# Patient Record
Sex: FEMALE | Race: WHITE | NOT HISPANIC OR LATINO | Employment: OTHER | URBAN - METROPOLITAN AREA
[De-identification: names, ages, dates, MRNs, and addresses within clinical notes are randomized per-mention and may not be internally consistent; named-entity substitution may affect disease eponyms.]

---

## 2017-01-27 ENCOUNTER — HOSPITAL ENCOUNTER (INPATIENT)
Facility: HOSPITAL | Age: 67
LOS: 4 days | Discharge: HOME HEALTH | End: 2017-01-31
Attending: EMERGENCY MEDICINE | Admitting: HOSPITALIST

## 2017-01-27 ENCOUNTER — APPOINTMENT (OUTPATIENT)
Dept: GENERAL RADIOLOGY | Facility: HOSPITAL | Age: 67
End: 2017-01-27

## 2017-01-27 DIAGNOSIS — A41.9 SEPSIS, DUE TO UNSPECIFIED ORGANISM: ICD-10-CM

## 2017-01-27 DIAGNOSIS — M17.0 PRIMARY OSTEOARTHRITIS OF BOTH KNEES: ICD-10-CM

## 2017-01-27 DIAGNOSIS — R53.1 GENERAL WEAKNESS: ICD-10-CM

## 2017-01-27 DIAGNOSIS — J18.9 PNEUMONIA OF RIGHT LOWER LOBE DUE TO INFECTIOUS ORGANISM: Primary | ICD-10-CM

## 2017-01-27 DIAGNOSIS — L03.114 LEFT ARM CELLULITIS: ICD-10-CM

## 2017-01-27 LAB
ALBUMIN SERPL-MCNC: 4.2 G/DL (ref 3.5–5.2)
ALBUMIN/GLOB SERPL: 1 G/DL
ALP SERPL-CCNC: 60 U/L (ref 39–117)
ALT SERPL W P-5'-P-CCNC: 15 U/L (ref 1–33)
ANION GAP SERPL CALCULATED.3IONS-SCNC: 14.8 MMOL/L
AST SERPL-CCNC: 19 U/L (ref 1–32)
BASOPHILS # BLD AUTO: 0.02 10*3/MM3 (ref 0–0.2)
BASOPHILS NFR BLD AUTO: 0.1 % (ref 0–1.5)
BILIRUB SERPL-MCNC: 0.6 MG/DL (ref 0.1–1.2)
BUN BLD-MCNC: 31 MG/DL (ref 8–23)
BUN/CREAT SERPL: 23.7 (ref 7–25)
CALCIUM SPEC-SCNC: 9.9 MG/DL (ref 8.6–10.5)
CHLORIDE SERPL-SCNC: 98 MMOL/L (ref 98–107)
CO2 SERPL-SCNC: 30.2 MMOL/L (ref 22–29)
CREAT BLD-MCNC: 1.31 MG/DL (ref 0.57–1)
D-LACTATE SERPL-SCNC: 1.3 MMOL/L (ref 0.5–2)
D-LACTATE SERPL-SCNC: 2.8 MMOL/L (ref 0.5–2)
DEPRECATED RDW RBC AUTO: 46.8 FL (ref 37–54)
EOSINOPHIL # BLD AUTO: 0.08 10*3/MM3 (ref 0–0.7)
EOSINOPHIL NFR BLD AUTO: 0.5 % (ref 0.3–6.2)
ERYTHROCYTE [DISTWIDTH] IN BLOOD BY AUTOMATED COUNT: 13.8 % (ref 11.7–13)
GFR SERPL CREATININE-BSD FRML MDRD: 40 ML/MIN/1.73
GLOBULIN UR ELPH-MCNC: 4.2 GM/DL
GLUCOSE BLD-MCNC: 142 MG/DL (ref 65–99)
GLUCOSE BLDC GLUCOMTR-MCNC: 112 MG/DL (ref 70–130)
HCT VFR BLD AUTO: 52 % (ref 35.6–45.5)
HGB BLD-MCNC: 16 G/DL (ref 11.9–15.5)
HOLD SPECIMEN: NORMAL
IMM GRANULOCYTES # BLD: 0.03 10*3/MM3 (ref 0–0.03)
IMM GRANULOCYTES NFR BLD: 0.2 % (ref 0–0.5)
LYMPHOCYTES # BLD AUTO: 1.42 10*3/MM3 (ref 0.9–4.8)
LYMPHOCYTES NFR BLD AUTO: 9.7 % (ref 19.6–45.3)
MCH RBC QN AUTO: 28.4 PG (ref 26.9–32)
MCHC RBC AUTO-ENTMCNC: 30.8 G/DL (ref 32.4–36.3)
MCV RBC AUTO: 92.4 FL (ref 80.5–98.2)
MONOCYTES # BLD AUTO: 0.59 10*3/MM3 (ref 0.2–1.2)
MONOCYTES NFR BLD AUTO: 4 % (ref 5–12)
NEUTROPHILS # BLD AUTO: 12.54 10*3/MM3 (ref 1.9–8.1)
NEUTROPHILS NFR BLD AUTO: 85.5 % (ref 42.7–76)
NT-PROBNP SERPL-MCNC: 2262 PG/ML (ref 5–900)
PLATELET # BLD AUTO: 268 10*3/MM3 (ref 140–500)
PMV BLD AUTO: 9 FL (ref 6–12)
POTASSIUM BLD-SCNC: 4.8 MMOL/L (ref 3.5–5.2)
PROT SERPL-MCNC: 8.4 G/DL (ref 6–8.5)
RBC # BLD AUTO: 5.63 10*6/MM3 (ref 3.9–5.2)
SODIUM BLD-SCNC: 143 MMOL/L (ref 136–145)
TROPONIN T SERPL-MCNC: <0.01 NG/ML (ref 0–0.03)
WBC NRBC COR # BLD: 14.68 10*3/MM3 (ref 4.5–10.7)
WHOLE BLOOD HOLD SPECIMEN: NORMAL

## 2017-01-27 PROCEDURE — 93005 ELECTROCARDIOGRAM TRACING: CPT

## 2017-01-27 PROCEDURE — 83880 ASSAY OF NATRIURETIC PEPTIDE: CPT | Performed by: EMERGENCY MEDICINE

## 2017-01-27 PROCEDURE — 25010000002 PIPERACILLIN SOD-TAZOBACTAM PER 1 G: Performed by: PHYSICIAN ASSISTANT

## 2017-01-27 PROCEDURE — 83605 ASSAY OF LACTIC ACID: CPT | Performed by: EMERGENCY MEDICINE

## 2017-01-27 PROCEDURE — 87040 BLOOD CULTURE FOR BACTERIA: CPT | Performed by: EMERGENCY MEDICINE

## 2017-01-27 PROCEDURE — 93010 ELECTROCARDIOGRAM REPORT: CPT | Performed by: INTERNAL MEDICINE

## 2017-01-27 PROCEDURE — 71020 HC CHEST PA AND LATERAL: CPT

## 2017-01-27 PROCEDURE — 99284 EMERGENCY DEPT VISIT MOD MDM: CPT

## 2017-01-27 PROCEDURE — 84484 ASSAY OF TROPONIN QUANT: CPT | Performed by: EMERGENCY MEDICINE

## 2017-01-27 PROCEDURE — 36415 COLL VENOUS BLD VENIPUNCTURE: CPT | Performed by: EMERGENCY MEDICINE

## 2017-01-27 PROCEDURE — 80053 COMPREHEN METABOLIC PANEL: CPT | Performed by: EMERGENCY MEDICINE

## 2017-01-27 PROCEDURE — 82962 GLUCOSE BLOOD TEST: CPT

## 2017-01-27 PROCEDURE — 85025 COMPLETE CBC W/AUTO DIFF WBC: CPT | Performed by: EMERGENCY MEDICINE

## 2017-01-27 RX ORDER — SODIUM CHLORIDE 0.9 % (FLUSH) 0.9 %
10 SYRINGE (ML) INJECTION AS NEEDED
Status: DISCONTINUED | OUTPATIENT
Start: 2017-01-27 | End: 2017-01-31 | Stop reason: HOSPADM

## 2017-01-27 RX ORDER — DICLOFENAC SODIUM 75 MG/1
75 TABLET, DELAYED RELEASE ORAL DAILY
COMMUNITY
End: 2021-01-05

## 2017-01-27 RX ORDER — ACETAMINOPHEN 500 MG
1000 TABLET ORAL ONCE
Status: COMPLETED | OUTPATIENT
Start: 2017-01-27 | End: 2017-01-27

## 2017-01-27 RX ORDER — ATORVASTATIN CALCIUM 10 MG/1
10 TABLET, FILM COATED ORAL NIGHTLY
Status: ON HOLD | COMMUNITY
Start: 2015-09-05 | End: 2023-01-21

## 2017-01-27 RX ORDER — LISINOPRIL AND HYDROCHLOROTHIAZIDE 20; 12.5 MG/1; MG/1
2 TABLET ORAL DAILY
Status: ON HOLD | COMMUNITY
End: 2023-01-21

## 2017-01-27 RX ADMIN — SODIUM CHLORIDE 1779 ML: 9 INJECTION, SOLUTION INTRAVENOUS at 21:33

## 2017-01-27 RX ADMIN — ACETAMINOPHEN 1000 MG: 500 TABLET ORAL at 21:43

## 2017-01-27 RX ADMIN — TAZOBACTAM SODIUM AND PIPERACILLIN SODIUM 4.5 G: 500; 4 INJECTION, SOLUTION INTRAVENOUS at 21:43

## 2017-01-28 ENCOUNTER — APPOINTMENT (OUTPATIENT)
Dept: CT IMAGING | Facility: HOSPITAL | Age: 67
End: 2017-01-28

## 2017-01-28 PROBLEM — I89.0 LYMPHEDEMA: Status: ACTIVE | Noted: 2017-01-28

## 2017-01-28 PROBLEM — L03.90 CELLULITIS: Status: ACTIVE | Noted: 2017-01-28

## 2017-01-28 PROBLEM — E11.9 DIABETES MELLITUS: Status: ACTIVE | Noted: 2017-01-28

## 2017-01-28 PROBLEM — C50.919 BREAST CANCER (HCC): Status: ACTIVE | Noted: 2017-01-28

## 2017-01-28 PROBLEM — I10 HYPERTENSION: Status: ACTIVE | Noted: 2017-01-28

## 2017-01-28 PROBLEM — M81.0 OSTEOPOROSIS: Status: ACTIVE | Noted: 2017-01-28

## 2017-01-28 PROBLEM — E78.5 HYPERLIPIDEMIA: Status: ACTIVE | Noted: 2017-01-28

## 2017-01-28 LAB
ANION GAP SERPL CALCULATED.3IONS-SCNC: 11.2 MMOL/L
B PERT DNA SPEC QL NAA+PROBE: NOT DETECTED
BASOPHILS # BLD AUTO: 0.02 10*3/MM3 (ref 0–0.2)
BASOPHILS NFR BLD AUTO: 0.2 % (ref 0–1.5)
BUN BLD-MCNC: 25 MG/DL (ref 8–23)
BUN/CREAT SERPL: 24 (ref 7–25)
C PNEUM DNA NPH QL NAA+NON-PROBE: NOT DETECTED
CALCIUM SPEC-SCNC: 8.6 MG/DL (ref 8.6–10.5)
CHLORIDE SERPL-SCNC: 96 MMOL/L (ref 98–107)
CO2 SERPL-SCNC: 25.8 MMOL/L (ref 22–29)
CREAT BLD-MCNC: 1.04 MG/DL (ref 0.57–1)
DEPRECATED RDW RBC AUTO: 44.4 FL (ref 37–54)
EOSINOPHIL # BLD AUTO: 0.22 10*3/MM3 (ref 0–0.7)
EOSINOPHIL NFR BLD AUTO: 2.4 % (ref 0.3–6.2)
ERYTHROCYTE [DISTWIDTH] IN BLOOD BY AUTOMATED COUNT: 13.7 % (ref 11.7–13)
FLUAV H1 2009 PAND RNA NPH QL NAA+PROBE: NOT DETECTED
FLUAV H1 HA GENE NPH QL NAA+PROBE: NOT DETECTED
FLUAV H3 RNA NPH QL NAA+PROBE: NOT DETECTED
FLUAV SUBTYP SPEC NAA+PROBE: NOT DETECTED
FLUBV RNA ISLT QL NAA+PROBE: NOT DETECTED
GFR SERPL CREATININE-BSD FRML MDRD: 53 ML/MIN/1.73
GLUCOSE BLD-MCNC: 245 MG/DL (ref 65–99)
GLUCOSE BLDC GLUCOMTR-MCNC: 180 MG/DL (ref 70–130)
GLUCOSE BLDC GLUCOMTR-MCNC: 201 MG/DL (ref 70–130)
GLUCOSE BLDC GLUCOMTR-MCNC: 216 MG/DL (ref 70–130)
GLUCOSE BLDC GLUCOMTR-MCNC: 231 MG/DL (ref 70–130)
HADV DNA SPEC NAA+PROBE: NOT DETECTED
HBA1C MFR BLD: 7.7 % (ref 4.8–5.6)
HCOV 229E RNA SPEC QL NAA+PROBE: NOT DETECTED
HCOV HKU1 RNA SPEC QL NAA+PROBE: NOT DETECTED
HCOV NL63 RNA SPEC QL NAA+PROBE: NOT DETECTED
HCOV OC43 RNA SPEC QL NAA+PROBE: NOT DETECTED
HCT VFR BLD AUTO: 43.3 % (ref 35.6–45.5)
HGB BLD-MCNC: 13.8 G/DL (ref 11.9–15.5)
HMPV RNA NPH QL NAA+NON-PROBE: NOT DETECTED
HPIV1 RNA SPEC QL NAA+PROBE: NOT DETECTED
HPIV2 RNA SPEC QL NAA+PROBE: NOT DETECTED
HPIV3 RNA NPH QL NAA+PROBE: NOT DETECTED
HPIV4 P GENE NPH QL NAA+PROBE: NOT DETECTED
IMM GRANULOCYTES # BLD: 0.03 10*3/MM3 (ref 0–0.03)
IMM GRANULOCYTES NFR BLD: 0.3 % (ref 0–0.5)
LYMPHOCYTES # BLD AUTO: 1.75 10*3/MM3 (ref 0.9–4.8)
LYMPHOCYTES NFR BLD AUTO: 18.8 % (ref 19.6–45.3)
M PNEUMO IGG SER IA-ACNC: NOT DETECTED
MCH RBC QN AUTO: 28.3 PG (ref 26.9–32)
MCHC RBC AUTO-ENTMCNC: 31.9 G/DL (ref 32.4–36.3)
MCV RBC AUTO: 88.9 FL (ref 80.5–98.2)
MONOCYTES # BLD AUTO: 0.67 10*3/MM3 (ref 0.2–1.2)
MONOCYTES NFR BLD AUTO: 7.2 % (ref 5–12)
NEUTROPHILS # BLD AUTO: 6.64 10*3/MM3 (ref 1.9–8.1)
NEUTROPHILS NFR BLD AUTO: 71.1 % (ref 42.7–76)
PLATELET # BLD AUTO: 226 10*3/MM3 (ref 140–500)
PMV BLD AUTO: 9.2 FL (ref 6–12)
POTASSIUM BLD-SCNC: 4 MMOL/L (ref 3.5–5.2)
RBC # BLD AUTO: 4.87 10*6/MM3 (ref 3.9–5.2)
RHINOVIRUS RNA SPEC NAA+PROBE: NOT DETECTED
RSV RNA NPH QL NAA+NON-PROBE: NOT DETECTED
SODIUM BLD-SCNC: 133 MMOL/L (ref 136–145)
WBC NRBC COR # BLD: 9.33 10*3/MM3 (ref 4.5–10.7)

## 2017-01-28 PROCEDURE — 82962 GLUCOSE BLOOD TEST: CPT

## 2017-01-28 PROCEDURE — 25010000002 VANCOMYCIN: Performed by: HOSPITALIST

## 2017-01-28 PROCEDURE — 71250 CT THORAX DX C-: CPT

## 2017-01-28 PROCEDURE — 87486 CHLMYD PNEUM DNA AMP PROBE: CPT | Performed by: HOSPITALIST

## 2017-01-28 PROCEDURE — 25010000002 ENOXAPARIN PER 10 MG: Performed by: HOSPITALIST

## 2017-01-28 PROCEDURE — 83036 HEMOGLOBIN GLYCOSYLATED A1C: CPT | Performed by: HOSPITALIST

## 2017-01-28 PROCEDURE — 94640 AIRWAY INHALATION TREATMENT: CPT

## 2017-01-28 PROCEDURE — 63710000001 INSULIN ASPART 100 UNIT/ML SOLUTION PEN-INJECTOR 3 ML PEN: Performed by: HOSPITALIST

## 2017-01-28 PROCEDURE — 87581 M.PNEUMON DNA AMP PROBE: CPT | Performed by: HOSPITALIST

## 2017-01-28 PROCEDURE — 80048 BASIC METABOLIC PNL TOTAL CA: CPT | Performed by: HOSPITALIST

## 2017-01-28 PROCEDURE — 25010000002 PIPERACILLIN SOD-TAZOBACTAM PER 1 G: Performed by: HOSPITALIST

## 2017-01-28 PROCEDURE — 87205 SMEAR GRAM STAIN: CPT | Performed by: INTERNAL MEDICINE

## 2017-01-28 PROCEDURE — 85025 COMPLETE CBC W/AUTO DIFF WBC: CPT | Performed by: HOSPITALIST

## 2017-01-28 PROCEDURE — 25010000002 VANCOMYCIN: Performed by: PHYSICIAN ASSISTANT

## 2017-01-28 PROCEDURE — 63710000001 INSULIN ASPART PER 5 UNITS: Performed by: HOSPITALIST

## 2017-01-28 PROCEDURE — 63710000001 INSULIN DETEMER PER 5 UNITS: Performed by: HOSPITALIST

## 2017-01-28 PROCEDURE — 87070 CULTURE OTHR SPECIMN AEROBIC: CPT | Performed by: INTERNAL MEDICINE

## 2017-01-28 PROCEDURE — 94799 UNLISTED PULMONARY SVC/PX: CPT

## 2017-01-28 PROCEDURE — 87798 DETECT AGENT NOS DNA AMP: CPT | Performed by: HOSPITALIST

## 2017-01-28 PROCEDURE — 87633 RESP VIRUS 12-25 TARGETS: CPT | Performed by: HOSPITALIST

## 2017-01-28 RX ORDER — ATORVASTATIN CALCIUM 10 MG/1
10 TABLET, FILM COATED ORAL NIGHTLY
Status: DISCONTINUED | OUTPATIENT
Start: 2017-01-28 | End: 2017-01-31 | Stop reason: HOSPADM

## 2017-01-28 RX ORDER — NICOTINE POLACRILEX 4 MG
15 LOZENGE BUCCAL
Status: DISCONTINUED | OUTPATIENT
Start: 2017-01-28 | End: 2017-01-31 | Stop reason: HOSPADM

## 2017-01-28 RX ORDER — ONDANSETRON 2 MG/ML
4 INJECTION INTRAMUSCULAR; INTRAVENOUS EVERY 6 HOURS PRN
Status: DISCONTINUED | OUTPATIENT
Start: 2017-01-28 | End: 2017-01-31 | Stop reason: HOSPADM

## 2017-01-28 RX ORDER — IPRATROPIUM BROMIDE AND ALBUTEROL SULFATE 2.5; .5 MG/3ML; MG/3ML
3 SOLUTION RESPIRATORY (INHALATION)
Status: DISCONTINUED | OUTPATIENT
Start: 2017-01-28 | End: 2017-01-31 | Stop reason: HOSPADM

## 2017-01-28 RX ORDER — GUAIFENESIN 600 MG/1
600 TABLET, EXTENDED RELEASE ORAL 2 TIMES DAILY
Status: DISCONTINUED | OUTPATIENT
Start: 2017-01-28 | End: 2017-01-31 | Stop reason: HOSPADM

## 2017-01-28 RX ORDER — SODIUM CHLORIDE 9 MG/ML
100 INJECTION, SOLUTION INTRAVENOUS CONTINUOUS
Status: DISCONTINUED | OUTPATIENT
Start: 2017-01-28 | End: 2017-01-28

## 2017-01-28 RX ORDER — DEXTROSE MONOHYDRATE 25 G/50ML
25 INJECTION, SOLUTION INTRAVENOUS
Status: DISCONTINUED | OUTPATIENT
Start: 2017-01-28 | End: 2017-01-31 | Stop reason: HOSPADM

## 2017-01-28 RX ORDER — ACETAMINOPHEN 325 MG/1
650 TABLET ORAL EVERY 6 HOURS PRN
Status: DISCONTINUED | OUTPATIENT
Start: 2017-01-28 | End: 2017-01-31 | Stop reason: HOSPADM

## 2017-01-28 RX ORDER — HYDROCODONE BITARTRATE AND ACETAMINOPHEN 5; 325 MG/1; MG/1
1 TABLET ORAL EVERY 6 HOURS PRN
Status: DISCONTINUED | OUTPATIENT
Start: 2017-01-28 | End: 2017-01-31 | Stop reason: HOSPADM

## 2017-01-28 RX ORDER — LISINOPRIL AND HYDROCHLOROTHIAZIDE 20; 12.5 MG/1; MG/1
2 TABLET ORAL DAILY
Status: DISCONTINUED | OUTPATIENT
Start: 2017-01-28 | End: 2017-01-31 | Stop reason: HOSPADM

## 2017-01-28 RX ADMIN — INSULIN ASPART 3 UNITS: 100 INJECTION, SOLUTION INTRAVENOUS; SUBCUTANEOUS at 12:05

## 2017-01-28 RX ADMIN — VANCOMYCIN HYDROCHLORIDE 1750 MG: 1 INJECTION, POWDER, LYOPHILIZED, FOR SOLUTION INTRAVENOUS at 23:37

## 2017-01-28 RX ADMIN — INSULIN ASPART 1 UNITS: 100 INJECTION, SOLUTION INTRAVENOUS; SUBCUTANEOUS at 17:05

## 2017-01-28 RX ADMIN — INSULIN ASPART 3 UNITS: 100 INJECTION, SOLUTION INTRAVENOUS; SUBCUTANEOUS at 21:50

## 2017-01-28 RX ADMIN — VANCOMYCIN HYDROCHLORIDE 1750 MG: 1 INJECTION, POWDER, LYOPHILIZED, FOR SOLUTION INTRAVENOUS at 12:03

## 2017-01-28 RX ADMIN — IPRATROPIUM BROMIDE AND ALBUTEROL SULFATE 3 ML: .5; 3 SOLUTION RESPIRATORY (INHALATION) at 12:13

## 2017-01-28 RX ADMIN — INSULIN DETEMIR 32 UNITS: 100 INJECTION, SOLUTION SUBCUTANEOUS at 21:00

## 2017-01-28 RX ADMIN — LISINOPRIL AND HYDROCHLOROTHIAZIDE 2 TABLET: 12.5; 2 TABLET ORAL at 09:44

## 2017-01-28 RX ADMIN — GUAIFENESIN 600 MG: 600 TABLET, EXTENDED RELEASE ORAL at 17:04

## 2017-01-28 RX ADMIN — INSULIN ASPART 3 UNITS: 100 INJECTION, SOLUTION INTRAVENOUS; SUBCUTANEOUS at 09:44

## 2017-01-28 RX ADMIN — TAZOBACTAM SODIUM AND PIPERACILLIN SODIUM 3.38 G: 375; 3 INJECTION, SOLUTION INTRAVENOUS at 23:00

## 2017-01-28 RX ADMIN — IPRATROPIUM BROMIDE AND ALBUTEROL SULFATE 3 ML: .5; 3 SOLUTION RESPIRATORY (INHALATION) at 16:37

## 2017-01-28 RX ADMIN — ENOXAPARIN SODIUM 40 MG: 40 INJECTION SUBCUTANEOUS at 04:51

## 2017-01-28 RX ADMIN — IPRATROPIUM BROMIDE AND ALBUTEROL SULFATE 3 ML: .5; 3 SOLUTION RESPIRATORY (INHALATION) at 22:43

## 2017-01-28 RX ADMIN — INSULIN DETEMIR 32 UNITS: 100 INJECTION, SOLUTION SUBCUTANEOUS at 11:33

## 2017-01-28 RX ADMIN — INSULIN ASPART 1 UNITS: 100 INJECTION, SOLUTION INTRAVENOUS; SUBCUTANEOUS at 12:05

## 2017-01-28 RX ADMIN — ATORVASTATIN CALCIUM 10 MG: 10 TABLET, FILM COATED ORAL at 21:49

## 2017-01-28 RX ADMIN — TAZOBACTAM SODIUM AND PIPERACILLIN SODIUM 3.38 G: 375; 3 INJECTION, SOLUTION INTRAVENOUS at 06:03

## 2017-01-28 RX ADMIN — SODIUM CHLORIDE 100 ML/HR: 9 INJECTION, SOLUTION INTRAVENOUS at 04:45

## 2017-01-28 RX ADMIN — TAZOBACTAM SODIUM AND PIPERACILLIN SODIUM 3.38 G: 375; 3 INJECTION, SOLUTION INTRAVENOUS at 16:13

## 2017-01-28 RX ADMIN — VANCOMYCIN HYDROCHLORIDE 3000 MG: 1 INJECTION, POWDER, LYOPHILIZED, FOR SOLUTION INTRAVENOUS at 00:15

## 2017-01-28 RX ADMIN — GUAIFENESIN 600 MG: 600 TABLET, EXTENDED RELEASE ORAL at 09:44

## 2017-01-28 RX ADMIN — INSULIN ASPART 2 UNITS: 100 INJECTION, SOLUTION INTRAVENOUS; SUBCUTANEOUS at 17:04

## 2017-01-28 RX ADMIN — INSULIN ASPART 1 UNITS: 100 INJECTION, SOLUTION INTRAVENOUS; SUBCUTANEOUS at 09:45

## 2017-01-29 ENCOUNTER — APPOINTMENT (OUTPATIENT)
Dept: CARDIOLOGY | Facility: HOSPITAL | Age: 67
End: 2017-01-29
Attending: INTERNAL MEDICINE

## 2017-01-29 PROBLEM — J96.01 ACUTE RESPIRATORY FAILURE WITH HYPOXIA (HCC): Status: ACTIVE | Noted: 2017-01-29

## 2017-01-29 LAB
ANION GAP SERPL CALCULATED.3IONS-SCNC: 12.8 MMOL/L
BASOPHILS # BLD AUTO: 0.02 10*3/MM3 (ref 0–0.2)
BASOPHILS NFR BLD AUTO: 0.3 % (ref 0–1.5)
BUN BLD-MCNC: 19 MG/DL (ref 8–23)
BUN/CREAT SERPL: 17.8 (ref 7–25)
CALCIUM SPEC-SCNC: 8.9 MG/DL (ref 8.6–10.5)
CHLORIDE SERPL-SCNC: 100 MMOL/L (ref 98–107)
CO2 SERPL-SCNC: 26.2 MMOL/L (ref 22–29)
CREAT BLD-MCNC: 1.07 MG/DL (ref 0.57–1)
DEPRECATED RDW RBC AUTO: 44.6 FL (ref 37–54)
EOSINOPHIL # BLD AUTO: 0.28 10*3/MM3 (ref 0–0.7)
EOSINOPHIL NFR BLD AUTO: 3.5 % (ref 0.3–6.2)
ERYTHROCYTE [DISTWIDTH] IN BLOOD BY AUTOMATED COUNT: 13.6 % (ref 11.7–13)
GFR SERPL CREATININE-BSD FRML MDRD: 51 ML/MIN/1.73
GLUCOSE BLD-MCNC: 162 MG/DL (ref 65–99)
GLUCOSE BLDC GLUCOMTR-MCNC: 130 MG/DL (ref 70–130)
GLUCOSE BLDC GLUCOMTR-MCNC: 134 MG/DL (ref 70–130)
GLUCOSE BLDC GLUCOMTR-MCNC: 188 MG/DL (ref 70–130)
GLUCOSE BLDC GLUCOMTR-MCNC: 43 MG/DL (ref 70–130)
GLUCOSE BLDC GLUCOMTR-MCNC: 74 MG/DL (ref 70–130)
GLUCOSE BLDC GLUCOMTR-MCNC: 78 MG/DL (ref 70–130)
HCT VFR BLD AUTO: 41.6 % (ref 35.6–45.5)
HGB BLD-MCNC: 12.9 G/DL (ref 11.9–15.5)
IMM GRANULOCYTES # BLD: 0.02 10*3/MM3 (ref 0–0.03)
IMM GRANULOCYTES NFR BLD: 0.3 % (ref 0–0.5)
LYMPHOCYTES # BLD AUTO: 1.15 10*3/MM3 (ref 0.9–4.8)
LYMPHOCYTES NFR BLD AUTO: 14.4 % (ref 19.6–45.3)
MCH RBC QN AUTO: 27.9 PG (ref 26.9–32)
MCHC RBC AUTO-ENTMCNC: 31 G/DL (ref 32.4–36.3)
MCV RBC AUTO: 90 FL (ref 80.5–98.2)
MONOCYTES # BLD AUTO: 0.7 10*3/MM3 (ref 0.2–1.2)
MONOCYTES NFR BLD AUTO: 8.8 % (ref 5–12)
NEUTROPHILS # BLD AUTO: 5.82 10*3/MM3 (ref 1.9–8.1)
NEUTROPHILS NFR BLD AUTO: 72.7 % (ref 42.7–76)
PLATELET # BLD AUTO: 231 10*3/MM3 (ref 140–500)
PMV BLD AUTO: 9.3 FL (ref 6–12)
POTASSIUM BLD-SCNC: 4 MMOL/L (ref 3.5–5.2)
RBC # BLD AUTO: 4.62 10*6/MM3 (ref 3.9–5.2)
SODIUM BLD-SCNC: 139 MMOL/L (ref 136–145)
VANCOMYCIN TROUGH SERPL-MCNC: 20.3 MCG/ML (ref 5–20)
WBC NRBC COR # BLD: 7.99 10*3/MM3 (ref 4.5–10.7)

## 2017-01-29 PROCEDURE — 93971 EXTREMITY STUDY: CPT

## 2017-01-29 PROCEDURE — 63710000001 INSULIN ASPART PER 5 UNITS: Performed by: HOSPITALIST

## 2017-01-29 PROCEDURE — 94640 AIRWAY INHALATION TREATMENT: CPT

## 2017-01-29 PROCEDURE — 25010000003 CEFTRIAXONE PER 250 MG: Performed by: INTERNAL MEDICINE

## 2017-01-29 PROCEDURE — 80202 ASSAY OF VANCOMYCIN: CPT | Performed by: HOSPITALIST

## 2017-01-29 PROCEDURE — 85025 COMPLETE CBC W/AUTO DIFF WBC: CPT | Performed by: INTERNAL MEDICINE

## 2017-01-29 PROCEDURE — 80048 BASIC METABOLIC PNL TOTAL CA: CPT | Performed by: INTERNAL MEDICINE

## 2017-01-29 PROCEDURE — 82962 GLUCOSE BLOOD TEST: CPT

## 2017-01-29 PROCEDURE — 25010000002 ENOXAPARIN PER 10 MG: Performed by: HOSPITALIST

## 2017-01-29 PROCEDURE — 25010000002 PIPERACILLIN SOD-TAZOBACTAM PER 1 G: Performed by: HOSPITALIST

## 2017-01-29 PROCEDURE — 94799 UNLISTED PULMONARY SVC/PX: CPT

## 2017-01-29 PROCEDURE — 87081 CULTURE SCREEN ONLY: CPT | Performed by: INTERNAL MEDICINE

## 2017-01-29 PROCEDURE — 63710000001 INSULIN ASPART PER 5 UNITS: Performed by: INTERNAL MEDICINE

## 2017-01-29 RX ORDER — ECHINACEA PURPUREA EXTRACT 125 MG
2 TABLET ORAL AS NEEDED
Status: DISCONTINUED | OUTPATIENT
Start: 2017-01-29 | End: 2017-01-31 | Stop reason: HOSPADM

## 2017-01-29 RX ORDER — CEFTRIAXONE SODIUM 1 G/50ML
1 INJECTION, SOLUTION INTRAVENOUS EVERY 24 HOURS
Status: DISCONTINUED | OUTPATIENT
Start: 2017-01-29 | End: 2017-01-31

## 2017-01-29 RX ADMIN — GUAIFENESIN 600 MG: 600 TABLET, EXTENDED RELEASE ORAL at 17:45

## 2017-01-29 RX ADMIN — DOXYCYCLINE 100 MG: 100 INJECTION, POWDER, LYOPHILIZED, FOR SOLUTION INTRAVENOUS at 15:55

## 2017-01-29 RX ADMIN — LISINOPRIL AND HYDROCHLOROTHIAZIDE 2 TABLET: 12.5; 2 TABLET ORAL at 08:52

## 2017-01-29 RX ADMIN — IPRATROPIUM BROMIDE AND ALBUTEROL SULFATE 3 ML: .5; 3 SOLUTION RESPIRATORY (INHALATION) at 11:01

## 2017-01-29 RX ADMIN — TAZOBACTAM SODIUM AND PIPERACILLIN SODIUM 3.38 G: 375; 3 INJECTION, SOLUTION INTRAVENOUS at 06:15

## 2017-01-29 RX ADMIN — IPRATROPIUM BROMIDE AND ALBUTEROL SULFATE 3 ML: .5; 3 SOLUTION RESPIRATORY (INHALATION) at 07:49

## 2017-01-29 RX ADMIN — ATORVASTATIN CALCIUM 10 MG: 10 TABLET, FILM COATED ORAL at 21:09

## 2017-01-29 RX ADMIN — AZITHROMYCIN DIHYDRATE 500 MG: 500 INJECTION, POWDER, LYOPHILIZED, FOR SOLUTION INTRAVENOUS at 11:17

## 2017-01-29 RX ADMIN — IPRATROPIUM BROMIDE AND ALBUTEROL SULFATE 3 ML: .5; 3 SOLUTION RESPIRATORY (INHALATION) at 16:01

## 2017-01-29 RX ADMIN — HYDROCODONE BITARTRATE AND ACETAMINOPHEN 1 TABLET: 5; 325 TABLET ORAL at 23:44

## 2017-01-29 RX ADMIN — GUAIFENESIN 600 MG: 600 TABLET, EXTENDED RELEASE ORAL at 08:52

## 2017-01-29 RX ADMIN — CEFTRIAXONE SODIUM 1 G: 1 INJECTION, SOLUTION INTRAVENOUS at 14:43

## 2017-01-29 RX ADMIN — INSULIN DETEMIR 32 UNITS: 100 INJECTION, SOLUTION SUBCUTANEOUS at 08:51

## 2017-01-29 RX ADMIN — INSULIN ASPART 2 UNITS: 100 INJECTION, SOLUTION INTRAVENOUS; SUBCUTANEOUS at 11:50

## 2017-01-29 RX ADMIN — ENOXAPARIN SODIUM 40 MG: 40 INJECTION SUBCUTANEOUS at 05:29

## 2017-01-29 RX ADMIN — INSULIN ASPART 30 UNITS: 100 INJECTION, SOLUTION INTRAVENOUS; SUBCUTANEOUS at 11:50

## 2017-01-29 RX ADMIN — IPRATROPIUM BROMIDE AND ALBUTEROL SULFATE 3 ML: .5; 3 SOLUTION RESPIRATORY (INHALATION) at 19:39

## 2017-01-29 RX ADMIN — INSULIN ASPART 22 UNITS: 100 INJECTION, SOLUTION INTRAVENOUS; SUBCUTANEOUS at 09:53

## 2017-01-30 PROBLEM — E66.01 MORBID OBESITY DUE TO EXCESS CALORIES: Status: ACTIVE | Noted: 2017-01-30

## 2017-01-30 PROBLEM — A41.9 SEPSIS: Status: ACTIVE | Noted: 2017-01-30

## 2017-01-30 LAB
BACTERIA SPEC RESP CULT: NORMAL
BH CV UPPER VENOUS LEFT AXILLARY AUGMENT: NORMAL
BH CV UPPER VENOUS LEFT AXILLARY COMPETENT: NORMAL
BH CV UPPER VENOUS LEFT AXILLARY COMPRESS: NORMAL
BH CV UPPER VENOUS LEFT AXILLARY PHASIC: NORMAL
BH CV UPPER VENOUS LEFT AXILLARY SPONT: NORMAL
BH CV UPPER VENOUS LEFT BASILIC FOREARM COMPRESS: NORMAL
BH CV UPPER VENOUS LEFT BASILIC UPPER COMPRESS: NORMAL
BH CV UPPER VENOUS LEFT BRACHIAL COMPRESS: NORMAL
BH CV UPPER VENOUS LEFT CEPHALIC FOREARM COMPRESS: NORMAL
BH CV UPPER VENOUS LEFT CEPHALIC UPPER COMPRESS: NORMAL
BH CV UPPER VENOUS LEFT INNOMINATE AUGMENT: NORMAL
BH CV UPPER VENOUS LEFT INNOMINATE COMPETENT: NORMAL
BH CV UPPER VENOUS LEFT INNOMINATE COMPRESS: NORMAL
BH CV UPPER VENOUS LEFT INNOMINATE PHASIC: NORMAL
BH CV UPPER VENOUS LEFT INNOMINATE SPONT: NORMAL
BH CV UPPER VENOUS LEFT INTERNAL JUGULAR AUGMENT: NORMAL
BH CV UPPER VENOUS LEFT INTERNAL JUGULAR COMPETENT: NORMAL
BH CV UPPER VENOUS LEFT INTERNAL JUGULAR COMPRESS: NORMAL
BH CV UPPER VENOUS LEFT INTERNAL JUGULAR PHASIC: NORMAL
BH CV UPPER VENOUS LEFT INTERNAL JUGULAR SPONT: NORMAL
BH CV UPPER VENOUS LEFT RADIAL COMPRESS: NORMAL
BH CV UPPER VENOUS LEFT SUBCLAVIAN AUGMENT: NORMAL
BH CV UPPER VENOUS LEFT SUBCLAVIAN COMPETENT: NORMAL
BH CV UPPER VENOUS LEFT SUBCLAVIAN COMPRESS: NORMAL
BH CV UPPER VENOUS LEFT SUBCLAVIAN PHASIC: NORMAL
BH CV UPPER VENOUS LEFT SUBCLAVIAN SPONT: NORMAL
BH CV UPPER VENOUS LEFT ULNAR COMPRESS: NORMAL
BH CV UPPER VENOUS RIGHT INNOMINATE AUGMENT: NORMAL
BH CV UPPER VENOUS RIGHT INNOMINATE COMPETENT: NORMAL
BH CV UPPER VENOUS RIGHT INNOMINATE COMPRESS: NORMAL
BH CV UPPER VENOUS RIGHT INNOMINATE PHASIC: NORMAL
BH CV UPPER VENOUS RIGHT INNOMINATE SPONT: NORMAL
BH CV UPPER VENOUS RIGHT INTERNAL JUGULAR AUGMENT: NORMAL
BH CV UPPER VENOUS RIGHT INTERNAL JUGULAR COMPETENT: NORMAL
BH CV UPPER VENOUS RIGHT INTERNAL JUGULAR COMPRESS: NORMAL
BH CV UPPER VENOUS RIGHT INTERNAL JUGULAR PHASIC: NORMAL
BH CV UPPER VENOUS RIGHT INTERNAL JUGULAR SPONT: NORMAL
BH CV UPPER VENOUS RIGHT SUBCLAVIAN AUGMENT: NORMAL
BH CV UPPER VENOUS RIGHT SUBCLAVIAN COMPETENT: NORMAL
BH CV UPPER VENOUS RIGHT SUBCLAVIAN COMPRESS: NORMAL
BH CV UPPER VENOUS RIGHT SUBCLAVIAN PHASIC: NORMAL
BH CV UPPER VENOUS RIGHT SUBCLAVIAN SPONT: NORMAL
GLUCOSE BLDC GLUCOMTR-MCNC: 132 MG/DL (ref 70–130)
GLUCOSE BLDC GLUCOMTR-MCNC: 163 MG/DL (ref 70–130)
GLUCOSE BLDC GLUCOMTR-MCNC: 214 MG/DL (ref 70–130)
GLUCOSE BLDC GLUCOMTR-MCNC: 40 MG/DL (ref 70–130)
GLUCOSE BLDC GLUCOMTR-MCNC: 62 MG/DL (ref 70–130)
GLUCOSE BLDC GLUCOMTR-MCNC: 69 MG/DL (ref 70–130)
GLUCOSE BLDC GLUCOMTR-MCNC: 77 MG/DL (ref 70–130)
GLUCOSE BLDC GLUCOMTR-MCNC: 90 MG/DL (ref 70–130)
GRAM STN SPEC: NORMAL

## 2017-01-30 PROCEDURE — 94799 UNLISTED PULMONARY SVC/PX: CPT

## 2017-01-30 PROCEDURE — 94640 AIRWAY INHALATION TREATMENT: CPT

## 2017-01-30 PROCEDURE — 25010000002 ENOXAPARIN PER 10 MG: Performed by: HOSPITALIST

## 2017-01-30 PROCEDURE — 63710000001 INSULIN ASPART PER 5 UNITS: Performed by: INTERNAL MEDICINE

## 2017-01-30 PROCEDURE — 63710000001 INSULIN ASPART PER 5 UNITS: Performed by: HOSPITALIST

## 2017-01-30 PROCEDURE — 97110 THERAPEUTIC EXERCISES: CPT

## 2017-01-30 PROCEDURE — 25010000003 CEFTRIAXONE PER 250 MG: Performed by: INTERNAL MEDICINE

## 2017-01-30 PROCEDURE — 94760 N-INVAS EAR/PLS OXIMETRY 1: CPT

## 2017-01-30 PROCEDURE — 82962 GLUCOSE BLOOD TEST: CPT

## 2017-01-30 PROCEDURE — 97162 PT EVAL MOD COMPLEX 30 MIN: CPT

## 2017-01-30 RX ADMIN — IPRATROPIUM BROMIDE AND ALBUTEROL SULFATE 3 ML: .5; 3 SOLUTION RESPIRATORY (INHALATION) at 15:19

## 2017-01-30 RX ADMIN — ACETAMINOPHEN 650 MG: 325 TABLET ORAL at 02:08

## 2017-01-30 RX ADMIN — INSULIN ASPART 3 UNITS: 100 INJECTION, SOLUTION INTRAVENOUS; SUBCUTANEOUS at 21:12

## 2017-01-30 RX ADMIN — CEFTRIAXONE SODIUM 1 G: 1 INJECTION, SOLUTION INTRAVENOUS at 15:15

## 2017-01-30 RX ADMIN — LISINOPRIL AND HYDROCHLOROTHIAZIDE 2 TABLET: 12.5; 2 TABLET ORAL at 08:41

## 2017-01-30 RX ADMIN — ATORVASTATIN CALCIUM 10 MG: 10 TABLET, FILM COATED ORAL at 20:44

## 2017-01-30 RX ADMIN — GUAIFENESIN 600 MG: 600 TABLET, EXTENDED RELEASE ORAL at 17:52

## 2017-01-30 RX ADMIN — INSULIN DETEMIR 32 UNITS: 100 INJECTION, SOLUTION SUBCUTANEOUS at 08:41

## 2017-01-30 RX ADMIN — IPRATROPIUM BROMIDE AND ALBUTEROL SULFATE 3 ML: .5; 3 SOLUTION RESPIRATORY (INHALATION) at 10:33

## 2017-01-30 RX ADMIN — GUAIFENESIN 600 MG: 600 TABLET, EXTENDED RELEASE ORAL at 08:41

## 2017-01-30 RX ADMIN — INSULIN ASPART 30 UNITS: 100 INJECTION, SOLUTION INTRAVENOUS; SUBCUTANEOUS at 11:51

## 2017-01-30 RX ADMIN — HYDROCODONE BITARTRATE AND ACETAMINOPHEN 1 TABLET: 5; 325 TABLET ORAL at 20:44

## 2017-01-30 RX ADMIN — DOXYCYCLINE 100 MG: 100 INJECTION, POWDER, LYOPHILIZED, FOR SOLUTION INTRAVENOUS at 15:15

## 2017-01-30 RX ADMIN — INSULIN ASPART 22 UNITS: 100 INJECTION, SOLUTION INTRAVENOUS; SUBCUTANEOUS at 08:41

## 2017-01-30 RX ADMIN — INSULIN DETEMIR 32 UNITS: 100 INJECTION, SOLUTION SUBCUTANEOUS at 21:12

## 2017-01-30 RX ADMIN — ENOXAPARIN SODIUM 40 MG: 40 INJECTION SUBCUTANEOUS at 05:50

## 2017-01-30 RX ADMIN — DOXYCYCLINE 100 MG: 100 INJECTION, POWDER, LYOPHILIZED, FOR SOLUTION INTRAVENOUS at 02:09

## 2017-01-30 RX ADMIN — IPRATROPIUM BROMIDE AND ALBUTEROL SULFATE 3 ML: .5; 3 SOLUTION RESPIRATORY (INHALATION) at 20:46

## 2017-01-30 RX ADMIN — IPRATROPIUM BROMIDE AND ALBUTEROL SULFATE 3 ML: .5; 3 SOLUTION RESPIRATORY (INHALATION) at 07:05

## 2017-01-31 VITALS
DIASTOLIC BLOOD PRESSURE: 83 MMHG | RESPIRATION RATE: 20 BRPM | OXYGEN SATURATION: 92 % | TEMPERATURE: 97.7 F | BODY MASS INDEX: 47.09 KG/M2 | HEIGHT: 66 IN | WEIGHT: 293 LBS | SYSTOLIC BLOOD PRESSURE: 154 MMHG | HEART RATE: 72 BPM

## 2017-01-31 PROBLEM — A41.9 SEPSIS (HCC): Status: RESOLVED | Noted: 2017-01-30 | Resolved: 2017-01-31

## 2017-01-31 LAB
GLUCOSE BLDC GLUCOMTR-MCNC: 102 MG/DL (ref 70–130)
GLUCOSE BLDC GLUCOMTR-MCNC: 122 MG/DL (ref 70–130)
GLUCOSE BLDC GLUCOMTR-MCNC: 42 MG/DL (ref 70–130)
GLUCOSE BLDC GLUCOMTR-MCNC: 52 MG/DL (ref 70–130)
MRSA SPEC QL CULT: NORMAL

## 2017-01-31 PROCEDURE — 63710000001 INSULIN ASPART PER 5 UNITS: Performed by: INTERNAL MEDICINE

## 2017-01-31 PROCEDURE — 25010000002 ENOXAPARIN PER 10 MG: Performed by: HOSPITALIST

## 2017-01-31 PROCEDURE — 94799 UNLISTED PULMONARY SVC/PX: CPT

## 2017-01-31 PROCEDURE — 94640 AIRWAY INHALATION TREATMENT: CPT

## 2017-01-31 PROCEDURE — 82962 GLUCOSE BLOOD TEST: CPT

## 2017-01-31 RX ORDER — DOXYCYCLINE 100 MG/1
100 CAPSULE ORAL EVERY 12 HOURS SCHEDULED
Qty: 16 CAPSULE | Refills: 0 | Status: SHIPPED | OUTPATIENT
Start: 2017-01-31 | End: 2017-02-08

## 2017-01-31 RX ORDER — HYDROCODONE BITARTRATE AND ACETAMINOPHEN 5; 325 MG/1; MG/1
1 TABLET ORAL EVERY 6 HOURS PRN
Qty: 30 TABLET | Refills: 0 | Status: SHIPPED | OUTPATIENT
Start: 2017-01-31 | End: 2023-01-31 | Stop reason: HOSPADM

## 2017-01-31 RX ORDER — DOXYCYCLINE 100 MG/1
100 CAPSULE ORAL EVERY 12 HOURS SCHEDULED
Qty: 16 CAPSULE | Refills: 0 | Status: CANCELLED | OUTPATIENT
Start: 2017-01-31 | End: 2017-02-08

## 2017-01-31 RX ORDER — CEFDINIR 300 MG/1
300 CAPSULE ORAL EVERY 12 HOURS SCHEDULED
Qty: 16 CAPSULE | Refills: 0 | Status: SHIPPED | OUTPATIENT
Start: 2017-01-31 | End: 2017-02-08

## 2017-01-31 RX ORDER — CEFDINIR 300 MG/1
300 CAPSULE ORAL EVERY 12 HOURS SCHEDULED
Qty: 16 CAPSULE | Refills: 0 | Status: CANCELLED | OUTPATIENT
Start: 2017-01-31 | End: 2017-02-08

## 2017-01-31 RX ORDER — DOXYCYCLINE 100 MG/1
100 CAPSULE ORAL EVERY 12 HOURS SCHEDULED
Status: DISCONTINUED | OUTPATIENT
Start: 2017-01-31 | End: 2017-01-31 | Stop reason: HOSPADM

## 2017-01-31 RX ORDER — GUAIFENESIN 600 MG/1
600 TABLET, EXTENDED RELEASE ORAL 2 TIMES DAILY
Qty: 16 TABLET | Refills: 0 | Status: SHIPPED | OUTPATIENT
Start: 2017-01-31 | End: 2017-02-08

## 2017-01-31 RX ORDER — CEFDINIR 300 MG/1
300 CAPSULE ORAL EVERY 12 HOURS SCHEDULED
Status: DISCONTINUED | OUTPATIENT
Start: 2017-01-31 | End: 2017-01-31 | Stop reason: HOSPADM

## 2017-01-31 RX ORDER — ALBUTEROL SULFATE 90 UG/1
2 AEROSOL, METERED RESPIRATORY (INHALATION) EVERY 4 HOURS PRN
Qty: 1 INHALER | Refills: 0 | Status: SHIPPED | OUTPATIENT
Start: 2017-01-31

## 2017-01-31 RX ADMIN — HYDROCODONE BITARTRATE AND ACETAMINOPHEN 1 TABLET: 5; 325 TABLET ORAL at 08:05

## 2017-01-31 RX ADMIN — INSULIN ASPART 20 UNITS: 100 INJECTION, SOLUTION INTRAVENOUS; SUBCUTANEOUS at 12:07

## 2017-01-31 RX ADMIN — INSULIN ASPART 22 UNITS: 100 INJECTION, SOLUTION INTRAVENOUS; SUBCUTANEOUS at 08:05

## 2017-01-31 RX ADMIN — IPRATROPIUM BROMIDE AND ALBUTEROL SULFATE 3 ML: .5; 3 SOLUTION RESPIRATORY (INHALATION) at 07:31

## 2017-01-31 RX ADMIN — GUAIFENESIN 600 MG: 600 TABLET, EXTENDED RELEASE ORAL at 08:05

## 2017-01-31 RX ADMIN — ENOXAPARIN SODIUM 40 MG: 40 INJECTION SUBCUTANEOUS at 05:08

## 2017-01-31 RX ADMIN — DOXYCYCLINE 100 MG: 100 CAPSULE ORAL at 13:42

## 2017-01-31 RX ADMIN — CEFDINIR 300 MG: 300 CAPSULE ORAL at 13:42

## 2017-01-31 RX ADMIN — LISINOPRIL AND HYDROCHLOROTHIAZIDE 2 TABLET: 12.5; 2 TABLET ORAL at 08:05

## 2017-01-31 RX ADMIN — INSULIN DETEMIR 32 UNITS: 100 INJECTION, SOLUTION SUBCUTANEOUS at 08:05

## 2017-01-31 RX ADMIN — IPRATROPIUM BROMIDE AND ALBUTEROL SULFATE 3 ML: .5; 3 SOLUTION RESPIRATORY (INHALATION) at 12:59

## 2017-01-31 RX ADMIN — DOXYCYCLINE 100 MG: 100 INJECTION, POWDER, LYOPHILIZED, FOR SOLUTION INTRAVENOUS at 01:40

## 2017-02-01 LAB — BACTERIA SPEC AEROBE CULT: NORMAL

## 2017-02-02 LAB — BACTERIA SPEC AEROBE CULT: NORMAL

## 2017-02-21 ENCOUNTER — OFFICE VISIT (OUTPATIENT)
Dept: ORTHOPEDIC SURGERY | Facility: CLINIC | Age: 67
End: 2017-02-21

## 2017-02-21 VITALS — BODY MASS INDEX: 47.09 KG/M2 | WEIGHT: 293 LBS | HEIGHT: 66 IN | TEMPERATURE: 98 F

## 2017-02-21 DIAGNOSIS — M25.561 CHRONIC PAIN OF RIGHT KNEE: Primary | ICD-10-CM

## 2017-02-21 DIAGNOSIS — G89.29 CHRONIC PAIN OF RIGHT KNEE: Primary | ICD-10-CM

## 2017-02-21 PROCEDURE — 99213 OFFICE O/P EST LOW 20 MIN: CPT | Performed by: ORTHOPAEDIC SURGERY

## 2017-02-21 PROCEDURE — 73562 X-RAY EXAM OF KNEE 3: CPT | Performed by: ORTHOPAEDIC SURGERY

## 2017-02-21 NOTE — PROGRESS NOTES
Patient: Aydee Solis  YOB: 1950 66 y.o. female  Medical Record Number: [id number]    Chief Complaints:   Chief Complaint   Patient presents with   • Right Knee - Follow-up, Pain, Numbness, Tingling       History of Present Illness:Aydee Solis is a 66 y.o. female who presents with complaints of right leg pain ongoing.  She's recently discharged for treatment of cellulitis in her leg.  She's having pain throughout moderate ache in nature.  She states she can no longer exercise.  She is wondering about a scooter.  She is wondering me to write her for narcotic pain medication for all over body pain including night pain.    Allergies:   Allergies   Allergen Reactions   • Ciprofloxacin        Medications:   Current Outpatient Prescriptions   Medication Sig Dispense Refill   • albuterol (PROVENTIL HFA;VENTOLIN HFA) 108 (90 BASE) MCG/ACT inhaler Inhale 2 puffs Every 4 (Four) Hours As Needed for wheezing or shortness of air. 1 inhaler 0   • atorvastatin (LIPITOR) 10 MG tablet Take 10 mg by mouth Every Night.     • diclofenac (VOLTAREN) 75 MG EC tablet Take 75 mg by mouth Daily.     • HYDROcodone-acetaminophen (NORCO) 5-325 MG per tablet Take 1 tablet by mouth Every 6 (Six) Hours As Needed for moderate pain (4-6) or severe pain (7-10). 30 tablet 0   • insulin aspart (NOVOLOG FLEXPEN) 100 UNIT/ML solution pen-injector sc pen Inject 1 Units under the skin 3 (Three) Times a Day With Meals. 22 units breakfast 20units lunch 15units dinner     • insulin detemir (LEVEMIR FLEXTOUCH) 100 UNIT/ML injection Inject 32 Units under the skin 2 (Two) Times a Day.     • lisinopril-hydrochlorothiazide (PRINZIDE,ZESTORETIC) 20-12.5 MG per tablet Take 2 tablets by mouth Daily.       No current facility-administered medications for this visit.          The following portions of the patient's history were reviewed and updated as appropriate: allergies, current medications, past family history, past medical history, past social  "history, past surgical history and problem list.    Review of Systems:   A 14 point review of systems was performed. All systems negative except pertinent positives/negative listed in HPI above    Physical Exam:   Vitals:    02/21/17 1511   Temp: 98 °F (36.7 °C)   Weight: (!) 334 lb (152 kg)   Height: 66\" (167.6 cm)       General: A and O x 3, ASA, NAD    SCLERA:    Normal    DENTITION:   Normal  There is marketed lymphedema from the knee distal she has cellulitis from mid tibia distal is tender to palpation the tissues are quite woody there is no evidence of weeping or drainage currently.  She has moderate pain with knee range of motion.       Radiology:  Xrays 3views right knee (ap,lateral, sunrise) were ordered and reviewed for evaluation of knee pain demonstrating advanced varus osteoarthritis with bone on bone articulation, subchondral cysts, and periarticular osteophytes  In comparison to previous films there are no changes    Assessment/Plan: Right knee osteoarthritis.  I don't feel she is a candidate for surgery given her markedly elevated BMI lymphedema distal cellulitis.  I also don't feel she is a candidate for chronic narcotics.  I discussed the problems with chronic narcotics including dependence tolerance and addiction.  Or for her to lymphedema clinic for management of edema so hopefully her cellulitis can improve.  I'll see her back on a when necessary basis.    Body mass index is 53.91 kg/(m^2).    Rocky Choudhary MD  2/21/2017  "

## 2017-02-24 ENCOUNTER — TELEPHONE (OUTPATIENT)
Dept: ORTHOPEDIC SURGERY | Facility: CLINIC | Age: 67
End: 2017-02-24

## 2017-02-24 NOTE — TELEPHONE ENCOUNTER
Upon further review of Dr. Yanez's notes, the patient was supposed to go to the lymphedema clinic for the swelling in her legs.  Please contact patient and if she is willing to go to the lymphedema clinic I can place an order for that.

## 2017-03-09 ENCOUNTER — TELEPHONE (OUTPATIENT)
Dept: ORTHOPEDIC SURGERY | Facility: CLINIC | Age: 67
End: 2017-03-09

## 2017-03-09 DIAGNOSIS — I89.0 LYMPHEDEMA OF RIGHT LOWER EXTREMITY: Primary | ICD-10-CM

## 2017-03-09 DIAGNOSIS — L03.115 CELLULITIS OF RIGHT LOWER EXTREMITY: ICD-10-CM

## 2017-03-31 ENCOUNTER — HOSPITAL ENCOUNTER (OUTPATIENT)
Dept: PHYSICAL THERAPY | Facility: HOSPITAL | Age: 67
Setting detail: THERAPIES SERIES
Discharge: HOME | End: 2017-03-31

## 2017-03-31 DIAGNOSIS — I89.0 LYMPHEDEMA: Primary | ICD-10-CM

## 2017-03-31 PROCEDURE — G8987 SELF CARE CURRENT STATUS: HCPCS

## 2017-03-31 PROCEDURE — G8988 SELF CARE GOAL STATUS: HCPCS

## 2017-03-31 PROCEDURE — 97162 PT EVAL MOD COMPLEX 30 MIN: CPT

## 2017-05-26 ENCOUNTER — TRANSCRIBE ORDERS (OUTPATIENT)
Dept: PHYSICAL THERAPY | Facility: HOSPITAL | Age: 67
End: 2017-05-26

## 2017-05-26 DIAGNOSIS — Z46.89 WHEELCHAIR FITTING OR ADJUSTMENT: Primary | ICD-10-CM

## 2017-05-30 ENCOUNTER — HOSPITAL ENCOUNTER (OUTPATIENT)
Dept: PHYSICAL THERAPY | Facility: HOSPITAL | Age: 67
Setting detail: THERAPIES SERIES
Discharge: HOME | End: 2017-05-30

## 2017-05-30 DIAGNOSIS — E66.9 OBESITY, UNSPECIFIED: ICD-10-CM

## 2017-05-30 DIAGNOSIS — Z46.89 FITTING AND ADJUSTMENT OF WHEELCHAIR: Primary | ICD-10-CM

## 2017-05-30 DIAGNOSIS — I89.0 LYMPHEDEMA: Primary | ICD-10-CM

## 2017-05-30 DIAGNOSIS — R26.89 FUNCTIONAL GAIT ABNORMALITY: ICD-10-CM

## 2017-05-30 PROCEDURE — 97140 MANUAL THERAPY 1/> REGIONS: CPT

## 2017-05-30 PROCEDURE — 97162 PT EVAL MOD COMPLEX 30 MIN: CPT

## 2017-05-30 PROCEDURE — G8991 OTHER PT/OT GOAL STATUS: HCPCS

## 2017-05-30 PROCEDURE — G8990 OTHER PT/OT CURRENT STATUS: HCPCS

## 2017-05-31 ENCOUNTER — HOSPITAL ENCOUNTER (OUTPATIENT)
Dept: PHYSICAL THERAPY | Facility: HOSPITAL | Age: 67
Setting detail: THERAPIES SERIES
Discharge: HOME | End: 2017-05-31

## 2017-05-31 DIAGNOSIS — I89.0 LYMPHEDEMA: Primary | ICD-10-CM

## 2017-05-31 PROCEDURE — 97140 MANUAL THERAPY 1/> REGIONS: CPT

## 2017-06-01 ENCOUNTER — HOSPITAL ENCOUNTER (OUTPATIENT)
Dept: PHYSICAL THERAPY | Facility: HOSPITAL | Age: 67
Setting detail: THERAPIES SERIES
Discharge: HOME | End: 2017-06-01

## 2017-06-01 DIAGNOSIS — I89.0 LYMPHEDEMA: Primary | ICD-10-CM

## 2017-06-01 PROCEDURE — 97140 MANUAL THERAPY 1/> REGIONS: CPT

## 2017-06-01 NOTE — THERAPY TREATMENT NOTE
Outpatient Physical Therapy Lymphedema Treatment Note  [facility]     Patient Name: Aydee Solis  : 1950  MRN: [id number]  Today's Date: 2017        Visit Date: 2017    Visit Dx:    ICD-10-CM ICD-9-CM   1. Lymphedema I89.0 457.1       Patient Active Problem List   Diagnosis   • Pneumonia of right lower lobe due to infectious organism   • Cellulitis   • Hypertension   • Diabetes mellitus   • Hyperlipidemia   • Breast cancer   • Lymphedema   • Osteoporosis   • Acute respiratory failure with hypoxia   • Morbid obesity due to excess calories              Lymphedema       17 1400 17 1500       Subjective Comments    Subjective Comments States that when she removed the bandages earlier today her legs were very painful and tender , very itchy, and she had red streaks and welts on them. Not as bad now. States she felt that the bandages were pulling in different directions. Says that she can tell her legs are down--says right leg looks close to normal for her.  -KD States the bandages on her feet made it very difficult to walk and caused incresaed pain.  The bandages on the legs were fine.  -PC     Subjective Pain    Able to rate subjective pain? yes  -KD yes  -PC     Pre-Treatment Pain Level 8  -KD 7  -PC     Post-Treatment Pain Level 8  -KD 7  -PC     Skin Changes/Observations    Skin Observations Comment Legs with some dark red discoloration.  -KD      Manual Lymphatic Drainage    Manual Lymphatic Drainage --   B axilla, sides, B inguinal, B LE's.  -KD --   B axilla, sides, B inguinal, B LE's.  -PC     Compression/Skin Care    Compression/Skin Care --   HCC to lower legs  -KD --   Eucerin lotion  -PC     Wrapping Location --   B ankles to knees  -KD --   B ankles to knees  -PC     Compression/Skin Care Comments Tricofix G8, Artiflex , Rosidal K 1-8cm & 2-10cm.  -KD Tg 9, Rosidal Soft/foam roll, Comprilan 1-6cm, 1-8cm, 2-10cm.   -PC       User Key  (r) = Recorded By, (t) = Taken By, (c)  = Cosigned By    Initials Name Provider Type    PC Hilary Wayne, PT Physical Therapist    SHONDA Braxton PT Physical Therapist                              PT Assessment/Plan       06/01/17 1430 05/31/17 1536    PT Assessment    Assessment Comments Pt liked not having the bandages on her feet but had issues with pain and irritation of both lower legs. Switched stockinette to Tricofix, switched Rosidal Soft to Artiflex, and decreased bandages from 4 on each leg to 3.  -KD Pt did not tolerate the bandages on her feet due to her peripheral neuropathy.  Today we tried bandaging ankles to knees, and pt will leave her shoes on until bedtime.  -PC    PT Plan    PT Plan Comments Cont, assess tolerance to changes in bandaging.  -KD Cont, assess pt's tolerance to bandaging ankles to knees.  -PC      User Key  (r) = Recorded By, (t) = Taken By, (c) = Cosigned By    Initials Name Provider Type    PC Hilary Wayne, PT Physical Therapist    SHONDA Braxton, PT Physical Therapist                     Exercises       06/01/17 1400 05/31/17 1500       Subjective Comments    Subjective Comments States that when she removed the bandages earlier today her legs were very painful and tender , very itchy, and she had red streaks and welts on them. Not as bad now. States she felt that the bandages were pulling in different directions. Says that she can tell her legs are down--says right leg looks close to normal for her.  -KD States the bandages on her feet made it very difficult to walk and caused incresaed pain.  The bandages on the legs were fine.  -PC     Subjective Pain    Able to rate subjective pain? yes  -KD yes  -PC     Pre-Treatment Pain Level 8  -KD 7  -PC     Post-Treatment Pain Level 8  -KD 7  -PC       User Key  (r) = Recorded By, (t) = Taken By, (c) = Cosigned By    Initials Name Provider Type    ANTONIO Wayne, PT Physical Therapist    SHONDA Braxton PT Physical Therapist                                 "  Therapy Education       06/01/17 1430 05/31/17 1536       Therapy Education    Given Bandaging/dressing change   Discussed change in bandaging  -KD Bandaging/dressing change  -PC     Program Reinforced  -KD Reinforced  -PC     How Provided Verbal  -KD Verbal;Demonstration  -PC     Provided to Patient  -KD Patient  -PC     Level of Understanding Verbalized  -KD Verbalized  -PC       User Key  (r) = Recorded By, (t) = Taken By, (c) = Cosigned By    Initials Name Provider Type    PC Hilary Wayne, PT Physical Therapist    SHONDA Braxton, ILYA Physical Therapist                Outcome Measures       05/30/17 1900          Tinetti Assessment    Tinetti Assessment yes  -LB      Sitting Balance 1  -LB      Arises 2  -LB      Attempts to Rise 2  -LB      Immediate Standing Balance (first 5 sec) 2  -LB      Standing Balance 1  -LB      Sternal Nudge (feet close together) 1  -LB      Eyes Closed (feet close together) 1  -LB      Turning 360 Degrees- Steps 0  -LB      Turning 360 Degrees- Steadiness 0  -LB      Sitting Down 1  -LB      Tinetti Balance Score 11  -LB      Gait Initiation (immediate after told \"go\") 0  -LB      Step Length- Right Swing 0  -LB      Step Length- Left Swing 1  -LB      Foot Clearance- Right Foot 1  -LB      Foot Clearance- Left Foot 1  -LB      Step Symmetry 0  -LB      Step Continuity 1  -LB      Path (excursion) 1  -LB      Trunk 0  -LB      Base of Support 0  -LB      Gait Score 5  -LB      Tinetti Total Score 16  -LB      Tinetti Assistive Device straight cane   in right hand, left hand unable to  cane   -LB      Tinetti Assessment Comments SOA noted   -LB      Timed Up and Go (TUG)    TUG Test 1 36 seconds  -LB      TUG Test 2 --   unable to complete due to pain and SOA   -LB      Timed Up and Go Comments straight cane in right hand   -LB      Functional Assessment    Outcome Measure Options Tinetti;Timed Up and Go (TUG)  -LB        User Key  (r) = Recorded By, (t) = Taken By, (c) = " Cosigned By    Initials Name Provider Type    LB Susie Augustin, PT Physical Therapist            Time Calculation:   Start Time: 1303  Stop Time: 1409  Time Calculation (min): 66 min     Therapy Charges for Today     Code Description Service Date Service Provider Modifiers Qty    20302339616 HC PT MANUAL THERAPY EA 15 MIN 6/1/2017 Cheryl Braxton, PT GP 4                    Cheryl Braxton, PT  6/1/2017

## 2017-06-02 ENCOUNTER — HOSPITAL ENCOUNTER (OUTPATIENT)
Dept: PHYSICAL THERAPY | Facility: HOSPITAL | Age: 67
Setting detail: THERAPIES SERIES
Discharge: HOME | End: 2017-06-02

## 2017-06-02 DIAGNOSIS — I89.0 LYMPHEDEMA: Primary | ICD-10-CM

## 2017-06-02 PROCEDURE — 97140 MANUAL THERAPY 1/> REGIONS: CPT

## 2017-06-02 NOTE — THERAPY TREATMENT NOTE
Outpatient Physical Therapy Lymphedema Treatment Note  [facility]     Patient Name: Aydee Solis  : 1950  MRN: [id number]  Today's Date: 2017        Visit Date: 2017    Visit Dx:    ICD-10-CM ICD-9-CM   1. Lymphedema I89.0 457.1       Patient Active Problem List   Diagnosis   • Pneumonia of right lower lobe due to infectious organism   • Cellulitis   • Hypertension   • Diabetes mellitus   • Hyperlipidemia   • Breast cancer   • Lymphedema   • Osteoporosis   • Acute respiratory failure with hypoxia   • Morbid obesity due to excess calories              Lymphedema       17 1500          Subjective Comments    Subjective Comments States the bandages felt much better yesterday. Thinks her right leg is close to normal.  -PC      Subjective Pain    Able to rate subjective pain? yes  -PC      Pre-Treatment Pain Level 7  -PC      Post-Treatment Pain Level 7  -PC      Skin Changes/Observations    Skin Observations Comment Redness has decreased.  -PC      Manual Lymphatic Drainage    Manual Lymphatic Drainage --   B axilla, sides, B inguinal, B LE's.  -PC      Compression/Skin Care    Compression/Skin Care --   HCC to lower legs  -PC      Wrapping Location --   B ankles to knees  -PC      Compression/Skin Care Comments Tricofix G8, Artiflex , Rosidal K 1-8cm & 2-10cm.  -PC        User Key  (r) = Recorded By, (t) = Taken By, (c) = Cosigned By    Initials Name Provider Type    PC Hilary Wayne, PT Physical Therapist                              PT Assessment/Plan       17 1532       PT Assessment    Assessment Comments Pt tolerated bandages much better with the changes.  -PC     PT Plan    PT Plan Comments Cont.  -PC       User Key  (r) = Recorded By, (t) = Taken By, (c) = Cosigned By    Initials Name Provider Type     Hilary Wayne, PT Physical Therapist                     Exercises       17 1500          Subjective Comments    Subjective Comments States the bandages felt much  "better yesterday. Thinks her right leg is close to normal.  -PC      Subjective Pain    Able to rate subjective pain? yes  -PC      Pre-Treatment Pain Level 7  -PC      Post-Treatment Pain Level 7  -PC        User Key  (r) = Recorded By, (t) = Taken By, (c) = Cosigned By    Initials Name Provider Type    PC Hilary Wayne, ILYA Physical Therapist                                  Therapy Education       06/02/17 1532          Therapy Education    Given Bandaging/dressing change  -PC      Program Reinforced  -PC      How Provided Verbal;Demonstration  -PC      Provided to Patient  -PC      Level of Understanding Verbalized  -PC        User Key  (r) = Recorded By, (t) = Taken By, (c) = Cosigned By    Initials Name Provider Type    PC Hilary Wayne PT Physical Therapist                Outcome Measures       05/30/17 1900          Tinetti Assessment    Tinetti Assessment yes  -LB      Sitting Balance 1  -LB      Arises 2  -LB      Attempts to Rise 2  -LB      Immediate Standing Balance (first 5 sec) 2  -LB      Standing Balance 1  -LB      Sternal Nudge (feet close together) 1  -LB      Eyes Closed (feet close together) 1  -LB      Turning 360 Degrees- Steps 0  -LB      Turning 360 Degrees- Steadiness 0  -LB      Sitting Down 1  -LB      Tinetti Balance Score 11  -LB      Gait Initiation (immediate after told \"go\") 0  -LB      Step Length- Right Swing 0  -LB      Step Length- Left Swing 1  -LB      Foot Clearance- Right Foot 1  -LB      Foot Clearance- Left Foot 1  -LB      Step Symmetry 0  -LB      Step Continuity 1  -LB      Path (excursion) 1  -LB      Trunk 0  -LB      Base of Support 0  -LB      Gait Score 5  -LB      Tinetti Total Score 16  -LB      Tinetti Assistive Device straight cane   in right hand, left hand unable to  cane   -LB      Tinetti Assessment Comments SOA noted   -LB      Timed Up and Go (TUG)    TUG Test 1 36 seconds  -LB      TUG Test 2 --   unable to complete due to pain and SOA   -LB      " Timed Up and Go Comments straight cane in right hand   -LB      Functional Assessment    Outcome Measure Options Tinetti;Timed Up and Go (TUG)  -LB        User Key  (r) = Recorded By, (t) = Taken By, (c) = Cosigned By    Initials Name Provider Type    LB Susie Augustin, PT Physical Therapist            Time Calculation:   Start Time: 1300  Stop Time: 1405  Time Calculation (min): 65 min     Therapy Charges for Today     Code Description Service Date Service Provider Modifiers Qty    38261203603  PT MANUAL THERAPY EA 15 MIN 6/2/2017 Hilary Wayne, PT GP 4                    Hilary Wayne, PT  6/2/2017

## 2017-06-05 ENCOUNTER — HOSPITAL ENCOUNTER (OUTPATIENT)
Dept: PHYSICAL THERAPY | Facility: HOSPITAL | Age: 67
Setting detail: THERAPIES SERIES
Discharge: HOME | End: 2017-06-05

## 2017-06-05 DIAGNOSIS — Z46.89 FITTING AND ADJUSTMENT OF WHEELCHAIR: Primary | ICD-10-CM

## 2017-06-05 DIAGNOSIS — I89.0 LYMPHEDEMA: ICD-10-CM

## 2017-06-05 DIAGNOSIS — E66.9 OBESITY, UNSPECIFIED: ICD-10-CM

## 2017-06-05 DIAGNOSIS — I89.0 LYMPHEDEMA: Primary | ICD-10-CM

## 2017-06-05 DIAGNOSIS — R26.89 FUNCTIONAL GAIT ABNORMALITY: ICD-10-CM

## 2017-06-05 PROCEDURE — 97140 MANUAL THERAPY 1/> REGIONS: CPT

## 2017-06-05 PROCEDURE — 97542 WHEELCHAIR MNGMENT TRAINING: CPT

## 2017-06-05 PROCEDURE — G8991 OTHER PT/OT GOAL STATUS: HCPCS

## 2017-06-05 PROCEDURE — G8992 OTHER PT/OT  D/C STATUS: HCPCS

## 2017-06-05 NOTE — THERAPY DISCHARGE NOTE
"      Outpatient Physical Therapy Neuro Treatment Note/Discharge Summary  [facility]     Patient Name: Aydee Solis  : 1950  MRN: [id number]  Today's Date: 2017      Visit Date: 2017    Visit Dx:    ICD-10-CM ICD-9-CM   1. Fitting and adjustment of wheelchair Z46.89 V53.8   2. Functional gait abnormality R26.89 781.2   3. Obesity, unspecified E66.9 278.00   4. Lymphedema I89.0 457.1       Patient Active Problem List   Diagnosis   • Pneumonia of right lower lobe due to infectious organism   • Cellulitis   • Hypertension   • Diabetes mellitus   • Hyperlipidemia   • Breast cancer   • Lymphedema   • Osteoporosis   • Acute respiratory failure with hypoxia   • Morbid obesity due to excess calories                  PT Neuro       17 1145          Subjective Comments    Subjective Comments \"I just was diagnosed with Achilles tendonitis by my podiatrist Dr. Rasmussen.\" \"The sore on my bottom is on the right side and it now has a scab.\"    -LB      Subjective Pain    Able to rate subjective pain? yes  -LB      Pre-Treatment Pain Level 7  -LB      Post-Treatment Pain Level 7  -LB      Subjective Pain Comment \"Having a hard time walking today. Pretty sore all over due to the rain.\"  -LB      Posture/Observations    Observations Edema;Erythema   (severe edema in bilateral LE's and left UE )  -LB      Posture/Observations Comments Pt reports she weighs 340#.   -LB      Wheelchair Training/Management    Wheelchair Transfer Type other (see comments)   Heavy duty scooter w difficulty w LE placement due to tiller  -LB      Wheelchair, Distance Propelled Pt was not evaluated with a manual wheelchair due to left UE weakness and pt would require a heavy duty w/c. Pt reports they gave her a heavy duty w/c when recently discharged from the hospital and she was unable to propel the w/c or fit thru doorways. Pt was evalauted with a heavy duty scooter but had great difficulty getting thru the large doorways.   -LB      " Wheelchair Safety Comment Pt had difficulty with LE placement on the scooter and has very fragile skin. Pt would not be able to extend her LE's for protective reaction if the scooter tippped.   -LB        User Key  (r) = Recorded By, (t) = Taken By, (c) = Cosigned By    Initials Name Provider Type    LB Susie Augustin, PT Physical Therapist                Lymphedema       06/05/17 1300          Subjective Comments    Subjective Comments States she took the bandages off yesterday around noon, so they have been off 24 hours.  -PC      Subjective Pain    Able to rate subjective pain? yes  -PC      Pre-Treatment Pain Level 7  -PC      Post-Treatment Pain Level 7  -PC      Lymphedema Edema Assessment    Edema Assessment Comment Legs appear more edematous today since pt has had bandages off 24 hrs.  -PC      Skin Changes/Observations    Skin Observations Comment Lower legs with areas of dark red discoloration.  -PC      Manual Lymphatic Drainage    Manual Lymphatic Drainage --   B axilla, sides, B inguinal, B LE's.  -PC      Compression/Skin Care    Compression/Skin Care --   HCC to lower legs  -PC      Wrapping Location --   B ankles to knees  -PC      Compression/Skin Care Comments Tricofix G8, Artiflex , Rosidal K 1-8cm & 2-10cm.  -PC        User Key  (r) = Recorded By, (t) = Taken By, (c) = Cosigned By    Initials Name Provider Type    PC Hilary Wayne, PT Physical Therapist                    PT Assessment/Plan       06/05/17 1634 06/05/17 1349    PT Assessment    Assessment Comments Based on the Initial Evaluation (including the Tinetti and TUG) pt was not able to use an upright mobility device. In addition to her multiple medical issues (lymphedema and osteoarthrits) pt reporting she was just diagnosed with Achilles tendonitis by her podiatrist. Pt reports she needs to have a knee replacement but due to her LE swelling she is unable to have the knee replacement. Due to patients weight of 340# she was unable to  "propel a heavy duty wheelchair in her home environment.  Pt had difficulty with LE placement onto the scooter and was unsuccessful manuevering a scooter in and out of a large doorway. An upright mobiity device (walker or cane),a heavy duty manual wheelchair and a scooter are not safe or timely for her to use ADL's. Pt has the mental capabilites to operate a power wheelchair safely. Therefore, a Quantum Heavy Duty wheelchair with a Tilt System is recommended. Pt requires the tilt  system to promote circulation and reduce edema due to severe lymphedema and to prevent further pressure sores on her buttocks. Elevating power legrests with adduction pads are required to properly position the LE' s while in the tilt position. A headrest is needed to support the head and neck in while being tilted. Pt has a pressure area on her buttocks.  A [employer] seat cushion is required to pressure sores and distribute weight throughout her buttocks and LE's. The Quantum Heavy Duty power wheelchiar will allow the patient to remain independent in her home environment and perfrom  ADL's.  Ga [employer] , [employer], A [employer] representative was present to aide in determination of appropriate mobility device.      -LB Pt had bandages off close to 24 hrs yesterday, so legs are more edematous today. Emphasized need to keep bandages on longer.  -PC    PT Plan    PT Plan Comments  Cont.  -PC      User Key  (r) = Recorded By, (t) = Taken By, (c) = Cosigned By    Initials Name Provider Type    LB Susie Augustin, PT Physical Therapist    PC Hilary Wayne, PT Physical Therapist                     Exercises       06/05/17 1300 06/05/17 1145       Subjective Comments    Subjective Comments States she took the bandages off yesterday around noon, so they have been off 24 hours.  -PC \"I just was diagnosed with Achilles tendonitis by my podiatrist Dr. Rasmussen.\" \"The sore on my bottom is on the right side and it now has a scab.\"    -LB     Subjective Pain    Able " "to rate subjective pain? yes  -PC yes  -LB     Pre-Treatment Pain Level 7  -PC 7  -LB     Post-Treatment Pain Level 7  -PC 7  -LB     Subjective Pain Comment  \"Having a hard time walking today. Pretty sore all over due to the rain.\"  -LB       User Key  (r) = Recorded By, (t) = Taken By, (c) = Cosigned By    Initials Name Provider Type    ZOHRA Augustin, PT Physical Therapist    PC Hilary Wayne, PT Physical Therapist                              PT OP Goals       06/05/17 1600       Long Term Goals    LTG 4 Pt to be evaluated for appropriate wheeled mobility device to allow pt to be independent in her home environment.   -LB     LTG 4 Progress Met  -LB       User Key  (r) = Recorded By, (t) = Taken By, (c) = Cosigned By    Initials Name Provider Type    ZOHRA Augustin PT Physical Therapist                Therapy Education       06/05/17 1631 06/05/17 1348       Therapy Education    Given Other (comment)   Wheelchair needs and recommendations were discussed in detail with the patient and . Stressed the importance of elevating her LE's above her heart and pressure relief for her buttocks.   -LB Edema management   Discussed long term management and emphasized the need for compression during the day.  -PC     Program  New  -PC     How Provided Verbal  -LB Verbal  -PC     Provided to Patient;Caregiver  -LB Patient  -PC     Level of Understanding Verbalized  -LB Verbalized  -PC       User Key  (r) = Recorded By, (t) = Taken By, (c) = Cosigned By    Initials Name Provider Type    ZOHRA Augustin, PT Physical Therapist    PC Hilary Wayne, PT Physical Therapist              Time Calculation:   Start Time: 1145  Stop Time: 1230  Time Calculation (min): 45 min     Therapy Charges for Today     Code Description Service Date Service Provider Modifiers Qty    27745196443 HC PT OTHER PRIME FUNCT PROJECTED 6/5/2017 Susie Augustin, PT GP, [place] 1    32083821195 HC PT OTHER PRIME FUNCT DISCHARGE 6/5/2017 Susie Augustin, " PT GP,  1    30298282032 HC PT WHEELCHAIR MGMT EA 15 MIN 6/5/2017 Susie Augustin, PT GP 3          PT G-Codes  PT Professional Judgement Used?: Yes  Functional Limitation: Other PT primary  Other PT Primary Goal Status (): 0 percent impaired, limited or restricted  Other PT Primary Discharge Status (): 0 percent impaired, limited or restricted     OP PT Discharge Summary  Date of Discharge: 06/05/17  Reason for Discharge: All goals achieved        Susie Augustin, PT  6/5/2017

## 2017-06-05 NOTE — THERAPY TREATMENT NOTE
Outpatient Physical Therapy Lymphedema Treatment Note  [facility]     Patient Name: Aydee Solis  : 1950  MRN: [id number]  Today's Date: 2017        Visit Date: 2017    Visit Dx:    ICD-10-CM ICD-9-CM   1. Lymphedema I89.0 457.1       Patient Active Problem List   Diagnosis   • Pneumonia of right lower lobe due to infectious organism   • Cellulitis   • Hypertension   • Diabetes mellitus   • Hyperlipidemia   • Breast cancer   • Lymphedema   • Osteoporosis   • Acute respiratory failure with hypoxia   • Morbid obesity due to excess calories              Lymphedema       17 1300          Subjective Comments    Subjective Comments States she took the bandages off yesterday around noon, so they have been off 24 hours.  -PC      Subjective Pain    Able to rate subjective pain? yes  -PC      Pre-Treatment Pain Level 7  -PC      Post-Treatment Pain Level 7  -PC      Lymphedema Edema Assessment    Edema Assessment Comment Legs appear more edematous today since pt has had bandages off 24 hrs.  -PC      Skin Changes/Observations    Skin Observations Comment Lower legs with areas of dark red discoloration.  -PC      Manual Lymphatic Drainage    Manual Lymphatic Drainage --   B axilla, sides, B inguinal, B LE's.  -PC      Compression/Skin Care    Compression/Skin Care --   HCC to lower legs  -PC      Wrapping Location --   B ankles to knees  -PC      Compression/Skin Care Comments Tricofix G8, Artiflex , Rosidal K 1-8cm & 2-10cm.  -PC        User Key  (r) = Recorded By, (t) = Taken By, (c) = Cosigned By    Initials Name Provider Type    PC Hilary Wayne, PT Physical Therapist                              PT Assessment/Plan       17 1349       PT Assessment    Assessment Comments Pt had bandages off close to 24 hrs yesterday, so legs are more edematous today. Emphasized need to keep bandages on longer.  -PC     PT Plan    PT Plan Comments Cont.  -PC       User Key  (r) = Recorded By, (t) =  Taken By, (c) = Cosigned By    Initials Name Provider Type    PC Hilary Wayne PT Physical Therapist                     Exercises       06/05/17 1300          Subjective Comments    Subjective Comments States she took the bandages off yesterday around noon, so they have been off 24 hours.  -PC      Subjective Pain    Able to rate subjective pain? yes  -PC      Pre-Treatment Pain Level 7  -PC      Post-Treatment Pain Level 7  -PC        User Key  (r) = Recorded By, (t) = Taken By, (c) = Cosigned By    Initials Name Provider Type    PC Hilary Wayne PT Physical Therapist                                  Therapy Education       06/05/17 1348          Therapy Education    Given Edema management   Discussed long term management and emphasized the need for compression during the day.  -PC      Program New  -PC      How Provided Verbal  -PC      Provided to Patient  -PC      Level of Understanding Verbalized  -PC        User Key  (r) = Recorded By, (t) = Taken By, (c) = Cosigned By    Initials Name Provider Type    PC Hilary Wayne PT Physical Therapist                Time Calculation:   Start Time: 1235  Stop Time: 1331  Time Calculation (min): 56 min     Therapy Charges for Today     Code Description Service Date Service Provider Modifiers Qty    09407549803  PT MANUAL THERAPY EA 15 MIN 6/5/2017 Hilary Wayne, PT GP 4                    Hilary Wayne PT  6/5/2017

## 2017-06-06 ENCOUNTER — HOSPITAL ENCOUNTER (OUTPATIENT)
Dept: PHYSICAL THERAPY | Facility: HOSPITAL | Age: 67
Setting detail: THERAPIES SERIES
Discharge: HOME | End: 2017-06-06

## 2017-06-06 DIAGNOSIS — I89.0 LYMPHEDEMA: Primary | ICD-10-CM

## 2017-06-06 PROCEDURE — 97140 MANUAL THERAPY 1/> REGIONS: CPT

## 2017-06-06 NOTE — THERAPY TREATMENT NOTE
Outpatient Physical Therapy Lymphedema Treatment Note  [facility]     Patient Name: Aydee Solis  : 1950  MRN: [id number]  Today's Date: 2017        Visit Date: 2017    Visit Dx:    ICD-10-CM ICD-9-CM   1. Lymphedema I89.0 457.1       Patient Active Problem List   Diagnosis   • Pneumonia of right lower lobe due to infectious organism   • Cellulitis   • Hypertension   • Diabetes mellitus   • Hyperlipidemia   • Breast cancer   • Lymphedema   • Osteoporosis   • Acute respiratory failure with hypoxia   • Morbid obesity due to excess calories              Lymphedema       17 1400          Subjective Comments    Subjective Comments States she's pleased with how well her legs are going down. States the backs of her legs were itchy last night.  -KD      Subjective Pain    Able to rate subjective pain? yes  -KD      Pre-Treatment Pain Level 7  -KD      Post-Treatment Pain Level 7  -KD      Manual Lymphatic Drainage    Manual Lymphatic Drainage --   B axilla, sides, B inguinal, B LE's.  -KD      Compression/Skin Care    Compression/Skin Care --   HCC to lower legs  -KD      Wrapping Location --   B ankles to knees  -KD      Compression/Skin Care Comments Tricofix G9, Artiflex , Rosidal K 1-8cm & 2-10cm.  -KD        User Key  (r) = Recorded By, (t) = Taken By, (c) = Cosigned By    Initials Name Provider Type    SHONDA Braxton, PT Physical Therapist                              PT Assessment/Plan       17 1417 17 1634    PT Assessment    Assessment Comments Legs seem somewhat decreased .Will measure .  -KD Based on the Initial Evaluation (including the Tinetti and TUG) pt was not able to use an upright mobility device. In addition to her multiple medical issues (lymphedema and osteoarthrits) pt reporting she was just diagnosed with Achilles tendonitis by her podiatrist. Pt reports she needs to have a knee replacement but due to her LE swelling she is unable to have the knee  replacement. Due to patients weight of 340# she was unable to propel a heavy duty wheelchair in her home environment.  Pt had difficulty with LE placement onto the scooter and was unsuccessful manuevering a scooter in and out of a large doorway. An upright mobiity device (walker or cane),a heavy duty manual wheelchair and a scooter are not safe or timely for her to use ADL's. Pt has the mental capabilites to operate a power wheelchair safely. Therefore, a Quantum Heavy Duty wheelchair with a Tilt System is recommended. Pt requires the tilt  system to promote circulation and reduce edema due to severe lymphedema and to prevent further pressure sores on her buttocks. Elevating power legrests with adduction pads are required to properly position the LE' s while in the tilt position. A headrest is needed to support the head and neck in while being tilted. Pt has a pressure area on her buttocks.  A [place] seat cushion is required to pressure sores and distribute weight throughout her buttocks and LE's. The Quantum Heavy Duty power wheelchiar will allow the patient to remain independent in her home environment and perfrom  ADL's.  [employer] , [employer], A [employer] representative was present to aide in determination of appropriate mobility device.      -LB    PT Plan    PT Plan Comments Cont.  -KD       User Key  (r) = Recorded By, (t) = Taken By, (c) = Cosigned By    Initials Name Provider Type    LB Susie Augustin, PT Physical Therapist    KD Cheryl Braxton, PT Physical Therapist                     Exercises       06/06/17 1400          Subjective Comments    Subjective Comments States she's pleased with how well her legs are going down. States the backs of her legs were itchy last night.  -KD      Subjective Pain    Able to rate subjective pain? yes  -KD      Pre-Treatment Pain Level 7  -KD      Post-Treatment Pain Level 7  -KD        User Key  (r) = Recorded By, (t) = Taken By, (c) = Cosigned By    Initials Name Provider  Type    KD Cheryl Braxton PT Physical Therapist                              PT OP Goals       06/05/17 1600       Long Term Goals    LTG 4 Pt to be evaluated for appropriate wheeled mobility device to allow pt to be independent in her home environment.   -LB     LTG 4 Progress Met  -LB       User Key  (r) = Recorded By, (t) = Taken By, (c) = Cosigned By    Initials Name Provider Type    ZOHRA Augustin, PT Physical Therapist                Therapy Education       06/06/17 1411 06/05/17 1631       Therapy Education    Given Edema management   Discussed velcro compression   -KD Other (comment)   Wheelchair needs and recommendations were discussed in detail with the patient and . Stressed the importance of elevating her LE's above her heart and pressure relief for her buttocks.   -LB     Program New  -KD      How Provided Verbal  -KD Verbal  -LB     Provided to Patient  -KD Patient;Caregiver  -LB     Level of Understanding Verbalized  -KD Verbalized  -LB       User Key  (r) = Recorded By, (t) = Taken By, (c) = Cosigned By    Initials Name Provider Type    ZOHRA Augustin, PT Physical Therapist    KD Cheyrl Braxton PT Physical Therapist                Time Calculation:   Start Time: 1258  Stop Time: 1400  Time Calculation (min): 62 min     Therapy Charges for Today     Code Description Service Date Service Provider Modifiers Qty    35418548059 HC PT MANUAL THERAPY EA 15 MIN 6/6/2017 Cheryl Braxton PT GP 4                    Cheryl Braxton PT  6/6/2017

## 2017-06-07 ENCOUNTER — HOSPITAL ENCOUNTER (OUTPATIENT)
Dept: PHYSICAL THERAPY | Facility: HOSPITAL | Age: 67
Setting detail: THERAPIES SERIES
Discharge: HOME | End: 2017-06-07

## 2017-06-07 DIAGNOSIS — I89.0 LYMPHEDEMA: Primary | ICD-10-CM

## 2017-06-07 PROCEDURE — 97140 MANUAL THERAPY 1/> REGIONS: CPT

## 2017-06-07 NOTE — THERAPY TREATMENT NOTE
Outpatient Physical Therapy Lymphedema Treatment Note  [facility]     Patient Name: Aydee Solis  : 1950  MRN: [id number]  Today's Date: 2017        Visit Date: 2017    Visit Dx:    ICD-10-CM ICD-9-CM   1. Lymphedema I89.0 457.1       Patient Active Problem List   Diagnosis   • Pneumonia of right lower lobe due to infectious organism   • Cellulitis   • Hypertension   • Diabetes mellitus   • Hyperlipidemia   • Breast cancer   • Lymphedema   • Osteoporosis   • Acute respiratory failure with hypoxia   • Morbid obesity due to excess calories              Lymphedema       17 1700          Subjective Comments    Subjective Comments States she had a little itching on back of legs, but not too bad.  States the back of her bandages get a little messed up as she moves around.  -PC      Subjective Pain    Able to rate subjective pain? yes  -PC      Pre-Treatment Pain Level 7  -PC      Post-Treatment Pain Level 7  -PC      Manual Lymphatic Drainage    Manual Lymphatic Drainage --   B axilla, sides, B inguinal, B LE's.  -PC      Compression/Skin Care    Compression/Skin Care --   HCC to lower legs  -PC      Wrapping Location --   B ankles to knees  -PC      Compression/Skin Care Comments Tricofix G9, Artiflex , Rosidal K 1-8cm & 2-10cm. Covered with Tubigrip size J.  -PC        User Key  (r) = Recorded By, (t) = Taken By, (c) = Cosigned By    Initials Name Provider Type    PC Hilary Wayne, PT Physical Therapist                              PT Assessment/Plan       17 1735 17 1711    PT Assessment    Assessment Comments Added Tubigrip over bandages to protect them from rolling/bunching.  -PC Legs appear decreased, redness has lightened some.  -PC    PT Plan    PT Plan Comments  Cont.  -PC      User Key  (r) = Recorded By, (t) = Taken By, (c) = Cosigned By    Initials Name Provider Type    ANTONIO Wayne, PT Physical Therapist                     Exercises       17 1700           Subjective Comments    Subjective Comments States she had a little itching on back of legs, but not too bad.  States the back of her bandages get a little messed up as she moves around.  -PC      Subjective Pain    Able to rate subjective pain? yes  -PC      Pre-Treatment Pain Level 7  -PC      Post-Treatment Pain Level 7  -PC        User Key  (r) = Recorded By, (t) = Taken By, (c) = Cosigned By    Initials Name Provider Type    PC Hilary Wayne PT Physical Therapist                                  Therapy Education       06/07/17 1710          Therapy Education    Given Symptoms/condition management  -PC      Program Reinforced  -PC      How Provided Verbal  -PC      Provided to Patient  -PC      Level of Understanding Verbalized  -PC        User Key  (r) = Recorded By, (t) = Taken By, (c) = Cosigned By    Initials Name Provider Type    PC Hilary Wayne PT Physical Therapist                Time Calculation:   Start Time: 1305  Stop Time: 1405  Time Calculation (min): 60 min     Therapy Charges for Today     Code Description Service Date Service Provider Modifiers Qty    85337761256 HC PT MANUAL THERAPY EA 15 MIN 6/7/2017 Hilary Wayne, PT GP 4                    Hilary Wayne PT  6/7/2017

## 2017-06-08 ENCOUNTER — HOSPITAL ENCOUNTER (OUTPATIENT)
Dept: PHYSICAL THERAPY | Facility: HOSPITAL | Age: 67
Setting detail: THERAPIES SERIES
Discharge: HOME | End: 2017-06-08

## 2017-06-08 DIAGNOSIS — I89.0 LYMPHEDEMA: Primary | ICD-10-CM

## 2017-06-08 PROCEDURE — 97140 MANUAL THERAPY 1/> REGIONS: CPT

## 2017-06-08 NOTE — THERAPY TREATMENT NOTE
"    Outpatient Physical Therapy Lymphedema Treatment Note  [facility]     Patient Name: Aydee Solis  : 1950  MRN: [id number]  Today's Date: 2017        Visit Date: 2017    Visit Dx:    ICD-10-CM ICD-9-CM   1. Lymphedema I89.0 457.1       Patient Active Problem List   Diagnosis   • Pneumonia of right lower lobe due to infectious organism   • Cellulitis   • Hypertension   • Diabetes mellitus   • Hyperlipidemia   • Breast cancer   • Lymphedema   • Osteoporosis   • Acute respiratory failure with hypoxia   • Morbid obesity due to excess calories              Lymphedema       17 1400 17 1700       Subjective Comments    Subjective Comments States her legs \"don't feel quite as heavy \" as they were. States she thinks the Tubigrip over her bandages helped.  -KD States she had a little itching on back of legs, but not too bad.  States the back of her bandages get a little messed up as she moves around.  -PC     Subjective Pain    Able to rate subjective pain? yes  -KD yes  -PC     Pre-Treatment Pain Level 7  -KD 7  -PC     Post-Treatment Pain Level 7  -KD 7  -PC     Lymphedema Measurements    Measurement Type(s) Circumferential  -KD      Circumferential Areas Lower extremities  -KD      LLE Circumferential (cm)    Measurement Location 1 Knee  -KD      Left 1 52 cm  -KD      Measurement Location 2 10cm below knee  -KD      Left 2 57.5 cm  -KD      Measurement Location 3 20cm below knee  -KD      Left 3 53 cm  -KD      Measurement Location 4 30cm below knee  -KD      Left 4 44 cm  -KD      Measurement Location 5 Ankle  -KD      Left 5 31 cm  -KD      Measurement Location 6 Midfoot  -KD      Left 6 23 cm  -KD      Measurement Location 7 Total  -KD      Left 7 260.5 cm  -KD      Measurement Location 8 Difference from 5/30  -KD      Left 8 -17 cm  -KD      RLE Circumferential (cm)    Measurement Location 1 Knee  -KD      Right 1 51 cm  -KD      Measurement Location 2 10cm below knee  -KD      " Right 2 58 cm  -KD      Measurement Location 3 20cm below knee  -KD      Right 3 49 cm  -KD      Measurement Location 4 30cm below knee  -KD      Right 4 41 cm  -KD      Measurement Location 5 Ankle  -KD      Right 5 32.5 cm  -KD      Measurement Location 6 Midfoot  -KD      Right 6 23 cm  -KD      Measurement Location 7 Total  -KD      Right 7 254.5 cm  -KD      Measurement Location 8 Difference from 5/30  -KD      Right 8 -21.5 cm  -KD      Manual Lymphatic Drainage    Manual Lymphatic Drainage --   B axilla, sides, B inguinal, B LE's.  -KD --   B axilla, sides, B inguinal, B LE's.  -PC     Compression/Skin Care    Compression/Skin Care --   Eucerin lotion to lower legs  -KD --   HCC to lower legs  -PC     Wrapping Location --   B ankles to knees  -KD --   B ankles to knees  -PC     Compression/Skin Care Comments Tricofix G9, Artiflex , Rosidal K 1-8cm & 2-10cm. Covered with Tubigrip size J.  -KD Tricofix G9, Artiflex , Rosidal K 1-8cm & 2-10cm. Covered with Tubigrip size J.  -PC       User Key  (r) = Recorded By, (t) = Taken By, (c) = Cosigned By    Initials Name Provider Type    PC Hilary Wayne, PT Physical Therapist    SHONDA Braxton, PT Physical Therapist                              PT Assessment/Plan       06/08/17 1426 06/07/17 1735    PT Assessment    Assessment Comments Pt tolerated Tubigrip addition well. Measurements have decreased by 21.5cm right and 17cm left.  -KD Added Tubigrip over bandages to protect them from rolling/bunching.  -PC    PT Plan    PT Plan Comments Cont. per PT Plan.  -KD       06/07/17 1711       PT Assessment    Assessment Comments Legs appear decreased, redness has lightened some.  -PC     PT Plan    PT Plan Comments Cont.  -PC       User Key  (r) = Recorded By, (t) = Taken By, (c) = Cosigned By    Initials Name Provider Type    PC Hilary Wayne, PT Physical Therapist    SHONDA Braxton, PT Physical Therapist                     Exercises       06/08/17 1400 06/07/17 1700  "      Subjective Comments    Subjective Comments States her legs \"don't feel quite as heavy \" as they were. States she thinks the Tubigrip over her bandages helped.  -KD States she had a little itching on back of legs, but not too bad.  States the back of her bandages get a little messed up as she moves around.  -PC     Subjective Pain    Able to rate subjective pain? yes  -KD yes  -PC     Pre-Treatment Pain Level 7  -KD 7  -PC     Post-Treatment Pain Level 7  -KD 7  -PC       User Key  (r) = Recorded By, (t) = Taken By, (c) = Cosigned By    Initials Name Provider Type    PC Hilary Wayne, PT Physical Therapist    KD Cheryl Braxton, PT Physical Therapist                              PT OP Goals       06/08/17 1400       PT Short Term Goals    STG Date to Achieve 06/13/17  -KD     STG 1 Pt demo awareness of condition and precautions for improved prevention, management, care of symptoms, and ease of transition to self-care of condition.  -KD     STG 1 Progress Progressing  -KD     STG 2 Pt/family independent with self-wrapping techniques of compression bandages as indicated for improved self-management of condition.  -KD     STG 2 Progress New  -KD     STG 3 Pt demo decreased net edema of >/=10-20cm for decreased edema symptoms, decreased risk of infection, and improved skin care.  -KD     STG 3 Progress Met  -KD     STG 3 Progress Comments Pt's measurements have decreased by 21.5cm on the right and by 17cm on the left.  -KD     Long Term Goals    LTG Date to Achieve 06/29/17  -KD     LTG 1 Pt/family independent with self-care techniques for self-management of condition.  -KD     LTG 1 Progress New  -KD     LTG 2 Pt demo decreased net edema of >/=20-30cm for decreased edema symptoms, decreased risk of infection, and improved skin care.  -KD     LTG 2 Progress Partially Met  -KD     LTG 2 Progress Comments Right leg has decreased by 21.5cm.  -KD     LTG 3 Pt/family independent with compression garments/velcro device " as indicated for self-management of condition.  -KD     LTG 3 Progress New  -KD     Time Calculation    PT Goal Re-Cert Due Date 06/29/17  -KD       User Key  (r) = Recorded By, (t) = Taken By, (c) = Cosigned By    Initials Name Provider Type    SHONDA Braxton PT Physical Therapist                Therapy Education       06/08/17 1422 06/07/17 1710       Therapy Education    Given Edema management   Discussed use of velcro compression devices.  -KD Symptoms/condition management  -PC     Program Reinforced  -KD Reinforced  -PC     How Provided Verbal  -KD Verbal  -PC     Provided to Patient  -KD Patient  -PC     Level of Understanding Verbalized  -KD Verbalized  -PC       User Key  (r) = Recorded By, (t) = Taken By, (c) = Cosigned By    Initials Name Provider Type    PC Hilary Wayne, PT Physical Therapist    KD Cheryl Braxton PT Physical Therapist                Time Calculation:   Start Time: 1257  Stop Time: 1402  Time Calculation (min): 65 min     Therapy Charges for Today     Code Description Service Date Service Provider Modifiers Qty    [id number]  PT MANUAL THERAPY EA 15 MIN 6/8/2017 Cheryl Braxton PT GP 4                    Cheryl Braxton PT  6/8/2017

## 2017-06-09 ENCOUNTER — HOSPITAL ENCOUNTER (OUTPATIENT)
Dept: PHYSICAL THERAPY | Facility: HOSPITAL | Age: 67
Setting detail: THERAPIES SERIES
Discharge: HOME | End: 2017-06-09

## 2017-06-09 DIAGNOSIS — I89.0 LYMPHEDEMA: Primary | ICD-10-CM

## 2017-06-09 PROCEDURE — 97140 MANUAL THERAPY 1/> REGIONS: CPT

## 2017-06-09 PROCEDURE — G8988 SELF CARE GOAL STATUS: HCPCS

## 2017-06-09 PROCEDURE — G8987 SELF CARE CURRENT STATUS: HCPCS

## 2017-06-09 NOTE — THERAPY TREATMENT NOTE
Outpatient Physical Therapy Lymphedema Treatment Note  [facility]     Patient Name: Aydee Solis  : 1950  MRN: [id number]  Today's Date: 2017        Visit Date: 2017    Visit Dx:    ICD-10-CM ICD-9-CM   1. Lymphedema I89.0 457.1       Patient Active Problem List   Diagnosis   • Pneumonia of right lower lobe due to infectious organism   • Cellulitis   • Hypertension   • Diabetes mellitus   • Hyperlipidemia   • Breast cancer   • Lymphedema   • Osteoporosis   • Acute respiratory failure with hypoxia   • Morbid obesity due to excess calories              Lymphedema       17 1500          Subjective Comments    Subjective Comments States she was happy with her msmts yesterday.  Is ready to order her velcro compression.  -PC      Subjective Pain    Able to rate subjective pain? yes  -PC      Pre-Treatment Pain Level 7  -PC      Post-Treatment Pain Level 7  -PC      Skin Changes/Observations    Skin Observations Comment Lower legs with areas of pink discolored areas (prev dark red).  -PC      Manual Lymphatic Drainage    Manual Lymphatic Drainage --   B axilla, sides, B inguinal, B LE's.  -PC      Compression/Skin Care    Compression/Skin Care --   Eucerin lotion to lower legs  -PC      Wrapping Location --   B ankles to knees  -PC      Compression/Skin Care Comments Tricofix G9, Artiflex , Rosidal K 1-8cm & 2-10cm.   -PC        User Key  (r) = Recorded By, (t) = Taken By, (c) = Cosigned By    Initials Name Provider Type    PC Hilary Wayne, PT Physical Therapist                              PT Assessment/Plan       17 1531       PT Assessment    Assessment Comments Pt is reday to order long term compression. Showed pt the Compreflex lite and how to order on line.  She plans to order this weekend.  -PC     PT Plan    PT Plan Comments Pt to order velcro compression.  Plan to see next week until they arrive.  -PC       User Key  (r) = Recorded By, (t) = Taken By, (c) = Cosigned By     Initials Name Provider Type    PC Hilary Wayne, PT Physical Therapist                     Exercises       06/09/17 1500          Subjective Comments    Subjective Comments States she was happy with her msmts yesterday.  Is ready to order her velcro compression.  -PC      Subjective Pain    Able to rate subjective pain? yes  -PC      Pre-Treatment Pain Level 7  -PC      Post-Treatment Pain Level 7  -PC        User Key  (r) = Recorded By, (t) = Taken By, (c) = Cosigned By    Initials Name Provider Type    PC Hilary Wayne, PT Physical Therapist                              PT OP Goals       06/09/17 1500       PT Short Term Goals    STG Date to Achieve 06/13/17  -PC     STG 1 Pt demo awareness of condition and precautions for improved prevention, management, care of symptoms, and ease of transition to self-care of condition.  -PC     STG 1 Progress Progressing  -PC     STG 2 Pt/family independent with self-wrapping techniques of compression bandages as indicated for improved self-management of condition.  -PC     STG 2 Progress Ongoing  -PC     STG 3 Pt demo decreased net edema of >/=10-20cm for decreased edema symptoms, decreased risk of infection, and improved skin care.  -PC     STG 3 Progress Met  -PC     Long Term Goals    LTG Date to Achieve 06/29/17  -PC     LTG 1 Pt/family independent with self-care techniques for self-management of condition.  -PC     LTG 1 Progress Ongoing  -PC     LTG 2 Pt demo decreased net edema of >/=20-30cm for decreased edema symptoms, decreased risk of infection, and improved skin care.  -PC     LTG 2 Progress Partially Met  -PC     LTG 3 Pt/family independent with compression garments/velcro device as indicated for self-management of condition.  -PC     LTG 3 Progress Ongoing  -PC     Time Calculation    PT Goal Re-Cert Due Date 06/29/17  -PC       User Key  (r) = Recorded By, (t) = Taken By, (c) = Cosigned By    Initials Name Provider Type    PC Hilary Wayne, ILYA Physical  Therapist                Therapy Education       06/09/17 1527          Therapy Education    Given Edema management   Showed pt a sample Compreflex lite. and instructed how to order on line.  -PC      Program New  -PC      How Provided Verbal;Demonstration;Written  -PC      Provided to Patient  -PC      Level of Understanding Verbalized  -PC        User Key  (r) = Recorded By, (t) = Taken By, (c) = Cosigned By    Initials Name Provider Type    PC Hilary Wayne, PT Physical Therapist                Time Calculation:   Start Time: 1300  Stop Time: 1405  Time Calculation (min): 65 min     Therapy Charges for Today     Code Description Service Date Service Provider Modifiers Qty    76804662522  PT SELFCARE CURRENT 6/9/2017 Hilary Wayne, PT GP, CK 1    59317594325 HC PT SELFCARE PROJECTED 6/9/2017 Hilary Wayne, PT GP, CJ 1    10330005630 HC PT MANUAL THERAPY EA 15 MIN 6/9/2017 Hilary Wayne, PT GP 4          PT G-Codes  PT Professional Judgement Used?: Yes  Functional Limitation: Self care  Self Care Current Status (): At least 40 percent but less than 60 percent impaired, limited or restricted  Self Care Goal Status (): At least 20 percent but less than 40 percent impaired, limited or restricted         Hilary Wayne, PT  6/9/2017

## 2017-06-12 ENCOUNTER — HOSPITAL ENCOUNTER (OUTPATIENT)
Dept: PHYSICAL THERAPY | Facility: HOSPITAL | Age: 67
Setting detail: THERAPIES SERIES
Discharge: HOME | End: 2017-06-12

## 2017-06-12 DIAGNOSIS — I89.0 LYMPHEDEMA: Primary | ICD-10-CM

## 2017-06-12 PROCEDURE — 97140 MANUAL THERAPY 1/> REGIONS: CPT

## 2017-06-12 NOTE — THERAPY TREATMENT NOTE
Outpatient Physical Therapy Lymphedema Treatment Note  [facility]     Patient Name: Aydee Solis  : 1950  MRN: [id number]  Today's Date: 2017        Visit Date: 2017    Visit Dx:    ICD-10-CM ICD-9-CM   1. Lymphedema I89.0 457.1       Patient Active Problem List   Diagnosis   • Pneumonia of right lower lobe due to infectious organism   • Cellulitis   • Hypertension   • Diabetes mellitus   • Hyperlipidemia   • Breast cancer   • Lymphedema   • Osteoporosis   • Acute respiratory failure with hypoxia   • Morbid obesity due to excess calories              Lymphedema       17 1600          Subjective Comments    Subjective Comments States she ordered her velcro compression.  States she removed her bandages Sat night and didn't rebandage the rest of the weekend.  -PC      Subjective Pain    Able to rate subjective pain? yes  -PC      Pre-Treatment Pain Level 7  -PC      Post-Treatment Pain Level 7  -PC      Lymphedema Edema Assessment    Edema Assessment Comment Legs appear slightly more swollen.  -PC      Manual Lymphatic Drainage    Manual Lymphatic Drainage --   B axilla, sides, B inguinal, B LE's.  -PC      Compression/Skin Care    Compression/Skin Care --   Eucerin lotion to lower legs  -PC      Wrapping Location --   B ankles to knees  -PC      Compression/Skin Care Comments Tricofix G9, Artiflex , Rosidal K 1-8cm & 2-10cm.   -PC        User Key  (r) = Recorded By, (t) = Taken By, (c) = Cosigned By    Initials Name Provider Type    PC Hilary Wayne, PT Physical Therapist                              PT Assessment/Plan       17 1643       PT Assessment    Assessment Comments Pt has ordered velcro compression. She cont to remove compression and leave it off over the weekend as she does not want to ask her family to bandage her.  -PC     PT Plan    PT Plan Comments Cont this week.  -PC       User Key  (r) = Recorded By, (t) = Taken By, (c) = Cosigned By    Initials Name Provider  Type    PC Hilary Wayne, PT Physical Therapist                     Exercises       06/12/17 1600          Subjective Comments    Subjective Comments States she ordered her velcro compression.  States she removed her bandages Sat night and didn't rebandage the rest of the weekend.  -PC      Subjective Pain    Able to rate subjective pain? yes  -PC      Pre-Treatment Pain Level 7  -PC      Post-Treatment Pain Level 7  -PC        User Key  (r) = Recorded By, (t) = Taken By, (c) = Cosigned By    Initials Name Provider Type    PC Hilary Wayne PT Physical Therapist                                  Therapy Education       06/12/17 1637          Therapy Education    Given Edema management  -PC      Program Reinforced  -PC      How Provided Verbal  -PC      Provided to Patient  -PC      Level of Understanding Verbalized  -PC        User Key  (r) = Recorded By, (t) = Taken By, (c) = Cosigned By    Initials Name Provider Type    PC Hilary Wayne PT Physical Therapist                Time Calculation:   Start Time: 1302  Stop Time: 1405  Time Calculation (min): 63 min     Therapy Charges for Today     Code Description Service Date Service Provider Modifiers Qty    89284541464 HC PT MANUAL THERAPY EA 15 MIN 6/12/2017 Hilary Wayne, PT GP 4                    Hilary Wayne, PT  6/12/2017

## 2017-06-13 ENCOUNTER — HOSPITAL ENCOUNTER (OUTPATIENT)
Dept: PHYSICAL THERAPY | Facility: HOSPITAL | Age: 67
Setting detail: THERAPIES SERIES
Discharge: HOME | End: 2017-06-13

## 2017-06-13 DIAGNOSIS — I89.0 LYMPHEDEMA: Primary | ICD-10-CM

## 2017-06-13 PROCEDURE — 97140 MANUAL THERAPY 1/> REGIONS: CPT

## 2017-06-13 NOTE — THERAPY TREATMENT NOTE
Outpatient Physical Therapy Lymphedema Treatment Note  [facility]     Patient Name: Aydee Solis  : 1950  MRN: [id number]  Today's Date: 2017        Visit Date: 2017    Visit Dx:    ICD-10-CM ICD-9-CM   1. Lymphedema I89.0 457.1       Patient Active Problem List   Diagnosis   • Pneumonia of right lower lobe due to infectious organism   • Cellulitis   • Hypertension   • Diabetes mellitus   • Hyperlipidemia   • Breast cancer   • Lymphedema   • Osteoporosis   • Acute respiratory failure with hypoxia   • Morbid obesity due to excess calories              Lymphedema       17 1400 17 1600       Subjective Comments    Subjective Comments States she has been running to medical appointments today.  -KD States she ordered her velcro compression.  States she removed her bandages Sat night and didn't rebandage the rest of the weekend.  -PC     Subjective Pain    Able to rate subjective pain? yes  -KD yes  -PC     Pre-Treatment Pain Level 7  -KD 7  -PC     Post-Treatment Pain Level 7  -KD 7  -PC     Lymphedema Edema Assessment    Edema Assessment Comment  Legs appear slightly more swollen.  -PC     Manual Lymphatic Drainage    Manual Lymphatic Drainage --   B axilla, sides, B inguinal, B LE's.  -KD --   B axilla, sides, B inguinal, B LE's.  -PC     Compression/Skin Care    Compression/Skin Care --   Eucerin lotion to lower legs  -KD --   Eucerin lotion to lower legs  -PC     Wrapping Location --   B ankles to knees  -KD --   B ankles to knees  -PC     Compression/Skin Care Comments Tricofix G9, Artiflex , Rosidal K 1-8cm & 2-10cm.   -KD Tricofix G9, Artiflex , Rosidal K 1-8cm & 2-10cm.   -PC       User Key  (r) = Recorded By, (t) = Taken By, (c) = Cosigned By    Initials Name Provider Type    PC Hilary Wayne, PT Physical Therapist    SHONDA Braxton, PT Physical Therapist                              PT Assessment/Plan       17 1417 17 1643    PT Assessment    Assessment  Comments Legs looked pretty good today. Awaiting velcro compression devices.  -KD Pt has ordered velcro compression. She cont to remove compression and leave it off over the weekend as she does not want to ask her family to bandage her.  -PC    PT Plan    PT Plan Comments Cont.  -KD Cont this week.  -PC      User Key  (r) = Recorded By, (t) = Taken By, (c) = Cosigned By    Initials Name Provider Type    PC Hilary Wayne, PT Physical Therapist    KD Cheryl Braxton, PT Physical Therapist                     Exercises       06/13/17 1400 06/12/17 1600       Subjective Comments    Subjective Comments States she has been running to medical appointments today.  -KD States she ordered her velcro compression.  States she removed her bandages Sat night and didn't rebandage the rest of the weekend.  -PC     Subjective Pain    Able to rate subjective pain? yes  -KD yes  -PC     Pre-Treatment Pain Level 7  -KD 7  -PC     Post-Treatment Pain Level 7  -KD 7  -PC       User Key  (r) = Recorded By, (t) = Taken By, (c) = Cosigned By    Initials Name Provider Type    PC Hilary Wayne, PT Physical Therapist    KD Cheryl Braxton, PT Physical Therapist                                  Therapy Education       06/13/17 1417 06/12/17 1637       Therapy Education    Given Edema management  -KD Edema management  -PC     Program Reinforced  -KD Reinforced  -PC     How Provided Verbal  -KD Verbal  -PC     Provided to Patient  -KD Patient  -PC     Level of Understanding Verbalized  -KD Verbalized  -PC       User Key  (r) = Recorded By, (t) = Taken By, (c) = Cosigned By    Initials Name Provider Type    PC Hilary Wayne, PT Physical Therapist    KD Cheryl Braxton, PT Physical Therapist                Time Calculation:   Start Time: 1301  Stop Time: 1358  Time Calculation (min): 57 min     Therapy Charges for Today     Code Description Service Date Service Provider Modifiers Qty    57161340121 HC PT MANUAL THERAPY EA 15 MIN 6/13/2017 Cheryl  Irais, PT GP 4                    Cheryl Braxton, PT  6/13/2017

## 2017-06-14 ENCOUNTER — HOSPITAL ENCOUNTER (OUTPATIENT)
Dept: PHYSICAL THERAPY | Facility: HOSPITAL | Age: 67
Setting detail: THERAPIES SERIES
Discharge: HOME | End: 2017-06-14

## 2017-06-14 DIAGNOSIS — I89.0 LYMPHEDEMA: Primary | ICD-10-CM

## 2017-06-14 PROCEDURE — 97140 MANUAL THERAPY 1/> REGIONS: CPT

## 2017-06-14 NOTE — THERAPY TREATMENT NOTE
Outpatient Physical Therapy Lymphedema Treatment Note  [facility]     Patient Name: Aydee Solis  : 1950  MRN: [id number]  Today's Date: 2017        Visit Date: 2017    Visit Dx:    ICD-10-CM ICD-9-CM   1. Lymphedema I89.0 457.1       Patient Active Problem List   Diagnosis   • Pneumonia of right lower lobe due to infectious organism   • Cellulitis   • Hypertension   • Diabetes mellitus   • Hyperlipidemia   • Breast cancer   • Lymphedema   • Osteoporosis   • Acute respiratory failure with hypoxia   • Morbid obesity due to excess calories              Lymphedema       17 1400          Subjective Comments    Subjective Comments States she doesn't feel well today  -PC      Subjective Pain    Able to rate subjective pain? yes  -PC      Pre-Treatment Pain Level 4  -PC      Post-Treatment Pain Level 4  -PC      Manual Lymphatic Drainage    Manual Lymphatic Drainage --   B axilla, sides, B inguinal, B LE's.  -PC      Compression/Skin Care    Compression/Skin Care --   Eucerin lotion to lower legs  -PC      Wrapping Location --   B ankles to knees  -PC      Compression/Skin Care Comments Tricofix G9, Artiflex , Rosidal K 1-8cm & 2-10cm.   -PC        User Key  (r) = Recorded By, (t) = Taken By, (c) = Cosigned By    Initials Name Provider Type    PC Hilary Wayne, PT Physical Therapist                              PT Assessment/Plan       17 1403 17 1417    PT Assessment    Assessment Comments No new problems.  -PC Legs looked pretty good today. Awaiting velcro compression devices.  -KD    PT Plan    PT Plan Comments Cont.  -PC Cont.  -KD      User Key  (r) = Recorded By, (t) = Taken By, (c) = Cosigned By    Initials Name Provider Type     Hilary Wayne, PT Physical Therapist    KD Cheryl Braxton, PT Physical Therapist                     Exercises       17 1400          Subjective Comments    Subjective Comments States she doesn't feel well today  -PC      Subjective Pain     Able to rate subjective pain? yes  -PC      Pre-Treatment Pain Level 4  -PC      Post-Treatment Pain Level 4  -PC        User Key  (r) = Recorded By, (t) = Taken By, (c) = Cosigned By    Initials Name Provider Type    PC Hilary Wayne, PT Physical Therapist                                  Therapy Education       06/14/17 1402 06/13/17 1417       Therapy Education    Given Symptoms/condition management  -PC Edema management  -KD     Program Reinforced  -PC Reinforced  -KD     How Provided Verbal  -PC Verbal  -KD     Provided to Patient  -PC Patient  -KD     Level of Understanding Verbalized  -PC Verbalized  -KD       User Key  (r) = Recorded By, (t) = Taken By, (c) = Cosigned By    Initials Name Provider Type    PC Hilary Wayne, PT Physical Therapist    KD Cheryl Braxton, PT Physical Therapist                Time Calculation:   Start Time: 1301  Stop Time: 1400  Time Calculation (min): 59 min     Therapy Charges for Today     Code Description Service Date Service Provider Modifiers Qty    51297758036 HC PT MANUAL THERAPY EA 15 MIN 6/14/2017 Hilary Wayne, PT GP 4                    Hilary Wayne, PT  6/14/2017

## 2017-06-15 ENCOUNTER — HOSPITAL ENCOUNTER (OUTPATIENT)
Dept: PHYSICAL THERAPY | Facility: HOSPITAL | Age: 67
Setting detail: THERAPIES SERIES
Discharge: HOME | End: 2017-06-15

## 2017-06-15 DIAGNOSIS — I89.0 LYMPHEDEMA: Primary | ICD-10-CM

## 2017-06-15 PROCEDURE — 97140 MANUAL THERAPY 1/> REGIONS: CPT

## 2017-06-15 NOTE — THERAPY TREATMENT NOTE
Outpatient Physical Therapy Lymphedema Treatment Note  [facility]     Patient Name: Aydee Solis  : 1950  MRN: [id number]  Today's Date: 6/15/2017        Visit Date: 06/15/2017    Visit Dx:    ICD-10-CM ICD-9-CM   1. Lymphedema I89.0 457.1       Patient Active Problem List   Diagnosis   • Pneumonia of right lower lobe due to infectious organism   • Cellulitis   • Hypertension   • Diabetes mellitus   • Hyperlipidemia   • Breast cancer   • Lymphedema   • Osteoporosis   • Acute respiratory failure with hypoxia   • Morbid obesity due to excess calories              Lymphedema       06/15/17 1400          Subjective Comments    Subjective Comments States she received her velcro devices. States she's had bandages off for about 6 hours.  -KD      Subjective Pain    Able to rate subjective pain? yes  -KD      Pre-Treatment Pain Level 4  -KD      Post-Treatment Pain Level 4  -KD      Lymphedema Measurements    Measurement Type(s) Circumferential  -KD      Circumferential Areas Lower extremities  -KD      LLE Circumferential (cm)    Measurement Location 1 Knee  -KD      Left 1 51 cm  -KD      Measurement Location 2 10cm below knee  -KD      Left 2 56.5 cm  -KD      Measurement Location 3 20cm below knee  -KD      Left 3 55.5 cm  -KD      Measurement Location 4 30cm below knee  -KD      Left 4 42.5 cm  -KD      Measurement Location 5 Ankle  -KD      Left 5 31 cm  -KD      Measurement Location 6 Midfoot  -KD      Left 6 23 cm  -KD      Measurement Location 7 Total  -KD      Left 7 259.5 cm  -KD      Measurement Location 8 Difference from 5/30  -KD      Left 8 -18 cm  -KD      RLE Circumferential (cm)    Measurement Location 1 Knee  -KD      Right 1 52 cm  -KD      Measurement Location 2 10cm below knee  -KD      Right 2 56.2 cm  -KD      Measurement Location 3 20cm below knee  -KD      Right 3 53.5 cm  -KD      Measurement Location 4 30cm below knee  -KD      Right 4 42 cm  -KD      Measurement Location 5 Ankle   -KD      Right 5 31 cm  -KD      Measurement Location 6 Midfoot  -KD      Right 6 22.5 cm  -KD      Measurement Location 7 Total  -KD      Right 7 257.2 cm  -KD      Measurement Location 8 Difference from 5/30  -KD      Right 8 -18.8 cm  -KD      Manual Lymphatic Drainage    Manual Lymphatic Drainage --   B axilla, sides, B inguinal, B LE's.  -KD      Compression/Skin Care    Compression/Skin Care Comments Applied Sigvaris velcro compression devices (size XXL).  -KD        User Key  (r) = Recorded By, (t) = Taken By, (c) = Cosigned By    Initials Name Provider Type    SHONDA Braxton PT Physical Therapist                              PT Assessment/Plan       06/15/17 1431       PT Assessment    Assessment Comments Pt fitted with velcro compression devices--some concern with edema bulging above top of garment--may need to adjust garment placement/amount of compression. Pt requested to use Tricofix as a liner instead of provided liner, so will need to check skin.  -KD     PT Plan    PT Plan Comments Re-check 6/16/2017.  -KD       User Key  (r) = Recorded By, (t) = Taken By, (c) = Cosigned By    Initials Name Provider Type    SHONDA Braxton PT Physical Therapist                     Exercises       06/15/17 1400          Subjective Comments    Subjective Comments States she received her velcro devices. States she's had bandages off for about 6 hours.  -KD      Subjective Pain    Able to rate subjective pain? yes  -KD      Pre-Treatment Pain Level 4  -KD      Post-Treatment Pain Level 4  -KD        User Key  (r) = Recorded By, (t) = Taken By, (c) = Cosigned By    Initials Name Provider Type    SHONDA Braxton PT Physical Therapist                              PT OP Goals       06/15/17 1400       PT Short Term Goals    STG Date to Achieve 06/13/17  -KD     STG 1 Pt demo awareness of condition and precautions for improved prevention, management, care of symptoms, and ease of transition to self-care of condition.   -KD     STG 1 Progress Met  -KD     STG 2 Pt/family independent with self-wrapping techniques of compression bandages as indicated for improved self-management of condition.  -KD     STG 2 Progress Ongoing  -KD     STG 2 Progress Comments Pt is hesitant to ask family to help.  -KD     STG 3 Pt demo decreased net edema of >/=10-20cm for decreased edema symptoms, decreased risk of infection, and improved skin care.  -KD     STG 3 Progress Met  -KD     Long Term Goals    LTG Date to Achieve 06/29/17  -KD     LTG 1 Pt/family independent with self-care techniques for self-management of condition.  -KD     LTG 1 Progress Met  -KD     LTG 2 Pt demo decreased net edema of >/=20-30cm for decreased edema symptoms, decreased risk of infection, and improved skin care.  -KD     LTG 2 Progress Not Met  -KD     LTG 2 Progress Comments Pt's measurements have decreased by 18-18.8cm.  -KD     LTG 3 Pt/family independent with compression garments/velcro device as indicated for self-management of condition.  -KD     LTG 3 Progress Ongoing  -KD     LTG 3 Progress Comments Will check garments 6/16.  -KD     Time Calculation    PT Goal Re-Cert Due Date 06/29/17  -KD       User Key  (r) = Recorded By, (t) = Taken By, (c) = Cosigned By    Initials Name Provider Type    SHONDA Braxton PT Physical Therapist                Therapy Education       06/15/17 5047          Therapy Education    Given Edema management   Discussed wear of velcro compression devices  -KD      Program New  -KD      How Provided Verbal  -KD      Provided to Patient  -KD      Level of Understanding Verbalized  -KD        User Key  (r) = Recorded By, (t) = Taken By, (c) = Cosigned By    Initials Name Provider Type    SHONDA Braxton PT Physical Therapist                Time Calculation:   Start Time: 1302  Stop Time: 1402  Time Calculation (min): 60 min     Therapy Charges for Today     Code Description Service Date Service Provider Modifiers Qty    15304565834   PT MANUAL THERAPY EA 15 MIN 6/15/2017 Cheryl Braxton, PT GP 4                    Cheryl Braxton, PT  6/15/2017

## 2017-06-16 ENCOUNTER — HOSPITAL ENCOUNTER (OUTPATIENT)
Dept: PHYSICAL THERAPY | Facility: HOSPITAL | Age: 67
Setting detail: THERAPIES SERIES
Discharge: HOME | End: 2017-06-16

## 2017-06-16 DIAGNOSIS — I89.0 LYMPHEDEMA: Primary | ICD-10-CM

## 2017-06-16 PROCEDURE — G8989 SELF CARE D/C STATUS: HCPCS

## 2017-06-16 PROCEDURE — 97535 SELF CARE MNGMENT TRAINING: CPT

## 2017-06-16 PROCEDURE — G8988 SELF CARE GOAL STATUS: HCPCS

## 2017-06-16 NOTE — THERAPY DISCHARGE NOTE
Outpatient Physical Therapy Lymphedema Treatment Note/Discharge Summary  [facility]     Patient Name: Aydee Solis  : 1950  MRN: [id number]  Today's Date: 2017      Visit Date: 2017    Visit Dx:    ICD-10-CM ICD-9-CM   1. Lymphedema I89.0 457.1       Patient Active Problem List   Diagnosis   • Pneumonia of right lower lobe due to infectious organism   • Cellulitis   • Hypertension   • Diabetes mellitus   • Hyperlipidemia   • Breast cancer   • Lymphedema   • Osteoporosis   • Acute respiratory failure with hypoxia   • Morbid obesity due to excess calories              Lymphedema       17 1700          Subjective Comments    Subjective Comments States she did ok with the velcro devices.  -PC      Subjective Pain    Able to rate subjective pain? yes  -PC      Pre-Treatment Pain Level 4  -PC      Post-Treatment Pain Level 4  -PC      Lymphedema Edema Assessment    Edema Assessment Comment Removed velcro devices, pt had some indentations from the velcro device.  -PC      Compression/Skin Care    Compression/Skin Care Comments Pt wore Compreflex velcro compression devices into clinic.  Advised pt to wear the light compression sock that came with the devices to prevent them from slipping as much, to have more padding between skin and velcro devices, and to prevent as much pitting from the devices.    -PC        User Key  (r) = Recorded By, (t) = Taken By, (c) = Cosigned By    Initials Name Provider Type    PC Hilary Wayne, PT Physical Therapist                              PT Assessment/Plan       17 9759       PT Assessment    Assessment Comments Pt tolerated velcro compression device well, but recommended she use the light compression sock that came with the device to give extra compression, keep it from slipping, and to prevent pitting. Pt is ready for d/c.  -PC     PT Plan    PT Plan Comments D/C.  -PC       User Key  (r) = Recorded By, (t) = Taken By, (c) = Cosigned By     Initials Name Provider Type    PC Hilary Wayne, PT Physical Therapist                     Exercises       06/16/17 1700          Subjective Comments    Subjective Comments States she did ok with the velcro devices.  -PC      Subjective Pain    Able to rate subjective pain? yes  -PC      Pre-Treatment Pain Level 4  -PC      Post-Treatment Pain Level 4  -PC        User Key  (r) = Recorded By, (t) = Taken By, (c) = Cosigned By    Initials Name Provider Type    PC Hilary Wayne PT Physical Therapist                              PT OP Goals       06/16/17 1700       PT Short Term Goals    STG 1 Pt demo awareness of condition and precautions for improved prevention, management, care of symptoms, and ease of transition to self-care of condition.  -PC     STG 1 Progress Met  -PC     STG 2 Pt/family independent with self-wrapping techniques of compression bandages as indicated for improved self-management of condition.  -PC     STG 2 Progress Met  -PC     STG 2 Progress Comments Pt's  able to bandage.  -PC     STG 3 Pt demo decreased net edema of >/=10-20cm for decreased edema symptoms, decreased risk of infection, and improved skin care.  -PC     STG 3 Progress Met  -PC     Long Term Goals    LTG 1 Pt/family independent with self-care techniques for self-management of condition.  -PC     LTG 1 Progress Met  -PC     LTG 2 Pt demo decreased net edema of >/=20-30cm for decreased edema symptoms, decreased risk of infection, and improved skin care.  -PC     LTG 2 Progress Not Met  -PC     LTG 3 Pt/family independent with compression garments/velcro device as indicated for self-management of condition.  -PC     LTG 3 Progress Met  -PC       User Key  (r) = Recorded By, (t) = Taken By, (c) = Cosigned By    Initials Name Provider Type    PC Hilary Wayne, PT Physical Therapist                Therapy Education       06/16/17 1731          Therapy Education    Given Edema management   Reivewed don/doff of velcro devices  with pt and .  -PC      Program Reinforced  -PC      How Provided Verbal;Demonstration  -PC      Provided to Patient;Caregiver  -PC      Level of Understanding Verbalized  -PC        User Key  (r) = Recorded By, (t) = Taken By, (c) = Cosigned By    Initials Name Provider Type    PC Hilary Wayne, PT Physical Therapist                Time Calculation:   Start Time: 1305  Stop Time: 1335  Time Calculation (min): 30 min     Therapy Charges for Today     Code Description Service Date Service Provider Modifiers Qty    69408216267 HC PT SELFCARE PROJECTED 6/16/2017 Hilary Wayne, PT GP, CJ 1    98796338232 HC PT SELFCARE DISCHARGE 6/16/2017 Hilary Wayne, PT GP, CK 1    13936815894 HC PT SELF CARE/MGMT/TRAIN EA 15 MIN 6/16/2017 Hilary Wayne PT GP 2          PT G-Codes  PT Professional Judgement Used?: Yes  Functional Limitation: Self care  Self Care Goal Status (): At least 20 percent but less than 40 percent impaired, limited or restricted  Self Care Discharge Status (): At least 40 percent but less than 60 percent impaired, limited or restricted      OP PT Discharge Summary  Date of Discharge: 06/16/17  Reason for Discharge: Maximum functional potential achieved  Outcomes Achieved: Patient able to partially acheive established goals  Discharge Destination: Home with home program  Discharge Instructions: Wear velcro compression during day, remove at night.  Bandage as needed.  Cont with skin care and elevation.      Hilary Wayne, PT  6/16/2017

## 2017-11-01 ENCOUNTER — APPOINTMENT (OUTPATIENT)
Dept: WOMENS IMAGING | Facility: HOSPITAL | Age: 67
End: 2017-11-01

## 2017-11-01 PROCEDURE — G0202 SCR MAMMO BI INCL CAD: HCPCS | Performed by: RADIOLOGY

## 2017-11-01 PROCEDURE — 77063 BREAST TOMOSYNTHESIS BI: CPT | Performed by: RADIOLOGY

## 2017-11-01 PROCEDURE — MDREVIEWSP: Performed by: RADIOLOGY

## 2021-01-05 ENCOUNTER — APPOINTMENT (OUTPATIENT)
Dept: GENERAL RADIOLOGY | Facility: HOSPITAL | Age: 71
End: 2021-01-05

## 2021-01-05 ENCOUNTER — APPOINTMENT (OUTPATIENT)
Dept: CARDIOLOGY | Facility: HOSPITAL | Age: 71
End: 2021-01-05

## 2021-01-05 ENCOUNTER — HOSPITAL ENCOUNTER (EMERGENCY)
Facility: HOSPITAL | Age: 71
Discharge: HOME | End: 2021-01-05
Attending: EMERGENCY MEDICINE | Admitting: EMERGENCY MEDICINE

## 2021-01-05 VITALS
RESPIRATION RATE: 19 BRPM | SYSTOLIC BLOOD PRESSURE: 100 MMHG | HEIGHT: 68 IN | DIASTOLIC BLOOD PRESSURE: 68 MMHG | HEART RATE: 66 BPM | TEMPERATURE: 97.2 F | WEIGHT: 293 LBS | OXYGEN SATURATION: 97 % | BODY MASS INDEX: 44.41 KG/M2

## 2021-01-05 DIAGNOSIS — I87.2 VENOUS STASIS ULCER OF LEFT CALF LIMITED TO BREAKDOWN OF SKIN WITHOUT VARICOSE VEINS (HCC): ICD-10-CM

## 2021-01-05 DIAGNOSIS — I80.02 SUPERFICIAL PHLEBITIS AND THROMBOPHLEBITIS OF LEFT LEG: ICD-10-CM

## 2021-01-05 DIAGNOSIS — L97.221 VENOUS STASIS ULCER OF LEFT CALF LIMITED TO BREAKDOWN OF SKIN WITHOUT VARICOSE VEINS (HCC): ICD-10-CM

## 2021-01-05 DIAGNOSIS — I48.92 NEW ONSET ATRIAL FLUTTER (HCC): Primary | ICD-10-CM

## 2021-01-05 LAB
ALBUMIN SERPL-MCNC: 4 G/DL (ref 3.5–5.2)
ALBUMIN/GLOB SERPL: 0.9 G/DL
ALP SERPL-CCNC: 91 U/L (ref 39–117)
ALT SERPL W P-5'-P-CCNC: 22 U/L (ref 1–33)
ANION GAP SERPL CALCULATED.3IONS-SCNC: 13.7 MMOL/L (ref 5–15)
AST SERPL-CCNC: 36 U/L (ref 1–32)
BASOPHILS # BLD AUTO: 0.05 10*3/MM3 (ref 0–0.2)
BASOPHILS NFR BLD AUTO: 0.7 % (ref 0–1.5)
BILIRUB SERPL-MCNC: 1 MG/DL (ref 0–1.2)
BUN SERPL-MCNC: 30 MG/DL (ref 8–23)
BUN/CREAT SERPL: 22.6 (ref 7–25)
CALCIUM SPEC-SCNC: 9.8 MG/DL (ref 8.6–10.5)
CHLORIDE SERPL-SCNC: 93 MMOL/L (ref 98–107)
CO2 SERPL-SCNC: 32.3 MMOL/L (ref 22–29)
CREAT SERPL-MCNC: 1.33 MG/DL (ref 0.57–1)
D DIMER PPP FEU-MCNC: 2.52 MCGFEU/ML (ref 0–0.49)
D-LACTATE SERPL-SCNC: 1.5 MMOL/L (ref 0.5–2)
DEPRECATED RDW RBC AUTO: 42.2 FL (ref 37–54)
EOSINOPHIL # BLD AUTO: 0.06 10*3/MM3 (ref 0–0.4)
EOSINOPHIL NFR BLD AUTO: 0.9 % (ref 0.3–6.2)
ERYTHROCYTE [DISTWIDTH] IN BLOOD BY AUTOMATED COUNT: 13.9 % (ref 12.3–15.4)
GFR SERPL CREATININE-BSD FRML MDRD: 39 ML/MIN/1.73
GLOBULIN UR ELPH-MCNC: 4.3 GM/DL
GLUCOSE SERPL-MCNC: 75 MG/DL (ref 65–99)
HCT VFR BLD AUTO: 52.6 % (ref 34–46.6)
HGB BLD-MCNC: 17 G/DL (ref 12–15.9)
IMM GRANULOCYTES # BLD AUTO: 0.02 10*3/MM3 (ref 0–0.05)
IMM GRANULOCYTES NFR BLD AUTO: 0.3 % (ref 0–0.5)
INR PPP: 1.1 (ref 0.9–1.1)
LYMPHOCYTES # BLD AUTO: 1.27 10*3/MM3 (ref 0.7–3.1)
LYMPHOCYTES NFR BLD AUTO: 18.2 % (ref 19.6–45.3)
MCH RBC QN AUTO: 27.5 PG (ref 26.6–33)
MCHC RBC AUTO-ENTMCNC: 32.3 G/DL (ref 31.5–35.7)
MCV RBC AUTO: 85.1 FL (ref 79–97)
MONOCYTES # BLD AUTO: 0.68 10*3/MM3 (ref 0.1–0.9)
MONOCYTES NFR BLD AUTO: 9.7 % (ref 5–12)
NEUTROPHILS NFR BLD AUTO: 4.9 10*3/MM3 (ref 1.7–7)
NEUTROPHILS NFR BLD AUTO: 70.2 % (ref 42.7–76)
NRBC BLD AUTO-RTO: 0 /100 WBC (ref 0–0.2)
PLATELET # BLD AUTO: 203 10*3/MM3 (ref 140–450)
PMV BLD AUTO: 9.4 FL (ref 6–12)
POTASSIUM SERPL-SCNC: 4.5 MMOL/L (ref 3.5–5.2)
PROCALCITONIN SERPL-MCNC: 0.11 NG/ML (ref 0–0.25)
PROT SERPL-MCNC: 8.3 G/DL (ref 6–8.5)
PROTHROMBIN TIME: 14 SECONDS (ref 11.7–14.2)
QT INTERVAL: 520 MS
RBC # BLD AUTO: 6.18 10*6/MM3 (ref 3.77–5.28)
SARS-COV-2 ORF1AB RESP QL NAA+PROBE: NOT DETECTED
SODIUM SERPL-SCNC: 139 MMOL/L (ref 136–145)
WBC # BLD AUTO: 6.98 10*3/MM3 (ref 3.4–10.8)

## 2021-01-05 PROCEDURE — 93971 EXTREMITY STUDY: CPT

## 2021-01-05 PROCEDURE — 85610 PROTHROMBIN TIME: CPT | Performed by: EMERGENCY MEDICINE

## 2021-01-05 PROCEDURE — 85379 FIBRIN DEGRADATION QUANT: CPT | Performed by: EMERGENCY MEDICINE

## 2021-01-05 PROCEDURE — 84145 PROCALCITONIN (PCT): CPT | Performed by: EMERGENCY MEDICINE

## 2021-01-05 PROCEDURE — 87077 CULTURE AEROBIC IDENTIFY: CPT | Performed by: EMERGENCY MEDICINE

## 2021-01-05 PROCEDURE — 85025 COMPLETE CBC W/AUTO DIFF WBC: CPT | Performed by: EMERGENCY MEDICINE

## 2021-01-05 PROCEDURE — U0004 COV-19 TEST NON-CDC HGH THRU: HCPCS | Performed by: EMERGENCY MEDICINE

## 2021-01-05 PROCEDURE — 99284 EMERGENCY DEPT VISIT MOD MDM: CPT

## 2021-01-05 PROCEDURE — 87147 CULTURE TYPE IMMUNOLOGIC: CPT | Performed by: EMERGENCY MEDICINE

## 2021-01-05 PROCEDURE — 87186 SC STD MICRODIL/AGAR DIL: CPT | Performed by: EMERGENCY MEDICINE

## 2021-01-05 PROCEDURE — 87205 SMEAR GRAM STAIN: CPT | Performed by: EMERGENCY MEDICINE

## 2021-01-05 PROCEDURE — 93010 ELECTROCARDIOGRAM REPORT: CPT | Performed by: INTERNAL MEDICINE

## 2021-01-05 PROCEDURE — 87070 CULTURE OTHR SPECIMN AEROBIC: CPT | Performed by: EMERGENCY MEDICINE

## 2021-01-05 PROCEDURE — 93005 ELECTROCARDIOGRAM TRACING: CPT | Performed by: EMERGENCY MEDICINE

## 2021-01-05 PROCEDURE — 96374 THER/PROPH/DIAG INJ IV PUSH: CPT

## 2021-01-05 PROCEDURE — 80053 COMPREHEN METABOLIC PANEL: CPT | Performed by: EMERGENCY MEDICINE

## 2021-01-05 PROCEDURE — 25010000002 FUROSEMIDE PER 20 MG: Performed by: EMERGENCY MEDICINE

## 2021-01-05 PROCEDURE — 36415 COLL VENOUS BLD VENIPUNCTURE: CPT

## 2021-01-05 PROCEDURE — 87040 BLOOD CULTURE FOR BACTERIA: CPT | Performed by: EMERGENCY MEDICINE

## 2021-01-05 PROCEDURE — 83605 ASSAY OF LACTIC ACID: CPT | Performed by: EMERGENCY MEDICINE

## 2021-01-05 PROCEDURE — 71045 X-RAY EXAM CHEST 1 VIEW: CPT

## 2021-01-05 RX ORDER — FUROSEMIDE 10 MG/ML
40 INJECTION INTRAMUSCULAR; INTRAVENOUS ONCE
Status: COMPLETED | OUTPATIENT
Start: 2021-01-05 | End: 2021-01-05

## 2021-01-05 RX ORDER — SODIUM CHLORIDE 0.9 % (FLUSH) 0.9 %
10 SYRINGE (ML) INJECTION AS NEEDED
Status: DISCONTINUED | OUTPATIENT
Start: 2021-01-05 | End: 2021-01-05 | Stop reason: HOSPADM

## 2021-01-05 RX ORDER — CEPHALEXIN 500 MG/1
500 CAPSULE ORAL 3 TIMES DAILY
Qty: 21 CAPSULE | Refills: 0 | Status: ON HOLD | OUTPATIENT
Start: 2021-01-05 | End: 2023-01-21

## 2021-01-05 RX ADMIN — APIXABAN 5 MG: 5 TABLET, FILM COATED ORAL at 20:20

## 2021-01-05 RX ADMIN — FUROSEMIDE 40 MG: 10 INJECTION, SOLUTION INTRAMUSCULAR; INTRAVENOUS at 18:39

## 2021-01-05 NOTE — ED NOTES
Pt wearing mask. Myself had mask, and eye wear/PPE when present with pt. Hand hygiene performed before and after pt care.     Daphnie Gumzan, PCT  01/05/21 1556

## 2021-01-05 NOTE — ED PROVIDER NOTES
EMERGENCY DEPARTMENT ENCOUNTER  Patient was placed in face mask in first look and the following protective measures were taken unless additional measures were taken and documented below in the ED course. Patient was wearing facemask when I entered the room and throughout our encounter. I wore full protective equipment throughout this patient encounter including a face mask, and gloves. Hand hygiene was performed before donning protective equipment and after removal when leaving the room.    Room Number:  13/13  Date of encounter:  1/5/2021  PCP: Marco Mayes MD    HPI:  Context: Aydee Solis is a 70 y.o. female who presents to the ED c/o chief complaint of continued leaking from her left leg venous stasis ulcer.  Patient has had known history of lymphedema secondary to breast cancer surgery.  She has had weeping from her left leg for several weeks.  She saw her primary physician on December 7, 2020 and was treated with Bactrim.  She states she has completed the Bactrim and the weeping and redness has minimally improved.  She still has redness to her left leg and she has had increasing weeping from the wounds on her left leg.  She denies fever, chills, nausea or vomiting.  She states that she is wheelchair-bound and is chronically short of air but is not on oxygen at home.  She does have dyspnea on exertion and orthopnea.  She denies general body aches or any known exposure to COVID-19.  She has been to the lymphedema clinic in the past but she states she has stopped going because it is very difficult to go there every day.  She has not been the lymphedema clinic in some time and has been treating her lymphedema at home.    MEDICAL HISTORY REVIEW  Reviewed in [facility].  Patient was seen by her family doctor on December 7 for a venous stasis ulcer left lower leg with mild erythema.  He was placed on Bactrim for cellulitis of her left leg.    PAST MEDICAL HISTORY  Active Ambulatory Problems     Diagnosis Date Noted   •  Pneumonia of right lower lobe due to infectious organism 01/27/2017   • Cellulitis 01/28/2017   • Hypertension 01/28/2017   • Diabetes mellitus (CMS/HCC) 01/28/2017   • Hyperlipidemia 01/28/2017   • Breast cancer (CMS/HCC) 01/28/2017   • Lymphedema 01/28/2017   • Osteoporosis 01/28/2017   • Acute respiratory failure with hypoxia (CMS/HCC) 01/29/2017   • Morbid obesity due to excess calories (CMS/HCC) 01/30/2017     Resolved Ambulatory Problems     Diagnosis Date Noted   • Sepsis (CMS/HCC) 01/30/2017     Past Medical History:   Diagnosis Date   • Arthritis    • Cancer (CMS/HCC)    • Elephantiasis    • Leukemia (CMS/HCC)    • Lung cancer (CMS/HCC)    • Tracheal cancer (CMS/HCC)        PAST SURGICAL HISTORY  Past Surgical History:   Procedure Laterality Date   • APPENDECTOMY     • HYSTERECTOMY     • KNEE ARTHROSCOPY Right    • LAPAROSCOPIC GASTRIC BANDING     • LYMPHADENECTOMY Left    • MASTECTOMY Left        FAMILY HISTORY  Family History   Problem Relation Age of Onset   • Stroke Mother    • Leukemia Father    • COPD Sister    • ALS Brother        SOCIAL HISTORY  Social History     Socioeconomic History   • Marital status:      Spouse name: Not on file   • Number of children: Not on file   • Years of education: Not on file   • Highest education level: Not on file   Tobacco Use   • Smoking status: Former Smoker   Substance and Sexual Activity   • Alcohol use: No   • Drug use: No       ALLERGIES  Ciprofloxacin    The patient's allergies have been reviewed    REVIEW OF SYSTEMS  All systems reviewed and negative except for those discussed in HPI.     PHYSICAL EXAM  I have reviewed the triage vital signs and nursing notes.  ED Triage Vitals   Temp Heart Rate Resp BP SpO2   01/05/21 1402 01/05/21 1356 01/05/21 1356 01/05/21 1428 01/05/21 1356   97.2 °F (36.2 °C) 75 24 126/78 (!) 86 %      Temp src Heart Rate Source Patient Position BP Location FiO2 (%)   01/05/21 1402 01/05/21 1356 01/05/21 1428 -- --   Tympanic  Monitor Sitting         General: No acute distress  HENT: NCAT, PERRL, Nares patent  Eyes: no scleral icterus  Neck: trachea midline, no ROM limitations  CV: regular rhythm, regular rate  Respiratory: normal effort, decreased breath sounds in the bases.  Abdomen: soft, nondistended, nontender to palpation, no rebound tenderness, no guarding or rigidity  : deferred  Musculoskeletal: no deformity.  Lymphedema bilateral lower extremities.  She has approximately 6 small areas on the anterior lateral aspect of her lower left leg that are weeping serous fluid.  There is surrounding erythema but is not warm to palpation.  Patient states the erythema is almost at her baseline.  Neuro: alert, moves all extremities, follows commands  Skin: warm, dry    LAB RESULTS  Recent Results (from the past 24 hour(s))   ECG 12 Lead    Collection Time: 01/05/21  3:50 PM   Result Value Ref Range    QT Interval 520 ms   Wound Culture - Wound, Leg, Left    Collection Time: 01/05/21  4:11 PM    Specimen: Leg, Left; Wound   Result Value Ref Range    Gram Stain No WBCs or organisms seen    COVID-19,[facility]-HOUSE, NP/OP SWAB IN UTM/VTM/[facility] TRANSPORT MEDIA,24 HR TAT - Swab, Nasopharynx    Collection Time: 01/05/21  4:11 PM    Specimen: Nasopharynx; Swab   Result Value Ref Range    COVID19 Not Detected Not Detected - Ref. Range   Comprehensive Metabolic Panel    Collection Time: 01/05/21  4:54 PM    Specimen: Blood   Result Value Ref Range    Glucose 75 65 - 99 mg/dL    BUN 30 (H) 8 - 23 mg/dL    Creatinine 1.33 (H) 0.57 - 1.00 mg/dL    Sodium 139 136 - 145 mmol/L    Potassium 4.5 3.5 - 5.2 mmol/L    Chloride 93 (L) 98 - 107 mmol/L    CO2 32.3 (H) 22.0 - 29.0 mmol/L    Calcium 9.8 8.6 - 10.5 mg/dL    Total Protein 8.3 6.0 - 8.5 g/dL    Albumin 4.00 3.50 - 5.20 g/dL    ALT (SGPT) 22 1 - 33 U/L    AST (SGOT) 36 (H) 1 - 32 U/L    Alkaline Phosphatase 91 39 - 117 U/L    Total Bilirubin 1.0 0.0 - 1.2 mg/dL    eGFR Non  Amer 39  (L) >60 mL/min/1.73    Globulin 4.3 gm/dL    A/G Ratio 0.9 g/dL    BUN/Creatinine Ratio 22.6 7.0 - 25.0    Anion Gap 13.7 5.0 - 15.0 mmol/L   Protime-INR    Collection Time: 01/05/21  4:54 PM    Specimen: Blood   Result Value Ref Range    Protime 14.0 11.7 - 14.2 Seconds    INR 1.10 0.90 - 1.10   Lactic Acid, Plasma    Collection Time: 01/05/21  4:54 PM    Specimen: Blood   Result Value Ref Range    Lactate 1.5 0.5 - 2.0 mmol/L   Procalcitonin    Collection Time: 01/05/21  4:54 PM    Specimen: Blood   Result Value Ref Range    Procalcitonin 0.11 0.00 - 0.25 ng/mL   CBC Auto Differential    Collection Time: 01/05/21  4:54 PM    Specimen: Blood   Result Value Ref Range    WBC 6.98 3.40 - 10.80 10*3/mm3    RBC 6.18 (H) 3.77 - 5.28 10*6/mm3    Hemoglobin 17.0 (H) 12.0 - 15.9 g/dL    Hematocrit 52.6 (H) 34.0 - 46.6 %    MCV 85.1 79.0 - 97.0 fL    MCH 27.5 26.6 - 33.0 pg    MCHC 32.3 31.5 - 35.7 g/dL    RDW 13.9 12.3 - 15.4 %    RDW-SD 42.2 37.0 - 54.0 fl    MPV 9.4 6.0 - 12.0 fL    Platelets 203 140 - 450 10*3/mm3    Neutrophil % 70.2 42.7 - 76.0 %    Lymphocyte % 18.2 (L) 19.6 - 45.3 %    Monocyte % 9.7 5.0 - 12.0 %    Eosinophil % 0.9 0.3 - 6.2 %    Basophil % 0.7 0.0 - 1.5 %    Immature Grans % 0.3 0.0 - 0.5 %    Neutrophils, Absolute 4.90 1.70 - 7.00 10*3/mm3    Lymphocytes, Absolute 1.27 0.70 - 3.10 10*3/mm3    Monocytes, Absolute 0.68 0.10 - 0.90 10*3/mm3    Eosinophils, Absolute 0.06 0.00 - 0.40 10*3/mm3    Basophils, Absolute 0.05 0.00 - 0.20 10*3/mm3    Immature Grans, Absolute 0.02 0.00 - 0.05 10*3/mm3    nRBC 0.0 0.0 - 0.2 /100 WBC   D-dimer, Quantitative    Collection Time: 01/05/21  4:54 PM    Specimen: Blood   Result Value Ref Range    D-Dimer, Quantitative 2.52 (H) 0.00 - 0.49 MCGFEU/mL   Duplex Venous Lower Extremity - Left CAR    Collection Time: 01/05/21  7:20 PM   Result Value Ref Range    Left Greater Saph AK Vessel 1     Left Greater Saph BK Vessel 1     Right Common Femoral Spont Y     Right Common  Femoral Phasic Y     Right Common Femoral Augment Y     Right Common Femoral Competent Y     Right Common Femoral Compress C     Left Common Femoral Spont Y     Left Common Femoral Phasic Y     Left Common Femoral Augment Y     Left Common Femoral Competent Y     Left Common Femoral Compress C     Left Saphenofemoral Junction Compress C     Left Profunda Femoral Compress C     Left Proximal Femoral Compress C     Left Mid Femoral Spont Y     Left Mid Femoral Phasic Y     Left Mid Femoral Augment Y     Left Mid Femoral Competent Y     Left Mid Femoral Compress C     Left Distal Femoral Compress C     Left Popliteal Spont Y     Left Popliteal Phasic Y     Left Popliteal Augment Y     Left Popliteal Competent Y     Left Popliteal Compress C     Left Posterior Tibial Compress C     Left Peroneal Compress C     Left GastronemiusSoleal Compress C     Left Greater Saph AK Compress N     Left Greater Saph AK Thrombus A     Left Greater Saph BK Compress N     Left Greater Saph BK Thrombus C     Left Lesser Saph Compress C        I ordered the above labs and reviewed the results.    RADIOLOGY  Xr Chest 1 View    Result Date: 1/5/2021  ONE VIEW PORTABLE CHEST  HISTORY: Shortness of breath.  FINDINGS: There is prominent cardiomegaly that is slightly more prominent compared to the study of 01/27/2017. The lungs are well-expanded and clear and there is no evidence of overt CHF or focal infiltrate.  This report was finalized on 1/5/2021 6:03 PM by Dr. Harsh Norton M.D.        I ordered the above noted radiological studies. I reviewed the images and results. I agree with the radiologist interpretation.    PROCEDURES  Procedures    MEDICATIONS GIVEN IN ER  Medications   sodium chloride 0.9 % flush 10 mL (has no administration in time range)   furosemide (LASIX) injection 40 mg (40 mg Intravenous Given 1/5/21 1839)   apixaban (ELIQUIS) tablet 5 mg (5 mg Oral Given 1/5/21 2020)       PROGRESS, DATA ANALYSIS, CONSULTS, AND MEDICAL  DECISION MAKING  A complete history and physical exam have been performed.  All available laboratory and imaging results have been reviewed by myself prior to disposition.    MDM  After the initial H&P, I discussed pertinent information from history and physical exam with patient/family.  Discussed differential diagnosis.  Discussed plan for ED evaluation/work-up/treatment.  All questions answered.  Patient/family is agreeable with plan.  ED Course as of Jan 05 2239   Tue Jan 05, 2021   1606 EKG          EKG time: 1550  Rhythm/Rate: Atrial flutter with a 4-1 block, rate 60  QRS, axis: LAFB and right bundle branch block  ST and T waves: Right bundle branch block    Interpreted Contemporaneously by me, independently viewed  changed compared to prior 01/27/2017      [DE]   1721 Patient's chest x-ray reveals cardiomegaly but no evidence of vascular congestion or CHF.    [DE]   1821 All of patient's blood work is normal and patient states the erythema is back to baseline.  We will wrap up patient's leg and have her follow-up with her family doctor.    [DE]   1834 Patient denies history of atrial flutter.  Patient is in atrial flutter with a heart rate in the 60s.  She states her left leg is burning but is not warm to the touch.  We will obtain a venous ultrasound left lower extremity to rule out DVT.    [DE]   1952 Have updated patient on the results of her blood work and a vascular ultrasound which showed a positive SVT but no DVT.  Patient states she has never been in atrial flutter or atrial fibrillation.  She would like somebody to come and check on her legs at home.  We will set up home health for this.  I will also call cardiology to see if we need to place patient on a DOAC.    [DE]   2006 I discussed the case with Dr. Mathis, cardiology.  He recommends placing patient on Eliquis, 5 mg twice daily.    [DE]      ED Course User Index  [DE] Camilo Armstrong MD     CHADS-VASc Risk Assessment            4       Total  Score        1 Hypertension    1 DM    1 Age 65-74    1 Sex: Female        Criteria that do not apply:    CHF    Age >/= 75    PRIOR STROKE/TIA/THROMBO    Vascular Disease          AS OF 22:39 EST VITALS:    BP - 100/68  HR - 66  TEMP - 97.2 °F (36.2 °C) (Tympanic)  O2 SATS - 97%    DIAGNOSIS  Final diagnoses:   New onset atrial flutter (CMS/HCC)   Venous stasis ulcer of left calf limited to breakdown of skin without varicose veins (CMS/HCC)   Superficial phlebitis and thrombophlebitis of left leg         DISPOSITION    DISCHARGE    Patient discharged in stable condition.    Reviewed implications of results, diagnosis, meds, responsibility to follow up, warning signs and symptoms of possible worsening, potential complications and reasons to return to ER.    Patient/Family voiced understanding of above instructions.    Discussed plan for discharge, as there is no emergent indication for admission. Patient referred to primary care provider for BP management due to today's BP. Pt/family is agreeable and understands need for follow up and repeat testing.  Pt is aware that discharge does not mean that nothing is wrong but it indicates no emergency is present that requires admission and they must continue care with follow-up as given below or physician of their choice.     FOLLOW-UP  Marco Mayes MD  [address] [facility] [id number] [phone]    Schedule an appointment as soon as possible for a visit       [facility] CARDIOLOGY  [address] [facility]  [address]  [place] [address] [phone]  Schedule an appointment as soon as possible for a visit            Medication List      New Prescriptions    apixaban 5 MG tablet tablet  Commonly known as: ELIQUIS  Take 1 tablet by mouth 2 (Two) Times a Day.     cephalexin 500 MG capsule  Commonly known as: KEFLEX  Take 1 capsule by mouth 3 (Three) Times a Day.        Stop    diclofenac 75 MG EC tablet  Commonly known as: VOLTAREN            Where to Get Your Medications      These medications were sent to [facility] [address] [place] [address] - [address] [address] [facility] - [phone]  - [phone]   [address] [facility] [facility] [address]    Phone: [phone]   · cephalexin 500 MG capsule     You can get these medications from any pharmacy    Bring a paper prescription for each of these medications  · apixaban 5 MG tablet tablet          Camilo Armstrong MD  01/05/21 0819

## 2021-01-05 NOTE — ED TRIAGE NOTES
Pt states that for the last few weeks she has had increase swelling, sores, and weeping to her left leg. States that she saw her PCP and was told to just out neosporin on it. States that it hasn't gotten better. Patient masked at arrival and triage staff wore all appropriate PPE during entire encounter with patient.

## 2021-01-06 LAB
BH CV LOW VAS LEFT GREATER SAPH AK VESSEL: 1
BH CV LOW VAS LEFT GREATER SAPH BK VESSEL: 1
BH CV LOWER VASCULAR LEFT COMMON FEMORAL AUGMENT: NORMAL
BH CV LOWER VASCULAR LEFT COMMON FEMORAL COMPETENT: NORMAL
BH CV LOWER VASCULAR LEFT COMMON FEMORAL COMPRESS: NORMAL
BH CV LOWER VASCULAR LEFT COMMON FEMORAL PHASIC: NORMAL
BH CV LOWER VASCULAR LEFT COMMON FEMORAL SPONT: NORMAL
BH CV LOWER VASCULAR LEFT DISTAL FEMORAL COMPRESS: NORMAL
BH CV LOWER VASCULAR LEFT GASTRONEMIUS COMPRESS: NORMAL
BH CV LOWER VASCULAR LEFT GREATER SAPH AK COMPRESS: NORMAL
BH CV LOWER VASCULAR LEFT GREATER SAPH AK THROMBUS: NORMAL
BH CV LOWER VASCULAR LEFT GREATER SAPH BK COMPRESS: NORMAL
BH CV LOWER VASCULAR LEFT GREATER SAPH BK THROMBUS: NORMAL
BH CV LOWER VASCULAR LEFT LESSER SAPH COMPRESS: NORMAL
BH CV LOWER VASCULAR LEFT MID FEMORAL AUGMENT: NORMAL
BH CV LOWER VASCULAR LEFT MID FEMORAL COMPETENT: NORMAL
BH CV LOWER VASCULAR LEFT MID FEMORAL COMPRESS: NORMAL
BH CV LOWER VASCULAR LEFT MID FEMORAL PHASIC: NORMAL
BH CV LOWER VASCULAR LEFT MID FEMORAL SPONT: NORMAL
BH CV LOWER VASCULAR LEFT PERONEAL COMPRESS: NORMAL
BH CV LOWER VASCULAR LEFT POPLITEAL AUGMENT: NORMAL
BH CV LOWER VASCULAR LEFT POPLITEAL COMPETENT: NORMAL
BH CV LOWER VASCULAR LEFT POPLITEAL COMPRESS: NORMAL
BH CV LOWER VASCULAR LEFT POPLITEAL PHASIC: NORMAL
BH CV LOWER VASCULAR LEFT POPLITEAL SPONT: NORMAL
BH CV LOWER VASCULAR LEFT POSTERIOR TIBIAL COMPRESS: NORMAL
BH CV LOWER VASCULAR LEFT PROFUNDA FEMORAL COMPRESS: NORMAL
BH CV LOWER VASCULAR LEFT PROXIMAL FEMORAL COMPRESS: NORMAL
BH CV LOWER VASCULAR LEFT SAPHENOFEMORAL JUNCTION COMPRESS: NORMAL
BH CV LOWER VASCULAR RIGHT COMMON FEMORAL AUGMENT: NORMAL
BH CV LOWER VASCULAR RIGHT COMMON FEMORAL COMPETENT: NORMAL
BH CV LOWER VASCULAR RIGHT COMMON FEMORAL COMPRESS: NORMAL
BH CV LOWER VASCULAR RIGHT COMMON FEMORAL PHASIC: NORMAL
BH CV LOWER VASCULAR RIGHT COMMON FEMORAL SPONT: NORMAL

## 2021-01-06 NOTE — PROGRESS NOTES
Spoke with Ely, on call with [facility], and informed of referral. Requested that info also be faxed to office. Lorraine Gomez RN

## 2021-01-06 NOTE — PROGRESS NOTES
Patient Education - Eliquis  Patient was counseled extensively on Eliquis including the followin) Eliquis's indication, mechanism of action, and dosing  2) Enforced the importance of taking Eliquis as instructed every day and that it is a twice a day medication.  3) Explained possible side effects of Eliquis therapy, including increased risk of bleeding, s/sx of bleeding, and s/sx of any additional clots/PE/CVA.   4) Emphasized the importance of going to the emergency room if any of the following occur: Falling and hitting your head; noticing bright red blood in urine or dark/tarry stools; vomiting up blood or vomit has a coffee-ground like texture; coughing up blood  5) Discussed the importance of speaking with physician/pharmacist when starting any new medications to check for drug interactions with Eliquis  6) Discussed the importance of informing any surgeon or proceduralist that you are on Eliquis and may need to go off prior to the procedure (including spinal/epidural procedures)  7) Discussed what to do about a missed dose (Take as soon as you remember and if it is closer to the time for your next dose, skip the missed dose and go back to your normal time; DO NOT double up doses)  8) Instructed the pt not to take or discontinue any medications without informing his physician/pharmacist     I also explained to the patient that this medication may have a high copay associated with it and to let the cardiologist know if it is unaffordable. Patient was provided with a 30 day free trial card to take to her preferred pharmacy.      Patient expressed understanding and had no further questions.       Peña Phelps, PharmD  20:31 EST

## 2021-01-06 NOTE — PROGRESS NOTES
Discharge Planning Assessment  [facility]     Patient Name: Aydee Solis  MRN: [id number]  Today's Date: 1/5/2021    Admit Date: 1/5/2021    Discharge Needs Assessment    No documentation.       Discharge Plan     Row Name 01/05/21 2007       Plan    Plan  Face sheet verified. Pt would like to have [facility] to provide services. Pt refuses to go to the Lymphedema clinic, as she does not have transportation. Referral sent via in basket        Continued Care and Services - Admitted Since 1/5/2021     Home Medical Care     Service Provider Request Status Selected Services Address Phone Fax Patient Preferred    [facility]  Pending - Request Sent N/A [address] [address] [phone] --                Demographic Summary    No documentation.       Functional Status    No documentation.       Psychosocial    No documentation.       Abuse/Neglect    No documentation.       Legal    No documentation.       Substance Abuse    No documentation.       Patient Forms    No documentation.           Lorraine Gomez RN

## 2021-01-06 NOTE — DISCHARGE INSTRUCTIONS
Take the Eliquis twice a day as directed.  Do follow-up with your family doctor.  Also, follow-up with [place] [facility].  Call for an appointment.  Please return to the emergency department if symptoms worsen with fever, increasing drainage from the left leg or increasing redness.

## 2021-01-06 NOTE — ED NOTES
Dressed the weeping the wounds to the left calf area and ace wrapped the leg.      Samantha Cabrera RN  01/05/21 1940

## 2021-01-06 NOTE — DISCHARGE PLACEMENT REQUEST
"Aydee Solis (70 y.o. Female)     Date of Birth Social Security Number Address Home Phone MRN    1950  [id number] [facility] [phone] [id number]    Amish Marital Status          Cheondoism        Admission Date Admission Type Admitting Provider Attending Provider Department, Room/Bed    1/5/21 Emergency  Camilo Armstrong MD [facility] Emergency Department, 13/13    Discharge Date Discharge Disposition Discharge Destination                       Attending Provider: Camilo Armstrong MD    Allergies: Ciprofloxacin    Isolation: None   Infection: COVID Screen (preop/placement) (01/05/21)   Code Status: Not on file    Ht: 172.7 cm (68\")   Wt: 161 kg (355 lb 14.4 oz)    Admission Cmt: None   Principal Problem: None                Active Insurance as of 1/5/2021     Primary Coverage     Payor Plan Insurance Group Employer/Plan Group    MEDICARE MEDICARE A & B      Payor Plan Address Payor Plan Phone Number Payor Plan Fax Number Effective Dates    [address] [phone]  7/1/2013 - None Entered    [facility] [address]       Subscriber Name Subscriber Birth Date Member ID       AYDEE SOLIS 1950 [id number]           Secondary Coverage     Payor Plan Insurance Group Employer/Plan Group    [facility] SUPP [id number]     Payor Plan Address Payor Plan Phone Number Payor Plan Fax Number Effective Dates    [address]   12/1/2016 - None Entered    [facility] [id number]       Subscriber Name Subscriber Birth Date Member ID       AYDEE SOLIS W 1950 [id number]                 Emergency Contacts      (Rel.) Home Phone Work Phone Mobile Phone    WillKyle jaime (Spouse) [phone] -- [phone]              "

## 2021-01-07 RX ORDER — DOXYCYCLINE 100 MG/1
100 CAPSULE ORAL 2 TIMES DAILY
Qty: 20 CAPSULE | Refills: 0 | Status: ON HOLD | OUTPATIENT
Start: 2021-01-07 | End: 2023-01-21

## 2021-01-08 LAB
BACTERIA SPEC AEROBE CULT: ABNORMAL
BACTERIA SPEC AEROBE CULT: ABNORMAL
GRAM STN SPEC: ABNORMAL

## 2021-01-10 LAB
BACTERIA SPEC AEROBE CULT: NORMAL
BACTERIA SPEC AEROBE CULT: NORMAL

## 2022-05-05 ENCOUNTER — OFFICE VISIT (OUTPATIENT)
Dept: ORTHOPEDIC SURGERY | Facility: CLINIC | Age: 72
End: 2022-05-05

## 2022-05-05 VITALS — HEIGHT: 66 IN | TEMPERATURE: 97.3 F | WEIGHT: 293 LBS | BODY MASS INDEX: 47.09 KG/M2

## 2022-05-05 DIAGNOSIS — R52 PAIN: Primary | ICD-10-CM

## 2022-05-05 DIAGNOSIS — M16.11 PRIMARY OSTEOARTHRITIS OF RIGHT HIP: ICD-10-CM

## 2022-05-05 PROCEDURE — 73502 X-RAY EXAM HIP UNI 2-3 VIEWS: CPT | Performed by: NURSE PRACTITIONER

## 2022-05-05 PROCEDURE — 99203 OFFICE O/P NEW LOW 30 MIN: CPT | Performed by: NURSE PRACTITIONER

## 2022-05-05 RX ORDER — MELATONIN
1000 DAILY
Status: ON HOLD | COMMUNITY
End: 2023-01-21

## 2022-05-05 RX ORDER — LISINOPRIL 5 MG/1
TABLET ORAL
Status: ON HOLD | COMMUNITY
Start: 2022-04-05 | End: 2023-01-21

## 2022-05-05 RX ORDER — ATORVASTATIN CALCIUM 10 MG/1
10 TABLET, FILM COATED ORAL DAILY
COMMUNITY
Start: 2022-01-21

## 2022-05-05 RX ORDER — METOLAZONE 5 MG/1
TABLET ORAL
Status: ON HOLD | COMMUNITY
Start: 2022-03-30 | End: 2023-01-21

## 2022-05-05 RX ORDER — DULOXETIN HYDROCHLORIDE 60 MG/1
60 CAPSULE, DELAYED RELEASE ORAL DAILY
COMMUNITY
Start: 2022-01-21

## 2022-05-05 RX ORDER — FUROSEMIDE 80 MG
80 TABLET ORAL DAILY
Status: ON HOLD | COMMUNITY
End: 2023-01-21 | Stop reason: ALTCHOICE

## 2022-05-05 NOTE — PROGRESS NOTES
Patient: Aydee Solis  YOB: 1950 71 y.o. female  Medical Record Number: [id number]    Chief Complaints:   Chief Complaint   Patient presents with   • Right Hip - Establish Care, Pain       History of Present Illness:Aydee Solis is a 71 y.o. female who presents with length of having right hip pain that started approximately 2 to 3 weeks ago.  Patient denies any known injury to her hip.  Patient states that the pain is located in her groin and the lateral side of her hip.  Patient reports the pain is severely excruciating.  She states she has osteoarthritis all over her body and she is afraid that she now has arthritis to her hip.  She states that she is having to use a cane for ambulatory purposes now just to get around.  She describes the pain as a severe sharp and achy pain and rates it as a 9 out of 10.  Patient does state that she knows that she has a very arthritic right knee and that she was not a good candidate for surgery.  Patient states the pain in her knee now with her hip is making it very hard for her to walk.  Patient is here today for further evaluation.    Allergies:   Allergies   Allergen Reactions   • Ciprofloxacin        Medications:   Current Outpatient Medications   Medication Sig Dispense Refill   • albuterol (PROVENTIL HFA;VENTOLIN HFA) 108 (90 BASE) MCG/ACT inhaler Inhale 2 puffs Every 4 (Four) Hours As Needed for wheezing or shortness of air. 1 inhaler 0   • apixaban (ELIQUIS) 5 MG tablet tablet Take 1 tablet by mouth 2 (Two) Times a Day. 60 tablet 0   • atorvastatin (LIPITOR) 10 MG tablet Take 10 mg by mouth Every Night.     • cholecalciferol (VITAMIN D3) 25 MCG (1000 UT) tablet Take 1,000 Units by mouth Daily.     • DULoxetine (CYMBALTA) 60 MG capsule Take 60 mg by mouth Daily.     • furosemide (LASIX) 80 MG tablet Take 80 mg by mouth Daily.     • HYDROcodone-acetaminophen (NORCO) 5-325 MG per tablet Take 1 tablet by mouth Every 6 (Six) Hours As Needed for moderate pain  "(4-6) or severe pain (7-10). 30 tablet 0   • insulin aspart (NOVOLOG FLEXPEN) 100 UNIT/ML solution pen-injector sc pen Inject 1 Units under the skin 3 (Three) Times a Day With Meals. 22 units breakfast 20units lunch 15units dinner     • insulin detemir (LEVEMIR) 100 UNIT/ML injection Inject 32 Units under the skin 2 (Two) Times a Day.     • lisinopril (PRINIVIL,ZESTRIL) 5 MG tablet      • metOLazone (ZAROXOLYN) 5 MG tablet      • Trelegy Ellipta 100-62.5-25 MCG/INH inhaler      • atorvastatin (LIPITOR) 10 MG tablet Take 10 mg by mouth Daily.     • cephalexin (KEFLEX) 500 MG capsule Take 1 capsule by mouth 3 (Three) Times a Day. 21 capsule 0   • doxycycline (MONODOX) 100 MG capsule Take 1 capsule by mouth 2 (Two) Times a Day. 20 capsule 0   • lisinopril-hydrochlorothiazide (PRINZIDE,ZESTORETIC) 20-12.5 MG per tablet Take 2 tablets by mouth Daily.       No current facility-administered medications for this visit.         The following portions of the patient's history were reviewed and updated as appropriate: allergies, current medications, past family history, past medical history, past social history, past surgical history and problem list.    Review of Systems:   A 14 point review of systems was performed. All systems negative except pertinent positives/negative listed in HPI above    Physical Exam:   Vitals:    05/05/22 1306   Temp: 97.3 °F (36.3 °C)   Weight: (!) 142 kg (312 lb)   Height: 167.6 cm (66\")   PainSc: 10-Worst pain ever       General: A and O x 3, ASA, NAD    SCLERA:    Normal    DENTITION:   Normal    Hip:  right    LEG ALIGNMENT:     Neutral        LEG LENGTH DISCREPANCY   :    none    GAIT:     Antalgic    SKIN:     No abnormality    RANGE OF MOTION:     Limited by joint irritability    STRENGTH:     Limited by joint irratibility    DISTAL PULSES:    Paplable    DISTAL SENSATION :   Intact    LYMPHATICS:     No   lymphadenopathy    OTHER:          +   Stinchfeld test      -    log roll      -   " Tenderness to palpation trochanteric bursa      Radiology:  Xrays 2views (AP bilateral hips, and lateral of the hip) ordered and reviewed for evaluation of hip pain  demonstrating  evidence of joint space narrowing and mild impingement morphology with CAM lesion noted as well as trochanteric spurring.  No other hip x-rays available for comparison purposes.    Assessment/Plan: Primary osteoarthritis right hip    Treatment options as well as imaging results were discussed in detail with the patient.  Patient's symptoms appear to be more significant than what the x-ray is showing.  Patient likely has worsening arthritis and wants appearing on the x-ray.  Patient is also obese and has a BMI of 50.38 kg/m².  I educated the patient on 1 pound of weight loss takes 4 pounds of pressure off of her knee joints.  I am going to send her for a fluoroscopy guided intra-articular hip injection to see if we can calm down the arthritis pain.  Should the patient not get significant relief from the fluoroscopy guided injection then she may need to schedule an appointment to talk with Dr. Choudhary about potential surgical options down the road.  However being the patient's current situation she is not a good candidate for any type of surgical operation at this time.      Kunal Griffith, APRN  5/5/2022

## 2022-05-12 ENCOUNTER — HOSPITAL ENCOUNTER (OUTPATIENT)
Dept: GENERAL RADIOLOGY | Facility: HOSPITAL | Age: 72
Discharge: HOME | End: 2022-05-12
Admitting: NURSE PRACTITIONER

## 2022-05-12 DIAGNOSIS — M16.11 PRIMARY OSTEOARTHRITIS OF RIGHT HIP: ICD-10-CM

## 2022-05-12 PROCEDURE — 25010000002 METHYLPREDNISOLONE PER 125 MG: Performed by: NURSE PRACTITIONER

## 2022-05-12 PROCEDURE — 77002 NEEDLE LOCALIZATION BY XRAY: CPT

## 2022-05-12 PROCEDURE — 0 LIDOCAINE 1 % SOLUTION: Performed by: NURSE PRACTITIONER

## 2022-05-12 PROCEDURE — 25010000002 IOPAMIDOL 61 % SOLUTION: Performed by: NURSE PRACTITIONER

## 2022-05-12 RX ORDER — LIDOCAINE HYDROCHLORIDE 10 MG/ML
10 INJECTION, SOLUTION INFILTRATION; PERINEURAL ONCE
Status: COMPLETED | OUTPATIENT
Start: 2022-05-12 | End: 2022-05-12

## 2022-05-12 RX ORDER — BUPIVACAINE HYDROCHLORIDE 2.5 MG/ML
10 INJECTION, SOLUTION EPIDURAL; INFILTRATION; INTRACAUDAL ONCE
Status: COMPLETED | OUTPATIENT
Start: 2022-05-12 | End: 2022-05-12

## 2022-05-12 RX ORDER — METHYLPREDNISOLONE SODIUM SUCCINATE 125 MG/2ML
80 INJECTION, POWDER, LYOPHILIZED, FOR SOLUTION INTRAMUSCULAR; INTRAVENOUS
Status: COMPLETED | OUTPATIENT
Start: 2022-05-12 | End: 2022-05-12

## 2022-05-12 RX ADMIN — IOPAMIDOL 1 ML: 612 INJECTION, SOLUTION INTRAVENOUS at 08:35

## 2022-05-12 RX ADMIN — METHYLPREDNISOLONE SODIUM SUCCINATE 80 MG: 125 INJECTION, POWDER, LYOPHILIZED, FOR SOLUTION INTRAMUSCULAR; INTRAVENOUS at 08:35

## 2022-05-12 RX ADMIN — LIDOCAINE HYDROCHLORIDE 5 ML: 10 INJECTION, SOLUTION INFILTRATION; PERINEURAL at 08:35

## 2022-05-12 RX ADMIN — BUPIVACAINE HYDROCHLORIDE 5 ML: 2.5 INJECTION, SOLUTION EPIDURAL; INFILTRATION; INTRACAUDAL; PERINEURAL at 08:35

## 2022-07-25 ENCOUNTER — TELEPHONE (OUTPATIENT)
Dept: ORTHOPEDIC SURGERY | Facility: CLINIC | Age: 72
End: 2022-07-25

## 2022-07-25 DIAGNOSIS — M16.11 PRIMARY OSTEOARTHRITIS OF RIGHT HIP: Primary | ICD-10-CM

## 2022-07-25 NOTE — TELEPHONE ENCOUNTER
I have ordered the injection, however she cannot have the injection before Aug 5th as she has to wait 3 months between injections. But I do go ahead and place the order.

## 2022-07-25 NOTE — TELEPHONE ENCOUNTER
Caller: RADHA    Relationship to patient: SPOUSE    Best call back number: [phone]    Chief complaint: R HIP     Type of visit: INJECTION     Requested date: NEXT AVAIL

## 2022-07-25 NOTE — TELEPHONE ENCOUNTER
Patient informed of CAR message, verbalizing understanding and with no further questions or concerns.

## 2022-08-04 ENCOUNTER — HOSPITAL ENCOUNTER (OUTPATIENT)
Dept: GENERAL RADIOLOGY | Facility: HOSPITAL | Age: 72
Discharge: HOME | End: 2022-08-04
Admitting: NURSE PRACTITIONER

## 2022-08-04 DIAGNOSIS — M16.11 PRIMARY OSTEOARTHRITIS OF RIGHT HIP: ICD-10-CM

## 2022-08-04 PROCEDURE — 77002 NEEDLE LOCALIZATION BY XRAY: CPT

## 2022-08-04 PROCEDURE — 25010000002 IOPAMIDOL 61 % SOLUTION: Performed by: NURSE PRACTITIONER

## 2022-08-04 PROCEDURE — 25010000002 METHYLPREDNISOLONE PER 80 MG: Performed by: NURSE PRACTITIONER

## 2022-08-04 PROCEDURE — 0 LIDOCAINE 1 % SOLUTION: Performed by: NURSE PRACTITIONER

## 2022-08-04 RX ORDER — LIDOCAINE HYDROCHLORIDE 10 MG/ML
10 INJECTION, SOLUTION INFILTRATION; PERINEURAL
Status: COMPLETED | OUTPATIENT
Start: 2022-08-04 | End: 2022-08-04

## 2022-08-04 RX ORDER — METHYLPREDNISOLONE ACETATE 80 MG/ML
80 INJECTION, SUSPENSION INTRA-ARTICULAR; INTRALESIONAL; INTRAMUSCULAR; SOFT TISSUE ONCE
Status: COMPLETED | OUTPATIENT
Start: 2022-08-04 | End: 2022-08-04

## 2022-08-04 RX ORDER — BUPIVACAINE HYDROCHLORIDE 2.5 MG/ML
10 INJECTION, SOLUTION EPIDURAL; INFILTRATION; INTRACAUDAL
Status: COMPLETED | OUTPATIENT
Start: 2022-08-04 | End: 2022-08-04

## 2022-08-04 RX ADMIN — METHYLPREDNISOLONE ACETATE 80 MG: 80 INJECTION, SUSPENSION INTRA-ARTICULAR; INTRALESIONAL; INTRAMUSCULAR; SOFT TISSUE at 13:50

## 2022-08-04 RX ADMIN — LIDOCAINE HYDROCHLORIDE 6 ML: 10 INJECTION, SOLUTION INFILTRATION; PERINEURAL at 13:50

## 2022-08-04 RX ADMIN — BUPIVACAINE HYDROCHLORIDE 5 ML: 2.5 INJECTION, SOLUTION EPIDURAL; INFILTRATION; INTRACAUDAL; PERINEURAL at 13:50

## 2022-08-04 RX ADMIN — IOPAMIDOL 2 ML: 612 INJECTION, SOLUTION INTRAVENOUS at 13:50

## 2022-08-09 ENCOUNTER — HOSPITAL ENCOUNTER (OUTPATIENT)
Dept: GENERAL RADIOLOGY | Facility: HOSPITAL | Age: 72
End: 2022-08-09

## 2022-09-15 ENCOUNTER — OFFICE VISIT (OUTPATIENT)
Dept: ORTHOPEDIC SURGERY | Facility: CLINIC | Age: 72
End: 2022-09-15

## 2022-09-15 VITALS — HEIGHT: 66 IN | RESPIRATION RATE: 16 BRPM | WEIGHT: 293 LBS | BODY MASS INDEX: 47.09 KG/M2 | TEMPERATURE: 96.6 F

## 2022-09-15 DIAGNOSIS — M16.12 PRIMARY OSTEOARTHRITIS OF LEFT HIP: Primary | ICD-10-CM

## 2022-09-15 DIAGNOSIS — M25.562 LEFT KNEE PAIN, UNSPECIFIED CHRONICITY: ICD-10-CM

## 2022-09-15 PROCEDURE — 99213 OFFICE O/P EST LOW 20 MIN: CPT | Performed by: NURSE PRACTITIONER

## 2022-09-15 PROCEDURE — 73502 X-RAY EXAM HIP UNI 2-3 VIEWS: CPT | Performed by: NURSE PRACTITIONER

## 2022-09-15 PROCEDURE — 73562 X-RAY EXAM OF KNEE 3: CPT | Performed by: NURSE PRACTITIONER

## 2022-09-15 NOTE — PROGRESS NOTES
Patient: Aydee Solis  YOB: 1950 72 y.o. female  Medical Record Number: [id number]    Chief Complaints:   Chief Complaint   Patient presents with   • Left Hip - Follow-up       History of Present Illness:Aydee Solis is a 72 y.o. female who presents with complaints of having left hip pain that started approximately 2 months ago.  Patient denies any known injury to the hip.  Patient reports the pain is located to the groin region and radiates to the posterior lateral portion of the hip down to the left knee.  Patient reports that she was seen earlier in this year with similar issues to her right hip.  Patient reports that she had a fluoroscopy guided intra-articular joint injection into her right hip and it gave her significant relief and has not been bothering her since.  She states that this is bothering her on a continual basis and she is having to use a cane for ambulatory purposes.  Patient has known knee issues in which she has seen Dr. Choudhary in the past.  Patient has significant lymphedema to her lower extremities and Dr. Choudhary said that she is not a good candidate for surgery.  Patient reports that she is recently been seen by Dr. Cosme Alston a pain management specialist who is taking over her treatment.     Allergies:   Allergies   Allergen Reactions   • Ciprofloxacin        Medications:   Current Outpatient Medications   Medication Sig Dispense Refill   • albuterol (PROVENTIL HFA;VENTOLIN HFA) 108 (90 BASE) MCG/ACT inhaler Inhale 2 puffs Every 4 (Four) Hours As Needed for wheezing or shortness of air. 1 inhaler 0   • apixaban (ELIQUIS) 5 MG tablet tablet Take 1 tablet by mouth 2 (Two) Times a Day. 60 tablet 0   • atorvastatin (LIPITOR) 10 MG tablet Take 10 mg by mouth Daily.     • cholecalciferol (VITAMIN D3) 25 MCG (1000 UT) tablet Take 1,000 Units by mouth Daily.     • doxycycline (MONODOX) 100 MG capsule Take 1 capsule by mouth 2 (Two) Times a Day. 20 capsule 0   • DULoxetine (CYMBALTA)  "60 MG capsule Take 60 mg by mouth Daily.     • furosemide (LASIX) 80 MG tablet Take 80 mg by mouth Daily.     • insulin aspart (NOVOLOG FLEXPEN) 100 UNIT/ML solution pen-injector sc pen Inject 1 Units under the skin 3 (Three) Times a Day With Meals. 22 units breakfast 20units lunch 15units dinner     • insulin detemir (LEVEMIR) 100 UNIT/ML injection Inject 32 Units under the skin 2 (Two) Times a Day.     • lisinopril (PRINIVIL,ZESTRIL) 5 MG tablet      • lisinopril-hydrochlorothiazide (PRINZIDE,ZESTORETIC) 20-12.5 MG per tablet Take 2 tablets by mouth Daily.     • metOLazone (ZAROXOLYN) 5 MG tablet      • Trelegy Ellipta 100-62.5-25 MCG/INH inhaler      • atorvastatin (LIPITOR) 10 MG tablet Take 10 mg by mouth Every Night.     • cephalexin (KEFLEX) 500 MG capsule Take 1 capsule by mouth 3 (Three) Times a Day. 21 capsule 0   • HYDROcodone-acetaminophen (NORCO) 5-325 MG per tablet Take 1 tablet by mouth Every 6 (Six) Hours As Needed for moderate pain (4-6) or severe pain (7-10). 30 tablet 0     No current facility-administered medications for this visit.         The following portions of the patient's history were reviewed and updated as appropriate: allergies, current medications, past family history, past medical history, past social history, past surgical history and problem list.    Review of Systems:   A 14 point review of systems was performed. All systems negative except pertinent positives/negative listed in HPI above    Physical Exam:   Vitals:    09/15/22 1030   Resp: 16   Temp: 96.6 °F (35.9 °C)   TempSrc: Temporal   Weight: (!) 142 kg (312 lb)   Height: 167.6 cm (65.98\")   PainSc: 0-No pain       General: A and O x 3, ASA, NAD    SCLERA:    Normal    DENTITION:   Normal    Hip:  left    LEG ALIGNMENT:     Neutral        LEG LENGTH DISCREPANCY   :    none    GAIT:     Antalgic    SKIN:     No abnormality    RANGE OF MOTION:     Limited by joint irritability    STRENGTH:     Limited by joint " irratibility    DISTAL PULSES:    Paplable    DISTAL SENSATION :   Intact    LYMPHATICS:     No   lymphadenopathy    OTHER:          +   Stinchfeld test      -    log roll      -   Tenderness to palpation trochanteric bursa          Radiology:  Xrays 2views (AP bilateral hips, and lateral of the hip) ordered and reviewed for evaluation of hip pain  demonstrating joint space narrowing and periarticular osteophytes.  In comparison to previous x-rays there does not appear to be significant arthritic changes.    X-rays 3 views left knee (AP, lateral, and sunrise views) were ordered and reviewed for evaluation of knee pain demonstrating moderate patellofemoral osteoarthritis with periarticular osteophytes present.  When in comparison to previous x-rays patient does demonstrate arthritic changes to the patellofemoral compartment.    Assessment/Plan: Primary osteoarthritis left hip    Treatment options as well as imaging results were discussed in detail with the patient.  At this point in time we are going to proceed forward with conservative measures.  Since patient reported significant relief from the fluoroscopy guided intra-articular hip injection on the right side we will repeat this procedure for the left side.  We will forward patient's medical records over to her new pain management Dr. Alston so he can assume care of joint injections.  Patient is not a good candidate for surgery due to her current medical issues and can follow-up with us on an as-needed basis.      Kunal Griffith, APRN  9/15/2022

## 2022-09-22 ENCOUNTER — HOSPITAL ENCOUNTER (OUTPATIENT)
Dept: GENERAL RADIOLOGY | Facility: HOSPITAL | Age: 72
Discharge: HOME | End: 2022-09-22
Admitting: NURSE PRACTITIONER

## 2022-09-22 DIAGNOSIS — M16.12 PRIMARY OSTEOARTHRITIS OF LEFT HIP: ICD-10-CM

## 2022-09-22 PROCEDURE — 25010000002 IOPAMIDOL 61 % SOLUTION: Performed by: NURSE PRACTITIONER

## 2022-09-22 PROCEDURE — 0 LIDOCAINE 1 % SOLUTION: Performed by: NURSE PRACTITIONER

## 2022-09-22 PROCEDURE — 77002 NEEDLE LOCALIZATION BY XRAY: CPT

## 2022-09-22 PROCEDURE — 25010000002 METHYLPREDNISOLONE PER 125 MG: Performed by: NURSE PRACTITIONER

## 2022-09-22 RX ORDER — LIDOCAINE HYDROCHLORIDE 10 MG/ML
10 INJECTION, SOLUTION INFILTRATION; PERINEURAL ONCE
Status: COMPLETED | OUTPATIENT
Start: 2022-09-22 | End: 2022-09-22

## 2022-09-22 RX ORDER — BUPIVACAINE HYDROCHLORIDE 2.5 MG/ML
10 INJECTION, SOLUTION EPIDURAL; INFILTRATION; INTRACAUDAL ONCE
Status: COMPLETED | OUTPATIENT
Start: 2022-09-22 | End: 2022-09-22

## 2022-09-22 RX ORDER — METHYLPREDNISOLONE SODIUM SUCCINATE 125 MG/2ML
80 INJECTION, POWDER, LYOPHILIZED, FOR SOLUTION INTRAMUSCULAR; INTRAVENOUS
Status: COMPLETED | OUTPATIENT
Start: 2022-09-22 | End: 2022-09-22

## 2022-09-22 RX ADMIN — LIDOCAINE HYDROCHLORIDE 2 ML: 10 INJECTION, SOLUTION INFILTRATION; PERINEURAL at 11:05

## 2022-09-22 RX ADMIN — BUPIVACAINE HYDROCHLORIDE 5 ML: 2.5 INJECTION, SOLUTION EPIDURAL; INFILTRATION; INTRACAUDAL; PERINEURAL at 11:05

## 2022-09-22 RX ADMIN — IOPAMIDOL 1 ML: 612 INJECTION, SOLUTION INTRAVENOUS at 11:05

## 2022-09-22 RX ADMIN — METHYLPREDNISOLONE SODIUM SUCCINATE 80 MG: 125 INJECTION, POWDER, LYOPHILIZED, FOR SOLUTION INTRAMUSCULAR; INTRAVENOUS at 11:05

## 2022-12-06 ENCOUNTER — TELEPHONE (OUTPATIENT)
Dept: ORTHOPEDIC SURGERY | Facility: CLINIC | Age: 72
End: 2022-12-06

## 2022-12-06 NOTE — TELEPHONE ENCOUNTER
Caller: RANDY ALCANTARA    Relationship to patient: SPOUSE - LISTED ON [facility]    Best call back number: [phone]    Chief complaint: LEFT HIP AND BILATERAL KNEE    Type of visit: INJECTION    Requested date: ASAP    If rescheduling, when is the original appointment: N/A     Additional notes: PLEASE CALL PATIENTS SPOUSE TO SCHEDULE. THANK  YOU

## 2022-12-08 ENCOUNTER — TELEPHONE (OUTPATIENT)
Dept: ORTHOPEDIC SURGERY | Facility: CLINIC | Age: 72
End: 2022-12-08

## 2022-12-08 NOTE — TELEPHONE ENCOUNTER
Caller: Kyle Solis    Relationship to patient: Emergency Contact    Best call back number:   [phone]    Chief complaint: LEFT HIP     Type of visit: FOLLOW UP HIP INJECTION     Requested date: WANTS ONE AROUND DEC 15TH

## 2022-12-09 ENCOUNTER — TELEPHONE (OUTPATIENT)
Dept: ORTHOPEDIC SURGERY | Facility: CLINIC | Age: 72
End: 2022-12-09

## 2022-12-09 DIAGNOSIS — M16.12 PRIMARY OSTEOARTHRITIS OF LEFT HIP: Primary | ICD-10-CM

## 2022-12-09 NOTE — TELEPHONE ENCOUNTER
Kunal    RE: FL Guide Pain Medicine Injection order    Patient requesting to be scheduled for another FL Guided Pain Medicine injection.   (Last performed 09/22/2022)    Started to have pain again and last injection really helped.     Thank you     P: [phone]

## 2022-12-19 ENCOUNTER — TELEPHONE (OUTPATIENT)
Dept: ORTHOPEDIC SURGERY | Facility: CLINIC | Age: 72
End: 2022-12-19

## 2022-12-19 NOTE — TELEPHONE ENCOUNTER
Caller: Kyle Solis    Relationship to patient: Emergency Contact    Best call back number:[phone]    Chief complaint:BILATERAL KNEE PAIN     Type of visit: INJECTION    Requested date:     If rescheduling, when is the original appointment: 12/20/22    Additional notes: PATIENT CANNOT MAKE IT TMRW, STATES IT IS FOR BILATERAL KNEE INJECTIONS AND NEEDS TO RESCHEDULE APPT

## 2023-01-05 ENCOUNTER — HOSPITAL ENCOUNTER (OUTPATIENT)
Dept: GENERAL RADIOLOGY | Facility: HOSPITAL | Age: 73
End: 2023-01-05
Payer: MEDICARE

## 2023-01-18 ENCOUNTER — APPOINTMENT (OUTPATIENT)
Dept: GENERAL RADIOLOGY | Facility: HOSPITAL | Age: 73
End: 2023-01-18
Payer: MEDICARE

## 2023-01-18 ENCOUNTER — TELEPHONE (OUTPATIENT)
Dept: ORTHOPEDIC SURGERY | Facility: CLINIC | Age: 73
End: 2023-01-18
Payer: MEDICARE

## 2023-01-18 NOTE — TELEPHONE ENCOUNTER
Caller: RANDY ALCANTARA     Relationship to patient:  ON  [facility]     Best call back number: [phone]    Chief complaint: THUAN KNEE     Type of visit: INJECTION     Requested date: 01/19/23 [place]   PATIENT RESIDES AT [facility] IN REHAB AND TRANSPORTATION FOR APPOINTMENTS IS NOT EASY.  MR ALCANTARA SAID IT WOULD BE A GREAT HELP IF PATIENT COULD GET THUAN KNEE INJECTIONS WHEN SHE COMES TO [place] FOR ANOTHER APPOINTMENT    FL YA GUIDE PAIN MED INJ SCHEDULED 01/19/23 AT 10;30 AM    MR ALCANTARA REQUESTED A CALL BACK IF YOU CAN OR CAN'T SO HE CAN ARRANGE.  THANK RENUKA GUERRA

## 2023-01-19 ENCOUNTER — TELEPHONE (OUTPATIENT)
Dept: ORTHOPEDIC SURGERY | Facility: CLINIC | Age: 73
End: 2023-01-19
Payer: MEDICARE

## 2023-01-19 DIAGNOSIS — M17.12 PRIMARY OSTEOARTHRITIS OF LEFT KNEE: Primary | ICD-10-CM

## 2023-01-19 NOTE — TELEPHONE ENCOUNTER
Please let the patient know that I have ordered a fluoroscopy guided intra-articular knee injection as well.  This way the patient can receive both the hip and the knee done at the same time due to her mobility issues.  Please just notify them that this is not common practice and we do not usually do fluoroscopy guided knee injections at the hospital.

## 2023-01-19 NOTE — TELEPHONE ENCOUNTER
PATIENT'S  CALLED AND WANTED TO MOVE HIS WIFE'S FLUORO GUIDED INJ MOVED TO [facility] SO THAT SHE CAN SOME DIRECTLY OVER HERE FOR HER THUAN KNEE INJ. SHE IS SCHEDULED THE SAME DAY. CAN SHE HAVE ALL THAT DONE IN ONE DAY? PLEASE ADVISE

## 2023-01-19 NOTE — TELEPHONE ENCOUNTER
This patient has an active fluoroscopy guided ordered injection in her chart.  She just needs to contact [facility] scheduling to set up her appointment.

## 2023-01-19 NOTE — TELEPHONE ENCOUNTER
Caller: RANDY ALCANTARA    Relationship to patient: SELF    Best call back number: [phone]    Patient is needing: PATIENT WANTS TO KNOW IF SHE CAN GET A GUIDED SHOT IN HER HIP AND SHE RECEIVED REGULAR INJECTION IN HER KNEES AT THE SAME LOCATION

## 2023-01-19 NOTE — TELEPHONE ENCOUNTER
Patient is scheduled for an guided injection 2/2/23 at 11:30 am aand will be coming over for a knee injection scheduled later that after noon

## 2023-01-19 NOTE — TELEPHONE ENCOUNTER
Spoke with patient and she verbalized understanding although patient is requesting if she can also have a Knee injection the same day as guided injection due to transportation issues at the hospital

## 2023-01-21 ENCOUNTER — APPOINTMENT (OUTPATIENT)
Dept: GENERAL RADIOLOGY | Facility: HOSPITAL | Age: 73
DRG: 70 | End: 2023-01-21
Payer: MEDICARE

## 2023-01-21 ENCOUNTER — APPOINTMENT (OUTPATIENT)
Dept: CT IMAGING | Facility: HOSPITAL | Age: 73
DRG: 70 | End: 2023-01-21
Payer: MEDICARE

## 2023-01-21 ENCOUNTER — HOSPITAL ENCOUNTER (INPATIENT)
Facility: HOSPITAL | Age: 73
LOS: 6 days | Discharge: NURSING, REHAB OR LONG-TERM CARE | DRG: 70 | End: 2023-01-31
Attending: EMERGENCY MEDICINE | Admitting: INTERNAL MEDICINE
Payer: MEDICARE

## 2023-01-21 DIAGNOSIS — M25.462 KNEE EFFUSION, LEFT: ICD-10-CM

## 2023-01-21 DIAGNOSIS — R41.82 ALTERED MENTAL STATUS, UNSPECIFIED ALTERED MENTAL STATUS TYPE: Primary | ICD-10-CM

## 2023-01-21 DIAGNOSIS — Z91.81 HISTORY OF RECENT FALL: ICD-10-CM

## 2023-01-21 DIAGNOSIS — N18.9 CHRONIC KIDNEY DISEASE, UNSPECIFIED CKD STAGE: ICD-10-CM

## 2023-01-21 LAB
ALBUMIN SERPL-MCNC: 3.4 G/DL (ref 3.5–5.2)
ALBUMIN/GLOB SERPL: 1 G/DL
ALP SERPL-CCNC: 161 U/L (ref 39–117)
ALT SERPL W P-5'-P-CCNC: 14 U/L (ref 1–33)
AMPHET+METHAMPHET UR QL: NEGATIVE
ANION GAP SERPL CALCULATED.3IONS-SCNC: 3.8 MMOL/L (ref 5–15)
AST SERPL-CCNC: 17 U/L (ref 1–32)
ATMOSPHERIC PRESS: 753.5 MMHG
BARBITURATES UR QL SCN: NEGATIVE
BASE EXCESS BLDV CALC-SCNC: 10.6 MMOL/L (ref -2–2)
BASOPHILS # BLD AUTO: 0.02 10*3/MM3 (ref 0–0.2)
BASOPHILS NFR BLD AUTO: 0.4 % (ref 0–1.5)
BDY SITE: ABNORMAL
BENZODIAZ UR QL SCN: NEGATIVE
BILIRUB SERPL-MCNC: 2.6 MG/DL (ref 0–1.2)
BILIRUB UR QL STRIP: NEGATIVE
BUN SERPL-MCNC: 33 MG/DL (ref 8–23)
BUN/CREAT SERPL: 21 (ref 7–25)
CALCIUM SPEC-SCNC: 9.3 MG/DL (ref 8.6–10.5)
CANNABINOIDS SERPL QL: NEGATIVE
CHLORIDE SERPL-SCNC: 95 MMOL/L (ref 98–107)
CLARITY UR: CLEAR
CO2 SERPL-SCNC: 37.2 MMOL/L (ref 22–29)
COCAINE UR QL: NEGATIVE
COLOR UR: ABNORMAL
CREAT SERPL-MCNC: 1.57 MG/DL (ref 0.57–1)
D-LACTATE SERPL-SCNC: 1.7 MMOL/L (ref 0.5–2)
DEPRECATED RDW RBC AUTO: 50.9 FL (ref 37–54)
EGFRCR SERPLBLD CKD-EPI 2021: 34.5 ML/MIN/1.73
EOSINOPHIL # BLD AUTO: 0.06 10*3/MM3 (ref 0–0.4)
EOSINOPHIL NFR BLD AUTO: 1.1 % (ref 0.3–6.2)
ERYTHROCYTE [DISTWIDTH] IN BLOOD BY AUTOMATED COUNT: 16.5 % (ref 12.3–15.4)
GAS FLOW AIRWAY: 2 LPM
GLOBULIN UR ELPH-MCNC: 3.5 GM/DL
GLUCOSE BLDC GLUCOMTR-MCNC: 143 MG/DL (ref 70–130)
GLUCOSE SERPL-MCNC: 152 MG/DL (ref 65–99)
GLUCOSE UR STRIP-MCNC: NEGATIVE MG/DL
HCO3 BLDV-SCNC: 35.4 MMOL/L (ref 22–28)
HCT VFR BLD AUTO: 40.3 % (ref 34–46.6)
HGB BLD-MCNC: 12.4 G/DL (ref 12–15.9)
HGB UR QL STRIP.AUTO: NEGATIVE
IMM GRANULOCYTES # BLD AUTO: 0.01 10*3/MM3 (ref 0–0.05)
IMM GRANULOCYTES NFR BLD AUTO: 0.2 % (ref 0–0.5)
KETONES UR QL STRIP: NEGATIVE
LEUKOCYTE ESTERASE UR QL STRIP.AUTO: NEGATIVE
LYMPHOCYTES # BLD AUTO: 1.02 10*3/MM3 (ref 0.7–3.1)
LYMPHOCYTES NFR BLD AUTO: 17.9 % (ref 19.6–45.3)
MCH RBC QN AUTO: 26.6 PG (ref 26.6–33)
MCHC RBC AUTO-ENTMCNC: 30.8 G/DL (ref 31.5–35.7)
MCV RBC AUTO: 86.3 FL (ref 79–97)
METHADONE UR QL SCN: NEGATIVE
MODALITY: ABNORMAL
MONOCYTES # BLD AUTO: 0.67 10*3/MM3 (ref 0.1–0.9)
MONOCYTES NFR BLD AUTO: 11.8 % (ref 5–12)
NEUTROPHILS NFR BLD AUTO: 3.92 10*3/MM3 (ref 1.7–7)
NEUTROPHILS NFR BLD AUTO: 68.6 % (ref 42.7–76)
NITRITE UR QL STRIP: NEGATIVE
NRBC BLD AUTO-RTO: 0.2 /100 WBC (ref 0–0.2)
OPIATES UR QL: POSITIVE
OXYCODONE UR QL SCN: NEGATIVE
PCO2 BLDV: 45.7 MM HG (ref 41–51)
PH BLDV: 7.5 PH UNITS (ref 7.31–7.41)
PH UR STRIP.AUTO: 5.5 [PH] (ref 5–8)
PLATELET # BLD AUTO: 191 10*3/MM3 (ref 140–450)
PMV BLD AUTO: 9.3 FL (ref 6–12)
PO2 BLDV: 29.1 MM HG (ref 35–45)
POTASSIUM SERPL-SCNC: 4.2 MMOL/L (ref 3.5–5.2)
PROCALCITONIN SERPL-MCNC: 0.14 NG/ML (ref 0–0.25)
PROT SERPL-MCNC: 6.9 G/DL (ref 6–8.5)
PROT UR QL STRIP: NEGATIVE
RBC # BLD AUTO: 4.67 10*6/MM3 (ref 3.77–5.28)
SAO2 % BLDCOA: 60.4 % (ref 92–99)
SODIUM SERPL-SCNC: 136 MMOL/L (ref 136–145)
SP GR UR STRIP: 1.02 (ref 1–1.03)
TOTAL RATE: 18 BREATHS/MINUTE
UROBILINOGEN UR QL STRIP: ABNORMAL
WBC NRBC COR # BLD: 5.7 10*3/MM3 (ref 3.4–10.8)

## 2023-01-21 PROCEDURE — 80307 DRUG TEST PRSMV CHEM ANLYZR: CPT | Performed by: EMERGENCY MEDICINE

## 2023-01-21 PROCEDURE — G0378 HOSPITAL OBSERVATION PER HR: HCPCS

## 2023-01-21 PROCEDURE — 94660 CPAP INITIATION&MGMT: CPT

## 2023-01-21 PROCEDURE — P9612 CATHETERIZE FOR URINE SPEC: HCPCS

## 2023-01-21 PROCEDURE — 82803 BLOOD GASES ANY COMBINATION: CPT

## 2023-01-21 PROCEDURE — 83605 ASSAY OF LACTIC ACID: CPT | Performed by: EMERGENCY MEDICINE

## 2023-01-21 PROCEDURE — 70450 CT HEAD/BRAIN W/O DYE: CPT

## 2023-01-21 PROCEDURE — 99285 EMERGENCY DEPT VISIT HI MDM: CPT

## 2023-01-21 PROCEDURE — 80053 COMPREHEN METABOLIC PANEL: CPT | Performed by: EMERGENCY MEDICINE

## 2023-01-21 PROCEDURE — 73502 X-RAY EXAM HIP UNI 2-3 VIEWS: CPT

## 2023-01-21 PROCEDURE — 84145 PROCALCITONIN (PCT): CPT | Performed by: EMERGENCY MEDICINE

## 2023-01-21 PROCEDURE — 87040 BLOOD CULTURE FOR BACTERIA: CPT | Performed by: EMERGENCY MEDICINE

## 2023-01-21 PROCEDURE — 71045 X-RAY EXAM CHEST 1 VIEW: CPT

## 2023-01-21 PROCEDURE — 82962 GLUCOSE BLOOD TEST: CPT

## 2023-01-21 PROCEDURE — 73552 X-RAY EXAM OF FEMUR 2/>: CPT

## 2023-01-21 PROCEDURE — 94799 UNLISTED PULMONARY SVC/PX: CPT

## 2023-01-21 PROCEDURE — 85025 COMPLETE CBC W/AUTO DIFF WBC: CPT | Performed by: EMERGENCY MEDICINE

## 2023-01-21 PROCEDURE — 81003 URINALYSIS AUTO W/O SCOPE: CPT | Performed by: EMERGENCY MEDICINE

## 2023-01-21 RX ORDER — ALBUTEROL SULFATE 2.5 MG/3ML
2.5 SOLUTION RESPIRATORY (INHALATION) EVERY 6 HOURS PRN
Status: DISCONTINUED | OUTPATIENT
Start: 2023-01-21 | End: 2023-01-31 | Stop reason: HOSPADM

## 2023-01-21 RX ORDER — ATORVASTATIN CALCIUM 20 MG/1
10 TABLET, FILM COATED ORAL DAILY
Status: DISCONTINUED | OUTPATIENT
Start: 2023-01-22 | End: 2023-01-31 | Stop reason: HOSPADM

## 2023-01-21 RX ORDER — ONDANSETRON 4 MG/1
4 TABLET, FILM COATED ORAL EVERY 6 HOURS PRN
Status: DISCONTINUED | OUTPATIENT
Start: 2023-01-21 | End: 2023-01-31 | Stop reason: HOSPADM

## 2023-01-21 RX ORDER — HYDROCODONE BITARTRATE AND ACETAMINOPHEN 7.5; 325 MG/1; MG/1
1 TABLET ORAL ONCE
Status: COMPLETED | OUTPATIENT
Start: 2023-01-21 | End: 2023-01-21

## 2023-01-21 RX ORDER — ACETAMINOPHEN 325 MG/1
650 TABLET ORAL EVERY 4 HOURS PRN
Status: DISCONTINUED | OUTPATIENT
Start: 2023-01-21 | End: 2023-01-31 | Stop reason: HOSPADM

## 2023-01-21 RX ORDER — ONDANSETRON 2 MG/ML
4 INJECTION INTRAMUSCULAR; INTRAVENOUS EVERY 6 HOURS PRN
Status: DISCONTINUED | OUTPATIENT
Start: 2023-01-21 | End: 2023-01-31 | Stop reason: HOSPADM

## 2023-01-21 RX ORDER — DOCUSATE SODIUM 100 MG/1
100 CAPSULE, LIQUID FILLED ORAL 2 TIMES DAILY
COMMUNITY

## 2023-01-21 RX ORDER — ALLOPURINOL 100 MG/1
100 TABLET ORAL DAILY
COMMUNITY

## 2023-01-21 RX ORDER — METOLAZONE 5 MG/1
5 TABLET ORAL DAILY
Status: DISCONTINUED | OUTPATIENT
Start: 2023-01-22 | End: 2023-01-22

## 2023-01-21 RX ORDER — DULOXETIN HYDROCHLORIDE 60 MG/1
60 CAPSULE, DELAYED RELEASE ORAL DAILY
Status: DISCONTINUED | OUTPATIENT
Start: 2023-01-22 | End: 2023-01-31 | Stop reason: HOSPADM

## 2023-01-21 RX ORDER — FUROSEMIDE 80 MG
80 TABLET ORAL DAILY
Status: DISCONTINUED | OUTPATIENT
Start: 2023-01-22 | End: 2023-01-22

## 2023-01-21 RX ORDER — UREA 10 %
3 LOTION (ML) TOPICAL NIGHTLY PRN
Status: DISCONTINUED | OUTPATIENT
Start: 2023-01-21 | End: 2023-01-31 | Stop reason: HOSPADM

## 2023-01-21 RX ORDER — BUSPIRONE HYDROCHLORIDE 5 MG/1
5 TABLET ORAL 3 TIMES DAILY
COMMUNITY

## 2023-01-21 RX ORDER — BUMETANIDE 2 MG/1
3 TABLET ORAL 2 TIMES DAILY
COMMUNITY
End: 2023-01-31 | Stop reason: HOSPADM

## 2023-01-21 RX ORDER — GABAPENTIN 100 MG/1
200 CAPSULE ORAL 2 TIMES DAILY
COMMUNITY

## 2023-01-21 RX ORDER — ASPIRIN 81 MG/1
81 TABLET, CHEWABLE ORAL DAILY
COMMUNITY

## 2023-01-21 RX ADMIN — HYDROCODONE BITARTRATE AND ACETAMINOPHEN 1 TABLET: 7.5; 325 TABLET ORAL at 18:45

## 2023-01-21 RX ADMIN — INSULIN GLARGINE-YFGN 30 UNITS: 100 INJECTION, SOLUTION SUBCUTANEOUS at 22:59

## 2023-01-22 ENCOUNTER — APPOINTMENT (OUTPATIENT)
Dept: CARDIOLOGY | Facility: HOSPITAL | Age: 73
DRG: 70 | End: 2023-01-22
Payer: MEDICARE

## 2023-01-22 LAB
ANION GAP SERPL CALCULATED.3IONS-SCNC: 9.1 MMOL/L (ref 5–15)
BH CV LOW VAS LEFT GREATER SAPH AK VESSEL: 1
BH CV LOW VAS RIGHT GREATER SAPH AK VESSEL: 1
BH CV LOWER VASCULAR LEFT COMMON FEMORAL AUGMENT: NORMAL
BH CV LOWER VASCULAR LEFT COMMON FEMORAL COMPETENT: NORMAL
BH CV LOWER VASCULAR LEFT COMMON FEMORAL COMPRESS: NORMAL
BH CV LOWER VASCULAR LEFT COMMON FEMORAL PHASIC: NORMAL
BH CV LOWER VASCULAR LEFT COMMON FEMORAL SPONT: NORMAL
BH CV LOWER VASCULAR LEFT GASTRONEMIUS COMPRESS: NORMAL
BH CV LOWER VASCULAR LEFT GREATER SAPH AK COMPRESS: NORMAL
BH CV LOWER VASCULAR LEFT GREATER SAPH AK THROMBUS: NORMAL
BH CV LOWER VASCULAR LEFT LESSER SAPH COMPRESS: NORMAL
BH CV LOWER VASCULAR LEFT MID FEMORAL AUGMENT: NORMAL
BH CV LOWER VASCULAR LEFT MID FEMORAL COMPETENT: NORMAL
BH CV LOWER VASCULAR LEFT MID FEMORAL COMPRESS: NORMAL
BH CV LOWER VASCULAR LEFT MID FEMORAL PHASIC: NORMAL
BH CV LOWER VASCULAR LEFT MID FEMORAL SPONT: NORMAL
BH CV LOWER VASCULAR LEFT POPLITEAL AUGMENT: NORMAL
BH CV LOWER VASCULAR LEFT POPLITEAL COMPETENT: NORMAL
BH CV LOWER VASCULAR LEFT POPLITEAL COMPRESS: NORMAL
BH CV LOWER VASCULAR LEFT POPLITEAL PHASIC: NORMAL
BH CV LOWER VASCULAR LEFT POPLITEAL SPONT: NORMAL
BH CV LOWER VASCULAR LEFT PROFUNDA FEMORAL COMPRESS: NORMAL
BH CV LOWER VASCULAR LEFT PROXIMAL FEMORAL COMPRESS: NORMAL
BH CV LOWER VASCULAR LEFT SAPHENOFEMORAL JUNCTION COMPRESS: NORMAL
BH CV LOWER VASCULAR RIGHT COMMON FEMORAL AUGMENT: NORMAL
BH CV LOWER VASCULAR RIGHT COMMON FEMORAL COMPETENT: NORMAL
BH CV LOWER VASCULAR RIGHT COMMON FEMORAL COMPRESS: NORMAL
BH CV LOWER VASCULAR RIGHT COMMON FEMORAL PHASIC: NORMAL
BH CV LOWER VASCULAR RIGHT COMMON FEMORAL SPONT: NORMAL
BH CV LOWER VASCULAR RIGHT DISTAL FEMORAL COMPRESS: NORMAL
BH CV LOWER VASCULAR RIGHT GASTRONEMIUS COMPRESS: NORMAL
BH CV LOWER VASCULAR RIGHT GREATER SAPH AK COMPRESS: NORMAL
BH CV LOWER VASCULAR RIGHT GREATER SAPH AK THROMBUS: NORMAL
BH CV LOWER VASCULAR RIGHT LESSER SAPH COMPRESS: NORMAL
BH CV LOWER VASCULAR RIGHT MID FEMORAL AUGMENT: NORMAL
BH CV LOWER VASCULAR RIGHT MID FEMORAL COMPETENT: NORMAL
BH CV LOWER VASCULAR RIGHT MID FEMORAL COMPRESS: NORMAL
BH CV LOWER VASCULAR RIGHT MID FEMORAL PHASIC: NORMAL
BH CV LOWER VASCULAR RIGHT MID FEMORAL SPONT: NORMAL
BH CV LOWER VASCULAR RIGHT POPLITEAL AUGMENT: NORMAL
BH CV LOWER VASCULAR RIGHT POPLITEAL COMPETENT: NORMAL
BH CV LOWER VASCULAR RIGHT POPLITEAL COMPRESS: NORMAL
BH CV LOWER VASCULAR RIGHT POPLITEAL PHASIC: NORMAL
BH CV LOWER VASCULAR RIGHT POPLITEAL SPONT: NORMAL
BH CV LOWER VASCULAR RIGHT PROFUNDA FEMORAL COMPRESS: NORMAL
BH CV LOWER VASCULAR RIGHT PROXIMAL FEMORAL COMPRESS: NORMAL
BH CV LOWER VASCULAR RIGHT SAPHENOFEMORAL JUNCTION COMPRESS: NORMAL
BH CV VAS POP FLUID COLLECTED: 1
BUN SERPL-MCNC: 32 MG/DL (ref 8–23)
BUN/CREAT SERPL: 21.9 (ref 7–25)
CALCIUM SPEC-SCNC: 9 MG/DL (ref 8.6–10.5)
CHLORIDE SERPL-SCNC: 98 MMOL/L (ref 98–107)
CO2 SERPL-SCNC: 28.9 MMOL/L (ref 22–29)
CREAT SERPL-MCNC: 1.46 MG/DL (ref 0.57–1)
DEPRECATED RDW RBC AUTO: 50.8 FL (ref 37–54)
EGFRCR SERPLBLD CKD-EPI 2021: 37.6 ML/MIN/1.73
ERYTHROCYTE [DISTWIDTH] IN BLOOD BY AUTOMATED COUNT: 16.2 % (ref 12.3–15.4)
GLUCOSE BLDC GLUCOMTR-MCNC: 150 MG/DL (ref 70–130)
GLUCOSE BLDC GLUCOMTR-MCNC: 161 MG/DL (ref 70–130)
GLUCOSE BLDC GLUCOMTR-MCNC: 212 MG/DL (ref 70–130)
GLUCOSE BLDC GLUCOMTR-MCNC: 256 MG/DL (ref 70–130)
GLUCOSE BLDC GLUCOMTR-MCNC: 261 MG/DL (ref 70–130)
GLUCOSE SERPL-MCNC: 165 MG/DL (ref 65–99)
HCT VFR BLD AUTO: 37.9 % (ref 34–46.6)
HGB BLD-MCNC: 11.7 G/DL (ref 12–15.9)
MAXIMAL PREDICTED HEART RATE: 146 BPM
MCH RBC QN AUTO: 27 PG (ref 26.6–33)
MCHC RBC AUTO-ENTMCNC: 30.9 G/DL (ref 31.5–35.7)
MCV RBC AUTO: 87.5 FL (ref 79–97)
PLATELET # BLD AUTO: 184 10*3/MM3 (ref 140–450)
PMV BLD AUTO: 10.3 FL (ref 6–12)
POTASSIUM SERPL-SCNC: 4.4 MMOL/L (ref 3.5–5.2)
PROCALCITONIN SERPL-MCNC: 0.13 NG/ML (ref 0–0.25)
QT INTERVAL: 481 MS
RBC # BLD AUTO: 4.33 10*6/MM3 (ref 3.77–5.28)
SODIUM SERPL-SCNC: 136 MMOL/L (ref 136–145)
STRESS TARGET HR: 124 BPM
TSH SERPL DL<=0.05 MIU/L-ACNC: 4.48 UIU/ML (ref 0.27–4.2)
WBC NRBC COR # BLD: 5.54 10*3/MM3 (ref 3.4–10.8)

## 2023-01-22 PROCEDURE — 82962 GLUCOSE BLOOD TEST: CPT

## 2023-01-22 PROCEDURE — 84145 PROCALCITONIN (PCT): CPT | Performed by: INTERNAL MEDICINE

## 2023-01-22 PROCEDURE — 94640 AIRWAY INHALATION TREATMENT: CPT

## 2023-01-22 PROCEDURE — 84443 ASSAY THYROID STIM HORMONE: CPT | Performed by: INTERNAL MEDICINE

## 2023-01-22 PROCEDURE — 94799 UNLISTED PULMONARY SVC/PX: CPT

## 2023-01-22 PROCEDURE — 36415 COLL VENOUS BLD VENIPUNCTURE: CPT | Performed by: INTERNAL MEDICINE

## 2023-01-22 PROCEDURE — 80048 BASIC METABOLIC PNL TOTAL CA: CPT | Performed by: INTERNAL MEDICINE

## 2023-01-22 PROCEDURE — 94761 N-INVAS EAR/PLS OXIMETRY MLT: CPT

## 2023-01-22 PROCEDURE — 85027 COMPLETE CBC AUTOMATED: CPT | Performed by: INTERNAL MEDICINE

## 2023-01-22 PROCEDURE — 25010000002 FUROSEMIDE PER 20 MG: Performed by: INTERNAL MEDICINE

## 2023-01-22 PROCEDURE — 93970 EXTREMITY STUDY: CPT

## 2023-01-22 PROCEDURE — 93010 ELECTROCARDIOGRAM REPORT: CPT | Performed by: INTERNAL MEDICINE

## 2023-01-22 PROCEDURE — 93005 ELECTROCARDIOGRAM TRACING: CPT | Performed by: INTERNAL MEDICINE

## 2023-01-22 PROCEDURE — G0378 HOSPITAL OBSERVATION PER HR: HCPCS

## 2023-01-22 RX ORDER — NICOTINE POLACRILEX 4 MG
15 LOZENGE BUCCAL
Status: DISCONTINUED | OUTPATIENT
Start: 2023-01-22 | End: 2023-01-27 | Stop reason: SDUPTHER

## 2023-01-22 RX ORDER — ALLOPURINOL 100 MG/1
100 TABLET ORAL DAILY
Status: DISCONTINUED | OUTPATIENT
Start: 2023-01-22 | End: 2023-01-31 | Stop reason: HOSPADM

## 2023-01-22 RX ORDER — BUDESONIDE AND FORMOTEROL FUMARATE DIHYDRATE 160; 4.5 UG/1; UG/1
2 AEROSOL RESPIRATORY (INHALATION)
Status: DISCONTINUED | OUTPATIENT
Start: 2023-01-22 | End: 2023-01-31 | Stop reason: HOSPADM

## 2023-01-22 RX ORDER — HYDROCODONE BITARTRATE AND ACETAMINOPHEN 7.5; 325 MG/1; MG/1
1 TABLET ORAL ONCE
Status: COMPLETED | OUTPATIENT
Start: 2023-01-22 | End: 2023-01-22

## 2023-01-22 RX ORDER — BUSPIRONE HYDROCHLORIDE 5 MG/1
5 TABLET ORAL 3 TIMES DAILY
Status: DISCONTINUED | OUTPATIENT
Start: 2023-01-22 | End: 2023-01-31 | Stop reason: HOSPADM

## 2023-01-22 RX ORDER — DOCUSATE SODIUM 100 MG/1
100 CAPSULE, LIQUID FILLED ORAL 2 TIMES DAILY
Status: DISCONTINUED | OUTPATIENT
Start: 2023-01-22 | End: 2023-01-31 | Stop reason: HOSPADM

## 2023-01-22 RX ORDER — FUROSEMIDE 10 MG/ML
40 INJECTION INTRAMUSCULAR; INTRAVENOUS EVERY 6 HOURS
Status: COMPLETED | OUTPATIENT
Start: 2023-01-22 | End: 2023-01-23

## 2023-01-22 RX ORDER — ASPIRIN 81 MG/1
81 TABLET, CHEWABLE ORAL DAILY
Status: DISCONTINUED | OUTPATIENT
Start: 2023-01-22 | End: 2023-01-31 | Stop reason: HOSPADM

## 2023-01-22 RX ORDER — GABAPENTIN 100 MG/1
200 CAPSULE ORAL 2 TIMES DAILY
Status: DISCONTINUED | OUTPATIENT
Start: 2023-01-22 | End: 2023-01-31 | Stop reason: HOSPADM

## 2023-01-22 RX ORDER — DEXTROSE MONOHYDRATE 25 G/50ML
25 INJECTION, SOLUTION INTRAVENOUS
Status: DISCONTINUED | OUTPATIENT
Start: 2023-01-22 | End: 2023-01-27 | Stop reason: SDUPTHER

## 2023-01-22 RX ADMIN — FUROSEMIDE 40 MG: 10 INJECTION, SOLUTION INTRAMUSCULAR; INTRAVENOUS at 18:35

## 2023-01-22 RX ADMIN — ATORVASTATIN CALCIUM 10 MG: 20 TABLET, FILM COATED ORAL at 08:05

## 2023-01-22 RX ADMIN — DULOXETINE HYDROCHLORIDE 60 MG: 60 CAPSULE, DELAYED RELEASE ORAL at 08:06

## 2023-01-22 RX ADMIN — DOCUSATE SODIUM 100 MG: 100 CAPSULE, LIQUID FILLED ORAL at 08:05

## 2023-01-22 RX ADMIN — INSULIN GLARGINE-YFGN 30 UNITS: 100 INJECTION, SOLUTION SUBCUTANEOUS at 21:24

## 2023-01-22 RX ADMIN — ASPIRIN 81 MG: 81 TABLET, CHEWABLE ORAL at 08:06

## 2023-01-22 RX ADMIN — ACETAMINOPHEN 650 MG: 325 TABLET, FILM COATED ORAL at 05:48

## 2023-01-22 RX ADMIN — BUDESONIDE AND FORMOTEROL FUMARATE DIHYDRATE 2 PUFF: 160; 4.5 AEROSOL RESPIRATORY (INHALATION) at 19:55

## 2023-01-22 RX ADMIN — BUSPIRONE HYDROCHLORIDE 5 MG: 5 TABLET ORAL at 14:52

## 2023-01-22 RX ADMIN — GABAPENTIN 200 MG: 100 CAPSULE ORAL at 21:24

## 2023-01-22 RX ADMIN — DICLOFENAC SODIUM 2 G: 10 GEL TOPICAL at 21:24

## 2023-01-22 RX ADMIN — BUSPIRONE HYDROCHLORIDE 5 MG: 5 TABLET ORAL at 08:08

## 2023-01-22 RX ADMIN — GABAPENTIN 200 MG: 100 CAPSULE ORAL at 08:06

## 2023-01-22 RX ADMIN — APIXABAN 5 MG: 5 TABLET, FILM COATED ORAL at 13:59

## 2023-01-22 RX ADMIN — DOCUSATE SODIUM 100 MG: 100 CAPSULE, LIQUID FILLED ORAL at 21:24

## 2023-01-22 RX ADMIN — HYDROCODONE BITARTRATE AND ACETAMINOPHEN 1 TABLET: 7.5; 325 TABLET ORAL at 14:52

## 2023-01-22 RX ADMIN — BUDESONIDE AND FORMOTEROL FUMARATE DIHYDRATE 2 PUFF: 160; 4.5 AEROSOL RESPIRATORY (INHALATION) at 10:56

## 2023-01-22 RX ADMIN — ALLOPURINOL 100 MG: 100 TABLET ORAL at 08:06

## 2023-01-22 RX ADMIN — BUMETANIDE 3 MG: 2 TABLET ORAL at 08:06

## 2023-01-22 RX ADMIN — APIXABAN 5 MG: 5 TABLET, FILM COATED ORAL at 21:24

## 2023-01-22 RX ADMIN — BUSPIRONE HYDROCHLORIDE 5 MG: 5 TABLET ORAL at 21:24

## 2023-01-22 RX ADMIN — TIOTROPIUM BROMIDE INHALATION SPRAY 1 PUFF: 3.12 SPRAY, METERED RESPIRATORY (INHALATION) at 10:56

## 2023-01-23 ENCOUNTER — APPOINTMENT (OUTPATIENT)
Dept: GENERAL RADIOLOGY | Facility: HOSPITAL | Age: 73
DRG: 70 | End: 2023-01-23
Payer: MEDICARE

## 2023-01-23 ENCOUNTER — APPOINTMENT (OUTPATIENT)
Dept: CARDIOLOGY | Facility: HOSPITAL | Age: 73
DRG: 70 | End: 2023-01-23
Payer: MEDICARE

## 2023-01-23 LAB
ALBUMIN SERPL-MCNC: 2.8 G/DL (ref 3.5–5.2)
ANION GAP SERPL CALCULATED.3IONS-SCNC: 8.4 MMOL/L (ref 5–15)
AORTIC DIMENSIONLESS INDEX: 1.8 (DI)
ASCENDING AORTA: 3.7 CM
BH CV ECHO MEAS - ACS: 0.85 CM
BH CV ECHO MEAS - AO MAX PG: 6.2 MMHG
BH CV ECHO MEAS - AO ROOT DIAM: 1.86 CM
BH CV ECHO MEAS - AO V2 MAX: 124.9 CM/SEC
BH CV ECHO MEAS - EDV(CUBED): 105.1 ML
BH CV ECHO MEAS - EDV(MOD-SP2): 125 ML
BH CV ECHO MEAS - EDV(MOD-SP4): 110 ML
BH CV ECHO MEAS - EF(MOD-BP): 42.8 %
BH CV ECHO MEAS - EF(MOD-SP2): 40.8 %
BH CV ECHO MEAS - EF(MOD-SP4): 43.6 %
BH CV ECHO MEAS - ESV(CUBED): 65.1 ML
BH CV ECHO MEAS - ESV(MOD-SP2): 74 ML
BH CV ECHO MEAS - ESV(MOD-SP4): 62 ML
BH CV ECHO MEAS - FS: 14.7 %
BH CV ECHO MEAS - IVS/LVPW: 1.12 CM
BH CV ECHO MEAS - IVSD: 0.91 CM
BH CV ECHO MEAS - LAT PEAK E' VEL: 7.5 CM/SEC
BH CV ECHO MEAS - LV DIASTOLIC VOL/BSA (35-75): 45.1 CM2
BH CV ECHO MEAS - LV MASS(C)D: 134.8 GRAMS
BH CV ECHO MEAS - LV MAX PG: 4.8 MMHG
BH CV ECHO MEAS - LV MEAN PG: 2.5 MMHG
BH CV ECHO MEAS - LV SYSTOLIC VOL/BSA (12-30): 25.4 CM2
BH CV ECHO MEAS - LV V1 MAX: 109.9 CM/SEC
BH CV ECHO MEAS - LV V1 VTI: 22.4 CM
BH CV ECHO MEAS - LVIDD: 4.7 CM
BH CV ECHO MEAS - LVIDS: 4 CM
BH CV ECHO MEAS - LVOT AREA: 2.7 CM2
BH CV ECHO MEAS - LVOT DIAM: 1.86 CM
BH CV ECHO MEAS - LVPWD: 0.81 CM
BH CV ECHO MEAS - MED PEAK E' VEL: 7.1 CM/SEC
BH CV ECHO MEAS - MV A DUR: 0.15 SEC
BH CV ECHO MEAS - MV A MAX VEL: 132.6 CM/SEC
BH CV ECHO MEAS - MV DEC SLOPE: 229.9 CM/SEC2
BH CV ECHO MEAS - MV DEC TIME: 0.54 MSEC
BH CV ECHO MEAS - MV E MAX VEL: 93.4 CM/SEC
BH CV ECHO MEAS - MV E/A: 0.7
BH CV ECHO MEAS - MV MAX PG: 8 MMHG
BH CV ECHO MEAS - MV MEAN PG: 3.1 MMHG
BH CV ECHO MEAS - MV P1/2T: 124.5 MSEC
BH CV ECHO MEAS - MV V2 VTI: 41.6 CM
BH CV ECHO MEAS - MVA(P1/2T): 1.77 CM2
BH CV ECHO MEAS - MVA(VTI): 1.47 CM2
BH CV ECHO MEAS - PA ACC TIME: 0.1 SEC
BH CV ECHO MEAS - PA PR(ACCEL): 35 MMHG
BH CV ECHO MEAS - PA V2 MAX: 97.7 CM/SEC
BH CV ECHO MEAS - PULM A REVS DUR: 0.08 SEC
BH CV ECHO MEAS - PULM A REVS VEL: 38.8 CM/SEC
BH CV ECHO MEAS - PULM DIAS VEL: 26.7 CM/SEC
BH CV ECHO MEAS - PULM S/D: 1.04
BH CV ECHO MEAS - PULM SYS VEL: 27.7 CM/SEC
BH CV ECHO MEAS - QP/QS: 0.42
BH CV ECHO MEAS - RAP SYSTOLE: 15 MMHG
BH CV ECHO MEAS - RV MAX PG: 0.95 MMHG
BH CV ECHO MEAS - RV V1 MAX: 48.8 CM/SEC
BH CV ECHO MEAS - RV V1 VTI: 8.3 CM
BH CV ECHO MEAS - RVOT DIAM: 1.98 CM
BH CV ECHO MEAS - RVSP: 35 MMHG
BH CV ECHO MEAS - SI(MOD-SP2): 20.9 ML/M2
BH CV ECHO MEAS - SI(MOD-SP4): 19.7 ML/M2
BH CV ECHO MEAS - SV(LVOT): 61 ML
BH CV ECHO MEAS - SV(MOD-SP2): 51 ML
BH CV ECHO MEAS - SV(MOD-SP4): 48 ML
BH CV ECHO MEAS - SV(RVOT): 25.4 ML
BH CV ECHO MEAS - TAPSE (>1.6): 2.19 CM
BH CV ECHO MEAS - TR MAX PG: 19.3 MMHG
BH CV ECHO MEAS - TR MAX VEL: 219.9 CM/SEC
BH CV ECHO MEASUREMENTS AVERAGE E/E' RATIO: 12.79
BH CV XLRA - RV BASE: 3.5 CM
BH CV XLRA - RV LENGTH: 7.6 CM
BH CV XLRA - RV MID: 4.4 CM
BH CV XLRA - TDI S': 4.8 CM/SEC
BUN SERPL-MCNC: 29 MG/DL (ref 8–23)
BUN/CREAT SERPL: 21 (ref 7–25)
CALCIUM SPEC-SCNC: 8.4 MG/DL (ref 8.6–10.5)
CHLORIDE SERPL-SCNC: 95 MMOL/L (ref 98–107)
CO2 SERPL-SCNC: 32.6 MMOL/L (ref 22–29)
CREAT SERPL-MCNC: 1.38 MG/DL (ref 0.57–1)
DEPRECATED RDW RBC AUTO: 48.5 FL (ref 37–54)
EGFRCR SERPLBLD CKD-EPI 2021: 40.2 ML/MIN/1.73
ERYTHROCYTE [DISTWIDTH] IN BLOOD BY AUTOMATED COUNT: 16 % (ref 12.3–15.4)
GLUCOSE BLDC GLUCOMTR-MCNC: 179 MG/DL (ref 70–130)
GLUCOSE BLDC GLUCOMTR-MCNC: 183 MG/DL (ref 70–130)
GLUCOSE BLDC GLUCOMTR-MCNC: 209 MG/DL (ref 70–130)
GLUCOSE BLDC GLUCOMTR-MCNC: 222 MG/DL (ref 70–130)
GLUCOSE SERPL-MCNC: 166 MG/DL (ref 65–99)
HCT VFR BLD AUTO: 34.3 % (ref 34–46.6)
HGB BLD-MCNC: 10.5 G/DL (ref 12–15.9)
LEFT ATRIUM VOLUME INDEX: 19.7 ML/M2
MAGNESIUM SERPL-MCNC: 1.9 MG/DL (ref 1.6–2.4)
MAXIMAL PREDICTED HEART RATE: 146 BPM
MCH RBC QN AUTO: 25.9 PG (ref 26.6–33)
MCHC RBC AUTO-ENTMCNC: 30.6 G/DL (ref 31.5–35.7)
MCV RBC AUTO: 84.7 FL (ref 79–97)
PHOSPHATE SERPL-MCNC: 3.3 MG/DL (ref 2.5–4.5)
PLATELET # BLD AUTO: 197 10*3/MM3 (ref 140–450)
PMV BLD AUTO: 9.9 FL (ref 6–12)
POTASSIUM SERPL-SCNC: 3.6 MMOL/L (ref 3.5–5.2)
RBC # BLD AUTO: 4.05 10*6/MM3 (ref 3.77–5.28)
SINUS: 3.1 CM
SODIUM SERPL-SCNC: 136 MMOL/L (ref 136–145)
STJ: 2.6 CM
STRESS TARGET HR: 124 BPM
WBC NRBC COR # BLD: 8.34 10*3/MM3 (ref 3.4–10.8)

## 2023-01-23 PROCEDURE — 25010000002 PERFLUTREN (DEFINITY) 8.476 MG IN SODIUM CHLORIDE (PF) 0.9 % 10 ML INJECTION: Performed by: STUDENT IN AN ORGANIZED HEALTH CARE EDUCATION/TRAINING PROGRAM

## 2023-01-23 PROCEDURE — 94799 UNLISTED PULMONARY SVC/PX: CPT

## 2023-01-23 PROCEDURE — 83735 ASSAY OF MAGNESIUM: CPT | Performed by: STUDENT IN AN ORGANIZED HEALTH CARE EDUCATION/TRAINING PROGRAM

## 2023-01-23 PROCEDURE — G0378 HOSPITAL OBSERVATION PER HR: HCPCS

## 2023-01-23 PROCEDURE — 94761 N-INVAS EAR/PLS OXIMETRY MLT: CPT

## 2023-01-23 PROCEDURE — 93306 TTE W/DOPPLER COMPLETE: CPT

## 2023-01-23 PROCEDURE — 63710000001 INSULIN LISPRO (HUMAN) PER 5 UNITS: Performed by: STUDENT IN AN ORGANIZED HEALTH CARE EDUCATION/TRAINING PROGRAM

## 2023-01-23 PROCEDURE — 80069 RENAL FUNCTION PANEL: CPT | Performed by: INTERNAL MEDICINE

## 2023-01-23 PROCEDURE — 82962 GLUCOSE BLOOD TEST: CPT

## 2023-01-23 PROCEDURE — 73030 X-RAY EXAM OF SHOULDER: CPT

## 2023-01-23 PROCEDURE — 85027 COMPLETE CBC AUTOMATED: CPT | Performed by: STUDENT IN AN ORGANIZED HEALTH CARE EDUCATION/TRAINING PROGRAM

## 2023-01-23 PROCEDURE — 25010000002 FUROSEMIDE PER 20 MG: Performed by: INTERNAL MEDICINE

## 2023-01-23 PROCEDURE — 94664 DEMO&/EVAL PT USE INHALER: CPT

## 2023-01-23 PROCEDURE — 93306 TTE W/DOPPLER COMPLETE: CPT | Performed by: INTERNAL MEDICINE

## 2023-01-23 RX ORDER — FUROSEMIDE 10 MG/ML
40 INJECTION INTRAMUSCULAR; INTRAVENOUS ONCE
Status: COMPLETED | OUTPATIENT
Start: 2023-01-23 | End: 2023-01-23

## 2023-01-23 RX ORDER — NICOTINE POLACRILEX 4 MG
15 LOZENGE BUCCAL
Status: DISCONTINUED | OUTPATIENT
Start: 2023-01-23 | End: 2023-01-31 | Stop reason: HOSPADM

## 2023-01-23 RX ORDER — DEXTROSE MONOHYDRATE 25 G/50ML
25 INJECTION, SOLUTION INTRAVENOUS
Status: DISCONTINUED | OUTPATIENT
Start: 2023-01-23 | End: 2023-01-31 | Stop reason: HOSPADM

## 2023-01-23 RX ORDER — POTASSIUM CHLORIDE 750 MG/1
40 TABLET, FILM COATED, EXTENDED RELEASE ORAL ONCE
Status: COMPLETED | OUTPATIENT
Start: 2023-01-23 | End: 2023-01-23

## 2023-01-23 RX ORDER — INSULIN LISPRO 100 [IU]/ML
0-7 INJECTION, SOLUTION INTRAVENOUS; SUBCUTANEOUS
Status: DISCONTINUED | OUTPATIENT
Start: 2023-01-23 | End: 2023-01-30

## 2023-01-23 RX ADMIN — DULOXETINE HYDROCHLORIDE 60 MG: 60 CAPSULE, DELAYED RELEASE ORAL at 09:21

## 2023-01-23 RX ADMIN — DICLOFENAC SODIUM 2 G: 10 GEL TOPICAL at 21:11

## 2023-01-23 RX ADMIN — GABAPENTIN 200 MG: 100 CAPSULE ORAL at 09:22

## 2023-01-23 RX ADMIN — ALLOPURINOL 100 MG: 100 TABLET ORAL at 09:21

## 2023-01-23 RX ADMIN — INSULIN GLARGINE-YFGN 30 UNITS: 100 INJECTION, SOLUTION SUBCUTANEOUS at 21:11

## 2023-01-23 RX ADMIN — BUSPIRONE HYDROCHLORIDE 5 MG: 5 TABLET ORAL at 16:11

## 2023-01-23 RX ADMIN — BUDESONIDE AND FORMOTEROL FUMARATE DIHYDRATE 2 PUFF: 160; 4.5 AEROSOL RESPIRATORY (INHALATION) at 19:16

## 2023-01-23 RX ADMIN — GABAPENTIN 200 MG: 100 CAPSULE ORAL at 21:11

## 2023-01-23 RX ADMIN — BUSPIRONE HYDROCHLORIDE 5 MG: 5 TABLET ORAL at 21:11

## 2023-01-23 RX ADMIN — ACETAMINOPHEN 650 MG: 325 TABLET, FILM COATED ORAL at 03:48

## 2023-01-23 RX ADMIN — BUSPIRONE HYDROCHLORIDE 5 MG: 5 TABLET ORAL at 09:21

## 2023-01-23 RX ADMIN — DOCUSATE SODIUM 100 MG: 100 CAPSULE, LIQUID FILLED ORAL at 21:11

## 2023-01-23 RX ADMIN — APIXABAN 5 MG: 5 TABLET, FILM COATED ORAL at 21:11

## 2023-01-23 RX ADMIN — ATORVASTATIN CALCIUM 10 MG: 20 TABLET, FILM COATED ORAL at 09:22

## 2023-01-23 RX ADMIN — FUROSEMIDE 40 MG: 10 INJECTION, SOLUTION INTRAMUSCULAR; INTRAVENOUS at 18:32

## 2023-01-23 RX ADMIN — BUDESONIDE AND FORMOTEROL FUMARATE DIHYDRATE 2 PUFF: 160; 4.5 AEROSOL RESPIRATORY (INHALATION) at 06:35

## 2023-01-23 RX ADMIN — ACETAMINOPHEN 650 MG: 325 TABLET, FILM COATED ORAL at 09:38

## 2023-01-23 RX ADMIN — POTASSIUM CHLORIDE 40 MEQ: 750 TABLET, EXTENDED RELEASE ORAL at 18:35

## 2023-01-23 RX ADMIN — FUROSEMIDE 40 MG: 10 INJECTION, SOLUTION INTRAMUSCULAR; INTRAVENOUS at 09:22

## 2023-01-23 RX ADMIN — PERFLUTREN 4.5 ML: 6.52 INJECTION, SUSPENSION INTRAVENOUS at 08:29

## 2023-01-23 RX ADMIN — TIOTROPIUM BROMIDE INHALATION SPRAY 1 PUFF: 3.12 SPRAY, METERED RESPIRATORY (INHALATION) at 06:34

## 2023-01-23 RX ADMIN — INSULIN LISPRO 3 UNITS: 100 INJECTION, SOLUTION INTRAVENOUS; SUBCUTANEOUS at 16:47

## 2023-01-23 RX ADMIN — DOCUSATE SODIUM 100 MG: 100 CAPSULE, LIQUID FILLED ORAL at 09:22

## 2023-01-23 RX ADMIN — FUROSEMIDE 40 MG: 10 INJECTION, SOLUTION INTRAMUSCULAR; INTRAVENOUS at 00:49

## 2023-01-23 RX ADMIN — APIXABAN 5 MG: 5 TABLET, FILM COATED ORAL at 09:21

## 2023-01-23 RX ADMIN — ASPIRIN 81 MG: 81 TABLET, CHEWABLE ORAL at 09:21

## 2023-01-23 RX ADMIN — DICLOFENAC SODIUM 2 G: 10 GEL TOPICAL at 09:23

## 2023-01-24 LAB
ALBUMIN SERPL-MCNC: 2.9 G/DL (ref 3.5–5.2)
ANION GAP SERPL CALCULATED.3IONS-SCNC: 9 MMOL/L (ref 5–15)
BUN SERPL-MCNC: 26 MG/DL (ref 8–23)
BUN/CREAT SERPL: 19.1 (ref 7–25)
CALCIUM SPEC-SCNC: 8.6 MG/DL (ref 8.6–10.5)
CHLORIDE SERPL-SCNC: 94 MMOL/L (ref 98–107)
CO2 SERPL-SCNC: 31 MMOL/L (ref 22–29)
CREAT SERPL-MCNC: 1.36 MG/DL (ref 0.57–1)
DEPRECATED RDW RBC AUTO: 51.1 FL (ref 37–54)
EGFRCR SERPLBLD CKD-EPI 2021: 41 ML/MIN/1.73
ERYTHROCYTE [DISTWIDTH] IN BLOOD BY AUTOMATED COUNT: 16.2 % (ref 12.3–15.4)
GLUCOSE BLDC GLUCOMTR-MCNC: 142 MG/DL (ref 70–130)
GLUCOSE BLDC GLUCOMTR-MCNC: 170 MG/DL (ref 70–130)
GLUCOSE BLDC GLUCOMTR-MCNC: 186 MG/DL (ref 70–130)
GLUCOSE BLDC GLUCOMTR-MCNC: 228 MG/DL (ref 70–130)
GLUCOSE SERPL-MCNC: 144 MG/DL (ref 65–99)
HCT VFR BLD AUTO: 40.3 % (ref 34–46.6)
HGB BLD-MCNC: 11.8 G/DL (ref 12–15.9)
MAGNESIUM SERPL-MCNC: 3.4 MG/DL (ref 1.6–2.4)
MCH RBC QN AUTO: 25.7 PG (ref 26.6–33)
MCHC RBC AUTO-ENTMCNC: 29.3 G/DL (ref 31.5–35.7)
MCV RBC AUTO: 87.6 FL (ref 79–97)
PHOSPHATE SERPL-MCNC: 3.3 MG/DL (ref 2.5–4.5)
PLATELET # BLD AUTO: 208 10*3/MM3 (ref 140–450)
PMV BLD AUTO: 10.1 FL (ref 6–12)
POTASSIUM SERPL-SCNC: 4.5 MMOL/L (ref 3.5–5.2)
RBC # BLD AUTO: 4.6 10*6/MM3 (ref 3.77–5.28)
SODIUM SERPL-SCNC: 134 MMOL/L (ref 136–145)
WBC NRBC COR # BLD: 7.93 10*3/MM3 (ref 3.4–10.8)

## 2023-01-24 PROCEDURE — 85027 COMPLETE CBC AUTOMATED: CPT | Performed by: STUDENT IN AN ORGANIZED HEALTH CARE EDUCATION/TRAINING PROGRAM

## 2023-01-24 PROCEDURE — G0378 HOSPITAL OBSERVATION PER HR: HCPCS

## 2023-01-24 PROCEDURE — 94799 UNLISTED PULMONARY SVC/PX: CPT

## 2023-01-24 PROCEDURE — 63710000001 INSULIN LISPRO (HUMAN) PER 5 UNITS: Performed by: STUDENT IN AN ORGANIZED HEALTH CARE EDUCATION/TRAINING PROGRAM

## 2023-01-24 PROCEDURE — 83735 ASSAY OF MAGNESIUM: CPT | Performed by: INTERNAL MEDICINE

## 2023-01-24 PROCEDURE — 82962 GLUCOSE BLOOD TEST: CPT

## 2023-01-24 PROCEDURE — 80069 RENAL FUNCTION PANEL: CPT | Performed by: INTERNAL MEDICINE

## 2023-01-24 PROCEDURE — 94660 CPAP INITIATION&MGMT: CPT

## 2023-01-24 PROCEDURE — 94761 N-INVAS EAR/PLS OXIMETRY MLT: CPT

## 2023-01-24 PROCEDURE — 94664 DEMO&/EVAL PT USE INHALER: CPT

## 2023-01-24 RX ORDER — FUROSEMIDE 40 MG/1
40 TABLET ORAL 2 TIMES DAILY
Qty: 60 TABLET | Refills: 0 | Status: SHIPPED | OUTPATIENT
Start: 2023-01-24 | End: 2023-01-24 | Stop reason: HOSPADM

## 2023-01-24 RX ORDER — OXYCODONE HYDROCHLORIDE 5 MG/1
2.5 TABLET ORAL EVERY 4 HOURS PRN
Qty: 8 TABLET | Refills: 0 | Status: SHIPPED | OUTPATIENT
Start: 2023-01-24 | End: 2023-01-31

## 2023-01-24 RX ORDER — OXYCODONE HYDROCHLORIDE 5 MG/1
2.5 TABLET ORAL EVERY 4 HOURS PRN
Status: DISPENSED | OUTPATIENT
Start: 2023-01-24 | End: 2023-01-31

## 2023-01-24 RX ORDER — BUMETANIDE 1 MG/1
3 TABLET ORAL 3 TIMES DAILY
Qty: 270 TABLET | Refills: 0 | Status: SHIPPED | OUTPATIENT
Start: 2023-01-24 | End: 2023-01-31 | Stop reason: HOSPADM

## 2023-01-24 RX ADMIN — ATORVASTATIN CALCIUM 10 MG: 20 TABLET, FILM COATED ORAL at 08:45

## 2023-01-24 RX ADMIN — BUMETANIDE 3 MG: 2 TABLET ORAL at 17:57

## 2023-01-24 RX ADMIN — GABAPENTIN 200 MG: 100 CAPSULE ORAL at 08:45

## 2023-01-24 RX ADMIN — BUDESONIDE AND FORMOTEROL FUMARATE DIHYDRATE 2 PUFF: 160; 4.5 AEROSOL RESPIRATORY (INHALATION) at 20:37

## 2023-01-24 RX ADMIN — GABAPENTIN 200 MG: 100 CAPSULE ORAL at 20:41

## 2023-01-24 RX ADMIN — APIXABAN 5 MG: 5 TABLET, FILM COATED ORAL at 20:40

## 2023-01-24 RX ADMIN — APIXABAN 5 MG: 5 TABLET, FILM COATED ORAL at 08:45

## 2023-01-24 RX ADMIN — BUSPIRONE HYDROCHLORIDE 5 MG: 5 TABLET ORAL at 16:57

## 2023-01-24 RX ADMIN — INSULIN LISPRO 2 UNITS: 100 INJECTION, SOLUTION INTRAVENOUS; SUBCUTANEOUS at 12:40

## 2023-01-24 RX ADMIN — BUSPIRONE HYDROCHLORIDE 5 MG: 5 TABLET ORAL at 08:45

## 2023-01-24 RX ADMIN — TIOTROPIUM BROMIDE INHALATION SPRAY 1 PUFF: 3.12 SPRAY, METERED RESPIRATORY (INHALATION) at 08:06

## 2023-01-24 RX ADMIN — ASPIRIN 81 MG: 81 TABLET, CHEWABLE ORAL at 10:15

## 2023-01-24 RX ADMIN — INSULIN GLARGINE-YFGN 30 UNITS: 100 INJECTION, SOLUTION SUBCUTANEOUS at 20:43

## 2023-01-24 RX ADMIN — DULOXETINE HYDROCHLORIDE 60 MG: 60 CAPSULE, DELAYED RELEASE ORAL at 08:45

## 2023-01-24 RX ADMIN — BUSPIRONE HYDROCHLORIDE 5 MG: 5 TABLET ORAL at 20:41

## 2023-01-24 RX ADMIN — DICLOFENAC SODIUM 2 G: 10 GEL TOPICAL at 20:40

## 2023-01-24 RX ADMIN — ALLOPURINOL 100 MG: 100 TABLET ORAL at 08:45

## 2023-01-24 RX ADMIN — DICLOFENAC SODIUM 2 G: 10 GEL TOPICAL at 16:57

## 2023-01-24 RX ADMIN — BUDESONIDE AND FORMOTEROL FUMARATE DIHYDRATE 2 PUFF: 160; 4.5 AEROSOL RESPIRATORY (INHALATION) at 08:08

## 2023-01-24 RX ADMIN — BUMETANIDE 3 MG: 2 TABLET ORAL at 20:41

## 2023-01-24 RX ADMIN — DOCUSATE SODIUM 100 MG: 100 CAPSULE, LIQUID FILLED ORAL at 20:40

## 2023-01-24 RX ADMIN — DOCUSATE SODIUM 100 MG: 100 CAPSULE, LIQUID FILLED ORAL at 08:45

## 2023-01-24 RX ADMIN — INSULIN LISPRO 3 UNITS: 100 INJECTION, SOLUTION INTRAVENOUS; SUBCUTANEOUS at 16:57

## 2023-01-25 ENCOUNTER — APPOINTMENT (OUTPATIENT)
Dept: ULTRASOUND IMAGING | Facility: HOSPITAL | Age: 73
DRG: 70 | End: 2023-01-25
Payer: MEDICARE

## 2023-01-25 PROBLEM — J44.9 COPD (CHRONIC OBSTRUCTIVE PULMONARY DISEASE): Chronic | Status: ACTIVE | Noted: 2023-01-25

## 2023-01-25 PROBLEM — J96.11 CHRONIC RESPIRATORY FAILURE WITH HYPOXIA: Chronic | Status: ACTIVE | Noted: 2023-01-25

## 2023-01-25 PROBLEM — G47.33 OSA (OBSTRUCTIVE SLEEP APNEA): Chronic | Status: ACTIVE | Noted: 2023-01-25

## 2023-01-25 PROBLEM — R41.82 ALTERED MENTAL STATUS, UNSPECIFIED ALTERED MENTAL STATUS TYPE: Status: ACTIVE | Noted: 2023-01-25

## 2023-01-25 PROBLEM — E11.9 TYPE 2 DIABETES MELLITUS, WITH LONG-TERM CURRENT USE OF INSULIN: Chronic | Status: ACTIVE | Noted: 2023-01-25

## 2023-01-25 PROBLEM — Z79.01 CHRONIC ANTICOAGULATION: Chronic | Status: ACTIVE | Noted: 2023-01-25

## 2023-01-25 PROBLEM — N18.32 STAGE 3B CHRONIC KIDNEY DISEASE: Chronic | Status: ACTIVE | Noted: 2023-01-25

## 2023-01-25 PROBLEM — G93.41 METABOLIC ENCEPHALOPATHY: Status: ACTIVE | Noted: 2023-01-25

## 2023-01-25 PROBLEM — I48.0 PAF (PAROXYSMAL ATRIAL FIBRILLATION): Chronic | Status: ACTIVE | Noted: 2023-01-25

## 2023-01-25 PROBLEM — Z79.4 TYPE 2 DIABETES MELLITUS, WITH LONG-TERM CURRENT USE OF INSULIN: Chronic | Status: ACTIVE | Noted: 2023-01-25

## 2023-01-25 PROBLEM — I50.43 ACUTE ON CHRONIC COMBINED SYSTOLIC AND DIASTOLIC CHF (CONGESTIVE HEART FAILURE): Status: ACTIVE | Noted: 2023-01-25

## 2023-01-25 LAB
ALBUMIN SERPL-MCNC: 3.2 G/DL (ref 3.5–5.2)
ALBUMIN/GLOB SERPL: 0.8 G/DL
ALP SERPL-CCNC: 166 U/L (ref 39–117)
ALT SERPL W P-5'-P-CCNC: 8 U/L (ref 1–33)
AMMONIA BLD-SCNC: 28 UMOL/L (ref 11–51)
ANION GAP SERPL CALCULATED.3IONS-SCNC: 10 MMOL/L (ref 5–15)
ARTERIAL PATENCY WRIST A: POSITIVE
AST SERPL-CCNC: 14 U/L (ref 1–32)
ATMOSPHERIC PRESS: 739.6 MMHG
BASE EXCESS BLDA CALC-SCNC: 10.5 MMOL/L (ref 0–2)
BASOPHILS # BLD AUTO: 0.04 10*3/MM3 (ref 0–0.2)
BASOPHILS NFR BLD AUTO: 0.5 % (ref 0–1.5)
BDY SITE: ABNORMAL
BILIRUB CONJ SERPL-MCNC: 2.4 MG/DL (ref 0–0.3)
BILIRUB INDIRECT SERPL-MCNC: 2 MG/DL
BILIRUB SERPL-MCNC: 4.4 MG/DL (ref 0–1.2)
BILIRUB SERPL-MCNC: 4.4 MG/DL (ref 0–1.2)
BUN SERPL-MCNC: 24 MG/DL (ref 8–23)
BUN/CREAT SERPL: 18.2 (ref 7–25)
CALCIUM SPEC-SCNC: 8.8 MG/DL (ref 8.6–10.5)
CHLORIDE SERPL-SCNC: 91 MMOL/L (ref 98–107)
CO2 SERPL-SCNC: 32 MMOL/L (ref 22–29)
CREAT SERPL-MCNC: 1.32 MG/DL (ref 0.57–1)
DEPRECATED RDW RBC AUTO: 49.6 FL (ref 37–54)
EGFRCR SERPLBLD CKD-EPI 2021: 42.5 ML/MIN/1.73
EOSINOPHIL # BLD AUTO: 0.05 10*3/MM3 (ref 0–0.4)
EOSINOPHIL NFR BLD AUTO: 0.6 % (ref 0.3–6.2)
ERYTHROCYTE [DISTWIDTH] IN BLOOD BY AUTOMATED COUNT: 16.1 % (ref 12.3–15.4)
GAS FLOW AIRWAY: 1 LPM
GLOBULIN UR ELPH-MCNC: 3.9 GM/DL
GLUCOSE BLDC GLUCOMTR-MCNC: 133 MG/DL (ref 70–130)
GLUCOSE BLDC GLUCOMTR-MCNC: 158 MG/DL (ref 70–130)
GLUCOSE BLDC GLUCOMTR-MCNC: 174 MG/DL (ref 70–130)
GLUCOSE BLDC GLUCOMTR-MCNC: 93 MG/DL (ref 70–130)
GLUCOSE SERPL-MCNC: 159 MG/DL (ref 65–99)
HCO3 BLDA-SCNC: 34.3 MMOL/L (ref 22–28)
HCT VFR BLD AUTO: 40 % (ref 34–46.6)
HGB BLD-MCNC: 12.5 G/DL (ref 12–15.9)
IMM GRANULOCYTES # BLD AUTO: 0.03 10*3/MM3 (ref 0–0.05)
IMM GRANULOCYTES NFR BLD AUTO: 0.4 % (ref 0–0.5)
LYMPHOCYTES # BLD AUTO: 0.98 10*3/MM3 (ref 0.7–3.1)
LYMPHOCYTES NFR BLD AUTO: 12.6 % (ref 19.6–45.3)
MCH RBC QN AUTO: 26.5 PG (ref 26.6–33)
MCHC RBC AUTO-ENTMCNC: 31.3 G/DL (ref 31.5–35.7)
MCV RBC AUTO: 84.7 FL (ref 79–97)
MODALITY: ABNORMAL
MONOCYTES # BLD AUTO: 1.04 10*3/MM3 (ref 0.1–0.9)
MONOCYTES NFR BLD AUTO: 13.3 % (ref 5–12)
NEUTROPHILS NFR BLD AUTO: 5.66 10*3/MM3 (ref 1.7–7)
NEUTROPHILS NFR BLD AUTO: 72.6 % (ref 42.7–76)
NRBC BLD AUTO-RTO: 0 /100 WBC (ref 0–0.2)
PCO2 BLDA: 41.1 MM HG (ref 35–45)
PH BLDA: 7.53 PH UNITS (ref 7.35–7.45)
PHOSPHATE SERPL-MCNC: 3.2 MG/DL (ref 2.5–4.5)
PLATELET # BLD AUTO: 266 10*3/MM3 (ref 140–450)
PMV BLD AUTO: 9.3 FL (ref 6–12)
PO2 BLDA: 68.7 MM HG (ref 80–100)
POTASSIUM SERPL-SCNC: 3.5 MMOL/L (ref 3.5–5.2)
PROT SERPL-MCNC: 7.1 G/DL (ref 6–8.5)
QT INTERVAL: 472 MS
RBC # BLD AUTO: 4.72 10*6/MM3 (ref 3.77–5.28)
SAO2 % BLDCOA: 95.2 % (ref 92–99)
SODIUM SERPL-SCNC: 133 MMOL/L (ref 136–145)
TOTAL RATE: 18 BREATHS/MINUTE
WBC NRBC COR # BLD: 7.8 10*3/MM3 (ref 3.4–10.8)

## 2023-01-25 PROCEDURE — 94799 UNLISTED PULMONARY SVC/PX: CPT

## 2023-01-25 PROCEDURE — 82803 BLOOD GASES ANY COMBINATION: CPT

## 2023-01-25 PROCEDURE — 82140 ASSAY OF AMMONIA: CPT | Performed by: HOSPITALIST

## 2023-01-25 PROCEDURE — 63710000001 INSULIN LISPRO (HUMAN) PER 5 UNITS: Performed by: STUDENT IN AN ORGANIZED HEALTH CARE EDUCATION/TRAINING PROGRAM

## 2023-01-25 PROCEDURE — 94664 DEMO&/EVAL PT USE INHALER: CPT

## 2023-01-25 PROCEDURE — 93005 ELECTROCARDIOGRAM TRACING: CPT | Performed by: HOSPITALIST

## 2023-01-25 PROCEDURE — 76705 ECHO EXAM OF ABDOMEN: CPT

## 2023-01-25 PROCEDURE — 82248 BILIRUBIN DIRECT: CPT | Performed by: HOSPITALIST

## 2023-01-25 PROCEDURE — 82962 GLUCOSE BLOOD TEST: CPT

## 2023-01-25 PROCEDURE — 36600 WITHDRAWAL OF ARTERIAL BLOOD: CPT

## 2023-01-25 PROCEDURE — 82247 BILIRUBIN TOTAL: CPT | Performed by: HOSPITALIST

## 2023-01-25 PROCEDURE — 93010 ELECTROCARDIOGRAM REPORT: CPT | Performed by: INTERNAL MEDICINE

## 2023-01-25 PROCEDURE — 84100 ASSAY OF PHOSPHORUS: CPT | Performed by: INTERNAL MEDICINE

## 2023-01-25 PROCEDURE — 80053 COMPREHEN METABOLIC PANEL: CPT | Performed by: HOSPITALIST

## 2023-01-25 PROCEDURE — 99222 1ST HOSP IP/OBS MODERATE 55: CPT | Performed by: INTERNAL MEDICINE

## 2023-01-25 PROCEDURE — 94761 N-INVAS EAR/PLS OXIMETRY MLT: CPT

## 2023-01-25 PROCEDURE — 0 POTASSIUM CHLORIDE 10 MEQ/100ML SOLUTION: Performed by: INTERNAL MEDICINE

## 2023-01-25 PROCEDURE — 85025 COMPLETE CBC W/AUTO DIFF WBC: CPT | Performed by: HOSPITALIST

## 2023-01-25 RX ORDER — TRIAMCINOLONE ACETONIDE 1 MG/G
1 CREAM TOPICAL EVERY 12 HOURS SCHEDULED
Status: DISCONTINUED | OUTPATIENT
Start: 2023-01-25 | End: 2023-01-31 | Stop reason: HOSPADM

## 2023-01-25 RX ORDER — POTASSIUM CHLORIDE 7.45 MG/ML
10 INJECTION INTRAVENOUS
Status: COMPLETED | OUTPATIENT
Start: 2023-01-25 | End: 2023-01-25

## 2023-01-25 RX ADMIN — INSULIN GLARGINE-YFGN 30 UNITS: 100 INJECTION, SOLUTION SUBCUTANEOUS at 22:01

## 2023-01-25 RX ADMIN — DOCUSATE SODIUM 100 MG: 100 CAPSULE, LIQUID FILLED ORAL at 08:44

## 2023-01-25 RX ADMIN — ACETAMINOPHEN 650 MG: 325 TABLET, FILM COATED ORAL at 17:48

## 2023-01-25 RX ADMIN — TRIAMCINOLONE ACETONIDE 1 APPLICATION: 1 CREAM TOPICAL at 13:02

## 2023-01-25 RX ADMIN — APIXABAN 5 MG: 5 TABLET, FILM COATED ORAL at 08:44

## 2023-01-25 RX ADMIN — INSULIN LISPRO 2 UNITS: 100 INJECTION, SOLUTION INTRAVENOUS; SUBCUTANEOUS at 13:02

## 2023-01-25 RX ADMIN — BUMETANIDE 3 MG: 2 TABLET ORAL at 08:44

## 2023-01-25 RX ADMIN — BUSPIRONE HYDROCHLORIDE 5 MG: 5 TABLET ORAL at 16:00

## 2023-01-25 RX ADMIN — POTASSIUM CHLORIDE 10 MEQ: 7.46 INJECTION, SOLUTION INTRAVENOUS at 16:00

## 2023-01-25 RX ADMIN — ATORVASTATIN CALCIUM 10 MG: 20 TABLET, FILM COATED ORAL at 08:44

## 2023-01-25 RX ADMIN — DICLOFENAC SODIUM 2 G: 10 GEL TOPICAL at 21:58

## 2023-01-25 RX ADMIN — DOCUSATE SODIUM 100 MG: 100 CAPSULE, LIQUID FILLED ORAL at 21:58

## 2023-01-25 RX ADMIN — GABAPENTIN 200 MG: 100 CAPSULE ORAL at 08:44

## 2023-01-25 RX ADMIN — BUSPIRONE HYDROCHLORIDE 5 MG: 5 TABLET ORAL at 21:58

## 2023-01-25 RX ADMIN — APIXABAN 5 MG: 5 TABLET, FILM COATED ORAL at 21:58

## 2023-01-25 RX ADMIN — OXYCODONE HYDROCHLORIDE 2.5 MG: 5 TABLET ORAL at 13:09

## 2023-01-25 RX ADMIN — GABAPENTIN 200 MG: 100 CAPSULE ORAL at 21:58

## 2023-01-25 RX ADMIN — TIOTROPIUM BROMIDE INHALATION SPRAY 1 PUFF: 3.12 SPRAY, METERED RESPIRATORY (INHALATION) at 07:46

## 2023-01-25 RX ADMIN — BUDESONIDE AND FORMOTEROL FUMARATE DIHYDRATE 2 PUFF: 160; 4.5 AEROSOL RESPIRATORY (INHALATION) at 21:07

## 2023-01-25 RX ADMIN — DULOXETINE HYDROCHLORIDE 60 MG: 60 CAPSULE, DELAYED RELEASE ORAL at 08:44

## 2023-01-25 RX ADMIN — BUMETANIDE 3 MG: 2 TABLET ORAL at 21:58

## 2023-01-25 RX ADMIN — BUDESONIDE AND FORMOTEROL FUMARATE DIHYDRATE 2 PUFF: 160; 4.5 AEROSOL RESPIRATORY (INHALATION) at 07:47

## 2023-01-25 RX ADMIN — DICLOFENAC SODIUM 2 G: 10 GEL TOPICAL at 08:44

## 2023-01-25 RX ADMIN — BUMETANIDE 3 MG: 2 TABLET ORAL at 16:00

## 2023-01-25 RX ADMIN — ALLOPURINOL 100 MG: 100 TABLET ORAL at 08:44

## 2023-01-25 RX ADMIN — ASPIRIN 81 MG: 81 TABLET, CHEWABLE ORAL at 08:44

## 2023-01-25 RX ADMIN — BUSPIRONE HYDROCHLORIDE 5 MG: 5 TABLET ORAL at 08:44

## 2023-01-25 RX ADMIN — OXYCODONE HYDROCHLORIDE 2.5 MG: 5 TABLET ORAL at 22:23

## 2023-01-25 RX ADMIN — TRIAMCINOLONE ACETONIDE 1 APPLICATION: 1 CREAM TOPICAL at 21:59

## 2023-01-25 RX ADMIN — POTASSIUM CHLORIDE 10 MEQ: 7.46 INJECTION, SOLUTION INTRAVENOUS at 16:53

## 2023-01-26 LAB
ALBUMIN SERPL-MCNC: 3 G/DL (ref 3.5–5.2)
ALBUMIN/GLOB SERPL: 0.9 G/DL
ALP SERPL-CCNC: 144 U/L (ref 39–117)
ALT SERPL W P-5'-P-CCNC: 8 U/L (ref 1–33)
ANION GAP SERPL CALCULATED.3IONS-SCNC: 8 MMOL/L (ref 5–15)
AST SERPL-CCNC: 13 U/L (ref 1–32)
BACTERIA SPEC AEROBE CULT: NORMAL
BACTERIA SPEC AEROBE CULT: NORMAL
BILIRUB SERPL-MCNC: 4 MG/DL (ref 0–1.2)
BUN SERPL-MCNC: 26 MG/DL (ref 8–23)
BUN/CREAT SERPL: 21 (ref 7–25)
CALCIUM SPEC-SCNC: 8.6 MG/DL (ref 8.6–10.5)
CHLORIDE SERPL-SCNC: 94 MMOL/L (ref 98–107)
CO2 SERPL-SCNC: 32 MMOL/L (ref 22–29)
CREAT SERPL-MCNC: 1.24 MG/DL (ref 0.57–1)
DEPRECATED RDW RBC AUTO: 50 FL (ref 37–54)
EGFRCR SERPLBLD CKD-EPI 2021: 45.8 ML/MIN/1.73
ERYTHROCYTE [DISTWIDTH] IN BLOOD BY AUTOMATED COUNT: 16.2 % (ref 12.3–15.4)
GLOBULIN UR ELPH-MCNC: 3.4 GM/DL
GLUCOSE BLDC GLUCOMTR-MCNC: 116 MG/DL (ref 70–130)
GLUCOSE BLDC GLUCOMTR-MCNC: 221 MG/DL (ref 70–130)
GLUCOSE BLDC GLUCOMTR-MCNC: 268 MG/DL (ref 70–130)
GLUCOSE BLDC GLUCOMTR-MCNC: 60 MG/DL (ref 70–130)
GLUCOSE BLDC GLUCOMTR-MCNC: 69 MG/DL (ref 70–130)
GLUCOSE SERPL-MCNC: 54 MG/DL (ref 65–99)
HAV IGM SERPL QL IA: NORMAL
HBV CORE IGM SERPL QL IA: NORMAL
HBV SURFACE AG SERPL QL IA: NORMAL
HCT VFR BLD AUTO: 37.4 % (ref 34–46.6)
HCV AB SER DONR QL: NORMAL
HGB BLD-MCNC: 11.2 G/DL (ref 12–15.9)
INR PPP: 2.35 (ref 0.9–1.1)
MAGNESIUM SERPL-MCNC: 2 MG/DL (ref 1.6–2.4)
MCH RBC QN AUTO: 25.9 PG (ref 26.6–33)
MCHC RBC AUTO-ENTMCNC: 29.9 G/DL (ref 31.5–35.7)
MCV RBC AUTO: 86.4 FL (ref 79–97)
PLATELET # BLD AUTO: 320 10*3/MM3 (ref 140–450)
PMV BLD AUTO: 9.6 FL (ref 6–12)
POTASSIUM SERPL-SCNC: 3.5 MMOL/L (ref 3.5–5.2)
PROT SERPL-MCNC: 6.4 G/DL (ref 6–8.5)
PROTHROMBIN TIME: 26.1 SECONDS (ref 11.7–14.2)
RBC # BLD AUTO: 4.33 10*6/MM3 (ref 3.77–5.28)
SODIUM SERPL-SCNC: 134 MMOL/L (ref 136–145)
WBC NRBC COR # BLD: 7.65 10*3/MM3 (ref 3.4–10.8)

## 2023-01-26 PROCEDURE — 94761 N-INVAS EAR/PLS OXIMETRY MLT: CPT

## 2023-01-26 PROCEDURE — 80074 ACUTE HEPATITIS PANEL: CPT | Performed by: INTERNAL MEDICINE

## 2023-01-26 PROCEDURE — 97530 THERAPEUTIC ACTIVITIES: CPT

## 2023-01-26 PROCEDURE — 82962 GLUCOSE BLOOD TEST: CPT

## 2023-01-26 PROCEDURE — 83735 ASSAY OF MAGNESIUM: CPT | Performed by: HOSPITALIST

## 2023-01-26 PROCEDURE — 63710000001 INSULIN LISPRO (HUMAN) PER 5 UNITS: Performed by: STUDENT IN AN ORGANIZED HEALTH CARE EDUCATION/TRAINING PROGRAM

## 2023-01-26 PROCEDURE — 94799 UNLISTED PULMONARY SVC/PX: CPT

## 2023-01-26 PROCEDURE — 85027 COMPLETE CBC AUTOMATED: CPT | Performed by: HOSPITALIST

## 2023-01-26 PROCEDURE — 80053 COMPREHEN METABOLIC PANEL: CPT | Performed by: HOSPITALIST

## 2023-01-26 PROCEDURE — 97162 PT EVAL MOD COMPLEX 30 MIN: CPT

## 2023-01-26 PROCEDURE — 85610 PROTHROMBIN TIME: CPT | Performed by: INTERNAL MEDICINE

## 2023-01-26 PROCEDURE — 99232 SBSQ HOSP IP/OBS MODERATE 35: CPT | Performed by: INTERNAL MEDICINE

## 2023-01-26 RX ORDER — POTASSIUM CHLORIDE 750 MG/1
40 TABLET, FILM COATED, EXTENDED RELEASE ORAL ONCE
Status: COMPLETED | OUTPATIENT
Start: 2023-01-26 | End: 2023-01-26

## 2023-01-26 RX ADMIN — INSULIN GLARGINE-YFGN 20 UNITS: 100 INJECTION, SOLUTION SUBCUTANEOUS at 23:04

## 2023-01-26 RX ADMIN — INSULIN LISPRO 3 UNITS: 100 INJECTION, SOLUTION INTRAVENOUS; SUBCUTANEOUS at 16:50

## 2023-01-26 RX ADMIN — TRIAMCINOLONE ACETONIDE 1 APPLICATION: 1 CREAM TOPICAL at 20:20

## 2023-01-26 RX ADMIN — APIXABAN 5 MG: 5 TABLET, FILM COATED ORAL at 20:20

## 2023-01-26 RX ADMIN — BUSPIRONE HYDROCHLORIDE 5 MG: 5 TABLET ORAL at 20:20

## 2023-01-26 RX ADMIN — TIOTROPIUM BROMIDE INHALATION SPRAY 1 PUFF: 3.12 SPRAY, METERED RESPIRATORY (INHALATION) at 07:40

## 2023-01-26 RX ADMIN — POTASSIUM CHLORIDE 40 MEQ: 750 TABLET, EXTENDED RELEASE ORAL at 13:11

## 2023-01-26 RX ADMIN — BUDESONIDE AND FORMOTEROL FUMARATE DIHYDRATE 2 PUFF: 160; 4.5 AEROSOL RESPIRATORY (INHALATION) at 19:15

## 2023-01-26 RX ADMIN — DOCUSATE SODIUM 100 MG: 100 CAPSULE, LIQUID FILLED ORAL at 20:20

## 2023-01-26 RX ADMIN — GABAPENTIN 200 MG: 100 CAPSULE ORAL at 08:01

## 2023-01-26 RX ADMIN — ASPIRIN 81 MG: 81 TABLET, CHEWABLE ORAL at 08:02

## 2023-01-26 RX ADMIN — BUDESONIDE AND FORMOTEROL FUMARATE DIHYDRATE 2 PUFF: 160; 4.5 AEROSOL RESPIRATORY (INHALATION) at 07:39

## 2023-01-26 RX ADMIN — DOCUSATE SODIUM 100 MG: 100 CAPSULE, LIQUID FILLED ORAL at 08:01

## 2023-01-26 RX ADMIN — DULOXETINE HYDROCHLORIDE 60 MG: 60 CAPSULE, DELAYED RELEASE ORAL at 08:02

## 2023-01-26 RX ADMIN — APIXABAN 5 MG: 5 TABLET, FILM COATED ORAL at 08:02

## 2023-01-26 RX ADMIN — TRIAMCINOLONE ACETONIDE 1 APPLICATION: 1 CREAM TOPICAL at 08:03

## 2023-01-26 RX ADMIN — ALLOPURINOL 100 MG: 100 TABLET ORAL at 08:02

## 2023-01-26 RX ADMIN — BUSPIRONE HYDROCHLORIDE 5 MG: 5 TABLET ORAL at 08:02

## 2023-01-26 RX ADMIN — BUSPIRONE HYDROCHLORIDE 5 MG: 5 TABLET ORAL at 16:50

## 2023-01-26 RX ADMIN — GABAPENTIN 200 MG: 100 CAPSULE ORAL at 20:20

## 2023-01-26 RX ADMIN — OXYCODONE HYDROCHLORIDE 2.5 MG: 5 TABLET ORAL at 10:00

## 2023-01-26 RX ADMIN — DICLOFENAC SODIUM 2 G: 10 GEL TOPICAL at 20:20

## 2023-01-26 RX ADMIN — OXYCODONE HYDROCHLORIDE 2.5 MG: 5 TABLET ORAL at 14:21

## 2023-01-26 RX ADMIN — ATORVASTATIN CALCIUM 10 MG: 20 TABLET, FILM COATED ORAL at 08:01

## 2023-01-26 RX ADMIN — OXYCODONE HYDROCHLORIDE 2.5 MG: 5 TABLET ORAL at 20:20

## 2023-01-27 ENCOUNTER — TELEPHONE (OUTPATIENT)
Dept: ORTHOPEDIC SURGERY | Facility: CLINIC | Age: 73
End: 2023-01-27

## 2023-01-27 LAB
ALBUMIN SERPL-MCNC: 2.6 G/DL (ref 3.5–5.2)
ALBUMIN/GLOB SERPL: 0.7 G/DL
ALP SERPL-CCNC: 171 U/L (ref 39–117)
ALT SERPL W P-5'-P-CCNC: 20 U/L (ref 1–33)
ANION GAP SERPL CALCULATED.3IONS-SCNC: 11.4 MMOL/L (ref 5–15)
AST SERPL-CCNC: 35 U/L (ref 1–32)
BILIRUB CONJ SERPL-MCNC: 3 MG/DL (ref 0–0.3)
BILIRUB INDIRECT SERPL-MCNC: 2 MG/DL
BILIRUB SERPL-MCNC: 4.2 MG/DL (ref 0–1.2)
BILIRUB SERPL-MCNC: 5 MG/DL (ref 0–1.2)
BUN SERPL-MCNC: 37 MG/DL (ref 8–23)
BUN/CREAT SERPL: 29.1 (ref 7–25)
CALCIUM SPEC-SCNC: 8.5 MG/DL (ref 8.6–10.5)
CHLORIDE SERPL-SCNC: 91 MMOL/L (ref 98–107)
CO2 SERPL-SCNC: 29.6 MMOL/L (ref 22–29)
CREAT SERPL-MCNC: 1.27 MG/DL (ref 0.57–1)
DEPRECATED RDW RBC AUTO: 49.7 FL (ref 37–54)
EGFRCR SERPLBLD CKD-EPI 2021: 44.5 ML/MIN/1.73
ERYTHROCYTE [DISTWIDTH] IN BLOOD BY AUTOMATED COUNT: 16 % (ref 12.3–15.4)
GLOBULIN UR ELPH-MCNC: 3.9 GM/DL
GLUCOSE BLDC GLUCOMTR-MCNC: 166 MG/DL (ref 70–130)
GLUCOSE BLDC GLUCOMTR-MCNC: 184 MG/DL (ref 70–130)
GLUCOSE BLDC GLUCOMTR-MCNC: 213 MG/DL (ref 70–130)
GLUCOSE BLDC GLUCOMTR-MCNC: 264 MG/DL (ref 70–130)
GLUCOSE SERPL-MCNC: 194 MG/DL (ref 65–99)
HCT VFR BLD AUTO: 34.8 % (ref 34–46.6)
HGB BLD-MCNC: 10.4 G/DL (ref 12–15.9)
MAGNESIUM SERPL-MCNC: 2.1 MG/DL (ref 1.6–2.4)
MCH RBC QN AUTO: 25.8 PG (ref 26.6–33)
MCHC RBC AUTO-ENTMCNC: 29.9 G/DL (ref 31.5–35.7)
MCV RBC AUTO: 86.4 FL (ref 79–97)
PLATELET # BLD AUTO: 340 10*3/MM3 (ref 140–450)
PMV BLD AUTO: 9.7 FL (ref 6–12)
POTASSIUM SERPL-SCNC: 4.9 MMOL/L (ref 3.5–5.2)
PROT SERPL-MCNC: 6.5 G/DL (ref 6–8.5)
RBC # BLD AUTO: 4.03 10*6/MM3 (ref 3.77–5.28)
SODIUM SERPL-SCNC: 132 MMOL/L (ref 136–145)
WBC NRBC COR # BLD: 6.68 10*3/MM3 (ref 3.4–10.8)

## 2023-01-27 PROCEDURE — 94799 UNLISTED PULMONARY SVC/PX: CPT

## 2023-01-27 PROCEDURE — 80053 COMPREHEN METABOLIC PANEL: CPT | Performed by: INTERNAL MEDICINE

## 2023-01-27 PROCEDURE — 82962 GLUCOSE BLOOD TEST: CPT

## 2023-01-27 PROCEDURE — 99232 SBSQ HOSP IP/OBS MODERATE 35: CPT | Performed by: INTERNAL MEDICINE

## 2023-01-27 PROCEDURE — 85027 COMPLETE CBC AUTOMATED: CPT | Performed by: HOSPITALIST

## 2023-01-27 PROCEDURE — 63710000001 INSULIN LISPRO (HUMAN) PER 5 UNITS: Performed by: STUDENT IN AN ORGANIZED HEALTH CARE EDUCATION/TRAINING PROGRAM

## 2023-01-27 PROCEDURE — 94761 N-INVAS EAR/PLS OXIMETRY MLT: CPT

## 2023-01-27 PROCEDURE — 83735 ASSAY OF MAGNESIUM: CPT | Performed by: HOSPITALIST

## 2023-01-27 PROCEDURE — 25010000002 FUROSEMIDE PER 20 MG: Performed by: INTERNAL MEDICINE

## 2023-01-27 PROCEDURE — 94664 DEMO&/EVAL PT USE INHALER: CPT

## 2023-01-27 PROCEDURE — 82248 BILIRUBIN DIRECT: CPT | Performed by: INTERNAL MEDICINE

## 2023-01-27 PROCEDURE — 82247 BILIRUBIN TOTAL: CPT | Performed by: INTERNAL MEDICINE

## 2023-01-27 RX ORDER — FUROSEMIDE 10 MG/ML
40 INJECTION INTRAMUSCULAR; INTRAVENOUS ONCE
Status: COMPLETED | OUTPATIENT
Start: 2023-01-27 | End: 2023-01-27

## 2023-01-27 RX ADMIN — DOCUSATE SODIUM 100 MG: 100 CAPSULE, LIQUID FILLED ORAL at 09:28

## 2023-01-27 RX ADMIN — OXYCODONE HYDROCHLORIDE 2.5 MG: 5 TABLET ORAL at 17:49

## 2023-01-27 RX ADMIN — OXYCODONE HYDROCHLORIDE 2.5 MG: 5 TABLET ORAL at 09:33

## 2023-01-27 RX ADMIN — DULOXETINE HYDROCHLORIDE 60 MG: 60 CAPSULE, DELAYED RELEASE ORAL at 09:28

## 2023-01-27 RX ADMIN — BUSPIRONE HYDROCHLORIDE 5 MG: 5 TABLET ORAL at 17:51

## 2023-01-27 RX ADMIN — INSULIN LISPRO 2 UNITS: 100 INJECTION, SOLUTION INTRAVENOUS; SUBCUTANEOUS at 09:28

## 2023-01-27 RX ADMIN — GABAPENTIN 200 MG: 100 CAPSULE ORAL at 21:13

## 2023-01-27 RX ADMIN — INSULIN GLARGINE-YFGN 20 UNITS: 100 INJECTION, SOLUTION SUBCUTANEOUS at 21:13

## 2023-01-27 RX ADMIN — ACETAMINOPHEN 650 MG: 325 TABLET, FILM COATED ORAL at 17:49

## 2023-01-27 RX ADMIN — DICLOFENAC SODIUM 2 G: 10 GEL TOPICAL at 21:13

## 2023-01-27 RX ADMIN — INSULIN LISPRO 3 UNITS: 100 INJECTION, SOLUTION INTRAVENOUS; SUBCUTANEOUS at 17:49

## 2023-01-27 RX ADMIN — DOCUSATE SODIUM 100 MG: 100 CAPSULE, LIQUID FILLED ORAL at 21:12

## 2023-01-27 RX ADMIN — TRIAMCINOLONE ACETONIDE 1 APPLICATION: 1 CREAM TOPICAL at 21:13

## 2023-01-27 RX ADMIN — APIXABAN 5 MG: 5 TABLET, FILM COATED ORAL at 21:12

## 2023-01-27 RX ADMIN — BUSPIRONE HYDROCHLORIDE 5 MG: 5 TABLET ORAL at 09:29

## 2023-01-27 RX ADMIN — TIOTROPIUM BROMIDE INHALATION SPRAY 1 PUFF: 3.12 SPRAY, METERED RESPIRATORY (INHALATION) at 08:02

## 2023-01-27 RX ADMIN — TRIAMCINOLONE ACETONIDE 1 APPLICATION: 1 CREAM TOPICAL at 09:29

## 2023-01-27 RX ADMIN — ACETAMINOPHEN 650 MG: 325 TABLET, FILM COATED ORAL at 09:33

## 2023-01-27 RX ADMIN — BUDESONIDE AND FORMOTEROL FUMARATE DIHYDRATE 2 PUFF: 160; 4.5 AEROSOL RESPIRATORY (INHALATION) at 08:02

## 2023-01-27 RX ADMIN — DICLOFENAC SODIUM 2 G: 10 GEL TOPICAL at 09:29

## 2023-01-27 RX ADMIN — OXYCODONE HYDROCHLORIDE 2.5 MG: 5 TABLET ORAL at 04:51

## 2023-01-27 RX ADMIN — ATORVASTATIN CALCIUM 10 MG: 20 TABLET, FILM COATED ORAL at 09:28

## 2023-01-27 RX ADMIN — ALLOPURINOL 100 MG: 100 TABLET ORAL at 09:28

## 2023-01-27 RX ADMIN — BUSPIRONE HYDROCHLORIDE 5 MG: 5 TABLET ORAL at 21:12

## 2023-01-27 RX ADMIN — INSULIN LISPRO 3 UNITS: 100 INJECTION, SOLUTION INTRAVENOUS; SUBCUTANEOUS at 13:17

## 2023-01-27 RX ADMIN — ASPIRIN 81 MG: 81 TABLET, CHEWABLE ORAL at 09:28

## 2023-01-27 RX ADMIN — APIXABAN 5 MG: 5 TABLET, FILM COATED ORAL at 09:28

## 2023-01-27 RX ADMIN — BUDESONIDE AND FORMOTEROL FUMARATE DIHYDRATE 2 PUFF: 160; 4.5 AEROSOL RESPIRATORY (INHALATION) at 22:04

## 2023-01-27 RX ADMIN — GABAPENTIN 200 MG: 100 CAPSULE ORAL at 09:28

## 2023-01-27 RX ADMIN — FUROSEMIDE 40 MG: 10 INJECTION, SOLUTION INTRAMUSCULAR; INTRAVENOUS at 21:12

## 2023-01-27 NOTE — TELEPHONE ENCOUNTER
This patient if they are in the hospital for other medical issues should wait until they are better before we proceed forward with giving her bilateral knee injections.  Is would not be ideal for me to come over to the hospital and give her bilateral knee injections due to her current situation.  They can reschedule an office appointment with me when she gets discharged from the hospital.

## 2023-01-27 NOTE — TELEPHONE ENCOUNTER
Spoke with patients  and she is scheduled for fl guided inj for hip and left knee at  on 2/2.  stated that she is supposed to have an inj in the right knee as well. He said his wife is immobile and he has a hard time getting her around places and would like to have all 3 injections done at the same time.

## 2023-01-27 NOTE — TELEPHONE ENCOUNTER
Caller: Kyle Solis    Relationship to patient: Emergency Contact    Best call back number:[phone]    Chief complaint: THUAN HIP    Type of visit: INJECTION    Additional notes: PT  CALLED TO INFORM THAT PT IS CURRENTLY AT [facility] AND HE DOES NOT KNOW IF PT WILL BE ABLE TO KEEP HER 02/02 APPT FOR INJECTION ON THUAN KNEES. PT  WOULD LIKE TO KNOW IF SHE COULD HAVE INJECTION AT THE HOSPITAL. PLEASE ADVISE

## 2023-01-28 LAB
ALBUMIN SERPL-MCNC: 2.5 G/DL (ref 3.5–5.2)
ALBUMIN SERPL-MCNC: 2.7 G/DL (ref 3.5–5.2)
ALP SERPL-CCNC: 174 U/L (ref 39–117)
ALT SERPL W P-5'-P-CCNC: 18 U/L (ref 1–33)
ANION GAP SERPL CALCULATED.3IONS-SCNC: 10.2 MMOL/L (ref 5–15)
AST SERPL-CCNC: 29 U/L (ref 1–32)
BILIRUB CONJ SERPL-MCNC: 2.4 MG/DL (ref 0–0.3)
BILIRUB INDIRECT SERPL-MCNC: 1.9 MG/DL
BILIRUB SERPL-MCNC: 4.3 MG/DL (ref 0–1.2)
BUN SERPL-MCNC: 41 MG/DL (ref 8–23)
BUN/CREAT SERPL: 24.7 (ref 7–25)
CALCIUM SPEC-SCNC: 8.6 MG/DL (ref 8.6–10.5)
CHLORIDE SERPL-SCNC: 91 MMOL/L (ref 98–107)
CO2 SERPL-SCNC: 29.8 MMOL/L (ref 22–29)
CREAT SERPL-MCNC: 1.66 MG/DL (ref 0.57–1)
EGFRCR SERPLBLD CKD-EPI 2021: 32.2 ML/MIN/1.73
GLUCOSE BLDC GLUCOMTR-MCNC: 143 MG/DL (ref 70–130)
GLUCOSE BLDC GLUCOMTR-MCNC: 197 MG/DL (ref 70–130)
GLUCOSE BLDC GLUCOMTR-MCNC: 199 MG/DL (ref 70–130)
GLUCOSE BLDC GLUCOMTR-MCNC: 253 MG/DL (ref 70–130)
GLUCOSE SERPL-MCNC: 127 MG/DL (ref 65–99)
PHOSPHATE SERPL-MCNC: 4.3 MG/DL (ref 2.5–4.5)
POTASSIUM SERPL-SCNC: 4.4 MMOL/L (ref 3.5–5.2)
PROT SERPL-MCNC: 6.5 G/DL (ref 6–8.5)
SODIUM SERPL-SCNC: 131 MMOL/L (ref 136–145)

## 2023-01-28 PROCEDURE — 82248 BILIRUBIN DIRECT: CPT | Performed by: INTERNAL MEDICINE

## 2023-01-28 PROCEDURE — 94799 UNLISTED PULMONARY SVC/PX: CPT

## 2023-01-28 PROCEDURE — 84100 ASSAY OF PHOSPHORUS: CPT | Performed by: INTERNAL MEDICINE

## 2023-01-28 PROCEDURE — 80053 COMPREHEN METABOLIC PANEL: CPT | Performed by: INTERNAL MEDICINE

## 2023-01-28 PROCEDURE — 82962 GLUCOSE BLOOD TEST: CPT

## 2023-01-28 PROCEDURE — 94664 DEMO&/EVAL PT USE INHALER: CPT

## 2023-01-28 PROCEDURE — 94761 N-INVAS EAR/PLS OXIMETRY MLT: CPT

## 2023-01-28 PROCEDURE — 63710000001 INSULIN LISPRO (HUMAN) PER 5 UNITS: Performed by: STUDENT IN AN ORGANIZED HEALTH CARE EDUCATION/TRAINING PROGRAM

## 2023-01-28 PROCEDURE — 25010000002 FUROSEMIDE PER 20 MG: Performed by: INTERNAL MEDICINE

## 2023-01-28 PROCEDURE — 99232 SBSQ HOSP IP/OBS MODERATE 35: CPT | Performed by: INTERNAL MEDICINE

## 2023-01-28 RX ORDER — OXYCODONE HCL 5 MG/5 ML
5 SOLUTION, ORAL ORAL ONCE AS NEEDED
Status: COMPLETED | OUTPATIENT
Start: 2023-01-28 | End: 2023-01-28

## 2023-01-28 RX ORDER — FUROSEMIDE 10 MG/ML
40 INJECTION INTRAMUSCULAR; INTRAVENOUS ONCE
Status: COMPLETED | OUTPATIENT
Start: 2023-01-28 | End: 2023-01-28

## 2023-01-28 RX ADMIN — BUSPIRONE HYDROCHLORIDE 5 MG: 5 TABLET ORAL at 21:08

## 2023-01-28 RX ADMIN — APIXABAN 5 MG: 5 TABLET, FILM COATED ORAL at 21:08

## 2023-01-28 RX ADMIN — OXYCODONE HYDROCHLORIDE 2.5 MG: 5 TABLET ORAL at 08:00

## 2023-01-28 RX ADMIN — ALLOPURINOL 100 MG: 100 TABLET ORAL at 09:36

## 2023-01-28 RX ADMIN — TRIAMCINOLONE ACETONIDE 1 APPLICATION: 1 CREAM TOPICAL at 21:08

## 2023-01-28 RX ADMIN — INSULIN LISPRO 4 UNITS: 100 INJECTION, SOLUTION INTRAVENOUS; SUBCUTANEOUS at 12:33

## 2023-01-28 RX ADMIN — DICLOFENAC SODIUM 2 G: 10 GEL TOPICAL at 09:37

## 2023-01-28 RX ADMIN — ATORVASTATIN CALCIUM 10 MG: 20 TABLET, FILM COATED ORAL at 09:36

## 2023-01-28 RX ADMIN — TRIAMCINOLONE ACETONIDE 1 APPLICATION: 1 CREAM TOPICAL at 09:38

## 2023-01-28 RX ADMIN — DULOXETINE HYDROCHLORIDE 60 MG: 60 CAPSULE, DELAYED RELEASE ORAL at 09:36

## 2023-01-28 RX ADMIN — APIXABAN 5 MG: 5 TABLET, FILM COATED ORAL at 09:36

## 2023-01-28 RX ADMIN — INSULIN GLARGINE-YFGN 20 UNITS: 100 INJECTION, SOLUTION SUBCUTANEOUS at 21:07

## 2023-01-28 RX ADMIN — TIOTROPIUM BROMIDE INHALATION SPRAY 1 PUFF: 3.12 SPRAY, METERED RESPIRATORY (INHALATION) at 07:19

## 2023-01-28 RX ADMIN — DOCUSATE SODIUM 100 MG: 100 CAPSULE, LIQUID FILLED ORAL at 21:08

## 2023-01-28 RX ADMIN — DICLOFENAC SODIUM 2 G: 10 GEL TOPICAL at 21:08

## 2023-01-28 RX ADMIN — DOCUSATE SODIUM 100 MG: 100 CAPSULE, LIQUID FILLED ORAL at 09:37

## 2023-01-28 RX ADMIN — ASPIRIN 81 MG: 81 TABLET, CHEWABLE ORAL at 09:36

## 2023-01-28 RX ADMIN — INSULIN LISPRO 2 UNITS: 100 INJECTION, SOLUTION INTRAVENOUS; SUBCUTANEOUS at 18:16

## 2023-01-28 RX ADMIN — BUSPIRONE HYDROCHLORIDE 5 MG: 5 TABLET ORAL at 18:16

## 2023-01-28 RX ADMIN — OXYCODONE HYDROCHLORIDE 2.5 MG: 5 TABLET ORAL at 21:08

## 2023-01-28 RX ADMIN — FUROSEMIDE 40 MG: 10 INJECTION, SOLUTION INTRAMUSCULAR; INTRAVENOUS at 12:26

## 2023-01-28 RX ADMIN — BUSPIRONE HYDROCHLORIDE 5 MG: 5 TABLET ORAL at 09:36

## 2023-01-28 RX ADMIN — OXYCODONE HYDROCHLORIDE 2.5 MG: 5 TABLET ORAL at 12:26

## 2023-01-28 RX ADMIN — BUDESONIDE AND FORMOTEROL FUMARATE DIHYDRATE 2 PUFF: 160; 4.5 AEROSOL RESPIRATORY (INHALATION) at 20:32

## 2023-01-28 RX ADMIN — GABAPENTIN 200 MG: 100 CAPSULE ORAL at 21:08

## 2023-01-28 RX ADMIN — OXYCODONE HYDROCHLORIDE 5 MG: 5 SOLUTION ORAL at 18:16

## 2023-01-28 RX ADMIN — GABAPENTIN 200 MG: 100 CAPSULE ORAL at 09:36

## 2023-01-28 RX ADMIN — BUDESONIDE AND FORMOTEROL FUMARATE DIHYDRATE 2 PUFF: 160; 4.5 AEROSOL RESPIRATORY (INHALATION) at 07:18

## 2023-01-28 NOTE — TELEPHONE ENCOUNTER
We cannot do all three injections at the same time as that is to much steroids. We can only do two joints at one time, so she will have to pick which two hurt the worse.

## 2023-01-29 LAB
ALBUMIN SERPL-MCNC: 2.7 G/DL (ref 3.5–5.2)
ALBUMIN/GLOB SERPL: 0.8 G/DL
ALP SERPL-CCNC: 189 U/L (ref 39–117)
ALT SERPL W P-5'-P-CCNC: 16 U/L (ref 1–33)
ANION GAP SERPL CALCULATED.3IONS-SCNC: 13 MMOL/L (ref 5–15)
AST SERPL-CCNC: 29 U/L (ref 1–32)
BILIRUB SERPL-MCNC: 3.9 MG/DL (ref 0–1.2)
BUN SERPL-MCNC: 40 MG/DL (ref 8–23)
BUN/CREAT SERPL: 28.4 (ref 7–25)
CALCIUM SPEC-SCNC: 8.6 MG/DL (ref 8.6–10.5)
CHLORIDE SERPL-SCNC: 95 MMOL/L (ref 98–107)
CO2 SERPL-SCNC: 26 MMOL/L (ref 22–29)
CREAT SERPL-MCNC: 1.41 MG/DL (ref 0.57–1)
EGFRCR SERPLBLD CKD-EPI 2021: 39.2 ML/MIN/1.73
FERRITIN SERPL-MCNC: 161 NG/ML (ref 13–150)
GLOBULIN UR ELPH-MCNC: 3.6 GM/DL
GLUCOSE BLDC GLUCOMTR-MCNC: 129 MG/DL (ref 70–130)
GLUCOSE BLDC GLUCOMTR-MCNC: 196 MG/DL (ref 70–130)
GLUCOSE BLDC GLUCOMTR-MCNC: 208 MG/DL (ref 70–130)
GLUCOSE BLDC GLUCOMTR-MCNC: 281 MG/DL (ref 70–130)
GLUCOSE SERPL-MCNC: 129 MG/DL (ref 65–99)
IRON 24H UR-MRATE: 35 MCG/DL (ref 37–145)
IRON SATN MFR SERPL: 10 % (ref 20–50)
POTASSIUM SERPL-SCNC: 5.6 MMOL/L (ref 3.5–5.2)
PROT SERPL-MCNC: 6.3 G/DL (ref 6–8.5)
SODIUM SERPL-SCNC: 134 MMOL/L (ref 136–145)
TIBC SERPL-MCNC: 335 MCG/DL (ref 298–536)
TRANSFERRIN SERPL-MCNC: 225 MG/DL (ref 200–360)

## 2023-01-29 PROCEDURE — 99232 SBSQ HOSP IP/OBS MODERATE 35: CPT | Performed by: INTERNAL MEDICINE

## 2023-01-29 PROCEDURE — 80053 COMPREHEN METABOLIC PANEL: CPT | Performed by: STUDENT IN AN ORGANIZED HEALTH CARE EDUCATION/TRAINING PROGRAM

## 2023-01-29 PROCEDURE — 94761 N-INVAS EAR/PLS OXIMETRY MLT: CPT

## 2023-01-29 PROCEDURE — 82962 GLUCOSE BLOOD TEST: CPT

## 2023-01-29 PROCEDURE — 82728 ASSAY OF FERRITIN: CPT | Performed by: INTERNAL MEDICINE

## 2023-01-29 PROCEDURE — 94664 DEMO&/EVAL PT USE INHALER: CPT

## 2023-01-29 PROCEDURE — 63710000001 INSULIN LISPRO (HUMAN) PER 5 UNITS: Performed by: STUDENT IN AN ORGANIZED HEALTH CARE EDUCATION/TRAINING PROGRAM

## 2023-01-29 PROCEDURE — 94799 UNLISTED PULMONARY SVC/PX: CPT

## 2023-01-29 PROCEDURE — 83540 ASSAY OF IRON: CPT | Performed by: INTERNAL MEDICINE

## 2023-01-29 PROCEDURE — 84466 ASSAY OF TRANSFERRIN: CPT | Performed by: INTERNAL MEDICINE

## 2023-01-29 RX ORDER — BUMETANIDE 0.25 MG/ML
2 INJECTION INTRAMUSCULAR; INTRAVENOUS ONCE
Status: COMPLETED | OUTPATIENT
Start: 2023-01-29 | End: 2023-01-29

## 2023-01-29 RX ORDER — BUMETANIDE 0.25 MG/ML
2 INJECTION INTRAMUSCULAR; INTRAVENOUS EVERY 12 HOURS
Status: DISCONTINUED | OUTPATIENT
Start: 2023-01-29 | End: 2023-01-30

## 2023-01-29 RX ADMIN — INSULIN LISPRO 2 UNITS: 100 INJECTION, SOLUTION INTRAVENOUS; SUBCUTANEOUS at 18:22

## 2023-01-29 RX ADMIN — BUSPIRONE HYDROCHLORIDE 5 MG: 5 TABLET ORAL at 21:26

## 2023-01-29 RX ADMIN — TRIAMCINOLONE ACETONIDE 1 APPLICATION: 1 CREAM TOPICAL at 09:31

## 2023-01-29 RX ADMIN — OXYCODONE HYDROCHLORIDE 2.5 MG: 5 TABLET ORAL at 13:43

## 2023-01-29 RX ADMIN — ATORVASTATIN CALCIUM 10 MG: 20 TABLET, FILM COATED ORAL at 09:30

## 2023-01-29 RX ADMIN — APIXABAN 5 MG: 5 TABLET, FILM COATED ORAL at 21:26

## 2023-01-29 RX ADMIN — DOCUSATE SODIUM 100 MG: 100 CAPSULE, LIQUID FILLED ORAL at 21:26

## 2023-01-29 RX ADMIN — ALLOPURINOL 100 MG: 100 TABLET ORAL at 09:31

## 2023-01-29 RX ADMIN — BUDESONIDE AND FORMOTEROL FUMARATE DIHYDRATE 2 PUFF: 160; 4.5 AEROSOL RESPIRATORY (INHALATION) at 19:31

## 2023-01-29 RX ADMIN — BUSPIRONE HYDROCHLORIDE 5 MG: 5 TABLET ORAL at 18:24

## 2023-01-29 RX ADMIN — TIOTROPIUM BROMIDE INHALATION SPRAY 1 PUFF: 3.12 SPRAY, METERED RESPIRATORY (INHALATION) at 07:30

## 2023-01-29 RX ADMIN — BUDESONIDE AND FORMOTEROL FUMARATE DIHYDRATE 2 PUFF: 160; 4.5 AEROSOL RESPIRATORY (INHALATION) at 07:30

## 2023-01-29 RX ADMIN — DICLOFENAC SODIUM 2 G: 10 GEL TOPICAL at 09:31

## 2023-01-29 RX ADMIN — BUMETANIDE 2 MG: 0.25 INJECTION INTRAMUSCULAR; INTRAVENOUS at 13:44

## 2023-01-29 RX ADMIN — Medication 3 MG: at 21:26

## 2023-01-29 RX ADMIN — INSULIN GLARGINE-YFGN 20 UNITS: 100 INJECTION, SOLUTION SUBCUTANEOUS at 21:26

## 2023-01-29 RX ADMIN — DOCUSATE SODIUM 100 MG: 100 CAPSULE, LIQUID FILLED ORAL at 09:31

## 2023-01-29 RX ADMIN — OXYCODONE HYDROCHLORIDE 2.5 MG: 5 TABLET ORAL at 04:38

## 2023-01-29 RX ADMIN — GABAPENTIN 200 MG: 100 CAPSULE ORAL at 09:30

## 2023-01-29 RX ADMIN — DICLOFENAC SODIUM 2 G: 10 GEL TOPICAL at 21:27

## 2023-01-29 RX ADMIN — TRIAMCINOLONE ACETONIDE 1 APPLICATION: 1 CREAM TOPICAL at 21:28

## 2023-01-29 RX ADMIN — OXYCODONE HYDROCHLORIDE 2.5 MG: 5 TABLET ORAL at 21:39

## 2023-01-29 RX ADMIN — OXYCODONE HYDROCHLORIDE 2.5 MG: 5 TABLET ORAL at 09:30

## 2023-01-29 RX ADMIN — BUMETANIDE 2 MG: 0.25 INJECTION INTRAMUSCULAR; INTRAVENOUS at 18:22

## 2023-01-29 RX ADMIN — INSULIN LISPRO 3 UNITS: 100 INJECTION, SOLUTION INTRAVENOUS; SUBCUTANEOUS at 12:07

## 2023-01-29 RX ADMIN — APIXABAN 5 MG: 5 TABLET, FILM COATED ORAL at 09:30

## 2023-01-29 RX ADMIN — DULOXETINE HYDROCHLORIDE 60 MG: 60 CAPSULE, DELAYED RELEASE ORAL at 09:30

## 2023-01-29 RX ADMIN — ASPIRIN 81 MG: 81 TABLET, CHEWABLE ORAL at 09:30

## 2023-01-29 RX ADMIN — BUSPIRONE HYDROCHLORIDE 5 MG: 5 TABLET ORAL at 09:30

## 2023-01-29 RX ADMIN — GABAPENTIN 200 MG: 100 CAPSULE ORAL at 21:26

## 2023-01-30 LAB
ALBUMIN SERPL-MCNC: 2.9 G/DL (ref 3.5–5.2)
ALBUMIN/GLOB SERPL: 0.7 G/DL
ALP SERPL-CCNC: 216 U/L (ref 39–117)
ALT SERPL W P-5'-P-CCNC: 16 U/L (ref 1–33)
ANION GAP SERPL CALCULATED.3IONS-SCNC: 9.4 MMOL/L (ref 5–15)
AST SERPL-CCNC: 21 U/L (ref 1–32)
BILIRUB SERPL-MCNC: 3.6 MG/DL (ref 0–1.2)
BUN SERPL-MCNC: 28 MG/DL (ref 8–23)
BUN/CREAT SERPL: 19.2 (ref 7–25)
CALCIUM SPEC-SCNC: 8.8 MG/DL (ref 8.6–10.5)
CHLORIDE SERPL-SCNC: 94 MMOL/L (ref 98–107)
CO2 SERPL-SCNC: 32.6 MMOL/L (ref 22–29)
CREAT SERPL-MCNC: 1.46 MG/DL (ref 0.57–1)
EGFRCR SERPLBLD CKD-EPI 2021: 37.6 ML/MIN/1.73
GLOBULIN UR ELPH-MCNC: 3.9 GM/DL
GLUCOSE BLDC GLUCOMTR-MCNC: 127 MG/DL (ref 70–130)
GLUCOSE BLDC GLUCOMTR-MCNC: 197 MG/DL (ref 70–130)
GLUCOSE BLDC GLUCOMTR-MCNC: 208 MG/DL (ref 70–130)
GLUCOSE BLDC GLUCOMTR-MCNC: 288 MG/DL (ref 70–130)
GLUCOSE SERPL-MCNC: 189 MG/DL (ref 65–99)
POTASSIUM SERPL-SCNC: 3.7 MMOL/L (ref 3.5–5.2)
PROT SERPL-MCNC: 6.8 G/DL (ref 6–8.5)
SODIUM SERPL-SCNC: 136 MMOL/L (ref 136–145)

## 2023-01-30 PROCEDURE — 80053 COMPREHEN METABOLIC PANEL: CPT | Performed by: STUDENT IN AN ORGANIZED HEALTH CARE EDUCATION/TRAINING PROGRAM

## 2023-01-30 PROCEDURE — 94799 UNLISTED PULMONARY SVC/PX: CPT

## 2023-01-30 PROCEDURE — 94664 DEMO&/EVAL PT USE INHALER: CPT

## 2023-01-30 PROCEDURE — 82962 GLUCOSE BLOOD TEST: CPT

## 2023-01-30 PROCEDURE — 63710000001 INSULIN LISPRO (HUMAN) PER 5 UNITS: Performed by: STUDENT IN AN ORGANIZED HEALTH CARE EDUCATION/TRAINING PROGRAM

## 2023-01-30 PROCEDURE — 97530 THERAPEUTIC ACTIVITIES: CPT

## 2023-01-30 PROCEDURE — 97110 THERAPEUTIC EXERCISES: CPT

## 2023-01-30 RX ORDER — TORSEMIDE 20 MG/1
60 TABLET ORAL
Status: DISCONTINUED | OUTPATIENT
Start: 2023-01-31 | End: 2023-01-31 | Stop reason: HOSPADM

## 2023-01-30 RX ORDER — INSULIN LISPRO 100 [IU]/ML
0-9 INJECTION, SOLUTION INTRAVENOUS; SUBCUTANEOUS
Status: DISCONTINUED | OUTPATIENT
Start: 2023-01-30 | End: 2023-01-31 | Stop reason: HOSPADM

## 2023-01-30 RX ORDER — POTASSIUM CHLORIDE 750 MG/1
20 TABLET, FILM COATED, EXTENDED RELEASE ORAL ONCE
Status: COMPLETED | OUTPATIENT
Start: 2023-01-30 | End: 2023-01-30

## 2023-01-30 RX ORDER — BUMETANIDE 0.25 MG/ML
2 INJECTION INTRAMUSCULAR; INTRAVENOUS ONCE
Status: COMPLETED | OUTPATIENT
Start: 2023-01-30 | End: 2023-01-30

## 2023-01-30 RX ADMIN — BUDESONIDE AND FORMOTEROL FUMARATE DIHYDRATE 2 PUFF: 160; 4.5 AEROSOL RESPIRATORY (INHALATION) at 20:02

## 2023-01-30 RX ADMIN — BUSPIRONE HYDROCHLORIDE 5 MG: 5 TABLET ORAL at 08:07

## 2023-01-30 RX ADMIN — TRIAMCINOLONE ACETONIDE 1 APPLICATION: 1 CREAM TOPICAL at 21:20

## 2023-01-30 RX ADMIN — POTASSIUM CHLORIDE 20 MEQ: 750 TABLET, EXTENDED RELEASE ORAL at 21:19

## 2023-01-30 RX ADMIN — OXYCODONE HYDROCHLORIDE 2.5 MG: 5 TABLET ORAL at 22:49

## 2023-01-30 RX ADMIN — BUMETANIDE 2 MG: 0.25 INJECTION INTRAMUSCULAR; INTRAVENOUS at 18:03

## 2023-01-30 RX ADMIN — Medication 3 MG: at 21:19

## 2023-01-30 RX ADMIN — ALLOPURINOL 100 MG: 100 TABLET ORAL at 08:07

## 2023-01-30 RX ADMIN — TRIAMCINOLONE ACETONIDE 1 APPLICATION: 1 CREAM TOPICAL at 08:08

## 2023-01-30 RX ADMIN — BUDESONIDE AND FORMOTEROL FUMARATE DIHYDRATE 2 PUFF: 160; 4.5 AEROSOL RESPIRATORY (INHALATION) at 08:18

## 2023-01-30 RX ADMIN — DOCUSATE SODIUM 100 MG: 100 CAPSULE, LIQUID FILLED ORAL at 21:21

## 2023-01-30 RX ADMIN — INSULIN LISPRO 6 UNITS: 100 INJECTION, SOLUTION INTRAVENOUS; SUBCUTANEOUS at 12:06

## 2023-01-30 RX ADMIN — DICLOFENAC SODIUM 2 G: 10 GEL TOPICAL at 21:20

## 2023-01-30 RX ADMIN — BUSPIRONE HYDROCHLORIDE 5 MG: 5 TABLET ORAL at 22:49

## 2023-01-30 RX ADMIN — INSULIN LISPRO 3 UNITS: 100 INJECTION, SOLUTION INTRAVENOUS; SUBCUTANEOUS at 06:31

## 2023-01-30 RX ADMIN — GABAPENTIN 200 MG: 100 CAPSULE ORAL at 08:07

## 2023-01-30 RX ADMIN — ATORVASTATIN CALCIUM 10 MG: 20 TABLET, FILM COATED ORAL at 08:07

## 2023-01-30 RX ADMIN — TIOTROPIUM BROMIDE INHALATION SPRAY 1 PUFF: 3.12 SPRAY, METERED RESPIRATORY (INHALATION) at 08:19

## 2023-01-30 RX ADMIN — BUSPIRONE HYDROCHLORIDE 5 MG: 5 TABLET ORAL at 18:03

## 2023-01-30 RX ADMIN — GABAPENTIN 200 MG: 100 CAPSULE ORAL at 21:21

## 2023-01-30 RX ADMIN — OXYCODONE HYDROCHLORIDE 2.5 MG: 5 TABLET ORAL at 18:10

## 2023-01-30 RX ADMIN — APIXABAN 5 MG: 5 TABLET, FILM COATED ORAL at 08:07

## 2023-01-30 RX ADMIN — ASPIRIN 81 MG: 81 TABLET, CHEWABLE ORAL at 08:07

## 2023-01-30 RX ADMIN — DOCUSATE SODIUM 100 MG: 100 CAPSULE, LIQUID FILLED ORAL at 08:07

## 2023-01-30 RX ADMIN — BUMETANIDE 2 MG: 0.25 INJECTION INTRAMUSCULAR; INTRAVENOUS at 22:51

## 2023-01-30 RX ADMIN — BUMETANIDE 2 MG: 0.25 INJECTION INTRAMUSCULAR; INTRAVENOUS at 06:30

## 2023-01-30 RX ADMIN — DULOXETINE HYDROCHLORIDE 60 MG: 60 CAPSULE, DELAYED RELEASE ORAL at 08:07

## 2023-01-30 RX ADMIN — APIXABAN 5 MG: 5 TABLET, FILM COATED ORAL at 21:20

## 2023-01-30 RX ADMIN — OXYCODONE HYDROCHLORIDE 2.5 MG: 5 TABLET ORAL at 06:30

## 2023-01-30 RX ADMIN — DICLOFENAC SODIUM 2 G: 10 GEL TOPICAL at 08:08

## 2023-01-30 RX ADMIN — INSULIN GLARGINE-YFGN 20 UNITS: 100 INJECTION, SOLUTION SUBCUTANEOUS at 21:19

## 2023-01-31 VITALS
OXYGEN SATURATION: 97 % | WEIGHT: 293 LBS | DIASTOLIC BLOOD PRESSURE: 61 MMHG | HEIGHT: 66 IN | SYSTOLIC BLOOD PRESSURE: 117 MMHG | TEMPERATURE: 97.6 F | BODY MASS INDEX: 47.09 KG/M2 | HEART RATE: 68 BPM | RESPIRATION RATE: 18 BRPM

## 2023-01-31 PROBLEM — I50.43 ACUTE ON CHRONIC COMBINED SYSTOLIC AND DIASTOLIC CHF (CONGESTIVE HEART FAILURE): Status: RESOLVED | Noted: 2023-01-25 | Resolved: 2023-01-31

## 2023-01-31 PROBLEM — I50.22 CHRONIC SYSTOLIC CHF (CONGESTIVE HEART FAILURE) (HCC): Status: ACTIVE | Noted: 2023-01-31

## 2023-01-31 LAB
ALBUMIN SERPL-MCNC: 2.6 G/DL (ref 3.5–5.2)
ALBUMIN/GLOB SERPL: 0.6 G/DL
ALP SERPL-CCNC: 209 U/L (ref 39–117)
ALT SERPL W P-5'-P-CCNC: 14 U/L (ref 1–33)
ANION GAP SERPL CALCULATED.3IONS-SCNC: 11.3 MMOL/L (ref 5–15)
AST SERPL-CCNC: 25 U/L (ref 1–32)
BILIRUB SERPL-MCNC: 3.2 MG/DL (ref 0–1.2)
BUN SERPL-MCNC: 31 MG/DL (ref 8–23)
BUN/CREAT SERPL: 26.5 (ref 7–25)
CALCIUM SPEC-SCNC: 8.5 MG/DL (ref 8.6–10.5)
CHLORIDE SERPL-SCNC: 92 MMOL/L (ref 98–107)
CO2 SERPL-SCNC: 29.7 MMOL/L (ref 22–29)
CREAT SERPL-MCNC: 1.17 MG/DL (ref 0.57–1)
EGFRCR SERPLBLD CKD-EPI 2021: 49.1 ML/MIN/1.73
GLOBULIN UR ELPH-MCNC: 4.2 GM/DL
GLUCOSE BLDC GLUCOMTR-MCNC: 135 MG/DL (ref 70–130)
GLUCOSE BLDC GLUCOMTR-MCNC: 148 MG/DL (ref 70–130)
GLUCOSE BLDC GLUCOMTR-MCNC: 164 MG/DL (ref 70–130)
GLUCOSE SERPL-MCNC: 183 MG/DL (ref 65–99)
POTASSIUM SERPL-SCNC: 3.7 MMOL/L (ref 3.5–5.2)
PROT SERPL-MCNC: 6.8 G/DL (ref 6–8.5)
SODIUM SERPL-SCNC: 133 MMOL/L (ref 136–145)

## 2023-01-31 PROCEDURE — 82962 GLUCOSE BLOOD TEST: CPT

## 2023-01-31 PROCEDURE — 80053 COMPREHEN METABOLIC PANEL: CPT | Performed by: STUDENT IN AN ORGANIZED HEALTH CARE EDUCATION/TRAINING PROGRAM

## 2023-01-31 PROCEDURE — 63710000001 INSULIN LISPRO (HUMAN) PER 5 UNITS: Performed by: STUDENT IN AN ORGANIZED HEALTH CARE EDUCATION/TRAINING PROGRAM

## 2023-01-31 PROCEDURE — 94799 UNLISTED PULMONARY SVC/PX: CPT

## 2023-01-31 PROCEDURE — 94664 DEMO&/EVAL PT USE INHALER: CPT

## 2023-01-31 RX ORDER — TRIAMCINOLONE ACETONIDE 1 MG/G
1 CREAM TOPICAL EVERY 12 HOURS SCHEDULED
Qty: 56 G | Refills: 0 | Status: SHIPPED | OUTPATIENT
Start: 2023-01-31 | End: 2023-02-28

## 2023-01-31 RX ADMIN — BUSPIRONE HYDROCHLORIDE 5 MG: 5 TABLET ORAL at 08:07

## 2023-01-31 RX ADMIN — ATORVASTATIN CALCIUM 10 MG: 20 TABLET, FILM COATED ORAL at 08:06

## 2023-01-31 RX ADMIN — INSULIN LISPRO 2 UNITS: 100 INJECTION, SOLUTION INTRAVENOUS; SUBCUTANEOUS at 07:06

## 2023-01-31 RX ADMIN — TIOTROPIUM BROMIDE INHALATION SPRAY 1 PUFF: 3.12 SPRAY, METERED RESPIRATORY (INHALATION) at 09:22

## 2023-01-31 RX ADMIN — ASPIRIN 81 MG: 81 TABLET, CHEWABLE ORAL at 08:09

## 2023-01-31 RX ADMIN — DOCUSATE SODIUM 100 MG: 100 CAPSULE, LIQUID FILLED ORAL at 08:07

## 2023-01-31 RX ADMIN — GABAPENTIN 200 MG: 100 CAPSULE ORAL at 08:07

## 2023-01-31 RX ADMIN — TRIAMCINOLONE ACETONIDE 1 APPLICATION: 1 CREAM TOPICAL at 08:07

## 2023-01-31 RX ADMIN — APIXABAN 5 MG: 5 TABLET, FILM COATED ORAL at 08:07

## 2023-01-31 RX ADMIN — DICLOFENAC SODIUM 2 G: 10 GEL TOPICAL at 08:07

## 2023-01-31 RX ADMIN — DULOXETINE HYDROCHLORIDE 60 MG: 60 CAPSULE, DELAYED RELEASE ORAL at 08:06

## 2023-01-31 RX ADMIN — BUDESONIDE AND FORMOTEROL FUMARATE DIHYDRATE 2 PUFF: 160; 4.5 AEROSOL RESPIRATORY (INHALATION) at 09:21

## 2023-01-31 RX ADMIN — TORSEMIDE 60 MG: 20 TABLET ORAL at 17:02

## 2023-01-31 RX ADMIN — BUSPIRONE HYDROCHLORIDE 5 MG: 5 TABLET ORAL at 17:02

## 2023-01-31 RX ADMIN — ALLOPURINOL 100 MG: 100 TABLET ORAL at 08:06

## 2023-01-31 RX ADMIN — INSULIN LISPRO 2 UNITS: 100 INJECTION, SOLUTION INTRAVENOUS; SUBCUTANEOUS at 17:02

## 2023-01-31 RX ADMIN — OXYCODONE HYDROCHLORIDE 2.5 MG: 5 TABLET ORAL at 13:52

## 2023-01-31 RX ADMIN — TORSEMIDE 60 MG: 20 TABLET ORAL at 08:06

## 2023-02-03 NOTE — CASE MANAGEMENT/SOCIAL WORK
Case Management Discharge Note      Final Note: [facility] snf via [facility]. [email or web address]         Selected Continued Care - Discharged on 1/31/2023 Admission date: 1/21/2023 - Discharge disposition: Skilled Nursing Facility (DC - External)    Destination Coordination complete.    Service Provider Selected Services Address Phone Fax Patient Preferred    Critical access hospital Skilled Nursing [address] [facility] [id number] [phone] --          Durable Medical Equipment    No services have been selected for the patient.              Dialysis/Infusion    No services have been selected for the patient.              Home Medical Care    No services have been selected for the patient.              Therapy    No services have been selected for the patient.              Community Resources    No services have been selected for the patient.              Community & DME    No services have been selected for the patient.                  Transportation Services  Ambulance: [facility]    Final Discharge Disposition Code: 03 - skilled nursing facility (SNF)

## 2023-04-03 ENCOUNTER — TELEPHONE (OUTPATIENT)
Dept: ORTHOPEDIC SURGERY | Facility: CLINIC | Age: 73
End: 2023-04-03

## 2023-04-03 NOTE — TELEPHONE ENCOUNTER
Caller: Kyle Solis    Relationship to patient: Emergency Contact    Best call back number:     Chief complaint: BILATERAL KNEE PAIN    Type of visit: CORTISONE INJECTIONS    Requested date: 4/27/23 IN THE AFTERNOON

## 2023-04-27 ENCOUNTER — HOSPITAL ENCOUNTER (OUTPATIENT)
Dept: GENERAL RADIOLOGY | Facility: HOSPITAL | Age: 73
Discharge: HOME | End: 2023-04-27
Payer: MEDICARE

## 2023-04-27 DIAGNOSIS — M16.12 PRIMARY OSTEOARTHRITIS OF LEFT HIP: ICD-10-CM

## 2023-04-27 PROCEDURE — 25510000001 IOPAMIDOL 61 % SOLUTION: Performed by: NURSE PRACTITIONER

## 2023-04-27 PROCEDURE — 0 LIDOCAINE 1 % SOLUTION: Performed by: NURSE PRACTITIONER

## 2023-04-27 PROCEDURE — 25010000002 METHYLPREDNISOLONE PER 125 MG: Performed by: NURSE PRACTITIONER

## 2023-04-27 PROCEDURE — 77002 NEEDLE LOCALIZATION BY XRAY: CPT

## 2023-04-27 RX ORDER — BUPIVACAINE HYDROCHLORIDE 2.5 MG/ML
10 INJECTION, SOLUTION EPIDURAL; INFILTRATION; INTRACAUDAL ONCE
Status: COMPLETED | OUTPATIENT
Start: 2023-04-27 | End: 2023-04-27

## 2023-04-27 RX ORDER — LIDOCAINE HYDROCHLORIDE 10 MG/ML
10 INJECTION, SOLUTION INFILTRATION; PERINEURAL ONCE
Status: COMPLETED | OUTPATIENT
Start: 2023-04-27 | End: 2023-04-27

## 2023-04-27 RX ORDER — METHYLPREDNISOLONE SODIUM SUCCINATE 125 MG/2ML
80 INJECTION, POWDER, LYOPHILIZED, FOR SOLUTION INTRAMUSCULAR; INTRAVENOUS
Status: COMPLETED | OUTPATIENT
Start: 2023-04-27 | End: 2023-04-27

## 2023-04-27 RX ADMIN — IOPAMIDOL 1 ML: 612 INJECTION, SOLUTION INTRAVENOUS at 11:54

## 2023-04-27 RX ADMIN — BUPIVACAINE HYDROCHLORIDE 5 ML: 2.5 INJECTION, SOLUTION EPIDURAL; INFILTRATION; INTRACAUDAL; PERINEURAL at 11:54

## 2023-04-27 RX ADMIN — METHYLPREDNISOLONE SODIUM SUCCINATE 80 MG: 125 INJECTION, POWDER, LYOPHILIZED, FOR SOLUTION INTRAMUSCULAR; INTRAVENOUS at 11:54

## 2023-04-27 RX ADMIN — LIDOCAINE HYDROCHLORIDE 2 ML: 10 INJECTION, SOLUTION INFILTRATION; PERINEURAL at 11:54

## 2023-05-25 ENCOUNTER — OFFICE VISIT (OUTPATIENT)
Dept: ORTHOPEDIC SURGERY | Facility: CLINIC | Age: 73
End: 2023-05-25
Payer: MEDICARE

## 2023-05-25 VITALS — TEMPERATURE: 97.3 F | BODY MASS INDEX: 42.67 KG/M2 | HEIGHT: 66 IN | WEIGHT: 265.5 LBS | RESPIRATION RATE: 16 BRPM

## 2023-05-25 DIAGNOSIS — R52 PAIN: Primary | ICD-10-CM

## 2023-05-25 DIAGNOSIS — I89.0 LYMPH EDEMA: ICD-10-CM

## 2023-05-25 DIAGNOSIS — M17.0 BILATERAL PRIMARY OSTEOARTHRITIS OF KNEE: ICD-10-CM

## 2023-05-25 PROCEDURE — 99213 OFFICE O/P EST LOW 20 MIN: CPT | Performed by: NURSE PRACTITIONER

## 2023-05-26 NOTE — PROGRESS NOTES
Patient: Aydee Solis  YOB: 1950 72 y.o. female  Medical Record Number: [id number]    Chief Complaints:   Chief Complaint   Patient presents with   • Right Knee - Pain   • Left Knee - Pain       History of Present Illness:Aydee Solis is a 72 y.o. female who presents for follow-up of bilateral knee pain that is chronic in nature.  Patient has known advanced osteoarthritis to both knees.  Patient also has advanced lymphedema to the left upper extremity as well as bilateral lower extremities.  Patient states that she was being seen by a Dr. Cosme Alston for pain management however he is only given her pain medicine and this is not what she wants.  Patient that Dr. Choudhary told her she was not a good candidate for surgery, but would be a good candidate for injections.  She denies any recent injuries to the knees.  She denies any signs or symptoms of infection, she is without any other significant complaints today.    Allergies:   Allergies   Allergen Reactions   • Ciprofloxacin        Medications:   Current Outpatient Medications   Medication Sig Dispense Refill   • albuterol (PROVENTIL HFA;VENTOLIN HFA) 108 (90 BASE) MCG/ACT inhaler Inhale 2 puffs Every 4 (Four) Hours As Needed for wheezing or shortness of air. 1 inhaler 0   • allopurinol (ZYLOPRIM) 100 MG tablet Take 1 tablet by mouth Daily.     • apixaban (ELIQUIS) 5 MG tablet tablet Take 1 tablet by mouth 2 (Two) Times a Day. 60 tablet 0   • aspirin 81 MG chewable tablet Chew 1 tablet Daily.     • atorvastatin (LIPITOR) 10 MG tablet Take 1 tablet by mouth Daily.     • busPIRone (BUSPAR) 5 MG tablet Take 1 tablet by mouth 3 (Three) Times a Day.     • Dextromethorphan-guaiFENesin 5-100 MG/5ML liquid Take 10 mL by mouth 4 (Four) Times a Day As Needed.     • Diclofenac Sodium 1 % cream Apply 4 g topically 2 (Two) Times a Day As Needed.     • docusate sodium (COLACE) 100 MG capsule Take 1 capsule by mouth 2 (Two) Times a Day.     • DULoxetine (CYMBALTA) 60  "MG capsule Take 1 capsule by mouth Daily.     • gabapentin (NEURONTIN) 100 MG capsule Take 2 capsules by mouth 2 (Two) Times a Day.     • insulin detemir (LEVEMIR) 100 UNIT/ML injection Inject 20 Units under the skin into the appropriate area as directed Every Night.     • miconazole (MICOTIN) 2 % powder Apply 1 application topically to the appropriate area as directed 2 (Two) Times a Day.     • Trelegy Ellipta 100-62.5-25 MCG/INH inhaler      • torsemide 60 MG tablet Take 60 mg by mouth 2 (Two) Times a Day for 14 days. 28 tablet 0     No current facility-administered medications for this visit.         The following portions of the patient's history were reviewed and updated as appropriate: allergies, current medications, past family history, past medical history, past social history, past surgical history and problem list.    Review of Systems:   Pertinent positives/negative listed in HPI above    Physical Exam:   Vitals:    05/25/23 1329   Resp: 16   Temp: 97.3 °F (36.3 °C)   TempSrc: Temporal   Weight: 120 kg (265 lb 8 oz)   Height: 167 cm (65.75\")   PainSc:   8       General: A and O x 3, ASA, NAD      Knee:  bilateral    ALIGNMENT:     Varus  ,   Patella  tracks  midline    GAIT:    Antalgic    SKIN:    No abnormality    RANGE OF MOTION:   3  -  105   DEG    STRENGTH:   4  / 5    LIGAMENTS:    No varus / valgus instability.   Negative  Lachman.    MENISCUS:     Negative   Donavon       DISTAL PULSES:    Paplable    DISTAL SENSATION :   Intact    LYMPHATICS:   Moderate edema noted bilaterally    OTHER:          - Positive   effusion      - Crepitance with ROM         Radiology:  Xrays 3views bilateral knees (ap,lateral, sunrise) were ordered and reviewed for evaluation of knee pain demonstratingadvanced varus osteoarthritis with bone on bone articulation, subchondral cysts, and periarticular osteophytes  todays xrays were compared to previous xrays and demonstrate no significant change    Assessment/Plan: " Primary we osteoarthritis bilateral knees    Treatment options as well as imaging results were discussed in detail with the patient.  At this point in time we would like to proceed forward with conservative measures.  Patient has a large amount of lymphedema to bilateral lower extremities and I feel that she would be best suited for fluoroscopy guided ultrasound guided intra-articular joint injections.  Therefore I am going to get her referred over to see Dr. Yanez for these injections.  Patient reports that she has not seen a vascular surgeon and on x-rays show that she does have some claudication noted to her lower extremities.  We are going to refer her over to see Dr. Elliott for further evaluation.  He can follow back up with us on an as-needed basis.    Kunal Griffith, APRN  5/26/2023

## 2023-05-30 ENCOUNTER — OFFICE VISIT (OUTPATIENT)
Dept: SPORTS MEDICINE | Facility: CLINIC | Age: 73
End: 2023-05-30

## 2023-05-30 VITALS
BODY MASS INDEX: 42.59 KG/M2 | TEMPERATURE: 98.5 F | OXYGEN SATURATION: 95 % | HEART RATE: 84 BPM | HEIGHT: 66 IN | WEIGHT: 265 LBS

## 2023-05-30 DIAGNOSIS — M17.0 PRIMARY OSTEOARTHRITIS OF BOTH KNEES: Primary | ICD-10-CM

## 2023-05-30 RX ORDER — TRIAMCINOLONE ACETONIDE 40 MG/ML
80 INJECTION, SUSPENSION INTRA-ARTICULAR; INTRAMUSCULAR ONCE
Status: SHIPPED | OUTPATIENT
Start: 2023-05-30

## 2023-05-30 RX ORDER — INSULIN ASPART 100 [IU]/ML
INJECTION, SOLUTION INTRAVENOUS; SUBCUTANEOUS
Status: ON HOLD | COMMUNITY

## 2023-05-30 NOTE — PROGRESS NOTES
Here today for bilateral knee injection under ultrasound guidance    Ultrasound Guided Knee Aspiration/Injection Procedure Note    Bilateral knee aspiration/injection was discussed with the patient in detail, including indication, risks, benefits, and alternatives. Verbal consent was given for the procedure. Injection was performed by MD. Ultrasound was indicated due to procedural pain with blind approach, difficult body habitus for procedural precision, and/or unsuccessful blind procedure in the past.   Aspiration/injection site at the superior capsule was identified by ultrasound examination then cleaned with Betadine and alcohol swabs.  Sterile technique was used. Local anesthesia was obtained with ethyl chloride.  On the right knee the 25-gauge 1.5 inch needle was used from medial approach.  On the left knee a 22-gauge 3.5 inch needle was used from lateral approach.  Continuous ultrasound visualization was used.   Injectate was passed into the joint space without difficulty. The needle was removed and a simple bandage was applied. The procedure was tolerated well without difficulty.    Injection mixture:  1% lidocaine without epinephrine: 2 mL  40 mg/mL triamcinolone acetonide: 1 mL      Diagnoses and all orders for this visit:    Primary osteoarthritis of both knees  -     triamcinolone acetonide (KENALOG-40) injection 80 mg    Other orders  -     insulin aspart (NovoLOG FlexPen) 100 UNIT/ML solution pen-injector sc pen; Novolog FlexPen U-100 Insulin aspart 100 unit/mL (3 mL) subcutaneous   Sliding scale.    Successful knee injections tolerated well.  Discussed possible hyperglycemia.  Plan to add viscosupplementation after 4 to 6 weeks to try to obtain extended benefit.

## 2023-06-02 ENCOUNTER — PATIENT ROUNDING (BHMG ONLY) (OUTPATIENT)
Dept: SPORTS MEDICINE | Facility: CLINIC | Age: 73
End: 2023-06-02

## 2023-06-02 NOTE — PROGRESS NOTES
June 2, 2023    A Choose Digital Message has been sent to the patient for PATIENT ROUNDING with [facility]

## 2023-06-05 ENCOUNTER — APPOINTMENT (OUTPATIENT)
Dept: GENERAL RADIOLOGY | Facility: HOSPITAL | Age: 73
DRG: 690 | End: 2023-06-05
Payer: MEDICARE

## 2023-06-05 ENCOUNTER — APPOINTMENT (OUTPATIENT)
Dept: CT IMAGING | Facility: HOSPITAL | Age: 73
DRG: 690 | End: 2023-06-05
Payer: MEDICARE

## 2023-06-05 ENCOUNTER — HOSPITAL ENCOUNTER (INPATIENT)
Facility: HOSPITAL | Age: 73
LOS: 2 days | Discharge: HOME | DRG: 690 | End: 2023-06-08
Attending: EMERGENCY MEDICINE | Admitting: HOSPITALIST
Payer: MEDICARE
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
## 2023-06-05 DIAGNOSIS — L03.116 LEFT LEG CELLULITIS: Primary | ICD-10-CM

## 2023-06-05 DIAGNOSIS — F11.20 UNCOMPLICATED OPIOID DEPENDENCE: ICD-10-CM

## 2023-06-05 DIAGNOSIS — N17.9 AKI (ACUTE KIDNEY INJURY): ICD-10-CM

## 2023-06-05 LAB
ALBUMIN SERPL-MCNC: 3.5 G/DL (ref 3.5–5.2)
ALBUMIN/GLOB SERPL: 1 G/DL
ALP SERPL-CCNC: 131 U/L (ref 39–117)
ALT SERPL W P-5'-P-CCNC: 26 U/L (ref 1–33)
ANION GAP SERPL CALCULATED.3IONS-SCNC: 12.7 MMOL/L (ref 5–15)
AST SERPL-CCNC: 47 U/L (ref 1–32)
BACTERIA UR QL AUTO: ABNORMAL /HPF
BASOPHILS # BLD AUTO: 0.02 10*3/MM3 (ref 0–0.2)
BASOPHILS NFR BLD AUTO: 0.3 % (ref 0–1.5)
BILIRUB SERPL-MCNC: 4.6 MG/DL (ref 0–1.2)
BILIRUB UR QL STRIP: NEGATIVE
BUN SERPL-MCNC: 53 MG/DL (ref 8–23)
BUN/CREAT SERPL: 34.2 (ref 7–25)
CALCIUM SPEC-SCNC: 8.8 MG/DL (ref 8.6–10.5)
CHLORIDE SERPL-SCNC: 88 MMOL/L (ref 98–107)
CLARITY UR: ABNORMAL
CO2 SERPL-SCNC: 34.3 MMOL/L (ref 22–29)
COLOR UR: ABNORMAL
CREAT SERPL-MCNC: 1.55 MG/DL (ref 0.57–1)
D-LACTATE SERPL-SCNC: 2 MMOL/L (ref 0.5–2)
DEPRECATED RDW RBC AUTO: 51.5 FL (ref 37–54)
EGFRCR SERPLBLD CKD-EPI 2021: 35 ML/MIN/1.73
EOSINOPHIL # BLD AUTO: 0.04 10*3/MM3 (ref 0–0.4)
EOSINOPHIL NFR BLD AUTO: 0.5 % (ref 0.3–6.2)
ERYTHROCYTE [DISTWIDTH] IN BLOOD BY AUTOMATED COUNT: 18.7 % (ref 12.3–15.4)
GEN 5 2HR TROPONIN T REFLEX: 45 NG/L
GLOBULIN UR ELPH-MCNC: 3.5 GM/DL
GLUCOSE SERPL-MCNC: 194 MG/DL (ref 65–99)
GLUCOSE UR STRIP-MCNC: NEGATIVE MG/DL
HCT VFR BLD AUTO: 46.9 % (ref 34–46.6)
HGB BLD-MCNC: 14.7 G/DL (ref 12–15.9)
HGB UR QL STRIP.AUTO: NEGATIVE
HYALINE CASTS UR QL AUTO: ABNORMAL /LPF
IMM GRANULOCYTES # BLD AUTO: 0.05 10*3/MM3 (ref 0–0.05)
IMM GRANULOCYTES NFR BLD AUTO: 0.6 % (ref 0–0.5)
KETONES UR QL STRIP: NEGATIVE
LEUKOCYTE ESTERASE UR QL STRIP.AUTO: ABNORMAL
LYMPHOCYTES # BLD AUTO: 1.07 10*3/MM3 (ref 0.7–3.1)
LYMPHOCYTES NFR BLD AUTO: 13.8 % (ref 19.6–45.3)
MAGNESIUM SERPL-MCNC: 2.5 MG/DL (ref 1.6–2.4)
MCH RBC QN AUTO: 25.3 PG (ref 26.6–33)
MCHC RBC AUTO-ENTMCNC: 31.3 G/DL (ref 31.5–35.7)
MCV RBC AUTO: 80.7 FL (ref 79–97)
MONOCYTES # BLD AUTO: 0.62 10*3/MM3 (ref 0.1–0.9)
MONOCYTES NFR BLD AUTO: 8 % (ref 5–12)
NEUTROPHILS NFR BLD AUTO: 5.93 10*3/MM3 (ref 1.7–7)
NEUTROPHILS NFR BLD AUTO: 76.8 % (ref 42.7–76)
NITRITE UR QL STRIP: NEGATIVE
NRBC BLD AUTO-RTO: 0.5 /100 WBC (ref 0–0.2)
NT-PROBNP SERPL-MCNC: 3099 PG/ML (ref 0–900)
PH UR STRIP.AUTO: 6 [PH] (ref 5–8)
PHOSPHATE SERPL-MCNC: 3.7 MG/DL (ref 2.5–4.5)
PLATELET # BLD AUTO: 242 10*3/MM3 (ref 140–450)
PMV BLD AUTO: 10.1 FL (ref 6–12)
POTASSIUM SERPL-SCNC: 3.9 MMOL/L (ref 3.5–5.2)
PROT SERPL-MCNC: 7 G/DL (ref 6–8.5)
PROT UR QL STRIP: ABNORMAL
RBC # BLD AUTO: 5.81 10*6/MM3 (ref 3.77–5.28)
RBC # UR STRIP: ABNORMAL /HPF
REF LAB TEST METHOD: ABNORMAL
SODIUM SERPL-SCNC: 135 MMOL/L (ref 136–145)
SP GR UR STRIP: 1.01 (ref 1–1.03)
SQUAMOUS #/AREA URNS HPF: ABNORMAL /HPF
T4 FREE SERPL-MCNC: 1.14 NG/DL (ref 0.93–1.7)
TROPONIN T DELTA: -1 NG/L
TROPONIN T SERPL HS-MCNC: 46 NG/L
TSH SERPL DL<=0.05 MIU/L-ACNC: 8.77 UIU/ML (ref 0.27–4.2)
UROBILINOGEN UR QL STRIP: ABNORMAL
WBC # UR STRIP: ABNORMAL /HPF
WBC NRBC COR # BLD: 7.73 10*3/MM3 (ref 3.4–10.8)

## 2023-06-05 PROCEDURE — 25010000002 CEFAZOLIN IN DEXTROSE 2-4 GM/100ML-% SOLUTION: Performed by: EMERGENCY MEDICINE

## 2023-06-05 PROCEDURE — G0378 HOSPITAL OBSERVATION PER HR: HCPCS

## 2023-06-05 PROCEDURE — 84100 ASSAY OF PHOSPHORUS: CPT | Performed by: EMERGENCY MEDICINE

## 2023-06-05 PROCEDURE — 80053 COMPREHEN METABOLIC PANEL: CPT | Performed by: EMERGENCY MEDICINE

## 2023-06-05 PROCEDURE — 93005 ELECTROCARDIOGRAM TRACING: CPT | Performed by: EMERGENCY MEDICINE

## 2023-06-05 PROCEDURE — 84439 ASSAY OF FREE THYROXINE: CPT | Performed by: EMERGENCY MEDICINE

## 2023-06-05 PROCEDURE — 84484 ASSAY OF TROPONIN QUANT: CPT | Performed by: EMERGENCY MEDICINE

## 2023-06-05 PROCEDURE — 99285 EMERGENCY DEPT VISIT HI MDM: CPT

## 2023-06-05 PROCEDURE — 83880 ASSAY OF NATRIURETIC PEPTIDE: CPT | Performed by: EMERGENCY MEDICINE

## 2023-06-05 PROCEDURE — 72170 X-RAY EXAM OF PELVIS: CPT

## 2023-06-05 PROCEDURE — 83735 ASSAY OF MAGNESIUM: CPT | Performed by: EMERGENCY MEDICINE

## 2023-06-05 PROCEDURE — 83605 ASSAY OF LACTIC ACID: CPT | Performed by: EMERGENCY MEDICINE

## 2023-06-05 PROCEDURE — 70450 CT HEAD/BRAIN W/O DYE: CPT

## 2023-06-05 PROCEDURE — 85025 COMPLETE CBC W/AUTO DIFF WBC: CPT | Performed by: EMERGENCY MEDICINE

## 2023-06-05 PROCEDURE — 73560 X-RAY EXAM OF KNEE 1 OR 2: CPT

## 2023-06-05 PROCEDURE — 87040 BLOOD CULTURE FOR BACTERIA: CPT | Performed by: EMERGENCY MEDICINE

## 2023-06-05 PROCEDURE — 36415 COLL VENOUS BLD VENIPUNCTURE: CPT

## 2023-06-05 PROCEDURE — 93010 ELECTROCARDIOGRAM REPORT: CPT | Performed by: INTERNAL MEDICINE

## 2023-06-05 PROCEDURE — 81001 URINALYSIS AUTO W/SCOPE: CPT | Performed by: EMERGENCY MEDICINE

## 2023-06-05 PROCEDURE — 84443 ASSAY THYROID STIM HORMONE: CPT | Performed by: EMERGENCY MEDICINE

## 2023-06-05 PROCEDURE — 71045 X-RAY EXAM CHEST 1 VIEW: CPT

## 2023-06-05 RX ORDER — SODIUM CHLORIDE 0.9 % (FLUSH) 0.9 %
10 SYRINGE (ML) INJECTION AS NEEDED
Status: DISCONTINUED | OUTPATIENT
Start: 2023-06-05 | End: 2023-06-08 | Stop reason: HOSPADM

## 2023-06-05 RX ORDER — CEFAZOLIN SODIUM 2 G/100ML
2 INJECTION, SOLUTION INTRAVENOUS ONCE
Status: COMPLETED | OUTPATIENT
Start: 2023-06-05 | End: 2023-06-05

## 2023-06-05 RX ADMIN — SODIUM CHLORIDE 500 ML: 9 INJECTION, SOLUTION INTRAVENOUS at 22:26

## 2023-06-05 RX ADMIN — CEFAZOLIN SODIUM 2 G: 2 INJECTION, SOLUTION INTRAVENOUS at 23:05

## 2023-06-05 NOTE — ED NOTES
Patient from home via ems; call was for a fall that happened at 1330 today. Patient was found to be disoriented with ems, was hypoxic in the mid 80s on RA, placed on 6L nc and is now A&Ox4 complaining of right knee pain.

## 2023-06-05 NOTE — ED PROVIDER NOTES
EMERGENCY DEPARTMENT ENCOUNTER    Room Number:  24/24  Date seen:  6/5/2023  PCP: Bennett Duque DO      HPI:  Chief Complaint: Fall  A complete HPI/ROS/PMH/PSH/SH/FH are unobtainable due to: None  Context: Aydee Solis is a 72 y.o. female who presents to the ED c/o fall.  She states that she fell earlier today.  She states that she had one of her typical dizzy spells.  Has been dizzy for about 5 months per her best estimate.  She does not know why she fell today, however.  She denies hitting her head although she is on Eliquis.  She is complaining primarily of right knee pain.  She normally wears 2.5 L of oxygen at all times.  She denies having any fever, headache, chest pain, cough, shortness of breath, abdominal pain.     is now at bedside.  I did obtain additional information from him.  He feels that she has been confused for about 1 week.          PAST MEDICAL HISTORY  Active Ambulatory Problems     Diagnosis Date Noted    Pneumonia of right lower lobe due to infectious organism 01/27/2017    Cellulitis 01/28/2017    Hypertension 01/28/2017    Hyperlipidemia 01/28/2017    Breast cancer 01/28/2017    Lymphedema 01/28/2017    Osteoporosis 01/28/2017    Acute respiratory failure with hypoxia 01/29/2017    Morbid obesity due to excess calories (HCC) 01/30/2017    Metabolic encephalopathy 01/25/2023    PAULETTE (obstructive sleep apnea) 01/25/2023    Chronic respiratory failure with hypoxia 01/25/2023    Stage 3b chronic kidney disease 01/25/2023    Type 2 diabetes mellitus, with long-term current use of insulin 01/25/2023    PAF (paroxysmal atrial fibrillation) 01/25/2023    Chronic anticoagulation 01/25/2023    COPD (chronic obstructive pulmonary disease) 01/25/2023    Chronic systolic CHF (congestive heart failure) 01/31/2023     Resolved Ambulatory Problems     Diagnosis Date Noted    Sepsis 01/30/2017    Acute on chronic combined systolic and diastolic CHF (congestive heart failure) 01/25/2023      Past Medical History:   Diagnosis Date    Arthritis     Cancer     Class 3 severe obesity due to excess calories with body mass index (BMI) of 50.0 to 59.9 in adult     Diabetes mellitus     EDWARDS (dyspnea on exertion)     Elephantiasis     Leukemia     Lung cancer     Tracheal cancer          PAST SURGICAL HISTORY  Past Surgical History:   Procedure Laterality Date    APPENDECTOMY      HYSTERECTOMY      KNEE ARTHROSCOPY Right     LAPAROSCOPIC GASTRIC BANDING      LYMPHADENECTOMY Left     MASTECTOMY Left          FAMILY HISTORY  Family History   Problem Relation Age of Onset    Stroke Mother     Leukemia Father     COPD Sister     ALS Brother          SOCIAL HISTORY  Social History     Socioeconomic History    Marital status:    Tobacco Use    Smoking status: Former   Vaping Use    Vaping Use: Never used   Substance and Sexual Activity    Alcohol use: No    Drug use: No         ALLERGIES  Ciprofloxacin        REVIEW OF SYSTEMS  Review of Systems     All systems reviewed and negative except for those discussed in HPI.       PHYSICAL EXAM  ED Triage Vitals   Temp Heart Rate Resp BP SpO2   06/05/23 1755 06/05/23 1755 06/05/23 1755 06/05/23 1755 06/05/23 1755   97.9 °F (36.6 °C) 98 18 117/67 97 %      Temp src Heart Rate Source Patient Position BP Location FiO2 (%)   06/05/23 1906 -- 06/05/23 1906 06/05/23 1906 --   Oral  Lying Right arm        Physical Exam      GENERAL: no acute distress  HENT: nares patent  EYES: no scleral icterus  CV: regular rhythm, normal rate  RESPIRATORY: normal effort, clear to auscultation bilaterally, distant breath sounds due to body habitus, she is satting in the upper 90s on 2.5 L nasal cannula  ABDOMEN: soft, nontender, obese abdomen  MUSCULOSKELETAL: no deformity, intact right knee extension, stable right knee ligaments  NEURO: alert, I first met she seems to be cognitively intact.  She does answer orientation questions appropriately.  However, she does seem to have difficulty  answering certain questions such as asking her if she is having urinary symptoms.  She answers a totally different question.  Moves all extremities, follows simple commands.  No facial asymmetry.  EOMI.  Normal speech.  PSYCH:  calm, cooperative  SKIN: Lymphedema noted to the left upper extremity as well as bilateral legs, she does have warmth on the left leg compared to the right.  However, the erythema to her bilateral legs is quite symmetrical.  There is no purulent drainage or no crepitus from her left leg.  Patient has bruising to the right knee    Vital signs and nursing notes reviewed.          LAB RESULTS  No results found for this or any previous visit (from the past 24 hour(s)).    Ordered the above labs and reviewed the results.        RADIOLOGY  No Radiology Exams Resulted Within Past 24 Hours    Ordered the above noted radiological studies. Reviewed by me in PACS.          PROCEDURES  Procedures          MEDICATIONS GIVEN IN ER  Medications   sodium chloride 0.9 % flush 10 mL (has no administration in time range)         MEDICAL DECISION MAKING, PROGRESS, and CONSULTS    Discussion below represents my analysis of pertinent findings related to patient's condition, differential diagnosis, treatment plan and final disposition.      Orders placed during this visit:  Orders Placed This Encounter   Procedures    XR Chest 1 View    XR Knee 1 or 2 View Right    XR Pelvis 1 or 2 View    CT Head Without Contrast    Comprehensive Metabolic Panel    Urinalysis With Culture If Indicated - Urine, Clean Catch    Single High Sensitivity Troponin T    High Sensitivity Troponin T    BNP    Magnesium    Phosphorus    TSH    T4, Free    CBC Auto Differential    Monitor Blood Pressure    Pulse Oximetry, Continuous    ECG 12 Lead Dyspnea    Insert Peripheral IV    CBC & Differential               Differential diagnosis:    Differential diagnosis for altered mental status includes:  - vital sign abnormalities such as HTN  encephalopathy, hypotension, hypoxemia, hypercarbia, heat stroke  - toxic/metabolic pathology such as hypoglycemia, DKA, hypo/hyper-natremia, thyroid storm, myxedema coma, medication side effect (either intentional or accidental)  - infectious etiology  - intracranial pathology such as stroke, seizure, intracranial mass, intracranial hemorrhage  - psychiatric pathology    After initial evaluation, I considered the need for admission given her dizziness causing her to fall. I was concerned about the possibility of stroke and her inability to walk.          Independent interpretation of labs, radiology studies, and discussions with consultants:  ED Course as of 06/08/23 1428   Mon Jun 05, 2023 1943 EKG independently interpreted by myself.  Time 7:40 PM.  Ventricularly paced.  Heart rate 76.  Negative Sgarbossa criteria.  PVC x1. [TD]   2040 WBC: 7.73 [TD]   2040 Hemoglobin: 14.7 [TD]   2041 WBC, UA: 0-2 [TD]   2041 Bacteria, UA(!): 4+ [TD]   2147 proBNP(!): 3,099.0 [TD]   2147 HS Troponin T(!): 46 [TD]   2147 CT scan of the head shows no acute intracranial pathology. [TD]   2148 X-ray of the right knee independently interpreted by myself.  No fracture or dislocation. [TD]   2150 Sodium(!): 135 [TD]   2150 Creatinine(!): 1.55 [TD]   2151 On 1/31/2023, the patient's creatinine was 1.17.  Today it is 1.55.  Her BUN is up to 53.  Back in January it was 31. [TD]   2238 I discussed the case with TAMANNA Taylor with hospitalist service.  We reviewed the patient's labs, history, imaging, exam.  She will admit for Dr. Dias. [TD]      ED Course User Index  [TD] Perry Batres II, MD         She is not a tpa candidate as her NIHSS is 0. I do not believer her dizziness was related to a stroke. She is symptomatically doing much better.    I am more concerned about the cellulitis to her leg lower leg.       DIAGNOSIS  Final diagnoses:   Left leg cellulitis   MELANIE (acute kidney injury)   Fall, initial encounter          DISPOSITION  Admit      Latest Documented Vital Signs:  As of 19:09 EDT  BP- 105/75 HR- 71 Temp- 98.1 °F (36.7 °C) (Oral) O2 sat- 99%      --    Please note that portions of this were completed with a voice recognition program.       Note Disclaimer: At [facility], we believe that sharing information builds trust and better relationships. You are receiving this note because you are receiving care at [facility] or recently visited. It is possible you will see health information before a provider has talked with you about it. This kind of information can be easy to misunderstand. To help you fully understand what it means for your health, we urge you to discuss this note with your provider.         Perry Batres II, MD  06/08/23 9900

## 2023-06-06 PROBLEM — I89.0 LYMPHEDEMA: Status: ACTIVE | Noted: 2023-06-06

## 2023-06-06 PROBLEM — R42 DIZZINESS: Status: ACTIVE | Noted: 2023-06-06

## 2023-06-06 PROBLEM — N39.0 ACUTE UTI (URINARY TRACT INFECTION): Status: ACTIVE | Noted: 2023-06-06

## 2023-06-06 LAB
ANION GAP SERPL CALCULATED.3IONS-SCNC: 13.1 MMOL/L (ref 5–15)
BASOPHILS # BLD AUTO: 0.02 10*3/MM3 (ref 0–0.2)
BASOPHILS NFR BLD AUTO: 0.2 % (ref 0–1.5)
BUN SERPL-MCNC: 49 MG/DL (ref 8–23)
BUN/CREAT SERPL: 36.3 (ref 7–25)
CALCIUM SPEC-SCNC: 9.1 MG/DL (ref 8.6–10.5)
CHLORIDE SERPL-SCNC: 89 MMOL/L (ref 98–107)
CO2 SERPL-SCNC: 31.9 MMOL/L (ref 22–29)
CREAT SERPL-MCNC: 1.35 MG/DL (ref 0.57–1)
D-LACTATE SERPL-SCNC: 2.1 MMOL/L (ref 0.5–2)
DEPRECATED RDW RBC AUTO: 53.1 FL (ref 37–54)
EGFRCR SERPLBLD CKD-EPI 2021: 41.3 ML/MIN/1.73
EOSINOPHIL # BLD AUTO: 0.13 10*3/MM3 (ref 0–0.4)
EOSINOPHIL NFR BLD AUTO: 1.6 % (ref 0.3–6.2)
ERYTHROCYTE [DISTWIDTH] IN BLOOD BY AUTOMATED COUNT: 18.8 % (ref 12.3–15.4)
GLUCOSE BLDC GLUCOMTR-MCNC: 161 MG/DL (ref 70–130)
GLUCOSE BLDC GLUCOMTR-MCNC: 193 MG/DL (ref 70–130)
GLUCOSE BLDC GLUCOMTR-MCNC: 203 MG/DL (ref 70–130)
GLUCOSE BLDC GLUCOMTR-MCNC: 239 MG/DL (ref 70–130)
GLUCOSE BLDC GLUCOMTR-MCNC: 269 MG/DL (ref 70–130)
GLUCOSE SERPL-MCNC: 233 MG/DL (ref 65–99)
HCT VFR BLD AUTO: 44.5 % (ref 34–46.6)
HGB BLD-MCNC: 13.8 G/DL (ref 12–15.9)
IMM GRANULOCYTES # BLD AUTO: 0.06 10*3/MM3 (ref 0–0.05)
IMM GRANULOCYTES NFR BLD AUTO: 0.7 % (ref 0–0.5)
INR PPP: 2.34 (ref 0.9–1.1)
LYMPHOCYTES # BLD AUTO: 1.15 10*3/MM3 (ref 0.7–3.1)
LYMPHOCYTES NFR BLD AUTO: 14.1 % (ref 19.6–45.3)
MCH RBC QN AUTO: 25.3 PG (ref 26.6–33)
MCHC RBC AUTO-ENTMCNC: 31 G/DL (ref 31.5–35.7)
MCV RBC AUTO: 81.7 FL (ref 79–97)
MONOCYTES # BLD AUTO: 0.69 10*3/MM3 (ref 0.1–0.9)
MONOCYTES NFR BLD AUTO: 8.4 % (ref 5–12)
NEUTROPHILS NFR BLD AUTO: 6.12 10*3/MM3 (ref 1.7–7)
NEUTROPHILS NFR BLD AUTO: 75 % (ref 42.7–76)
NRBC BLD AUTO-RTO: 0.2 /100 WBC (ref 0–0.2)
PLATELET # BLD AUTO: 231 10*3/MM3 (ref 140–450)
PMV BLD AUTO: 9.3 FL (ref 6–12)
POTASSIUM SERPL-SCNC: 3.5 MMOL/L (ref 3.5–5.2)
PROTHROMBIN TIME: 26.1 SECONDS (ref 11.7–14.2)
QT INTERVAL: 485 MS
RBC # BLD AUTO: 5.45 10*6/MM3 (ref 3.77–5.28)
SODIUM SERPL-SCNC: 134 MMOL/L (ref 136–145)
WBC NRBC COR # BLD: 8.17 10*3/MM3 (ref 3.4–10.8)

## 2023-06-06 PROCEDURE — 25010000002 ONDANSETRON PER 1 MG: Performed by: NURSE PRACTITIONER

## 2023-06-06 PROCEDURE — 80048 BASIC METABOLIC PNL TOTAL CA: CPT | Performed by: NURSE PRACTITIONER

## 2023-06-06 PROCEDURE — 63710000001 INSULIN LISPRO (HUMAN) PER 5 UNITS: Performed by: NURSE PRACTITIONER

## 2023-06-06 PROCEDURE — 82948 REAGENT STRIP/BLOOD GLUCOSE: CPT

## 2023-06-06 PROCEDURE — 99222 1ST HOSP IP/OBS MODERATE 55: CPT | Performed by: PSYCHIATRY & NEUROLOGY

## 2023-06-06 PROCEDURE — 25010000002 CEFAZOLIN IN DEXTROSE 2-4 GM/100ML-% SOLUTION: Performed by: NURSE PRACTITIONER

## 2023-06-06 PROCEDURE — 25010000002 CEFTRIAXONE PER 250 MG: Performed by: HOSPITALIST

## 2023-06-06 PROCEDURE — 85025 COMPLETE CBC W/AUTO DIFF WBC: CPT | Performed by: NURSE PRACTITIONER

## 2023-06-06 PROCEDURE — 85610 PROTHROMBIN TIME: CPT | Performed by: NURSE PRACTITIONER

## 2023-06-06 PROCEDURE — 83605 ASSAY OF LACTIC ACID: CPT | Performed by: NURSE PRACTITIONER

## 2023-06-06 RX ORDER — ACETAMINOPHEN 650 MG/1
650 SUPPOSITORY RECTAL EVERY 4 HOURS PRN
Status: DISCONTINUED | OUTPATIENT
Start: 2023-06-06 | End: 2023-06-08 | Stop reason: HOSPADM

## 2023-06-06 RX ORDER — DULOXETIN HYDROCHLORIDE 60 MG/1
60 CAPSULE, DELAYED RELEASE ORAL DAILY
Status: DISCONTINUED | OUTPATIENT
Start: 2023-06-06 | End: 2023-06-08 | Stop reason: HOSPADM

## 2023-06-06 RX ORDER — BISACODYL 10 MG
10 SUPPOSITORY, RECTAL RECTAL DAILY PRN
Status: DISCONTINUED | OUTPATIENT
Start: 2023-06-06 | End: 2023-06-08 | Stop reason: HOSPADM

## 2023-06-06 RX ORDER — DEXTROSE MONOHYDRATE 25 G/50ML
25 INJECTION, SOLUTION INTRAVENOUS
Status: DISCONTINUED | OUTPATIENT
Start: 2023-06-06 | End: 2023-06-08 | Stop reason: HOSPADM

## 2023-06-06 RX ORDER — AMOXICILLIN 250 MG
2 CAPSULE ORAL 2 TIMES DAILY
Status: DISCONTINUED | OUTPATIENT
Start: 2023-06-06 | End: 2023-06-08 | Stop reason: HOSPADM

## 2023-06-06 RX ORDER — GABAPENTIN 100 MG/1
100 CAPSULE ORAL 3 TIMES DAILY
Status: DISCONTINUED | OUTPATIENT
Start: 2023-06-06 | End: 2023-06-08 | Stop reason: HOSPADM

## 2023-06-06 RX ORDER — POLYETHYLENE GLYCOL 3350 17 G/17G
17 POWDER, FOR SOLUTION ORAL DAILY PRN
Status: DISCONTINUED | OUTPATIENT
Start: 2023-06-06 | End: 2023-06-08 | Stop reason: HOSPADM

## 2023-06-06 RX ORDER — DOCUSATE SODIUM 100 MG/1
100 CAPSULE, LIQUID FILLED ORAL 2 TIMES DAILY
Status: DISCONTINUED | OUTPATIENT
Start: 2023-06-06 | End: 2023-06-08 | Stop reason: HOSPADM

## 2023-06-06 RX ORDER — ONDANSETRON 2 MG/ML
4 INJECTION INTRAMUSCULAR; INTRAVENOUS EVERY 6 HOURS PRN
Status: DISCONTINUED | OUTPATIENT
Start: 2023-06-06 | End: 2023-06-08 | Stop reason: HOSPADM

## 2023-06-06 RX ORDER — SODIUM CHLORIDE 0.9 % (FLUSH) 0.9 %
10 SYRINGE (ML) INJECTION EVERY 12 HOURS SCHEDULED
Status: DISCONTINUED | OUTPATIENT
Start: 2023-06-06 | End: 2023-06-08 | Stop reason: HOSPADM

## 2023-06-06 RX ORDER — BUSPIRONE HYDROCHLORIDE 5 MG/1
5 TABLET ORAL 3 TIMES DAILY
Status: DISCONTINUED | OUTPATIENT
Start: 2023-06-06 | End: 2023-06-08 | Stop reason: HOSPADM

## 2023-06-06 RX ORDER — INSULIN LISPRO 100 [IU]/ML
2-7 INJECTION, SOLUTION INTRAVENOUS; SUBCUTANEOUS
Status: DISCONTINUED | OUTPATIENT
Start: 2023-06-06 | End: 2023-06-08 | Stop reason: HOSPADM

## 2023-06-06 RX ORDER — SODIUM CHLORIDE 0.9 % (FLUSH) 0.9 %
10 SYRINGE (ML) INJECTION AS NEEDED
Status: DISCONTINUED | OUTPATIENT
Start: 2023-06-06 | End: 2023-06-08 | Stop reason: HOSPADM

## 2023-06-06 RX ORDER — ACETAMINOPHEN 325 MG/1
650 TABLET ORAL EVERY 4 HOURS PRN
Status: DISCONTINUED | OUTPATIENT
Start: 2023-06-06 | End: 2023-06-08 | Stop reason: HOSPADM

## 2023-06-06 RX ORDER — IBUPROFEN 600 MG/1
1 TABLET ORAL
Status: DISCONTINUED | OUTPATIENT
Start: 2023-06-06 | End: 2023-06-08 | Stop reason: HOSPADM

## 2023-06-06 RX ORDER — NICOTINE POLACRILEX 4 MG
15 LOZENGE BUCCAL
Status: DISCONTINUED | OUTPATIENT
Start: 2023-06-06 | End: 2023-06-08 | Stop reason: HOSPADM

## 2023-06-06 RX ORDER — BISACODYL 5 MG/1
5 TABLET, DELAYED RELEASE ORAL DAILY PRN
Status: DISCONTINUED | OUTPATIENT
Start: 2023-06-06 | End: 2023-06-08 | Stop reason: HOSPADM

## 2023-06-06 RX ORDER — SODIUM CHLORIDE 9 MG/ML
40 INJECTION, SOLUTION INTRAVENOUS AS NEEDED
Status: DISCONTINUED | OUTPATIENT
Start: 2023-06-06 | End: 2023-06-08 | Stop reason: HOSPADM

## 2023-06-06 RX ORDER — ALBUTEROL SULFATE 2.5 MG/3ML
2.5 SOLUTION RESPIRATORY (INHALATION) EVERY 6 HOURS PRN
Status: DISCONTINUED | OUTPATIENT
Start: 2023-06-06 | End: 2023-06-08 | Stop reason: HOSPADM

## 2023-06-06 RX ORDER — ACETAMINOPHEN 160 MG/5ML
650 SOLUTION ORAL EVERY 4 HOURS PRN
Status: DISCONTINUED | OUTPATIENT
Start: 2023-06-06 | End: 2023-06-08 | Stop reason: HOSPADM

## 2023-06-06 RX ORDER — CEFAZOLIN SODIUM 2 G/100ML
2 INJECTION, SOLUTION INTRAVENOUS EVERY 8 HOURS
Status: DISCONTINUED | OUTPATIENT
Start: 2023-06-06 | End: 2023-06-06

## 2023-06-06 RX ORDER — GABAPENTIN 100 MG/1
200 CAPSULE ORAL 2 TIMES DAILY
Status: DISCONTINUED | OUTPATIENT
Start: 2023-06-06 | End: 2023-06-06

## 2023-06-06 RX ORDER — ALLOPURINOL 100 MG/1
100 TABLET ORAL DAILY
Status: DISCONTINUED | OUTPATIENT
Start: 2023-06-06 | End: 2023-06-08 | Stop reason: HOSPADM

## 2023-06-06 RX ORDER — ATORVASTATIN CALCIUM 20 MG/1
10 TABLET, FILM COATED ORAL DAILY
Status: DISCONTINUED | OUTPATIENT
Start: 2023-06-06 | End: 2023-06-08 | Stop reason: HOSPADM

## 2023-06-06 RX ADMIN — Medication 10 ML: at 01:54

## 2023-06-06 RX ADMIN — INSULIN GLARGINE-YFGN 20 UNITS: 100 INJECTION, SOLUTION SUBCUTANEOUS at 22:25

## 2023-06-06 RX ADMIN — DULOXETINE HYDROCHLORIDE 60 MG: 60 CAPSULE, DELAYED RELEASE ORAL at 09:56

## 2023-06-06 RX ADMIN — INSULIN LISPRO 4 UNITS: 100 INJECTION, SOLUTION INTRAVENOUS; SUBCUTANEOUS at 11:46

## 2023-06-06 RX ADMIN — ONDANSETRON 4 MG: 2 INJECTION INTRAMUSCULAR; INTRAVENOUS at 11:42

## 2023-06-06 RX ADMIN — ALLOPURINOL 100 MG: 100 TABLET ORAL at 09:56

## 2023-06-06 RX ADMIN — INSULIN LISPRO 3 UNITS: 100 INJECTION, SOLUTION INTRAVENOUS; SUBCUTANEOUS at 22:24

## 2023-06-06 RX ADMIN — CEFTRIAXONE 2 G: 2 INJECTION, POWDER, FOR SOLUTION INTRAMUSCULAR; INTRAVENOUS at 09:53

## 2023-06-06 RX ADMIN — BUSPIRONE HYDROCHLORIDE 5 MG: 5 TABLET ORAL at 22:22

## 2023-06-06 RX ADMIN — APIXABAN 5 MG: 5 TABLET, FILM COATED ORAL at 09:54

## 2023-06-06 RX ADMIN — BUSPIRONE HYDROCHLORIDE 5 MG: 5 TABLET ORAL at 17:34

## 2023-06-06 RX ADMIN — DOCUSATE SODIUM 50MG AND SENNOSIDES 8.6MG 2 TABLET: 8.6; 5 TABLET, FILM COATED ORAL at 09:53

## 2023-06-06 RX ADMIN — BUSPIRONE HYDROCHLORIDE 5 MG: 5 TABLET ORAL at 09:56

## 2023-06-06 RX ADMIN — GABAPENTIN 100 MG: 100 CAPSULE ORAL at 17:34

## 2023-06-06 RX ADMIN — APIXABAN 5 MG: 5 TABLET, FILM COATED ORAL at 22:21

## 2023-06-06 RX ADMIN — Medication 10 ML: at 09:59

## 2023-06-06 RX ADMIN — DOCUSATE SODIUM 50MG AND SENNOSIDES 8.6MG 2 TABLET: 8.6; 5 TABLET, FILM COATED ORAL at 22:20

## 2023-06-06 RX ADMIN — Medication 10 ML: at 22:23

## 2023-06-06 RX ADMIN — GABAPENTIN 200 MG: 100 CAPSULE ORAL at 09:54

## 2023-06-06 RX ADMIN — INSULIN LISPRO 2 UNITS: 100 INJECTION, SOLUTION INTRAVENOUS; SUBCUTANEOUS at 17:34

## 2023-06-06 RX ADMIN — ATORVASTATIN CALCIUM 10 MG: 20 TABLET, FILM COATED ORAL at 09:54

## 2023-06-06 RX ADMIN — INSULIN LISPRO 3 UNITS: 100 INJECTION, SOLUTION INTRAVENOUS; SUBCUTANEOUS at 07:41

## 2023-06-06 RX ADMIN — GABAPENTIN 100 MG: 100 CAPSULE ORAL at 22:21

## 2023-06-06 RX ADMIN — CEFAZOLIN SODIUM 2 G: 2 INJECTION, SOLUTION INTRAVENOUS at 06:01

## 2023-06-06 NOTE — PLAN OF CARE
Goal Outcome Evaluation:  Plan of Care Reviewed With: patient        Progress: no change  Outcome Evaluation: Pt admitted through the ED w/ left leg cellulitis, MELANIE, and recent fall, A/O, on 5 L nasal cannula, IV antibiotic ordered, purewick in place, blood glucose monitoring, limb and falls precautions maintained

## 2023-06-06 NOTE — CASE MANAGEMENT/SOCIAL WORK
Discharge Planning Assessment  [facility]     Patient Name: Aydee Solis  MRN: [id number]  Today's Date: 6/6/2023    Admit Date: 6/5/2023    Plan: Home with family support   Discharge Needs Assessment       Row Name 06/06/23 1406       Living Environment    People in Home spouse    Name(s) of People in Home Kyle Solis  [phone]    Current Living Arrangements home    Potentially Unsafe Housing Conditions none    Primary Care Provided by self;spouse/significant other    Provides Primary Care For no one    Family Caregiver if Needed spouse    Family Caregiver Names Kyle Solis  [phone]    Quality of Family Relationships unable to assess    Able to Return to Prior Arrangements yes       Resource/Environmental Concerns    Resource/Environmental Concerns none    Transportation Concerns none       Transition Planning    Patient/Family Anticipates Transition to home with family    Patient/Family Anticipated Services at Transition none    Transportation Anticipated family or friend will provide       Discharge Needs Assessment    Readmission Within the Last 30 Days no previous admission in last 30 days    Equipment Currently Used at Home cane, straight;walker, rolling;wheelchair    Concerns to be Addressed discharge planning    Anticipated Changes Related to Illness none    Equipment Needed After Discharge none    Provided Post Acute Provider List? N/A    N/A Provider List Comment No needs identified    Provided Post Acute Provider Quality & Resource List? N/A    Current Discharge Risk chronically ill                   Discharge Plan       Row Name 06/06/23 1410       Plan    Plan Home with family support    Patient/Family in Agreement with Plan yes    Plan Comments Met with patient at bedside. Face sheet verified. Prior to admission patient was able to perform some aspects of her ADL's but also required assistance from her . Patient has cane, wheelchair and oxygen from [employer]. Patient uses the  [facility] pharmacy on [address], denies any issues affording or remembering to take her medications. Patient is agreeable to using [facility]. Patient does not have POA or living will. Discharge plans discussed; plan is to return home with family support.  to transport. Will continue to monitor for new or changing discharge needs. Jeannette CISNEROS RN CCP                  Continued Care and Services - Admitted Since 6/5/2023    Coordination has not been started for this encounter.       Expected Discharge Date and Time       Expected Discharge Date Expected Discharge Time    Jun 7, 2023            Demographic Summary       Row Name 06/06/23 1405       General Information    Admission Type inpatient    Arrived From emergency department    Referral Source admission list    Reason for Consult discharge planning    Preferred Language English       Contact Information    Permission Granted to Share Info With family/designee  Kyle Solis  [phone]                   Functional Status       Row Name 06/06/23 1405       Functional Status    Usual Activity Tolerance moderate    Current Activity Tolerance moderate       Functional Status, IADL    Medications independent    Meal Preparation assistive person    Housekeeping assistive person    Laundry assistive person    Shopping assistive person       Mental Status    General Appearance WDL WDL       Mental Status Summary    Recent Changes in Mental Status/Cognitive Functioning no changes       Employment/    Employment Status retired                   Psychosocial    No documentation.                  Abuse/Neglect    No documentation.                  Legal    No documentation.                  Substance Abuse    No documentation.                  Patient Forms    No documentation.                     Jeannette Piper RN

## 2023-06-06 NOTE — ED NOTES
"Nursing report ED to floor  Aydee Solis  72 y.o.  female    HPI :   Chief Complaint   Patient presents with    Fall       Admitting doctor:   Curt Dias MD    Admitting diagnosis:   The primary encounter diagnosis was Left leg cellulitis. Diagnoses of MELANIE (acute kidney injury) and Fall, initial encounter were also pertinent to this visit.    Code status:   Current Code Status       Date Active Code Status Order ID Comments User Context       Prior            Allergies:   Ciprofloxacin    Isolation:   No active isolations    Intake and Output  No intake or output data in the 24 hours ending 06/05/23 2242    Weight:       06/05/23  1906   Weight: 125 kg (276 lb)       Most recent vitals:   Vitals:    06/05/23 1755 06/05/23 1906 06/05/23 2106 06/05/23 2108   BP: 117/67 105/75 119/81    BP Location:  Right arm     Patient Position:  Lying     Pulse: 98 71 72 85   Resp: 18 18     Temp: 97.9 °F (36.6 °C) 98.1 °F (36.7 °C)     TempSrc:  Oral     SpO2: 97% 99%  93%   Weight:  125 kg (276 lb)     Height:  167.6 cm (66\")         Active LDAs/IV Access:   Lines, Drains & Airways       Active LDAs       Name Placement date Placement time Site Days    Peripheral IV 06/05/23 1947 Right Antecubital 06/05/23 1947  Antecubital  less than 1    External Urinary Catheter 01/21/23 2030  --  135                    Labs (abnormal labs have a star):   Labs Reviewed   COMPREHENSIVE METABOLIC PANEL - Abnormal; Notable for the following components:       Result Value    Glucose 194 (*)     BUN 53 (*)     Creatinine 1.55 (*)     Sodium 135 (*)     Chloride 88 (*)     CO2 34.3 (*)     AST (SGOT) 47 (*)     Alkaline Phosphatase 131 (*)     Total Bilirubin 4.6 (*)     BUN/Creatinine Ratio 34.2 (*)     eGFR 35.0 (*)     All other components within normal limits    Narrative:     GFR Normal >60  Chronic Kidney Disease <60  Kidney Failure <15    The GFR formula is only valid for adults with stable renal function between ages 18 and 70. "   URINALYSIS W/ CULTURE IF INDICATED - Abnormal; Notable for the following components:    Color, UA Dark Yellow (*)     Appearance, UA Cloudy (*)     Protein, UA Trace (*)     Leuk Esterase, UA Trace (*)     All other components within normal limits    Narrative:     In absence of clinical symptoms, the presence of pyuria, bacteria, and/or nitrites on the urinalysis result does not correlate with infection.   TROPONIN - Abnormal; Notable for the following components:    HS Troponin T 46 (*)     All other components within normal limits    Narrative:     High Sensitive Troponin T Reference Range:  <10.0 ng/L- Negative Female for AMI  <15.0 ng/L- Negative Male for AMI  >=10 - Abnormal Female indicating possible myocardial injury.  >=15 - Abnormal Male indicating possible myocardial injury.   Clinicians would have to utilize clinical acumen, EKG, Troponin, and serial changes to determine if it is an Acute Myocardial Infarction or myocardial injury due to an underlying chronic condition.        BNP (IN-HOUSE) - Abnormal; Notable for the following components:    proBNP 3,099.0 (*)     All other components within normal limits    Narrative:     Among patients with dyspnea, NT-proBNP is highly sensitive for the detection of acute congestive heart failure. In addition NT-proBNP of <300 pg/ml effectively rules out acute congestive heart failure with 99% negative predictive value.    Results may be falsely decreased if patient taking Biotin.     MAGNESIUM - Abnormal; Notable for the following components:    Magnesium 2.5 (*)     All other components within normal limits   TSH - Abnormal; Notable for the following components:    TSH 8.770 (*)     All other components within normal limits   CBC WITH AUTO DIFFERENTIAL - Abnormal; Notable for the following components:    RBC 5.81 (*)     Hematocrit 46.9 (*)     MCH 25.3 (*)     MCHC 31.3 (*)     RDW 18.7 (*)     Neutrophil % 76.8 (*)     Lymphocyte % 13.8 (*)     Immature Grans %  0.6 (*)     nRBC 0.5 (*)     All other components within normal limits   URINALYSIS, MICROSCOPIC ONLY - Abnormal; Notable for the following components:    Bacteria, UA 4+ (*)     Squamous Epithelial Cells, UA 3-6 (*)     All other components within normal limits   HIGH SENSITIVITIY TROPONIN T 2HR - Abnormal; Notable for the following components:    HS Troponin T 45 (*)     All other components within normal limits    Narrative:     High Sensitive Troponin T Reference Range:  <10.0 ng/L- Negative Female for AMI  <15.0 ng/L- Negative Male for AMI  >=10 - Abnormal Female indicating possible myocardial injury.  >=15 - Abnormal Male indicating possible myocardial injury.   Clinicians would have to utilize clinical acumen, EKG, Troponin, and serial changes to determine if it is an Acute Myocardial Infarction or myocardial injury due to an underlying chronic condition.        PHOSPHORUS - Normal   T4, FREE - Normal    Narrative:     Results may be falsely increased if patient taking Biotin.     BLOOD CULTURE   BLOOD CULTURE   LACTIC ACID, PLASMA   CBC AND DIFFERENTIAL    Narrative:     The following orders were created for panel order CBC & Differential.  Procedure                               Abnormality         Status                     ---------                               -----------         ------                     CBC Auto Differential[[id number]]        Abnormal            Final result                 Please view results for these tests on the individual orders.       EKG:   ECG 12 Lead Dyspnea   Preliminary Result   HEART RATE= 76  bpm   RR Interval= 789  ms   MT Interval= 201  ms   P Horizontal Axis= 79  deg   P Front Axis= 90  deg   QRSD Interval= 148  ms   QT Interval= 485  ms   QRS Axis= 160  deg   T Wave Axis= 30  deg   - ABNORMAL ECG -   Atrial-sensed ventricular-paced complexes   No further analysis attempted due to paced rhythm   Electronically Signed By:    Date and Time of Study: 2023-06-05 19:40:10           Meds given in ED:   Medications   sodium chloride 0.9 % flush 10 mL (has no administration in time range)   sodium chloride 0.9 % bolus 500 mL (500 mL Intravenous New Bag 6/5/23 2226)   ceFAZolin in dextrose (ANCEF) IVPB solution 2 g (has no administration in time range)       Imaging results:  CT Head Without Contrast    Result Date: 6/5/2023  1. No acute process.  Radiation dose reduction techniques were utilized, including automated exposure control and exposure modulation based on body size.  This report was finalized on 6/5/2023 8:17 PM by Dr. Kasi Gracia M.D.       Ambulatory status:   - assist x1    Social issues:   Social History     Socioeconomic History    Marital status:    Tobacco Use    Smoking status: Former   Vaping Use    Vaping Use: Never used   Substance and Sexual Activity    Alcohol use: No    Drug use: No       NIH Stroke Scale:         Kyra Serrato RN  06/05/23 22:42 EDT

## 2023-06-06 NOTE — PLAN OF CARE
Goal Outcome Evaluation:         Orders received for BSE.  Patient passed Dysphagia Screen and is on regular diet with thin liquids.  All imaging was negative.  RN, Anisa, confirms no issues at this time.  ST to sign off, please reconsult, if needed.

## 2023-06-06 NOTE — H&P
HISTORY AND PHYSICAL   [facility]        Date of Admission: 2023  Patient Identification:  Name: Aydee Solis  Age: 72 y.o.  Sex: female  :  1950  MRN: [id number]                     Primary Care Physician: Bennett Duque DO    Chief Complaint:  dizziness    History of Present Illness:   Mrs. Solis is a 72-year-old female who came to the ER for a fall at home that she states was secondary to dizziness.  Her main complaint all revolves around dizziness.  She states it is constant and affects her both at rest as well as with exertion.  I could not demonstrate any parallels with orthostasis per history.  She states this contributes to her falls but she is not losing complete loss of consciousness.  She was brought to the ER via EMS with O2 in the 80s and placed on 6 L the patient states her baseline O2 requirements are 3-1/2 L nasal cannula.  Her chest x-ray was actually unremarkable for any acute disease.  She denies any fever chills chest pain or shortness of breath above baseline.  She denies any altered mentation nausea vomiting.  She even denies any dysuria.  When asked about any changes to her skin or swelling, she states her lymphedema is chronic and at baseline.  She points to some rash on her left upper extremity as well as her chest wall and to be honest there is nothing very specific or concerning I can appreciate with those lesions.  Per review of the chart there was concerns about left lower extremity edema and she was started on cefazolin.  She denies any new pain in that extremity warmth fever chills.  Patient denies any abdominal pain or diarrhea.    Past Medical History:  Past Medical History:   Diagnosis Date    Arthritis     Breast cancer     Cancer     Class 3 severe obesity due to excess calories with body mass index (BMI) of 50.0 to 59.9 in adult     COPD (chronic obstructive pulmonary disease)     Diabetes mellitus     EDWARDS (dyspnea on exertion)      Elephantiasis     left leg    Hyperlipidemia     Hypertension     Leukemia     Lung cancer     Lymphedema     left arm    Osteoporosis     Tracheal cancer      Past Surgical History:  Past Surgical History:   Procedure Laterality Date    APPENDECTOMY      HYSTERECTOMY      KNEE ARTHROSCOPY Right     LAPAROSCOPIC GASTRIC BANDING      LYMPHADENECTOMY Left     MASTECTOMY Left       Home Meds:  Facility-Administered Medications Prior to Admission   Medication Dose Route Frequency Provider Last Rate Last Admin    triamcinolone acetonide (KENALOG-40) injection 80 mg  80 mg Intra-articular Once Sravan Yanez MD         Medications Prior to Admission   Medication Sig Dispense Refill Last Dose    albuterol (PROVENTIL HFA;VENTOLIN HFA) 108 (90 BASE) MCG/ACT inhaler Inhale 2 puffs Every 4 (Four) Hours As Needed for wheezing or shortness of air. 1 inhaler 0 Past Week    allopurinol (ZYLOPRIM) 100 MG tablet Take 1 tablet by mouth Daily.   6/5/2023    apixaban (ELIQUIS) 5 MG tablet tablet Take 1 tablet by mouth 2 (Two) Times a Day. 60 tablet 0 6/5/2023    atorvastatin (LIPITOR) 10 MG tablet Take 1 tablet by mouth Daily.   6/5/2023    busPIRone (BUSPAR) 5 MG tablet Take 1 tablet by mouth 3 (Three) Times a Day.   Past Month    Dextromethorphan-guaiFENesin 5-100 MG/5ML liquid Take 10 mL by mouth 4 (Four) Times a Day As Needed.   Past Month    Diclofenac Sodium 1 % cream Apply 4 g topically 2 (Two) Times a Day As Needed.   Past Week    docusate sodium (COLACE) 100 MG capsule Take 1 capsule by mouth 2 (Two) Times a Day.   6/5/2023    DULoxetine (CYMBALTA) 60 MG capsule Take 1 capsule by mouth Daily.   Past Week    gabapentin (NEURONTIN) 100 MG capsule Take 2 capsules by mouth 2 (Two) Times a Day.   6/5/2023    insulin aspart (NovoLOG FlexPen) 100 UNIT/ML solution pen-injector sc pen Novolog FlexPen U-100 Insulin aspart 100 unit/mL (3 mL) subcutaneous   Sliding scale.   6/5/2023    insulin detemir (LEVEMIR) 100 UNIT/ML injection  Inject 20 Units under the skin into the appropriate area as directed Every Night.   2023    aspirin 81 MG chewable tablet Chew 1 tablet Daily. (Patient not taking: Reported on 2023)   More than a month    miconazole (MICOTIN) 2 % powder Apply 1 application topically to the appropriate area as directed 2 (Two) Times a Day.   More than a month    torsemide 60 MG tablet Take 60 mg by mouth 2 (Two) Times a Day for 14 days. (Patient not taking: Reported on 2023) 28 tablet 0     Trelegy Ellipta 100-62.5-25 MCG/INH inhaler    More than a month       Allergies:  Allergies   Allergen Reactions    Ciprofloxacin      Immunizations:  Immunization History   Administered Date(s) Administered    Fluzone High Dose =>65 Years ([employer] ONLY) 2016     Social History:   Social History     Social History Narrative    Not on file     Social History     Socioeconomic History    Marital status:    Tobacco Use    Smoking status: Former   Vaping Use    Vaping Use: Never used   Substance and Sexual Activity    Alcohol use: No    Drug use: No    Sexual activity: Defer       Family History:  Family History   Problem Relation Age of Onset    Stroke Mother     Leukemia Father     COPD Sister     ALS Brother         Review of Systems  See history of present illness and past medical history.  Patient denies fever chills night sweats nausea vomiting diarrhea abdominal pain.  Denies any confusion but admits to dizziness.  Denies any complete loss of function or syncope.  Denies chest pain palpitation cough shortness of breath above baseline.  Denies any dysuria or discharge or changes to her urination.  Remainder of ROS is negative.    Objective:  T Max 24 hrs: Temp (24hrs), Av.7 °F (36.5 °C), Min:97.2 °F (36.2 °C), Max:98.1 °F (36.7 °C)    Vitals Ranges:   Temp:  [97.2 °F (36.2 °C)-98.1 °F (36.7 °C)] 97.5 °F (36.4 °C)  Heart Rate:  [60-98] 60  Resp:  [16-20] 16  BP: (105-124)/(67-81) 106/68      Exam:  /68 (BP  "Location: Right arm, Patient Position: Lying)   Pulse 60   Temp 97.5 °F (36.4 °C) (Oral)   Resp 16   Ht 167.6 cm (66\")   Wt 129 kg (285 lb 7.9 oz)   SpO2 94%   BMI 46.08 kg/m²     General Appearance:    Alert, cooperative, seemingly at baseline, conversational with fluent speech and pleasant, no family at bedside, patient certainly not infectious or toxic in appearance   Head:    Normocephalic, without obvious abnormality, atraumatic   Eyes:    PERRL, conjunctivae/corneas clear, EOM's intact, both eyes   Ears:    Normal external ear canals, both ears   Nose:   Nares normal, septum midline, mucosa normal, no drainage    or sinus tenderness   Throat:   Lips, mucosa, and tongue normal   Neck:   Supple, no meningismus or JVD       Lungs:   Mild decrease in bases otherwise clear to auscultation bilaterally, respirations unlabored   Chest Wall:    No tenderness or deformity    Heart:    Regular rate and rhythm, S1 and S2 normal   Abdomen:     Soft, nontender, bowel sounds active all four quadrants, M0   Extremities: Chronic lymphedema to upper and lower extremities, left lower extremity has a small lesion on the posterior aspect present prior to admission but I cannot appreciate any secondary cellulitis such as warmth fluctuation or induration   Pulses:   2+ and symmetric all extremities           Neurologic:   CNII-XII intact, reviewed all with decent strength while in bed, no obvious focal deficit      .    Data Review:  Labs in chart were reviewed.    Results from last 7 days   Lab Units 06/05/23 1946   TSH uIU/mL 8.770*   FREE T4 ng/dL 1.14          Imaging Results (All)       Procedure Component Value Units Date/Time    XR Chest 1 View [[id number]] Collected: 06/05/23 2023     Updated: 06/05/23 2029    Narrative:      XR CHEST 1 VW-, XR KNEE 1 OR 2 VW RIGHT-, XR PELVIS 1 OR 2 VW-clinical:  Fell with multifocal pain     Chest comparison 01/21/2023     FINDINGS: Transvenous pacemaker in position as before. There " is cardiac  enlargement. There is atherosclerotic calcification of the aorta. No  pleural effusion, pneumothorax, pulmonary edema or lung consolidation  seen.     CONCLUSION: Cardiomegaly     AP pelvis findings: Bilateral hip joint narrowing which is symmetric. No  hip or pelvic fracture. Degenerative change noted about the symphysis  pubis. Soft tissues unremarkable.     CONCLUSION: No acute osseous abnormality is demonstrated.     Right knee findings: There is medial compartment joint space loss,  lateral compartment with preserved. There is narrowing of the  patellofemoral articulation. There is a spur arising from the anterior  distal femoral metaphysis. No joint effusion, fracture or dislocation  seen.     CONCLUSION: No joint effusion nor acute osseous abnormality, joint  degeneration as described above.     This report was finalized on 6/5/2023 8:26 PM by Dr. Zeb Reyes M.D.       XR Knee 1 or 2 View Right [[id number]] Collected: 06/05/23 2023     Updated: 06/05/23 2029    Narrative:      XR CHEST 1 VW-, XR KNEE 1 OR 2 VW RIGHT-, XR PELVIS 1 OR 2 VW-clinical:  Fell with multifocal pain     Chest comparison 01/21/2023     FINDINGS: Transvenous pacemaker in position as before. There is cardiac  enlargement. There is atherosclerotic calcification of the aorta. No  pleural effusion, pneumothorax, pulmonary edema or lung consolidation  seen.     CONCLUSION: Cardiomegaly     AP pelvis findings: Bilateral hip joint narrowing which is symmetric. No  hip or pelvic fracture. Degenerative change noted about the symphysis  pubis. Soft tissues unremarkable.     CONCLUSION: No acute osseous abnormality is demonstrated.     Right knee findings: There is medial compartment joint space loss,  lateral compartment with preserved. There is narrowing of the  patellofemoral articulation. There is a spur arising from the anterior  distal femoral metaphysis. No joint effusion, fracture or dislocation  seen.     CONCLUSION: No  joint effusion nor acute osseous abnormality, joint  degeneration as described above.     This report was finalized on 6/5/2023 8:26 PM by Dr. Zeb Reyes M.D.       XR Pelvis 1 or 2 View [[id number]] Collected: 06/05/23 2023     Updated: 06/05/23 2029    Narrative:      XR CHEST 1 VW-, XR KNEE 1 OR 2 VW RIGHT-, XR PELVIS 1 OR 2 VW-clinical:  Fell with multifocal pain     Chest comparison 01/21/2023     FINDINGS: Transvenous pacemaker in position as before. There is cardiac  enlargement. There is atherosclerotic calcification of the aorta. No  pleural effusion, pneumothorax, pulmonary edema or lung consolidation  seen.     CONCLUSION: Cardiomegaly     AP pelvis findings: Bilateral hip joint narrowing which is symmetric. No  hip or pelvic fracture. Degenerative change noted about the symphysis  pubis. Soft tissues unremarkable.     CONCLUSION: No acute osseous abnormality is demonstrated.     Right knee findings: There is medial compartment joint space loss,  lateral compartment with preserved. There is narrowing of the  patellofemoral articulation. There is a spur arising from the anterior  distal femoral metaphysis. No joint effusion, fracture or dislocation  seen.     CONCLUSION: No joint effusion nor acute osseous abnormality, joint  degeneration as described above.     This report was finalized on 6/5/2023 8:26 PM by Dr. Zeb Reyes M.D.       CT Head Without Contrast [[id number]] Collected: 06/05/23 2016     Updated: 06/05/23 2020    Narrative:      CT OF THE BRAIN WITHOUT CONTRAST 06/05/2023     HISTORY: Fell, head injury.     Axial images were obtained through the brain without intravenous  contrast.     There is mild-to-moderate diffuse atrophy. There is decreased  attenuation of the periventricular white matter bilaterally consistent  with mild small vessel white matter ischemic disease. There is some  calcification of the distal right vertebral artery. There is no evidence  of acute infarction,  hemorrhage, midline shift or mass effect.     No bony abnormalities are seen.       Impression:      1. No acute process.     Radiation dose reduction techniques were utilized, including automated  exposure control and exposure modulation based on body size.     This report was finalized on 6/5/2023 8:17 PM by Dr. Kasi Gracia M.D.                 Assessment:  Active Hospital Problems    Diagnosis  POA    **Acute UTI (urinary tract infection) [N39.0]  Unknown    Lymphedema [I89.0]  Unknown    Chronic systolic CHF (congestive heart failure) [I50.22]  Yes    PAULETTE (obstructive sleep apnea) [G47.33]  Yes    Stage 3b chronic kidney disease [N18.32]  Yes    Type 2 diabetes mellitus, with long-term current use of insulin [E11.9, Z79.4]  Not Applicable    PAF (paroxysmal atrial fibrillation) [I48.0]  Yes    Chronic anticoagulation [Z79.01]  Not Applicable    COPD (chronic obstructive pulmonary disease) [J44.9]  Yes    Hypertension [I10]  Yes      Resolved Hospital Problems   No resolved problems to display.       Plan:    I do not feel that cellulitis of her left lower extremity is all that impressive.  I think she has changes consistent with lymphedema and she does have a small wound to the posterior aspect of that left lower extremity but there is no warmth induration or fluctuation consistent with abscess.  I am going to adjust antibiotics to Rocephin to better give urinary tract infectious coverage as her UA is abnormal but again is not the most impressive we will keep antibiotic duration to minimum 3 days at this point.  Will ask wound care to see   -I do not feel the slight elevated lactic acid is clinically significant and I do not feel sepsis is present and no plans to further trend in the initiation of antibiotics as lab value as likely influenced by CKD as well as diabetes    Her main complaint in admission is dizziness and if she possibly does have this UTI that could be an influential issue.  Her dizziness  seems to be persistent though currently not present.  Will ask neuro to evaluate.  CT of the head shows no acute abnormalities   -No reported hypoglycemia though chronic hyperglycemia and deconditioning/morbid obesity could be contributory    Paroxysmal A-fib-RC-AC with Eliquis which will be continued so no additional prophylaxis needed    CKD 3B creatinine overall stable.  Even given patient's lymphedema, I am not so sure that she seems all that volume overloaded.  Formally on torsemide though not utilized at this point.  I am going to hold any diuretic today and monitor for now.  Diuretic actually not listed on her current MAR.  Review of previous echo from 1/23/2023 demonstrates normal EF but shows grade 1 diastolic dysfunction as well as wall motion abnormalities   -Trend BMP and check uric with a.m. labs and consider reinstatement of diuretic    PAULETTE-okay with home use of CPAP    HTN stable continue medications    MAR reconciled    Generalized anxiety on BuSpar/Cymbalta    HLD on statin    DM2 with CKD -continue basal insulin with sliding scale in addition        Further recommendations to follow as clinical course unfolds        Addendum -Case discussed with Dr. Liao later in the day and appreciate his input and assistance.  He learned from the spouse that the patient had recently been on increased level of diuretics due to worsening lymphedema but these diuretics had recently been stopped prior to admission as admission MAR stated not taking.  This definitely could contribute to her aspects of dizziness especially if she was overdiuresed.  I agree with Dr. Liao checking orthostatics and I have a uric acid level pending with repeat BMP in a.m. and diuretics remain on hold with further recommendations to follow.  Otherwise no additional neuro imaging to pursue/suggested -CT head was negative for acute disease.    Curt Dias MD  6/6/2023  08:13 EDT

## 2023-06-06 NOTE — NURSING NOTE
"CWON note: pt seen for evaluation of LLE. Pt with known lymphedema, she has used lymphedema wraps in the past and states \"they don't work\". She is refusing to have lymphedema wraps done here as well. LLE edematous with erythema, small superficial abrasion to medial/ posterior calf approximately 0.2x 0.3cms, no drainage. Optifoam dressing placed for protection.  Gen heels are negative for breakdown or erythema. Prevention standing orders placed into EPIC. Please re-consult for any additional needs.   "

## 2023-06-06 NOTE — CONSULTS
"Neurology Consult Note    Consult Date: 6/6/2023    Referring MD: Curt Dias MD    Reason for Consult I have been asked to see the patient in neurological consultation to render advice and opinion regarding dizziness    Aydee Solis is a 72 y.o. female with obesity, COPD on home oxygen, bilateral lower extremity lymphedema, diabetic peripheral neuropathy, insulin-dependent diabetes.    Has been reports to me that about a month ago her torsemide was increased with a goal of reducing lower extremity swelling.  This was quite effective over the last month but after that change was made it seems that she developed persistent orthostatic dizziness.  She describes this as a sense of lightheadedness without clear associated vertigo.  She has a tendency to fall backward and there have been several falls in the last month.   also has noticed some intermittent generalized tremor of the body and extremities.  Symptoms have overall been pretty stable since coming to the hospital.    Past Medical History:   Diagnosis Date    Arthritis     Breast cancer     Cancer     Class 3 severe obesity due to excess calories with body mass index (BMI) of 50.0 to 59.9 in adult     COPD (chronic obstructive pulmonary disease)     Diabetes mellitus     EDWARDS (dyspnea on exertion)     Elephantiasis     left leg    Hyperlipidemia     Hypertension     Leukemia     Lung cancer     Lymphedema     left arm    Osteoporosis     Tracheal cancer        Exam  /68 (BP Location: Right arm, Patient Position: Lying)   Pulse 61   Temp 97.3 °F (36.3 °C) (Oral)   Resp 16   Ht 167.6 cm (66\")   Wt 129 kg (285 lb 7.9 oz)   SpO2 95%   BMI 46.08 kg/m²   Gen: NAD, vitals reviewed  MS: oriented x3, recent/remote memory intact, mildly impaired attention/concentration, language intact, no neglect.  CN: visual acuity grossly normal, PERRL, EOMI, no facial droop, no dysarthria  Motor: 5/5 throughout upper extremities, 3/5 bilateral lower " extremities, normal tone throughout.  Mild to moderate generalized asterixis  Sensory: Diminished cold temperature and vibration bilateral lower extremities  Reflexes: Absent throughout    DATA:    Lab Results   Component Value Date    GLUCOSE 233 (H) 06/06/2023    CALCIUM 9.1 06/06/2023     (L) 06/06/2023    K 3.5 06/06/2023    CO2 31.9 (H) 06/06/2023    CL 89 (L) 06/06/2023    BUN 49 (H) 06/06/2023    CREATININE 1.35 (H) 06/06/2023    EGFRIFNONA 39 (L) 01/05/2021    BCR 36.3 (H) 06/06/2023    ANIONGAP 13.1 06/06/2023     Lab Results   Component Value Date    WBC 8.17 06/06/2023    HGB 13.8 06/06/2023    HCT 44.5 06/06/2023    MCV 81.7 06/06/2023     06/06/2023       Lab review: Sodium 133, GFR 39, CBC normal    Imaging review: No head imaging ordered    Diagnoses:  Dizziness, orthostatic  Asterixis  COPD  Insulin-dependent diabetes with diabetic peripheral neuropathy  Bilateral lower extremity lymphedema    Comment: 72-year-old female with complicated medical history presenting with predominantly orthostatic dizziness for about a month.  Symptoms may correlate to a change in diuretic dosing related to lower extremity edema.  Exam is mostly unremarkable apart from fairly noticeable asterixis which is a finding of unclear significance but may well be contributing to her symptoms as well.  This is most likely attributable to combination of COPD and gabapentin in the setting of renal insufficiency.    PLAN:  Check orthostatics, ?  Reduce diuretic dosing  Reduce gabapentin slightly to 100mg 3 times daily which might work better with her current renal function    Management discussed with Dr. Dias

## 2023-06-06 NOTE — PLAN OF CARE
Goal Outcome Evaluation:      Rocephin IV for possible UTI. 2.5L O2 NC. Pt refusing SCDs. BLE dusky/red and 3+ edema. IV zofran for nausea. ACHS accucheck with SSI. Bed alarm in place and falls precautions in place.

## 2023-06-07 PROBLEM — F11.20 OPIOID DEPENDENCE: Status: ACTIVE | Noted: 2023-06-07

## 2023-06-07 LAB
ANION GAP SERPL CALCULATED.3IONS-SCNC: 10.9 MMOL/L (ref 5–15)
BUN SERPL-MCNC: 34 MG/DL (ref 8–23)
BUN/CREAT SERPL: 28.8 (ref 7–25)
CALCIUM SPEC-SCNC: 9 MG/DL (ref 8.6–10.5)
CHLORIDE SERPL-SCNC: 91 MMOL/L (ref 98–107)
CO2 SERPL-SCNC: 32.1 MMOL/L (ref 22–29)
CREAT SERPL-MCNC: 1.18 MG/DL (ref 0.57–1)
EGFRCR SERPLBLD CKD-EPI 2021: 48.6 ML/MIN/1.73
GLUCOSE BLDC GLUCOMTR-MCNC: 130 MG/DL (ref 70–130)
GLUCOSE BLDC GLUCOMTR-MCNC: 184 MG/DL (ref 70–130)
GLUCOSE BLDC GLUCOMTR-MCNC: 199 MG/DL (ref 70–130)
GLUCOSE BLDC GLUCOMTR-MCNC: 246 MG/DL (ref 70–130)
GLUCOSE SERPL-MCNC: 145 MG/DL (ref 65–99)
POTASSIUM SERPL-SCNC: 3.8 MMOL/L (ref 3.5–5.2)
SODIUM SERPL-SCNC: 134 MMOL/L (ref 136–145)
URATE SERPL-MCNC: 10.2 MG/DL (ref 2.4–5.7)

## 2023-06-07 PROCEDURE — 99233 SBSQ HOSP IP/OBS HIGH 50: CPT | Performed by: NURSE PRACTITIONER

## 2023-06-07 PROCEDURE — 82948 REAGENT STRIP/BLOOD GLUCOSE: CPT

## 2023-06-07 PROCEDURE — 80048 BASIC METABOLIC PNL TOTAL CA: CPT | Performed by: HOSPITALIST

## 2023-06-07 PROCEDURE — 63710000001 INSULIN LISPRO (HUMAN) PER 5 UNITS: Performed by: NURSE PRACTITIONER

## 2023-06-07 PROCEDURE — 84550 ASSAY OF BLOOD/URIC ACID: CPT | Performed by: HOSPITALIST

## 2023-06-07 PROCEDURE — 25010000002 CEFTRIAXONE PER 250 MG: Performed by: HOSPITALIST

## 2023-06-07 RX ORDER — TORSEMIDE 100 MG/1
100 TABLET ORAL DAILY
Status: DISCONTINUED | OUTPATIENT
Start: 2023-06-07 | End: 2023-06-08 | Stop reason: HOSPADM

## 2023-06-07 RX ORDER — HYDROCODONE BITARTRATE AND ACETAMINOPHEN 10; 325 MG/1; MG/1
1 TABLET ORAL EVERY 8 HOURS PRN
COMMUNITY
Start: 2023-04-12

## 2023-06-07 RX ORDER — HYDROCODONE BITARTRATE AND ACETAMINOPHEN 10; 325 MG/1; MG/1
1 TABLET ORAL EVERY 8 HOURS PRN
Status: DISCONTINUED | OUTPATIENT
Start: 2023-06-07 | End: 2023-06-08 | Stop reason: HOSPADM

## 2023-06-07 RX ADMIN — ALLOPURINOL 100 MG: 100 TABLET ORAL at 08:08

## 2023-06-07 RX ADMIN — HYDROCODONE BITARTRATE AND ACETAMINOPHEN 1 TABLET: 10; 325 TABLET ORAL at 22:03

## 2023-06-07 RX ADMIN — INSULIN LISPRO 2 UNITS: 100 INJECTION, SOLUTION INTRAVENOUS; SUBCUTANEOUS at 18:25

## 2023-06-07 RX ADMIN — GABAPENTIN 100 MG: 100 CAPSULE ORAL at 22:03

## 2023-06-07 RX ADMIN — DOCUSATE SODIUM 100 MG: 100 CAPSULE, LIQUID FILLED ORAL at 22:02

## 2023-06-07 RX ADMIN — APIXABAN 5 MG: 5 TABLET, FILM COATED ORAL at 08:08

## 2023-06-07 RX ADMIN — BUSPIRONE HYDROCHLORIDE 5 MG: 5 TABLET ORAL at 08:08

## 2023-06-07 RX ADMIN — INSULIN LISPRO 3 UNITS: 100 INJECTION, SOLUTION INTRAVENOUS; SUBCUTANEOUS at 11:51

## 2023-06-07 RX ADMIN — BUSPIRONE HYDROCHLORIDE 5 MG: 5 TABLET ORAL at 22:02

## 2023-06-07 RX ADMIN — DULOXETINE HYDROCHLORIDE 60 MG: 60 CAPSULE, DELAYED RELEASE ORAL at 08:08

## 2023-06-07 RX ADMIN — ATORVASTATIN CALCIUM 10 MG: 20 TABLET, FILM COATED ORAL at 08:08

## 2023-06-07 RX ADMIN — Medication 10 ML: at 22:05

## 2023-06-07 RX ADMIN — Medication 10 ML: at 08:11

## 2023-06-07 RX ADMIN — TORSEMIDE 100 MG: 100 TABLET ORAL at 12:33

## 2023-06-07 RX ADMIN — DOCUSATE SODIUM 50MG AND SENNOSIDES 8.6MG 2 TABLET: 8.6; 5 TABLET, FILM COATED ORAL at 08:08

## 2023-06-07 RX ADMIN — GABAPENTIN 100 MG: 100 CAPSULE ORAL at 08:08

## 2023-06-07 RX ADMIN — INSULIN GLARGINE-YFGN 20 UNITS: 100 INJECTION, SOLUTION SUBCUTANEOUS at 22:03

## 2023-06-07 RX ADMIN — APIXABAN 5 MG: 5 TABLET, FILM COATED ORAL at 22:02

## 2023-06-07 RX ADMIN — BUSPIRONE HYDROCHLORIDE 5 MG: 5 TABLET ORAL at 16:08

## 2023-06-07 RX ADMIN — INSULIN LISPRO 2 UNITS: 100 INJECTION, SOLUTION INTRAVENOUS; SUBCUTANEOUS at 22:03

## 2023-06-07 RX ADMIN — GABAPENTIN 100 MG: 100 CAPSULE ORAL at 16:08

## 2023-06-07 RX ADMIN — HYDROCODONE BITARTRATE AND ACETAMINOPHEN 1 TABLET: 10; 325 TABLET ORAL at 02:08

## 2023-06-07 RX ADMIN — DOCUSATE SODIUM 100 MG: 100 CAPSULE, LIQUID FILLED ORAL at 08:08

## 2023-06-07 RX ADMIN — HYDROCODONE BITARTRATE AND ACETAMINOPHEN 1 TABLET: 10; 325 TABLET ORAL at 12:32

## 2023-06-07 RX ADMIN — CEFTRIAXONE 2 G: 2 INJECTION, POWDER, FOR SOLUTION INTRAMUSCULAR; INTRAVENOUS at 08:11

## 2023-06-07 NOTE — PLAN OF CARE
Goal Outcome Evaluation:  Plan of Care Reviewed With: patient        Progress: no change  Outcome Evaluation: denies any dizzness. will do orthostatics this am.  Lortab given for back pain.  oxygen at 3L.  blood sugar monitorng. insulin given last night.  Purewick in place.  falls precaution maintained.

## 2023-06-07 NOTE — PROGRESS NOTES
DOS: 2023  NAME: Aydee Solis   : 1950  PCP: Bennett Duque DO    Chief Complaint   Patient presents with    Fall        Subjective: Pt seen in follow up, however the problem is new to me.  Patient lying in bed resting comfortably with her  at bedside.  States her dizziness and shaking episodes have resolved.  She denies any new weakness, numbness, speech or visual disturbances, or headaches.    Objective:  Vital signs:      Vitals:    23 0552 23 0553 23 0556 23 0915   BP: 131/74 128/75 137/82 121/73   BP Location: Right arm Right arm Right arm Right arm   Patient Position: Lying Standing Standing Lying   Pulse: 70 68 67 71   Resp: 16 16 16 16   Temp: 97 °F (36.1 °C)   97.1 °F (36.2 °C)   TempSrc: Oral   Oral   SpO2: 96% 95% 92% 98%   Weight:       Height:           Current Facility-Administered Medications:     acetaminophen (TYLENOL) tablet 650 mg, 650 mg, Oral, Q4H PRN **OR** acetaminophen (TYLENOL) 160 MG/5ML solution 650 mg, 650 mg, Oral, Q4H PRN **OR** acetaminophen (TYLENOL) suppository 650 mg, 650 mg, Rectal, Q4H PRN, Crystal Green APRN    albuterol (PROVENTIL) nebulizer solution 0.083% 2.5 mg/3mL, 2.5 mg, Nebulization, Q6H PRN, Curt Dias MD    allopurinol (ZYLOPRIM) tablet 100 mg, 100 mg, Oral, Daily, Curt Dias MD, 100 mg at 23 0808    apixaban (ELIQUIS) tablet 5 mg, 5 mg, Oral, BID, Curt Dias MD, 5 mg at 23 0808    atorvastatin (LIPITOR) tablet 10 mg, 10 mg, Oral, Daily, Curt Dias MD, 10 mg at 23 0808    sennosides-docusate (PERICOLACE) 8.6-50 MG per tablet 2 tablet, 2 tablet, Oral, BID, 2 tablet at 23 0808 **AND** polyethylene glycol (MIRALAX) packet 17 g, 17 g, Oral, Daily PRN **AND** bisacodyl (DULCOLAX) EC tablet 5 mg, 5 mg, Oral, Daily PRN **AND** bisacodyl (DULCOLAX) suppository 10 mg, 10 mg, Rectal, Daily PRN, Crystal Green, TAMANNA    busPIRone (BUSPAR) tablet 5 mg, 5  mg, Oral, TID, Curt Dias MD, 5 mg at 06/07/23 0808    cefTRIAXone (ROCEPHIN) 2 g in sodium chloride 0.9 % 100 mL IVPB-VTB, 2 g, Intravenous, Q24H, Curt Dias MD, Last Rate: 200 mL/hr at 06/07/23 0811, 2 g at 06/07/23 0811    dextrose (D50W) (25 g/50 mL) IV injection 25 g, 25 g, Intravenous, Q15 Min PRN, Crystal Green APRN    dextrose (GLUTOSE) oral gel 15 g, 15 g, Oral, Q15 Min PRN, Crystal Green APRN    docusate sodium (COLACE) capsule 100 mg, 100 mg, Oral, BID, Curt Dias MD, 100 mg at 06/07/23 0808    DULoxetine (CYMBALTA) DR capsule 60 mg, 60 mg, Oral, Daily, Curt Dias MD, 60 mg at 06/07/23 0808    gabapentin (NEURONTIN) capsule 100 mg, 100 mg, Oral, TID, Myles Liao MD, 100 mg at 06/07/23 0808    glucagon (GLUCAGEN) injection 1 mg, 1 mg, Intramuscular, Q15 Min PRN, Crystal Green APRN    HYDROcodone-acetaminophen (NORCO)  MG per tablet 1 tablet, 1 tablet, Oral, Q8H PRN, Crystal Green APRN, 1 tablet at 06/07/23 1232    insulin glargine (LANTUS, SEMGLEE) injection 20 Units, 20 Units, Subcutaneous, Nightly, Curt Dias MD, 20 Units at 06/06/23 2225    insulin lispro (HUMALOG/ADMELOG) injection 2-7 Units, 2-7 Units, Subcutaneous, 4x Daily AC & at Bedtime, Crystal Green APRN, 3 Units at 06/07/23 1151    ondansetron (ZOFRAN) injection 4 mg, 4 mg, Intravenous, Q6H PRN, Crystal Green APRN, 4 mg at 06/06/23 1142    [COMPLETED] Insert Peripheral IV, , , Once **AND** sodium chloride 0.9 % flush 10 mL, 10 mL, Intravenous, PRN, Perry Batres II, MD    sodium chloride 0.9 % flush 10 mL, 10 mL, Intravenous, Q12H, Crystal Green APRN, 10 mL at 06/07/23 0811    sodium chloride 0.9 % flush 10 mL, 10 mL, Intravenous, PRN, Crystal Green APRN    sodium chloride 0.9 % infusion 40 mL, 40 mL, Intravenous, PRN, Crystal Green, APRANNA    torsemide (DEMADEX) tablet 100 mg, 100 mg, Oral, Daily, Kirt,  Roldan Casper MD, 100 mg at 06/07/23 1233    PRN meds    acetaminophen **OR** acetaminophen **OR** acetaminophen    albuterol    senna-docusate sodium **AND** polyethylene glycol **AND** bisacodyl **AND** bisacodyl    dextrose    dextrose    glucagon (human recombinant)    HYDROcodone-acetaminophen    ondansetron    [COMPLETED] Insert Peripheral IV **AND** sodium chloride    sodium chloride    sodium chloride    No current facility-administered medications on file prior to encounter.     Current Outpatient Medications on File Prior to Encounter   Medication Sig    albuterol (PROVENTIL HFA;VENTOLIN HFA) 108 (90 BASE) MCG/ACT inhaler Inhale 2 puffs Every 4 (Four) Hours As Needed for wheezing or shortness of air.    allopurinol (ZYLOPRIM) 100 MG tablet Take 1 tablet by mouth Daily.    apixaban (ELIQUIS) 5 MG tablet tablet Take 1 tablet by mouth 2 (Two) Times a Day.    atorvastatin (LIPITOR) 10 MG tablet Take 1 tablet by mouth Daily.    busPIRone (BUSPAR) 5 MG tablet Take 1 tablet by mouth 3 (Three) Times a Day.    Dextromethorphan-guaiFENesin 5-100 MG/5ML liquid Take 10 mL by mouth 4 (Four) Times a Day As Needed.    Diclofenac Sodium 1 % cream Apply 4 g topically 2 (Two) Times a Day As Needed.    docusate sodium (COLACE) 100 MG capsule Take 1 capsule by mouth 2 (Two) Times a Day.    DULoxetine (CYMBALTA) 60 MG capsule Take 1 capsule by mouth Daily.    gabapentin (NEURONTIN) 100 MG capsule Take 2 capsules by mouth 2 (Two) Times a Day.    HYDROcodone-acetaminophen (NORCO)  MG per tablet 1 tablet Every 8 (Eight) Hours As Needed for Moderate Pain.    insulin aspart (NovoLOG FlexPen) 100 UNIT/ML solution pen-injector sc pen Novolog FlexPen U-100 Insulin aspart 100 unit/mL (3 mL) subcutaneous   Sliding scale.    insulin detemir (LEVEMIR) 100 UNIT/ML injection Inject 20 Units under the skin into the appropriate area as directed Every Night.    aspirin 81 MG chewable tablet Chew 1 tablet Daily. (Patient not taking:  Reported on 5/30/2023)    miconazole (MICOTIN) 2 % powder Apply 1 application topically to the appropriate area as directed 2 (Two) Times a Day.    torsemide 60 MG tablet Take 60 mg by mouth 2 (Two) Times a Day for 14 days. (Patient not taking: Reported on 5/30/2023)    Trelegy Ellipta 100-62.5-25 MCG/INH inhaler        General appearance: Morbidly obese female, NAD, alert and cooperative  HEENT: Normocephalic, atraumatic  Resp: Even and unlabored    Neurological:   MS: oriented x3, normal attention/concentration, language intact, no neglect, normal fund of knowledge  CN: visual fields full, EOMI, facial sensation equal, no facial droop, tongue midline  Motor: 5/5 upper extremities, 3/5 BLE against resistance, able to keep up both lower extremities against gravity for 3-5 seconds  Sensory: light touch sensation intact in all 4 ext.  Coordination: Normal finger to nose test  Gait and station: Deferred  Rapid alternating movements: normal finger to thumb tap    Laboratory results:  Lab Results   Component Value Date    TSH 8.770 (H) 06/05/2023     Lab Results   Component Value Date    HGBA1C 7.2 (H) 11/11/2020     No results found for: [id number]  Lab Results   Component Value Date    CHLPL 92 03/02/2023    CHLPL 141 08/27/2019    CHLPL 111 12/21/2018     Lab Results   Component Value Date    TRIG 51 03/02/2023    TRIG 86 08/27/2019    TRIG 60 12/21/2018     Lab Results   Component Value Date    HDL 22 (L) 03/02/2023    HDL 71 08/27/2019    HDL 56 12/21/2018     Lab Results   Component Value Date    LDL 53 08/27/2019    LDL 43 12/21/2018     Lab Results   Component Value Date    WBC 8.17 06/06/2023    HGB 13.8 06/06/2023    HCT 44.5 06/06/2023    MCV 81.7 06/06/2023     06/06/2023     Lab Results   Component Value Date    GLUCOSE 145 (H) 06/07/2023    BUN 34 (H) 06/07/2023    CREATININE 1.18 (H) 06/07/2023    EGFRIFNONA 39 (L) 01/05/2021    BCR 28.8 (H) 06/07/2023    K 3.8 06/07/2023    CO2 32.1 (H) 06/07/2023     CALCIUM 9.0 06/07/2023    ALBUMIN 3.5 06/05/2023    LABIL2 1.2 05/04/2022    AST 47 (H) 06/05/2023    ALT 26 06/05/2023     Lab Results   Component Value Date    PTT 39.6 (H) 12/12/2022     Lab Results   Component Value Date    INR 2.34 (H) 06/06/2023    INR 2.35 (H) 01/26/2023    INR 3.2 12/12/2022    PROTIME 26.1 (H) 06/06/2023    PROTIME 26.1 (H) 01/26/2023    PROTIME 37.3 (H) 12/12/2022     Brief Urine Lab Results  (Last result in the past 365 days)        Color   Clarity   Blood   Leuk Est   Nitrite   Protein   CREAT   Urine HCG        06/05/23 1944 Dark Yellow   Cloudy   Negative   Trace   Negative   Trace                   Review and interpretation of imaging:  CT Head Without Contrast    Result Date: 6/5/2023  1. No acute process.  Radiation dose reduction techniques were utilized, including automated exposure control and exposure modulation based on body size.  This report was finalized on 6/5/2023 8:17 PM by Dr. Kasi Gracia M.D.     Results for orders placed during the hospital encounter of 01/21/23    Adult Transthoracic Echo Complete W/ Cont if Necessary Per Protocol    Interpretation Summary    Left ventricular systolic function is mildly decreased. Calculated left ventricular EF = 42.8% Left ventricular ejection fraction appears to be 41 - 45%.    The following left ventricular wall segments are hypokinetic: mid anterior, apical anterior, basal anterolateral, mid anterolateral, apical lateral, basal inferolateral, mid inferolateral, apical inferior, mid inferior, apical septal, basal inferoseptal, mid inferoseptal, apex hypokinetic, mid anteroseptal, basal anterior, basal inferior and basal inferoseptal.    Left ventricular diastolic function is consistent with (grade I) impaired relaxation.    Mildly reduced right ventricular systolic function noted.    The right ventricular cavity is moderately dilated.    The right atrial cavity is moderately  dilated.    There is moderate, bileaflet mitral  valve thickening present.    Mild mitral valve stenosis is present. The mitral valve peak gradient is 8 mmHg. The mitral valve mean gradient is 3 mmHg.    Estimated right ventricular systolic pressure from tricuspid regurgitation is mildly elevated (35-45 mmHg).      Impression/Assessment:  This is a 72-year-old female with past medical history of breast cancer, COPD, diabetes, hypertension, hyperlipidemia, lymphedema who presented to the hospital on 6/5/2023 with complaints of dizziness described as lightheadedness and suffering a fall at home.  EMS noted that her O2 saturation was in the 80s.  She had recently had her diuretics increased to reduce her lower extremity swelling.  She states she had been having several falls over the last month.  Her and her  also report that they noted a generalized tremor of her upper extremities.  She had a noncontrast CT head that did not reveal any acute intracranial abnormalities.    Diagnosis:  Dizziness and lightheadedness  Asterixis  Diabetic peripheral neuropathy  Hypoxia in the setting of COPD    Plan:  She reports resolution of her dizziness and asterixis.  Noted subtle asterixis movement of her right hand but nothing severe.   agrees this has greatly improved since she has been admitted.  We will continue with reduced dose of gabapentin due to poor renal function at baseline which likely contributed to her asterixis.  Renal is following and addressing diuretic status.  Her TSH was noted to be pretty elevated, primary to address.  We will sign off, will see again per request.    Case discussed with patient, , and Dr. Mac, and she agrees with plan above.     TAMANNA Patino

## 2023-06-07 NOTE — PLAN OF CARE
Goal Outcome Evaluation:  Plan of Care Reviewed With: patient        Progress: improving  Outcome Evaluation: [facility]. Vitals stable and pain si well controlled. All medications given without complications and patient tolerating her diet. Patient voiding well via purwick and all medications given without complications. No other issues this shift.

## 2023-06-07 NOTE — CONSULTS
[facility] Consult Note      Patient Name: Aydee Solis  : 1950  MRN: [id number]  Primary Care Physician:  Bennett Duque DO  Referring Physician: Curt Dias MD  Date of admission: 2023    Subjective     Reason for Consult: Chronic kidney disease stage III with lymphedema    HPI:   Aydee Solis is a 72 y.o. female patient was admitted after a fall on 2023, she had significant lymphedema hence nephrology consult was requested, creatinine on admission was 1.55 down to 1.18 her baseline creatinine around 1.2, she has history of chronic lymphedema, she did not agree to continue to go to the lymphedema clinic has been on torsemide 100 mg daily and recently spironolactone and metolazone were added on 2023 when she saw my partner Dr. Zeb Guallpa in our office  She has history of diabetes mellitus type 2 for the past 20 years at least with known diabetic retinopathy and polyneuropathy, history of breast cancer in the past, obstructive sleep apnea using CPAP for the past 18 months, history of pulmonary hypertension and chronic combined systolic and diastolic dysfunction ejection fraction in 2023 was between 41-45, she had decreased function of the right ventricle also.  History of paroxysmal atrial fibrillation she had pacemaker at present and chronically anticoagulated, history of dyslipidemia and hypertension also history of anxiety and depression, she had degenerative joint disease with prior total knee arthroplasty.    The patient denies orthopnea or PND, denies chest pain, no dyspnea with exertion, no nausea or vomiting, no abdominal pain, no dysuria or gross hematuria, she has massive lower extremity edema.      Review of Systems:   14 point review of systems is otherwise negative except for mentioned above on HPI    Personal History     Past Medical History:   Diagnosis Date    Arthritis     Breast cancer     Cancer     Class 3 severe obesity  due to excess calories with body mass index (BMI) of 50.0 to 59.9 in adult     COPD (chronic obstructive pulmonary disease)     Diabetes mellitus     EDWARDS (dyspnea on exertion)     Elephantiasis     left leg    Hyperlipidemia     Hypertension     Leukemia     Lung cancer     Lymphedema     left arm    Osteoporosis     Tracheal cancer        Past Surgical History:   Procedure Laterality Date    APPENDECTOMY      HYSTERECTOMY      KNEE ARTHROSCOPY Right     LAPAROSCOPIC GASTRIC BANDING      LYMPHADENECTOMY Left     MASTECTOMY Left        Family History: family history includes ALS in her brother; COPD in her sister; Leukemia in her father; Stroke in her mother.    Social History:  reports that she has quit smoking. She does not have any smokeless tobacco history on file. She reports that she does not drink alcohol and does not use drugs.    Home Medications:  Prior to Admission medications    Medication Sig Start Date End Date Taking? Authorizing Provider   albuterol (PROVENTIL HFA;VENTOLIN HFA) 108 (90 BASE) MCG/ACT inhaler Inhale 2 puffs Every 4 (Four) Hours As Needed for wheezing or shortness of air. 1/31/17  Yes Bennett John MD   allopurinol (ZYLOPRIM) 100 MG tablet Take 1 tablet by mouth Daily.   Yes Brock Kenyon MD   apixaban (ELIQUIS) 5 MG tablet tablet Take 1 tablet by mouth 2 (Two) Times a Day. 1/5/21  Yes Camilo Armstrong MD   atorvastatin (LIPITOR) 10 MG tablet Take 1 tablet by mouth Daily. 1/21/22  Yes Brock Kenyon MD   busPIRone (BUSPAR) 5 MG tablet Take 1 tablet by mouth 3 (Three) Times a Day.   Yes Brock Kenyon MD   Dextromethorphan-guaiFENesin 5-100 MG/5ML liquid Take 10 mL by mouth 4 (Four) Times a Day As Needed.   Yes Brock Kenyon MD   Diclofenac Sodium 1 % cream Apply 4 g topically 2 (Two) Times a Day As Needed.   Yes Brock Kenyon MD   docusate sodium (COLACE) 100 MG capsule Take 1 capsule by mouth 2 (Two) Times a Day.   Yes Brock Kenyon MD    DULoxetine (CYMBALTA) 60 MG capsule Take 1 capsule by mouth Daily. 1/21/22  Yes Brock Kenyon MD   gabapentin (NEURONTIN) 100 MG capsule Take 2 capsules by mouth 2 (Two) Times a Day.   Yes Brock Kenyon MD   HYDROcodone-acetaminophen (NORCO)  MG per tablet 1 tablet Every 8 (Eight) Hours As Needed for Moderate Pain. 4/12/23  Yes Brock Kenyon MD   insulin aspart (NovoLOG FlexPen) 100 UNIT/ML solution pen-injector sc pen Novolog FlexPen U-100 Insulin aspart 100 unit/mL (3 mL) subcutaneous   Sliding scale.   Yes Brock Kenyon MD   insulin detemir (LEVEMIR) 100 UNIT/ML injection Inject 20 Units under the skin into the appropriate area as directed Every Night. 2/25/16  Yes Brock Kenyon MD   aspirin 81 MG chewable tablet Chew 1 tablet Daily.  Patient not taking: Reported on 5/30/2023    Brock Kenyon MD   miconazole (MICOTIN) 2 % powder Apply 1 application topically to the appropriate area as directed 2 (Two) Times a Day.    Brock Kenyon MD   torsemide 60 MG tablet Take 60 mg by mouth 2 (Two) Times a Day for 14 days.  Patient not taking: Reported on 5/30/2023 1/31/23 2/14/23  Connor Avery MD   Trelegy Ellipta 100-62.5-25 MCG/INH inhaler  4/18/22   Brock Kenyon MD       Allergies:  Allergies   Allergen Reactions    Ciprofloxacin        Objective     Vitals:   Temp:  [97 °F (36.1 °C)-97.4 °F (36.3 °C)] 97 °F (36.1 °C)  Heart Rate:  [61-70] 67  Resp:  [16] 16  BP: (109-139)/(68-82) 137/82  Flow (L/min):  [3] 3    Intake/Output Summary (Last 24 hours) at 6/7/2023 0956  Last data filed at 6/7/2023 0811  Gross per 24 hour   Intake 100 ml   Output 1050 ml   Net -950 ml       Physical Exam:   Constitutional: Awake, alert, no acute distress.  Chronically ill, morbidly obese  HEENT: Sclera anicteric, no conjunctival injection  Neck: Supple, no thyromegaly, no lymphadenopathy, trachea at midline, no JVD  Respiratory: Clear to auscultation  bilaterally, nonlabored respiration  Cardiovascular: RRR, no murmurs, no rubs or gallops, no carotid bruit  Gastrointestinal: Positive bowel sounds, abdomen is soft, nontender and nondistended  : No palpable bladder  Musculoskeletal: Significant bilateral lower extremity brawny edema, she has edema of the upper extremities too.  Psychiatric: Appropriate affect, cooperative  Neurologic: Oriented x3, moving all extremities, normal speech and mental status  Skin: Warm and dry       Scheduled Meds:     allopurinol, 100 mg, Oral, Daily  apixaban, 5 mg, Oral, BID  atorvastatin, 10 mg, Oral, Daily  busPIRone, 5 mg, Oral, TID  cefTRIAXone, 2 g, Intravenous, Q24H  docusate sodium, 100 mg, Oral, BID  DULoxetine, 60 mg, Oral, Daily  gabapentin, 100 mg, Oral, TID  insulin glargine, 20 Units, Subcutaneous, Nightly  insulin lispro, 2-7 Units, Subcutaneous, 4x Daily AC & at Bedtime  senna-docusate sodium, 2 tablet, Oral, BID  sodium chloride, 10 mL, Intravenous, Q12H      IV Meds:        Results Reviewed:   I have personally reviewed the results from the time of this admission to 6/7/2023 09:56 EDT     Lab Results   Component Value Date    GLUCOSE 145 (H) 06/07/2023    CALCIUM 9.0 06/07/2023     (L) 06/07/2023    K 3.8 06/07/2023    CO2 32.1 (H) 06/07/2023    CL 91 (L) 06/07/2023    BUN 34 (H) 06/07/2023    CREATININE 1.18 (H) 06/07/2023    EGFRIFNONA 39 (L) 01/05/2021    BCR 28.8 (H) 06/07/2023    ANIONGAP 10.9 06/07/2023      Lab Results   Component Value Date    MG 2.5 (H) 06/05/2023    PHOS 3.7 06/05/2023    ALBUMIN 3.5 06/05/2023           Assessment / Plan       Acute UTI (urinary tract infection)    Hypertension    Breast cancer    PAULETTE (obstructive sleep apnea)    Chronic respiratory failure with hypoxia    Stage 3a chronic kidney disease    Type 2 diabetes mellitus, with long-term current use of insulin    PAF (paroxysmal atrial fibrillation)    Chronic anticoagulation    COPD (chronic obstructive pulmonary  disease)    Chronic systolic and diastolic dysfunction (congestive heart failure)    Lymphedema    Dizziness    Opioid dependence      ASSESSMENT:  -Chronic kidney disease stage IIIa associated with diabetic and hypertension, at baseline creatinine today 1.18.  -Chronic lymphedema with difficult to control the patient declined to go to the lymphedema clinic she felt there was no benefit to that.  Has been given diuretics torsemide 100 mg daily and recently metolazone and spironolactone were added.  -Combined systolic and diastolic dysfunction  -Morbid obesity  -Diabetes mellitus type 2 with renal complication  -Diabetic retinopathy  -Obstructive sleep apnea  -History of gout treated with allopurinol  -Anxiety and depression treated  -Peripheral neuropathy treated with gabapentin and the dose has been tried to be minimized since gabapentin teen will lead to increased lower extremity edema.    PLAN:  -Check random urine for protein to creatinine ratio  -Restart torsemide 100 mg daily, will consider restarting metolazone and spironolactone after discharge when she comes to the office in a week to determine her volume status and electrolyte at that time.  -Restrict sodium intake to 2 g  -Surveillance lab    Thank you for involving us in the care of Aydee Solis.  Please feel free to call with any questions.    Roldan Moralez MD  06/07/23  09:56 EDT    [facility]  [phone]      Please note that portions of this note were completed with a voice recognition program.

## 2023-06-07 NOTE — PROGRESS NOTES
Name: Aydee Solis ADMIT: 2023   : 1950  PCP: Neto Bennettshelby Ryan DO    MRN: [id number] LOS: 1 days   AGE/SEX: 72 y.o. female  ROOM: [facility]     Subjective   Subjective   Feeling fine today. No dizziness. No CP or SOA or palp. No wheezing. Voiding fine. Tolerating diet.        Objective   Objective   Vital Signs  Temp:  [97 °F (36.1 °C)-97.4 °F (36.3 °C)] 97 °F (36.1 °C)  Heart Rate:  [61-70] 67  Resp:  [16] 16  BP: (109-139)/(65-82) 137/82  SpO2:  [92 %-96 %] 92 %  on  Flow (L/min):  [2.5-3] 3;   Device (Oxygen Therapy): nasal cannula  Body mass index is 46.08 kg/m².  Physical Exam  Vitals and nursing note reviewed. Exam conducted with a chaperone present (RN).   Constitutional:       General: She is not in acute distress.     Appearance: She is obese. She is ill-appearing (chronically). She is not toxic-appearing or diaphoretic.   HENT:      Head: Normocephalic.      Nose: Nose normal.      Mouth/Throat:      Mouth: Mucous membranes are moist.      Pharynx: Oropharynx is clear.   Eyes:      General: No scleral icterus.        Right eye: No discharge.         Left eye: No discharge.      Extraocular Movements: Extraocular movements intact.      Conjunctiva/sclera: Conjunctivae normal.   Cardiovascular:      Rate and Rhythm: Normal rate and regular rhythm.      Pulses: Normal pulses.      Heart sounds: Murmur heard.   Pulmonary:      Effort: Pulmonary effort is normal. No respiratory distress.      Breath sounds: Normal breath sounds. No wheezing or rales.   Abdominal:      General: Bowel sounds are normal. There is no distension.      Palpations: Abdomen is soft.      Tenderness: There is no abdominal tenderness.   Musculoskeletal:         General: Swelling (3+ in BLEs) present. Normal range of motion.      Cervical back: Neck supple. No rigidity.   Lymphadenopathy:      Cervical: No cervical adenopathy.   Skin:     General: Skin is warm and dry.      Capillary Refill: Capillary refill takes less  than 2 seconds.      Coloration: Skin is not jaundiced.      Comments: Stasis changes of BLEs, at baseline, small wound posterior left calf--dressed   Neurological:      General: No focal deficit present.      Mental Status: She is alert and oriented to person, place, and time. Mental status is at baseline.      Cranial Nerves: No cranial nerve deficit.      Coordination: Coordination normal.   Psychiatric:         Mood and Affect: Mood normal.         Behavior: Behavior normal.         Thought Content: Thought content normal.     Results Review     I reviewed the patient's new clinical results.  Results from last 7 days   Lab Units 06/06/23  0611 06/05/23  1946   WBC 10*3/mm3 8.17 7.73   HEMOGLOBIN g/dL 13.8 14.7   PLATELETS 10*3/mm3 231 242     Results from last 7 days   Lab Units 06/07/23  0631 06/06/23  0611 06/05/23  1946   SODIUM mmol/L 134* 134* 135*   POTASSIUM mmol/L 3.8 3.5 3.9   CHLORIDE mmol/L 91* 89* 88*   CO2 mmol/L 32.1* 31.9* 34.3*   BUN mg/dL 34* 49* 53*   CREATININE mg/dL 1.18* 1.35* 1.55*   GLUCOSE mg/dL 145* 233* 194*   EGFR mL/min/1.73 48.6* 41.3* 35.0*     Results from last 7 days   Lab Units 06/05/23  1946   ALBUMIN g/dL 3.5   BILIRUBIN mg/dL 4.6*   ALK PHOS U/L 131*   AST (SGOT) U/L 47*   ALT (SGPT) U/L 26     Results from last 7 days   Lab Units 06/07/23  0631 06/06/23  0611 06/05/23 1946   CALCIUM mg/dL 9.0 9.1 8.8   ALBUMIN g/dL  --   --  3.5   MAGNESIUM mg/dL  --   --  2.5*   PHOSPHORUS mg/dL  --   --  3.7     Results from last 7 days   Lab Units 06/06/23  0611 06/05/23 2213   LACTATE mmol/L 2.1* 2.0     Glucose   Date/Time Value Ref Range Status   06/07/2023 0628 130 70 - 130 mg/dL Final     Comment:     Meter: [id number] : [id number] Randy THOMPSON   06/06/2023 2213 239 (H) 70 - 130 mg/dL Final     Comment:     Meter: [id number] : [id number] Randy THOMPSON   06/06/2023 1600 193 (H) 70 - 130 mg/dL Final     Comment:     Meter: [id number] : [id number] Kristi  Sav NA   06/06/2023 1128 269 (H) 70 - 130 mg/dL Final     Comment:     Meter: [id number] : [id number] Kristi Ang NA   06/06/2023 0647 203 (H) 70 - 130 mg/dL Final     Comment:     Meter: [id number] : [id number] Randy THOMPSON   06/06/2023 0140 161 (H) 70 - 130 mg/dL Final     Comment:     Meter: [id number] : [id number] Randy THOMPSON       CT Head Without Contrast    Result Date: 6/5/2023  1. No acute process.  Radiation dose reduction techniques were utilized, including automated exposure control and exposure modulation based on body size.  This report was finalized on 6/5/2023 8:17 PM by Dr. Kasi Gracia M.D.     I have personally reviewed all medications:  Scheduled Medications  allopurinol, 100 mg, Oral, Daily  apixaban, 5 mg, Oral, BID  atorvastatin, 10 mg, Oral, Daily  busPIRone, 5 mg, Oral, TID  cefTRIAXone, 2 g, Intravenous, Q24H  docusate sodium, 100 mg, Oral, BID  DULoxetine, 60 mg, Oral, Daily  gabapentin, 100 mg, Oral, TID  insulin glargine, 20 Units, Subcutaneous, Nightly  insulin lispro, 2-7 Units, Subcutaneous, 4x Daily AC & at Bedtime  senna-docusate sodium, 2 tablet, Oral, BID  sodium chloride, 10 mL, Intravenous, Q12H    Infusions   Diet  Diet: Diabetic Diets; Consistent Carbohydrate; Texture: Regular Texture (IDDSI 7); Fluid Consistency: Thin (IDDSI 0)    I have personally reviewed:  [x]  Laboratory   [x]  Microbiology   [x]  Radiology   [x]  EKG/Telemetry  []  Cardiology/Vascular   []  Pathology    [x]  Records       Assessment/Plan     Active Hospital Problems    Diagnosis  POA    **Acute UTI (urinary tract infection) [N39.0]  Yes    Lymphedema [I89.0]  Yes    Dizziness [R42]  Yes    Chronic systolic CHF (congestive heart failure) [I50.22]  Yes    PAULETTE (obstructive sleep apnea) [G47.33]  Yes    Stage 3b chronic kidney disease [N18.32]  Yes    Type 2 diabetes mellitus, with long-term current use of insulin [E11.9, Z79.4]  Not Applicable    PAF (paroxysmal  atrial fibrillation) [I48.0]  Yes    Chronic anticoagulation [Z79.01]  Not Applicable    COPD (chronic obstructive pulmonary disease) [J44.9]  Yes    Chronic respiratory failure with hypoxia [J96.11]  Yes    Hypertension [I10]  Yes    Breast cancer [C50.919]  Yes      Resolved Hospital Problems   No resolved problems to display.         73yo woman with h/o PAULETTE, CKD3b, DM2, PAF (Eliquis), COPD, HTN, chronic systolic CHF, chronic BLE lymphedema, breast CA, DIONY, and chronic hypoxic resp failure (3.5L/min), who presented to ER with c/o dizziness and falls at home.    Dizziness: appreciate Neuro attention to pt, CT head fine, no dizziness today, ?multifactorial, no orthostasis here, Gabapentin dose decreased, PT eval ordered    UTI (no sepsis): continue Rocephin, urine culture not sent for some reason, blood cultures NGTD    LLE Cellulitis  Chronic BLE lymphedema: I see no evidence of cellulitis at present but should be covered by current abx for UTI, blood cultures NGTD, Wound RN has seen, pt refuses compression of any kind, have asked her Nephrologist to see regarding diuretic regimen    PAF  Chronic AC  Tachy/Doc Syndrome, PPM  Chronic combined CHF: HRs fine, not on RC, AC with Eliquis, restart diuretics per Renal    CKD3b: Cr improving, is supposed to be on diuretics per Renal--have asked them to see here    PAULETTE  COPD  CHRF  Morbid obesity: can use home CPAP as needed, continue supplemental O2, not currently taking any inhalers at home, lung exam fine here    HTN: not currently on any meds for this PTA    DIONY: no anxiety here, continue home regimen of Buspar and Cymbalta    HLD: continue statin    DM2: sugars acceptable on Lantus/SSI, A1c 7.8 in March      Eliquis (home med) for DVT prophylaxis.  Full code.  Discussed with patient.  Anticipate discharge  TBD     .      Noman Ramirez MD  [facility]  06/07/23  08:23 EDT

## 2023-06-08 ENCOUNTER — READMISSION MANAGEMENT (OUTPATIENT)
Dept: CALL CENTER | Facility: HOSPITAL | Age: 73
End: 2023-06-08
Payer: MEDICARE

## 2023-06-08 VITALS
TEMPERATURE: 97.7 F | RESPIRATION RATE: 16 BRPM | SYSTOLIC BLOOD PRESSURE: 110 MMHG | WEIGHT: 285.5 LBS | BODY MASS INDEX: 45.88 KG/M2 | HEIGHT: 66 IN | HEART RATE: 62 BPM | OXYGEN SATURATION: 95 % | DIASTOLIC BLOOD PRESSURE: 68 MMHG

## 2023-06-08 LAB
ALBUMIN SERPL-MCNC: 3.2 G/DL (ref 3.5–5.2)
ALBUMIN/GLOB SERPL: 1 G/DL
ALP SERPL-CCNC: 116 U/L (ref 39–117)
ALT SERPL W P-5'-P-CCNC: 15 U/L (ref 1–33)
ANION GAP SERPL CALCULATED.3IONS-SCNC: 8 MMOL/L (ref 5–15)
AST SERPL-CCNC: 36 U/L (ref 1–32)
BILIRUB SERPL-MCNC: 2 MG/DL (ref 0–1.2)
BUN SERPL-MCNC: 27 MG/DL (ref 8–23)
BUN/CREAT SERPL: 23.3 (ref 7–25)
CALCIUM SPEC-SCNC: 8.1 MG/DL (ref 8.6–10.5)
CHLORIDE SERPL-SCNC: 93 MMOL/L (ref 98–107)
CO2 SERPL-SCNC: 35 MMOL/L (ref 22–29)
CREAT SERPL-MCNC: 1.16 MG/DL (ref 0.57–1)
DEPRECATED RDW RBC AUTO: 52.7 FL (ref 37–54)
EGFRCR SERPLBLD CKD-EPI 2021: 49.6 ML/MIN/1.73
ERYTHROCYTE [DISTWIDTH] IN BLOOD BY AUTOMATED COUNT: 19 % (ref 12.3–15.4)
GLOBULIN UR ELPH-MCNC: 3.2 GM/DL
GLUCOSE BLDC GLUCOMTR-MCNC: 84 MG/DL (ref 70–130)
GLUCOSE SERPL-MCNC: 71 MG/DL (ref 65–99)
HBA1C MFR BLD: 8.4 % (ref 4.8–5.6)
HCT VFR BLD AUTO: 46.1 % (ref 34–46.6)
HGB BLD-MCNC: 14.5 G/DL (ref 12–15.9)
MAGNESIUM SERPL-MCNC: 1.7 MG/DL (ref 1.6–2.4)
MCH RBC QN AUTO: 25.9 PG (ref 26.6–33)
MCHC RBC AUTO-ENTMCNC: 31.5 G/DL (ref 31.5–35.7)
MCV RBC AUTO: 82.3 FL (ref 79–97)
PHOSPHATE SERPL-MCNC: 3.5 MG/DL (ref 2.5–4.5)
PLATELET # BLD AUTO: 193 10*3/MM3 (ref 140–450)
PMV BLD AUTO: 9.2 FL (ref 6–12)
POTASSIUM SERPL-SCNC: 3.6 MMOL/L (ref 3.5–5.2)
PROT SERPL-MCNC: 6.4 G/DL (ref 6–8.5)
RBC # BLD AUTO: 5.6 10*6/MM3 (ref 3.77–5.28)
SODIUM SERPL-SCNC: 136 MMOL/L (ref 136–145)
URATE SERPL-MCNC: 9.3 MG/DL (ref 2.4–5.7)
WBC NRBC COR # BLD: 7.84 10*3/MM3 (ref 3.4–10.8)

## 2023-06-08 PROCEDURE — 82948 REAGENT STRIP/BLOOD GLUCOSE: CPT

## 2023-06-08 PROCEDURE — 85027 COMPLETE CBC AUTOMATED: CPT | Performed by: HOSPITALIST

## 2023-06-08 PROCEDURE — 97116 GAIT TRAINING THERAPY: CPT

## 2023-06-08 PROCEDURE — 97161 PT EVAL LOW COMPLEX 20 MIN: CPT

## 2023-06-08 PROCEDURE — 83036 HEMOGLOBIN GLYCOSYLATED A1C: CPT | Performed by: HOSPITALIST

## 2023-06-08 PROCEDURE — 84100 ASSAY OF PHOSPHORUS: CPT | Performed by: INTERNAL MEDICINE

## 2023-06-08 PROCEDURE — 84550 ASSAY OF BLOOD/URIC ACID: CPT | Performed by: INTERNAL MEDICINE

## 2023-06-08 PROCEDURE — 80053 COMPREHEN METABOLIC PANEL: CPT | Performed by: HOSPITALIST

## 2023-06-08 PROCEDURE — 97530 THERAPEUTIC ACTIVITIES: CPT

## 2023-06-08 PROCEDURE — 83735 ASSAY OF MAGNESIUM: CPT | Performed by: INTERNAL MEDICINE

## 2023-06-08 PROCEDURE — 25010000002 CEFTRIAXONE PER 250 MG: Performed by: HOSPITALIST

## 2023-06-08 RX ORDER — SPIRONOLACTONE 25 MG/1
25 TABLET ORAL DAILY
Qty: 30 TABLET | Refills: 0 | Status: SHIPPED | OUTPATIENT
Start: 2023-06-08

## 2023-06-08 RX ORDER — TORSEMIDE 100 MG/1
100 TABLET ORAL DAILY
Qty: 30 TABLET | Refills: 0 | Status: SHIPPED | OUTPATIENT
Start: 2023-06-08

## 2023-06-08 RX ORDER — GABAPENTIN 100 MG/1
100 CAPSULE ORAL 3 TIMES DAILY
Qty: 90 CAPSULE | Refills: 0 | Status: SHIPPED | OUTPATIENT
Start: 2023-06-08

## 2023-06-08 RX ORDER — SPIRONOLACTONE 25 MG/1
25 TABLET ORAL DAILY
Status: DISCONTINUED | OUTPATIENT
Start: 2023-06-08 | End: 2023-06-08 | Stop reason: HOSPADM

## 2023-06-08 RX ADMIN — ALLOPURINOL 100 MG: 100 TABLET ORAL at 09:38

## 2023-06-08 RX ADMIN — DOCUSATE SODIUM 100 MG: 100 CAPSULE, LIQUID FILLED ORAL at 09:39

## 2023-06-08 RX ADMIN — TORSEMIDE 100 MG: 100 TABLET ORAL at 09:38

## 2023-06-08 RX ADMIN — Medication 10 ML: at 09:37

## 2023-06-08 RX ADMIN — APIXABAN 5 MG: 5 TABLET, FILM COATED ORAL at 09:38

## 2023-06-08 RX ADMIN — CEFTRIAXONE 2 G: 2 INJECTION, POWDER, FOR SOLUTION INTRAMUSCULAR; INTRAVENOUS at 09:38

## 2023-06-08 RX ADMIN — DOCUSATE SODIUM 50MG AND SENNOSIDES 8.6MG 2 TABLET: 8.6; 5 TABLET, FILM COATED ORAL at 09:38

## 2023-06-08 RX ADMIN — DULOXETINE HYDROCHLORIDE 60 MG: 60 CAPSULE, DELAYED RELEASE ORAL at 09:38

## 2023-06-08 RX ADMIN — ATORVASTATIN CALCIUM 10 MG: 20 TABLET, FILM COATED ORAL at 09:38

## 2023-06-08 RX ADMIN — BUSPIRONE HYDROCHLORIDE 5 MG: 5 TABLET ORAL at 09:38

## 2023-06-08 RX ADMIN — GABAPENTIN 100 MG: 100 CAPSULE ORAL at 09:38

## 2023-06-08 NOTE — PROGRESS NOTES
Name: Aydee Solis ADMIT: 2023   : 1950  PCP: Neto Bennettshelby Ryan DO    MRN: [id number] LOS: 2 days   AGE/SEX: 72 y.o. female  ROOM: [facility]     Subjective   Subjective   Feeling fine again today. No dizziness. No CP or SOA or palp. No wheezing. Voiding fine. Tolerating diet. Very eager to go home today.       Objective   Objective   Vital Signs  Temp:  [96.7 °F (35.9 °C)-98.2 °F (36.8 °C)] 96.7 °F (35.9 °C)  Heart Rate:  [58-71] 70  Resp:  [16-17] 17  BP: (105-124)/(66-88) 105/68  SpO2:  [92 %-98 %] 94 %  on  Flow (L/min):  [3] 3;   Device (Oxygen Therapy): nasal cannula  Body mass index is 46.08 kg/m².    (No change in exam today)    Physical Exam  Vitals and nursing note reviewed. Exam conducted with a chaperone present ().   Constitutional:       General: She is not in acute distress.     Appearance: She is obese. She is ill-appearing (chronically). She is not toxic-appearing or diaphoretic.   HENT:      Head: Normocephalic.      Nose: Nose normal.      Mouth/Throat:      Mouth: Mucous membranes are moist.      Pharynx: Oropharynx is clear.   Eyes:      General: No scleral icterus.        Right eye: No discharge.         Left eye: No discharge.      Extraocular Movements: Extraocular movements intact.      Conjunctiva/sclera: Conjunctivae normal.   Cardiovascular:      Rate and Rhythm: Normal rate and regular rhythm.      Pulses: Normal pulses.      Heart sounds: Murmur heard.   Pulmonary:      Effort: Pulmonary effort is normal. No respiratory distress.      Breath sounds: Normal breath sounds. No wheezing or rales.   Abdominal:      General: Bowel sounds are normal. There is no distension.      Palpations: Abdomen is soft.      Tenderness: There is no abdominal tenderness.   Musculoskeletal:         General: Swelling (3+ in BLEs) present. Normal range of motion.      Cervical back: Neck supple. No rigidity.   Lymphadenopathy:      Cervical: No cervical adenopathy.   Skin:     General:  Skin is warm and dry.      Capillary Refill: Capillary refill takes less than 2 seconds.      Coloration: Skin is not jaundiced.      Comments: Stasis changes of BLEs, at baseline, small wound posterior left calf--dressed   Neurological:      General: No focal deficit present.      Mental Status: She is alert and oriented to person, place, and time. Mental status is at baseline.      Cranial Nerves: No cranial nerve deficit.      Coordination: Coordination normal.   Psychiatric:         Mood and Affect: Mood normal.         Behavior: Behavior normal.         Thought Content: Thought content normal.     Results Review     I reviewed the patient's new clinical results.  Results from last 7 days   Lab Units 06/08/23 0659 06/06/23  0611 06/05/23 1946   WBC 10*3/mm3 7.84 8.17 7.73   HEMOGLOBIN g/dL 14.5 13.8 14.7   PLATELETS 10*3/mm3 193 231 242       Results from last 7 days   Lab Units 06/08/23 0659 06/07/23  0631 06/06/23 0611 06/05/23 1946   SODIUM mmol/L 136 134* 134* 135*   POTASSIUM mmol/L 3.6 3.8 3.5 3.9   CHLORIDE mmol/L 93* 91* 89* 88*   CO2 mmol/L 35.0* 32.1* 31.9* 34.3*   BUN mg/dL 27* 34* 49* 53*   CREATININE mg/dL 1.16* 1.18* 1.35* 1.55*   GLUCOSE mg/dL 71 145* 233* 194*   EGFR mL/min/1.73 49.6* 48.6* 41.3* 35.0*       Results from last 7 days   Lab Units 06/08/23  0659 06/05/23  1946   ALBUMIN g/dL 3.2* 3.5   BILIRUBIN mg/dL 2.0* 4.6*   ALK PHOS U/L 116 131*   AST (SGOT) U/L 36* 47*   ALT (SGPT) U/L 15 26       Results from last 7 days   Lab Units 06/08/23 0659 06/07/23  0631 06/06/23 0611 06/05/23 1946   CALCIUM mg/dL 8.1* 9.0 9.1 8.8   ALBUMIN g/dL 3.2*  --   --  3.5   MAGNESIUM mg/dL 1.7  --   --  2.5*   PHOSPHORUS mg/dL 3.5  --   --  3.7       Results from last 7 days   Lab Units 06/06/23  0611 06/05/23  2213   LACTATE mmol/L 2.1* 2.0       Glucose   Date/Time Value Ref Range Status   06/08/2023 0612 84 70 - 130 mg/dL Final     Comment:     Meter: [id number] : [id number] Luis Alberto Franklin CNA    06/07/2023 2139 199 (H) 70 - 130 mg/dL Final     Comment:     Meter: [id number] : [id number] Luis Alberto Franklin CNA   06/07/2023 1735 184 (H) 70 - 130 mg/dL Final     Comment:     Meter: [id number] : [id number] Kristi Sav NA   06/07/2023 1122 246 (H) 70 - 130 mg/dL Final     Comment:     Meter: [id number] : [id number] Kristi Shreyaseia NA   06/07/2023 0628 130 70 - 130 mg/dL Final     Comment:     Meter: [id number] : [id number] Randycasey Newberryia M NA   06/06/2023 2213 239 (H) 70 - 130 mg/dL Final     Comment:     Meter: [id number] : [id number] Niagaracasey Newberryia M NA   06/06/2023 1600 193 (H) 70 - 130 mg/dL Final     Comment:     Meter: [id number] : [id number] Kristi Christinea NA       No radiology results for the last day  I have personally reviewed all medications:  Scheduled Medications  allopurinol, 100 mg, Oral, Daily  apixaban, 5 mg, Oral, BID  atorvastatin, 10 mg, Oral, Daily  busPIRone, 5 mg, Oral, TID  cefTRIAXone, 2 g, Intravenous, Q24H  docusate sodium, 100 mg, Oral, BID  DULoxetine, 60 mg, Oral, Daily  gabapentin, 100 mg, Oral, TID  insulin glargine, 20 Units, Subcutaneous, Nightly  insulin lispro, 2-7 Units, Subcutaneous, 4x Daily AC & at Bedtime  senna-docusate sodium, 2 tablet, Oral, BID  sodium chloride, 10 mL, Intravenous, Q12H  spironolactone, 25 mg, Oral, Daily  torsemide, 100 mg, Oral, Daily    Infusions   Diet  Diet: Diabetic Diets, Cardiac Diets; Low Sodium (2g); Consistent Carbohydrate; Texture: Regular Texture (IDDSI 7); Fluid Consistency: Thin (IDDSI 0)    I have personally reviewed:  [x]  Laboratory   [x]  Microbiology   []  Radiology   [x]  EKG/Telemetry  []  Cardiology/Vascular   []  Pathology    []  Records       Assessment/Plan     Active Hospital Problems    Diagnosis  POA    **Acute UTI (urinary tract infection) [N39.0]  Yes    Opioid dependence [F11.20]  Yes    Lymphedema [I89.0]  Yes    Dizziness [R42]  Yes    Chronic systolic CHF (congestive heart failure)  [I50.22]  Yes    PAULETTE (obstructive sleep apnea) [G47.33]  Yes    Stage 3b chronic kidney disease [N18.32]  Yes    Type 2 diabetes mellitus, with long-term current use of insulin [E11.9, Z79.4]  Not Applicable    PAF (paroxysmal atrial fibrillation) [I48.0]  Yes    Chronic anticoagulation [Z79.01]  Not Applicable    COPD (chronic obstructive pulmonary disease) [J44.9]  Yes    Chronic respiratory failure with hypoxia [J96.11]  Yes    Hypertension [I10]  Yes    Breast cancer [C50.919]  Yes      Resolved Hospital Problems   No resolved problems to display.         Very pleasant 73yo woman with h/o PAULETTE, CKD3b, DM2, PAF (Eliquis), COPD, HTN, chronic systolic CHF, chronic BLE lymphedema, breast CA, DIONY, and chronic hypoxic resp failure (3.5L/min), who presented to ER with c/o dizziness and falls at home. Please see below for details of admission:    Dizziness: appreciate Neuro attention to pt, CT head fine, ?multifactorial, no orthostasis here, dizziness much improved since Gabapentin dose decreased    UTI (no sepsis): complete Rocephin (day 3 of 3), urine culture not sent for some reason, blood cultures NGTD    LLE Cellulitis  Chronic BLE lymphedema: I see no evidence of cellulitis at present but should be covered by current abx for UTI, blood cultures NGTD, Wound RN has seen, pt refuses compression of any kind, asked her Nephrologist to see regarding diuretic regimen, they have now restarted Torsemide and Aldactone    PAF  Chronic AC  Tachy/Doc Syndrome, PPM  Chronic combined CHF: HRs fine, not on RC, AC with Eliquis, have restarted diuretics per [facility]    CKD3b: Cr improving despite re-initiation of diuretics, can f/u with Renal in 1 week    PAULETTE  COPD  CHRF  Morbid obesity: home CPAP as needed, continued supplemental O2 at her baseline rate of 3L/min, not currently taking any inhalers at home, lung exam fine here    HTN: not currently on any meds for this PTA, BPs fine here    DIONY: no anxiety here, continued home  regimen of Buspar and Cymbalta    HLD: continued statin    DM2: sugars acceptable on Lantus/SSI, A1c 7.8 in March    Elevated TSH: free T4 wnl, would recommend PCP repeat TFTs in a few weeks      Eliquis (home med) for DVT prophylaxis.  Full code.  Discussed with patient, , and RN.  Discharge home tomorrow with family.  F/u with [facility] in 1 week (Dr. Guallpa)  F/u with PCP in 2 weeks (Dr. Duque)      Noman Ramirez MD  [facility]  06/08/23  08:35 EDT

## 2023-06-08 NOTE — DISCHARGE SUMMARY
Patient Name: Aydee Solis  : 1950  MRN: [id number]    Date of Admission: 2023  Date of Discharge:  2023  Primary Care Physician: Bennett Duque DO      Chief Complaint:   Fall      Discharge Diagnoses     Active Hospital Problems    Diagnosis  POA    **Acute UTI (urinary tract infection) [N39.0]  Yes    Opioid dependence [F11.20]  Yes    Lymphedema [I89.0]  Yes    Dizziness [R42]  Yes    Chronic systolic CHF (congestive heart failure) [I50.22]  Yes    PAULETTE (obstructive sleep apnea) [G47.33]  Yes    Stage 3b chronic kidney disease [N18.32]  Yes    Type 2 diabetes mellitus, with long-term current use of insulin [E11.9, Z79.4]  Not Applicable    PAF (paroxysmal atrial fibrillation) [I48.0]  Yes    Chronic anticoagulation [Z79.01]  Not Applicable    COPD (chronic obstructive pulmonary disease) [J44.9]  Yes    Chronic respiratory failure with hypoxia [J96.11]  Yes    Hypertension [I10]  Yes    Breast cancer [C50.919]  Yes      Resolved Hospital Problems   No resolved problems to display.        Hospital Course     Very pleasant 73yo woman with h/o PAULETTE, CKD3b, DM2, PAF (Eliquis), COPD, HTN, chronic systolic CHF, chronic BLE lymphedema, breast CA, DIONY, and chronic hypoxic resp failure (3.5L/min), who presented to ER with c/o dizziness and falls at home. Please see below for details of admission:     Dizziness: appreciate Neuro attention to pt, CT head fine, ?multifactorial, no orthostasis here, dizziness much improved since Gabapentin dose decreased     UTI (no sepsis): complete Rocephin (day 3 of 3), urine culture not sent for some reason, blood cultures NGTD     LLE Cellulitis  Chronic BLE lymphedema: I see no evidence of cellulitis at present but should be covered by current abx for UTI, blood cultures NGTD, Wound RN has seen, pt refuses compression of any kind, asked her Nephrologist to see regarding diuretic regimen, they have now restarted Torsemide and Aldactone     PAF  Chronic  AC  Tachy/Doc Syndrome, PPM  Chronic combined CHF: HRs fine, not on RC, AC with Eliquis, have restarted diuretics per Renal     CKD3b: Cr improving despite re-initiation of diuretics, can f/u with Renal in 1 week     PAULETTE  COPD  CHRF  Morbid obesity: home CPAP as needed, continued supplemental O2 at her baseline rate of 3L/min, not currently taking any inhalers at home, lung exam fine here     HTN: not currently on any meds for this PTA, BPs fine here     DIONY: no anxiety here, continued home regimen of Buspar and Cymbalta     HLD: continued statin     DM2: sugars acceptable on Lantus/SSI, A1c 7.8 in March     Elevated TSH: free T4 wnl, would recommend PCP repeat TFTs in a few weeks        Eliquis (home med) sufficed for DVT prophylaxis.  Full code confirmed.  Discussed with patient, , and RN.  Discharge home tomorrow with family.  F/u with [facility] in 1 week (Dr. Guallpa)  F/u with PCP in 2 weeks (Dr. Duque)    Day of Discharge     Subjective:  Feeling fine again today. No dizziness. No CP or SOA or palp. No wheezing. Voiding fine. Tolerating diet. Very eager to go home today.     Physical Exam:  Temp:  [96.7 °F (35.9 °C)-98.2 °F (36.8 °C)] 96.7 °F (35.9 °C)  Heart Rate:  [58-71] 70  Resp:  [16-17] 17  BP: (105-124)/(66-88) 105/68  Body mass index is 46.08 kg/m².  Physical Exam  Vitals and nursing note reviewed. Exam conducted with a chaperone present ().   Constitutional:       General: She is not in acute distress.     Appearance: She is obese. She is ill-appearing (chronically). She is not toxic-appearing or diaphoretic.   Cardiovascular:      Rate and Rhythm: Normal rate and regular rhythm.      Pulses: Normal pulses.      Heart sounds: Murmur heard.   Pulmonary:      Effort: Pulmonary effort is normal. No respiratory distress.      Breath sounds: Normal breath sounds. No wheezing or rales.   Abdominal:      General: Bowel sounds are normal. There is no distension.      Palpations: Abdomen is soft.       Tenderness: There is no abdominal tenderness.   Musculoskeletal:         General: Swelling (3+ in BLEs) present. Normal range of motion.      Cervical back: Neck supple. No rigidity.   Lymphadenopathy:      Cervical: No cervical adenopathy.   Skin:     General: Skin is warm and dry.      Capillary Refill: Capillary refill takes less than 2 seconds.      Coloration: Skin is not jaundiced.      Comments: Stasis changes of BLEs, at baseline, small wound posterior left calf--dressed   Neurological:      General: No focal deficit present.      Mental Status: She is alert and oriented to person, place, and time. Mental status is at baseline.      Cranial Nerves: No cranial nerve deficit.      Coordination: Coordination normal.   Psychiatric:         Mood and Affect: Mood normal.         Behavior: Behavior normal.         Thought Content: Thought content normal.      Consultants     Consult Orders (all) (From admission, onward)       Start     Ordered    06/07/23 0819  Inpatient Nephrology Consult  Once        Specialty:  Nephrology  Provider:  Kyle Guallpa MD    06/07/23 0819    06/06/23 0822  Inpatient Neurology Consult General  Once        Specialty:  Neurology  Provider:  Myles Liao MD    06/06/23 0822 06/05/23 2156  A (on-call MD unless specified) Details  Once        Specialty:  Hospitalist  Provider:  (Not yet assigned)    06/05/23 2155                  Procedures     * Surgery not found *      Imaging Results (All)       Procedure Component Value Units Date/Time    XR Chest 1 View [[id number]] Collected: 06/05/23 2023     Updated: 06/05/23 2029    Narrative:      XR CHEST 1 VW-, XR KNEE 1 OR 2 VW RIGHT-, XR PELVIS 1 OR 2 VW-clinical:  Fell with multifocal pain     Chest comparison 01/21/2023     FINDINGS: Transvenous pacemaker in position as before. There is cardiac  enlargement. There is atherosclerotic calcification of the aorta. No  pleural effusion, pneumothorax, pulmonary edema  or lung consolidation  seen.     CONCLUSION: Cardiomegaly     AP pelvis findings: Bilateral hip joint narrowing which is symmetric. No  hip or pelvic fracture. Degenerative change noted about the symphysis  pubis. Soft tissues unremarkable.     CONCLUSION: No acute osseous abnormality is demonstrated.     Right knee findings: There is medial compartment joint space loss,  lateral compartment with preserved. There is narrowing of the  patellofemoral articulation. There is a spur arising from the anterior  distal femoral metaphysis. No joint effusion, fracture or dislocation  seen.     CONCLUSION: No joint effusion nor acute osseous abnormality, joint  degeneration as described above.     This report was finalized on 6/5/2023 8:26 PM by Dr. Zeb Reyes M.D.       XR Knee 1 or 2 View Right [[id number]] Collected: 06/05/23 2023     Updated: 06/05/23 2029    Narrative:      XR CHEST 1 VW-, XR KNEE 1 OR 2 VW RIGHT-, XR PELVIS 1 OR 2 VW-clinical:  Fell with multifocal pain     Chest comparison 01/21/2023     FINDINGS: Transvenous pacemaker in position as before. There is cardiac  enlargement. There is atherosclerotic calcification of the aorta. No  pleural effusion, pneumothorax, pulmonary edema or lung consolidation  seen.     CONCLUSION: Cardiomegaly     AP pelvis findings: Bilateral hip joint narrowing which is symmetric. No  hip or pelvic fracture. Degenerative change noted about the symphysis  pubis. Soft tissues unremarkable.     CONCLUSION: No acute osseous abnormality is demonstrated.     Right knee findings: There is medial compartment joint space loss,  lateral compartment with preserved. There is narrowing of the  patellofemoral articulation. There is a spur arising from the anterior  distal femoral metaphysis. No joint effusion, fracture or dislocation  seen.     CONCLUSION: No joint effusion nor acute osseous abnormality, joint  degeneration as described above.     This report was finalized on 6/5/2023 8:26  PM by Dr. Zeb Reyes M.D.       XR Pelvis 1 or 2 View [[id number]] Collected: 06/05/23 2023     Updated: 06/05/23 2029    Narrative:      XR CHEST 1 VW-, XR KNEE 1 OR 2 VW RIGHT-, XR PELVIS 1 OR 2 VW-clinical:  Fell with multifocal pain     Chest comparison 01/21/2023     FINDINGS: Transvenous pacemaker in position as before. There is cardiac  enlargement. There is atherosclerotic calcification of the aorta. No  pleural effusion, pneumothorax, pulmonary edema or lung consolidation  seen.     CONCLUSION: Cardiomegaly     AP pelvis findings: Bilateral hip joint narrowing which is symmetric. No  hip or pelvic fracture. Degenerative change noted about the symphysis  pubis. Soft tissues unremarkable.     CONCLUSION: No acute osseous abnormality is demonstrated.     Right knee findings: There is medial compartment joint space loss,  lateral compartment with preserved. There is narrowing of the  patellofemoral articulation. There is a spur arising from the anterior  distal femoral metaphysis. No joint effusion, fracture or dislocation  seen.     CONCLUSION: No joint effusion nor acute osseous abnormality, joint  degeneration as described above.     This report was finalized on 6/5/2023 8:26 PM by Dr. Zeb Reyes M.D.       CT Head Without Contrast [[id number]] Collected: 06/05/23 2016     Updated: 06/05/23 2020    Narrative:      CT OF THE BRAIN WITHOUT CONTRAST 06/05/2023     HISTORY: Fell, head injury.     Axial images were obtained through the brain without intravenous  contrast.     There is mild-to-moderate diffuse atrophy. There is decreased  attenuation of the periventricular white matter bilaterally consistent  with mild small vessel white matter ischemic disease. There is some  calcification of the distal right vertebral artery. There is no evidence  of acute infarction, hemorrhage, midline shift or mass effect.     No bony abnormalities are seen.       Impression:      1. No acute process.     Radiation dose  reduction techniques were utilized, including automated  exposure control and exposure modulation based on body size.     This report was finalized on 6/5/2023 8:17 PM by Dr. Kasi Gracia M.D.             Results for orders placed during the hospital encounter of 01/21/23    Duplex Venous Lower Extremity - Bilateral CAR    Interpretation Summary    Chronic right lower extremity superficial thrombophlebitis noted in the great saphenous (above knee).    Chronic left lower extremity superficial thrombophlebitis noted in the great saphenous (above knee).    Right popliteal fossa fluid collection.    All other visualized veins appeared normal bilaterally.  However, the bilateral calf veins could not be well seen because of patient's body habitus.  Venous pulsatility noted.    Results for orders placed during the hospital encounter of 01/21/23    Adult Transthoracic Echo Complete W/ Cont if Necessary Per Protocol    Interpretation Summary    Left ventricular systolic function is mildly decreased. Calculated left ventricular EF = 42.8% Left ventricular ejection fraction appears to be 41 - 45%.    The following left ventricular wall segments are hypokinetic: mid anterior, apical anterior, basal anterolateral, mid anterolateral, apical lateral, basal inferolateral, mid inferolateral, apical inferior, mid inferior, apical septal, basal inferoseptal, mid inferoseptal, apex hypokinetic, mid anteroseptal, basal anterior, basal inferior and basal inferoseptal.    Left ventricular diastolic function is consistent with (grade I) impaired relaxation.    Mildly reduced right ventricular systolic function noted.    The right ventricular cavity is moderately dilated.    The right atrial cavity is moderately  dilated.    There is moderate, bileaflet mitral valve thickening present.    Mild mitral valve stenosis is present. The mitral valve peak gradient is 8 mmHg. The mitral valve mean gradient is 3 mmHg.    Estimated right  ventricular systolic pressure from tricuspid regurgitation is mildly elevated (35-45 mmHg).    Pertinent Labs     Results from last 7 days   Lab Units 06/08/23 0659 06/06/23 0611 06/05/23  1946   WBC 10*3/mm3 7.84 8.17 7.73   HEMOGLOBIN g/dL 14.5 13.8 14.7   PLATELETS 10*3/mm3 193 231 242     Results from last 7 days   Lab Units 06/08/23 0659 06/07/23 0631 06/06/23 0611 06/05/23 1946   SODIUM mmol/L 136 134* 134* 135*   POTASSIUM mmol/L 3.6 3.8 3.5 3.9   CHLORIDE mmol/L 93* 91* 89* 88*   CO2 mmol/L 35.0* 32.1* 31.9* 34.3*   BUN mg/dL 27* 34* 49* 53*   CREATININE mg/dL 1.16* 1.18* 1.35* 1.55*   GLUCOSE mg/dL 71 145* 233* 194*   EGFR mL/min/1.73 49.6* 48.6* 41.3* 35.0*     Results from last 7 days   Lab Units 06/08/23 0659 06/05/23  1946   ALBUMIN g/dL 3.2* 3.5   BILIRUBIN mg/dL 2.0* 4.6*   ALK PHOS U/L 116 131*   AST (SGOT) U/L 36* 47*   ALT (SGPT) U/L 15 26     Results from last 7 days   Lab Units 06/08/23 0659 06/07/23 0631 06/06/23 0611 06/05/23 1946   CALCIUM mg/dL 8.1* 9.0 9.1 8.8   ALBUMIN g/dL 3.2*  --   --  3.5   MAGNESIUM mg/dL 1.7  --   --  2.5*   PHOSPHORUS mg/dL 3.5  --   --  3.7       Results from last 7 days   Lab Units 06/05/23 2213 06/05/23 1946   HSTROP T ng/L 45* 46*   PROBNP pg/mL  --  3,099.0*     Results from last 7 days   Lab Units 06/08/23 0659   URIC ACID mg/dL 9.3*         Invalid input(s): LDLCALC  Results from last 7 days   Lab Units 06/05/23 2259 06/05/23  2250   BLOODCX  No growth at 2 days No growth at 2 days         Test Results Pending at Discharge     Pending Labs       Order Current Status    Hemoglobin A1c In process    Blood Culture - Blood, Arm, Left Preliminary result    Blood Culture - Blood, Arm, Left Preliminary result            Discharge Details        Discharge Medications        New Medications        Instructions Start Date   spironolactone 25 MG tablet  Commonly known as: ALDACTONE   25 mg, Oral, Daily             Changes to Medications        Instructions  Start Date   gabapentin 100 MG capsule  Commonly known as: NEURONTIN  What changed:   how much to take  when to take this   100 mg, Oral, 3 Times Daily      torsemide 100 MG tablet  Commonly known as: DEMADEX  What changed:   medication strength  how much to take  when to take this   100 mg, Oral, Daily             Continue These Medications        Instructions Start Date   albuterol sulfate  (90 Base) MCG/ACT inhaler  Commonly known as: PROVENTIL HFA;VENTOLIN HFA;PROAIR HFA   2 puffs, Inhalation, Every 4 Hours PRN      allopurinol 100 MG tablet  Commonly known as: ZYLOPRIM   100 mg, Oral, Daily      apixaban 5 MG tablet tablet  Commonly known as: ELIQUIS   5 mg, Oral, 2 Times Daily      atorvastatin 10 MG tablet  Commonly known as: LIPITOR   10 mg, Oral, Daily      busPIRone 5 MG tablet  Commonly known as: BUSPAR   5 mg, Oral, 3 Times Daily      docusate sodium 100 MG capsule  Commonly known as: COLACE   100 mg, Oral, 2 Times Daily      DULoxetine 60 MG capsule  Commonly known as: CYMBALTA   60 mg, Oral, Daily      HYDROcodone-acetaminophen  MG per tablet  Commonly known as: NORCO   1 tablet, Every 8 Hours PRN      insulin detemir 100 UNIT/ML injection  Commonly known as: LEVEMIR   20 Units, Subcutaneous, Nightly      NovoLOG FlexPen 100 UNIT/ML solution pen-injector sc pen  Generic drug: insulin aspart   Novolog FlexPen U-100 Insulin aspart 100 unit/mL (3 mL) subcutaneous   Sliding scale.      Trelegy Ellipta 100-62.5-25 MCG/ACT inhaler  Generic drug: Fluticasone-Umeclidin-Vilant   No dose, route, or frequency recorded.             Stop These Medications      aspirin 81 MG chewable tablet     Dextromethorphan-guaiFENesin 5-100 MG/5ML liquid     Diclofenac Sodium 1 % cream     miconazole 2 % powder  Commonly known as: MICOTIN              Allergies   Allergen Reactions    Ciprofloxacin        Discharge Disposition:  Home or Self Care      Discharge Diet:  Diet Order   Procedures    Diet: Diabetic  Diets, Cardiac Diets; Low Sodium (2g); Consistent Carbohydrate; Texture: Regular Texture (IDDSI 7); Fluid Consistency: Thin (IDDSI 0)       Discharge Activity:   as tolerated    CODE STATUS:    Code Status and Medical Interventions:   Ordered at: 06/06/23 0109     Code Status (Patient has no pulse and is not breathing):    CPR (Attempt to Resuscitate)     Medical Interventions (Patient has pulse or is breathing):    Full Support       Future Appointments   Date Time Provider Department Center   6/30/2023 11:40 AM Sravan Yanez MD [facility]     Additional Instructions for the Follow-ups that You Need to Schedule       Discharge Follow-up with PCP   As directed       Currently Documented PCP:    Bennett Duque DO    PCP Phone Number:    [phone]     Follow Up Details: Dr. Duque (PCP) in 2 weeks         Discharge Follow-up with Specified Provider: Dr. Guallpa (Renal); 1 Week   As directed      To: Dr. Guallpa (Renal)    Follow Up: 1 Week                Follow-up Information       Bennett Duque DO .    Specialty: Internal Medicine  Why: Dr. Duque (PCP) in 2 weeks  Contact information:  [address] [address]  [phone]               Marco Mayes MD .    Specialty: Family Medicine  Why: Dr. Duque (PCP) in 2 weeks  Contact information:  [address] [address]  [phone]                             Additional Instructions for the Follow-ups that You Need to Schedule       Discharge Follow-up with PCP   As directed       Currently Documented PCP:    Bennett Duque DO    PCP Phone Number:    [phone]     Follow Up Details: Dr. Duque (PCP) in 2 weeks         Discharge Follow-up with Specified Provider: Dr. Guallpa (Ren[facility]); 1 Week   As directed      To: Dr. Guallpa (Renal)    Follow Up: 1 Week             Time Spent on Discharge:  Greater than 30 minutes      Noman Ramirez MD  [facility]  06/08/23  08:58  EDT

## 2023-06-08 NOTE — PLAN OF CARE
Goal Outcome Evaluation:  Plan of Care Reviewed With: patient, spouse           Outcome Evaluation: Pt is a 71 yo F who was admitted with dizziness and a fall. Pt was found to have a UTI. Pt presents to PT with some impaired functional mobility and gait secondary to generalized weakness and decreased activity tolerance. Pt reports no dizziness this date. Pt's gait was slow but steady when using a walker. Pt plans to return home today as she feels as though she is back to her baseline. Pt may benefit from skilled PT services to address strength, mobility, and gait.

## 2023-06-08 NOTE — THERAPY EVALUATION
Patient Name: Aydee Solis  : 1950    MRN: [id number]                              Today's Date: 2023       Admit Date: 2023    Visit Dx:     ICD-10-CM ICD-9-CM   1. Left leg cellulitis  L03.116 682.6   2. MELANIE (acute kidney injury)  N17.9 584.9   3. Fall, initial encounter  W19.XXXA E888.9   4. Uncomplicated opioid dependence  F11.20 304.00     Patient Active Problem List   Diagnosis    Pneumonia of right lower lobe due to infectious organism    Cellulitis    Hypertension    Hyperlipidemia    Breast cancer    Lymphedema    Osteoporosis    Acute respiratory failure with hypoxia    Morbid obesity due to excess calories (HCC)    Metabolic encephalopathy    PAULETTE (obstructive sleep apnea)    Chronic respiratory failure with hypoxia    Stage 3b chronic kidney disease    Type 2 diabetes mellitus, with long-term current use of insulin    PAF (paroxysmal atrial fibrillation)    Chronic anticoagulation    COPD (chronic obstructive pulmonary disease)    Chronic systolic CHF (congestive heart failure)    Left leg cellulitis    Acute UTI (urinary tract infection)    Lymphedema    Dizziness    Opioid dependence     Past Medical History:   Diagnosis Date    Arthritis     Breast cancer     Cancer     Class 3 severe obesity due to excess calories with body mass index (BMI) of 50.0 to 59.9 in adult     COPD (chronic obstructive pulmonary disease)     Diabetes mellitus     EDWARDS (dyspnea on exertion)     Elephantiasis     left leg    Hyperlipidemia     Hypertension     Leukemia     Lung cancer     Lymphedema     left arm    Osteoporosis     Tracheal cancer      Past Surgical History:   Procedure Laterality Date    APPENDECTOMY      HYSTERECTOMY      KNEE ARTHROSCOPY Right     LAPAROSCOPIC GASTRIC BANDING      LYMPHADENECTOMY Left     MASTECTOMY Left       General Information       Row Name 23 1218          Physical Therapy Time and Intention    Document Type evaluation  -CH     Mode of Treatment individual  therapy;physical therapy  -       Row Name 06/08/23 1218          General Information    Prior Level of Function independent:;gait;transfer;bed mobility  walks with a walker  -     Existing Precautions/Restrictions fall  -     Barriers to Rehab medically complex  -       Row Name 06/08/23 1218          Living Environment    People in Home spouse  -       Row Name 06/08/23 1218          Cognition    Orientation Status (Cognition) oriented x 3  -       Row Name 06/08/23 1218          Safety Issues, Functional Mobility    Impairments Affecting Function (Mobility) strength;endurance/activity tolerance  -               User Key  (r) = Recorded By, (t) = Taken By, (c) = Cosigned By      Initials Name Provider Type     Irma Flowers, PT Physical Therapist                   Mobility       Row Name 06/08/23 1218          Bed Mobility    Bed Mobility supine-sit;sit-supine  -     Supine-Sit Morrill (Bed Mobility) verbal cues;nonverbal cues (demo/gesture);contact guard  -     Sit-Supine [place] (Bed Mobility) not tested  sitting EOB, RN and aide notified  -     Assistive Device (Bed Mobility) bed rails;head of bed elevated  -       Row Name 06/08/23 1218          Sit-Stand Transfer    Sit-Stand [place] (Transfers) verbal cues;nonverbal cues (demo/gesture);contact guard  -     Assistive Device (Sit-Stand Transfers) walker, front-wheeled  -       Row Name 06/08/23 1218          Gait/Stairs (Locomotion)    Morrill Level (Gait) verbal cues;standby assist  -     Assistive Device (Gait) walker, front-wheeled  -     Distance in Feet (Gait) 20 ft x2  -     Deviations/Abnormal Patterns (Gait) rachid decreased;gait speed decreased;weight shifting decreased;base of support, wide  -     Bilateral Gait Deviations forward flexed posture  -     Comment, (Gait/Stairs) gait was slow but steady  -               User Key  (r) = Recorded By, (t) = Taken By, (c) = Cosigned By       Initials Name Provider Type     Irma Flowres, PT Physical Therapist                   Obj/Interventions       Row Name 06/08/23 1219          Range of Motion Comprehensive    General Range of Motion no range of motion deficits identified  -       Row Name 06/08/23 1219          Strength Comprehensive (MMT)    Comment, General Manual Muscle Testing (MMT) Assessment mild generalized weakness noted with functional mobility  -       Row Name 06/08/23 1219          Balance    Balance Assessment standing static balance;standing dynamic balance  -     Static Standing Balance verbal cues;standby assist  -     Dynamic Standing Balance verbal cues;standby assist  -     Position/Device Used, Standing Balance walker, rolling  -               User Key  (r) = Recorded By, (t) = Taken By, (c) = Cosigned By      Initials Name Provider Type     Irma Flowers, PT Physical Therapist                   Goals/Plan    No documentation.                  Clinical Impression       Row Name 06/08/23 1220          Pain    Pretreatment Pain Rating 0/10 - no pain  -     Posttreatment Pain Rating 0/10 - no pain  -       Row Name 06/08/23 1220          Plan of Care Review    Plan of Care Reviewed With patient;spouse  -     Outcome Evaluation Pt is a 71 yo F who was admitted with dizziness and a fall. Pt was found to have a UTI. Pt presents to PT with some impaired functional mobility and gait secondary to generalized weakness and decreased activity tolerance. Pt reports no dizziness this date. Pt's gait was slow but steady when using a walker. Pt plans to return home today as she feels as though she is back to her baseline. Pt may benefit from skilled PT services to address strength, mobility, and gait.  -       Row Name 06/08/23 1220          Therapy Assessment/Plan (PT)    Patient/Family Therapy Goals Statement (PT) to go home  -     Rehab Potential (PT) good, to achieve stated therapy goals  -     Criteria  for Skilled Interventions Met (PT) skilled treatment is necessary  -     Therapy Frequency (PT) evaluation only  -       Row Name 06/08/23 1220          Positioning and Restraints    Pre-Treatment Position in bed  -     Post Treatment Position bed  -CH     In Bed notified nsg;call light within reach;sitting EOB;encouraged to call for assist;with family/caregiver  -               User Key  (r) = Recorded By, (t) = Taken By, (c) = Cosigned By      Initials Name Provider Type     Irma Flowers, PT Physical Therapist                   Outcome Measures       Row Name 06/08/23 1223 06/08/23 0800       How much help from another person do you currently need...    Turning from your back to your side while in flat bed without using bedrails? 3  -CH 3  -RM    Moving from lying on back to sitting on the side of a flat bed without bedrails? 3  -CH 3  -RM    Moving to and from a bed to a chair (including a wheelchair)? 3  -CH 2  -RM    Standing up from a chair using your arms (e.g., wheelchair, bedside chair)? 3  - 2  -RM    Climbing 3-5 steps with a railing? 2  -CH 2  -RM    To walk in hospital room? 3  -CH 2  -RM    AM-PAC 6 Clicks Score (PT) 17  - 14  -RM    Highest level of mobility 5 --> Static standing  - 4 --> Transferred to chair/commode  -      Row Name 06/08/23 1223          Functional Assessment    Outcome Measure Options AM-PAC 6 Clicks Basic Mobility (PT)  -               User Key  (r) = Recorded By, (t) = Taken By, (c) = Cosigned By      Initials Name Provider Type     Irma Flowers, PT Physical Therapist    RM Jyotsna Coello RN Registered Nurse                                 Physical Therapy Education       Title: PT OT SLP Therapies (In Progress)       Topic: Physical Therapy (In Progress)       Point: Mobility training (Done)       Learning Progress Summary             Patient Acceptance, E,TB,D, VU,NR by  at 6/8/2023 1224                         Point: Home exercise program  (Not Started)       Learner Progress:  Not documented in this visit.              Point: Body mechanics (Done)       Learning Progress Summary             Patient Acceptance, E,TB,D, VU,NR by  at 6/8/2023 1224                         Point: Precautions (Done)       Learning Progress Summary             Patient Acceptance, E,TB,D, VU,NR by  at 6/8/2023 1224                                         User Key       Initials Effective Dates Name Provider Type [facility] 06/16/21 -  Irma Flowers, PT Physical Therapist PT                  PT Recommendation and Plan     Plan of Care Reviewed With: patient, spouse  Outcome Evaluation: Pt is a 73 yo F who was admitted with dizziness and a fall. Pt was found to have a UTI. Pt presents to PT with some impaired functional mobility and gait secondary to generalized weakness and decreased activity tolerance. Pt reports no dizziness this date. Pt's gait was slow but steady when using a walker. Pt plans to return home today as she feels as though she is back to her baseline. Pt may benefit from skilled PT services to address strength, mobility, and gait.     Time Calculation:    PT Charges       Row Name 06/08/23 1225             Time Calculation    Start Time 1020  -      Stop Time 1053  -      Time Calculation (min) 33 min  -      PT Received On 06/08/23  -         Time Calculation- PT    Total Timed Code Minutes- PT 28 minute(s)  -         Timed Charges    07353 - Gait Training Minutes  15  -      54971 - PT Therapeutic Activity Minutes 13  -         Total Minutes    Timed Charges Total Minutes 28  -       Total Minutes 28  -                User Key  (r) = Recorded By, (t) = Taken By, (c) = Cosigned By      Initials Name Provider Type     Irma Flowers PT Physical Therapist                  Therapy Charges for Today       Code Description Service Date Service Provider Modifiers Qty    30345258808 HC GAIT TRAINING EA 15 MIN 6/8/2023  Irma Flowers, PT GP 1    00076218407 HC PT THERAPEUTIC ACT EA 15 MIN 6/8/2023 Irma Flowers, PT GP 1    22973191732 HC PT EVAL LOW COMPLEXITY 2 6/8/2023 Irma Flowers, PT GP 1            PT G-Codes  Outcome Measure Options: AM-PAC 6 Clicks Basic Mobility (PT)  AM-PAC 6 Clicks Score (PT): 17  PT Discharge Summary  Anticipated Discharge Disposition (PT): home with assist, home with home health    Irma Flowers, PT  6/8/2023

## 2023-06-08 NOTE — PLAN OF CARE
Goal Outcome Evaluation:  Plan of Care Reviewed With: patient           Outcome Evaluation: VSS, Norco x1 for c/o back pain, tolerating diet, purewick in place and care provided, refusing turns and repositioning for the most part, remains on falls with bed alarm for safety, BLE elevated, on 3LO2, S/L, Blood sugars checked with insulin given as needed for coverage. will continue to monitor.

## 2023-06-08 NOTE — PLAN OF CARE
Goal Outcome Evaluation:  Plan of Care Reviewed With: patient        Progress: improving  Outcome Evaluation[facility]. Vitals stable and pain si well controlled. All medications given without complications and patient tolerating her diet. Patient voiding well via purwick and all medications given without complications. No other issues this shift.

## 2023-06-08 NOTE — PROGRESS NOTES
[facility] Progress Note      Patient Name: Aydee Solis  : 1950  MRN: [id number]  Primary Care Physician:  Bennett Duque DO  Date of admission: 2023    Subjective     Interval History:   Follow-up chronic kidney disease and lymphedema    Patient is feeling much better, diuresed quite well with reinitiation of torsemide, denies any chest pain or shortness of air, no orthopnea or PND, no nausea or vomiting, no dysuria or gross hematuria continues to have massive lymphedema    Review of Systems:   As noted above    Objective     Vitals:   Temp:  [96.7 °F (35.9 °C)-98.2 °F (36.8 °C)] 96.7 °F (35.9 °C)  Heart Rate:  [58-71] 70  Resp:  [16-17] 17  BP: (105-124)/(66-88) 105/68  Flow (L/min):  [3] 3    Intake/Output Summary (Last 24 hours) at 2023 0816  Last data filed at 2023 0500  Gross per 24 hour   Intake --   Output 4550 ml   Net -4550 ml       Physical Exam:    General Appearance: alert, awake, chronically ill, obese, no acute distress   Skin: warm and dry  HEENT: oral mucosa normal, nonicteric sclera  Neck: supple, no JVD  Lungs: CTA, unlabored breathing effort  Heart: RRR, normal S1 and S2, no rub  Abdomen: soft, nontender, nondistended  : no palpable bladder  Extremities: Significant brawny edema and lymphedema of lower extremity  Neuro: normal speech and mental status     Scheduled Meds:     allopurinol, 100 mg, Oral, Daily  apixaban, 5 mg, Oral, BID  atorvastatin, 10 mg, Oral, Daily  busPIRone, 5 mg, Oral, TID  cefTRIAXone, 2 g, Intravenous, Q24H  docusate sodium, 100 mg, Oral, BID  DULoxetine, 60 mg, Oral, Daily  gabapentin, 100 mg, Oral, TID  insulin glargine, 20 Units, Subcutaneous, Nightly  insulin lispro, 2-7 Units, Subcutaneous, 4x Daily AC & at Bedtime  senna-docusate sodium, 2 tablet, Oral, BID  sodium chloride, 10 mL, Intravenous, Q12H  torsemide, 100 mg, Oral, Daily      IV Meds:        Results Reviewed:   I have personally reviewed the  results from the time of this admission to 6/8/2023 08:16 EDT     Results from last 7 days   Lab Units 06/08/23 0659 06/07/23  0631 06/06/23  0611 06/05/23 1946   SODIUM mmol/L 136 134* 134* 135*   POTASSIUM mmol/L 3.6 3.8 3.5 3.9   CHLORIDE mmol/L 93* 91* 89* 88*   CO2 mmol/L 35.0* 32.1* 31.9* 34.3*   BUN mg/dL 27* 34* 49* 53*   CREATININE mg/dL 1.16* 1.18* 1.35* 1.55*   CALCIUM mg/dL 8.1* 9.0 9.1 8.8   BILIRUBIN mg/dL 2.0*  --   --  4.6*   ALK PHOS U/L 116  --   --  131*   ALT (SGPT) U/L 15  --   --  26   AST (SGOT) U/L 36*  --   --  47*   GLUCOSE mg/dL 71 145* 233* 194*       Estimated Creatinine Clearance: 58.8 mL/min (A) (by C-G formula based on SCr of 1.16 mg/dL (H)).    Results from last 7 days   Lab Units 06/08/23 0659 06/05/23 1946   MAGNESIUM mg/dL 1.7 2.5*   PHOSPHORUS mg/dL 3.5 3.7       Results from last 7 days   Lab Units 06/08/23 0659 06/07/23 0631   URIC ACID mg/dL 9.3* 10.2*       Results from last 7 days   Lab Units 06/08/23 0659 06/06/23 0611 06/05/23 1946   WBC 10*3/mm3 7.84 8.17 7.73   HEMOGLOBIN g/dL 14.5 13.8 14.7   PLATELETS 10*3/mm3 193 231 242       Results from last 7 days   Lab Units 06/06/23  0611   INR  2.34*       Assessment / Plan     ASSESSMENT:  -Chronic kidney disease stage IIIa associated with diabetic and hypertension, at baseline creatinine today 1.16, very stable, electrolyte within acceptable range and her total CO2 35.  -Chronic lymphedema with difficult to control the patient declined to go to the lymphedema clinic she felt there was no benefit to that.  Has been given diuretics torsemide 100 mg daily and recently metolazone and spironolactone were added.  -Combined systolic and diastolic dysfunction  -Morbid obesity  -Diabetes mellitus type 2 with renal complication  -Diabetic retinopathy  -Obstructive sleep apnea  -History of gout treated with allopurinol  -Anxiety and depression treated  -Peripheral neuropathy treated with gabapentin and the dose has been tried  to be minimized since gabapentin, will lead to increased lower extremity edema.  -Metabolic alkalosis probably associated with diuresis also component of probable respiratory acidosis and CO2 retention related to her obstructive sleep apnea morbid obesity      PLAN:  -Add spironolactone 25 mg daily  -Continue the same dose of torsemide  -Surveillance labs    Copied text in this note has been reviewed and is accurate as of 06/08/23.       Thank you for involving us in the care of Aydee Solis.  Please feel free to call with any questions.    Roldan Moralez MD  06/08/23  08:16 EDT    [facility]  [phone]    Please note that portions of this note were completed with a voice recognition program.

## 2023-06-09 NOTE — OUTREACH NOTE
Prep Survey      Flowsheet Row Responses   [facility] facility patient discharged from? [facility]   Is LACE score < 7 ? No   Eligibility Readm Mgmt   Discharge diagnosis Acute UTI   Does the patient have one of the following disease processes/diagnoses(primary or secondary)? Other   Does the patient have Home health ordered? No   Is there a DME ordered? No   Prep survey completed? Yes            JOSE LUIS [place] - Registered Nurse

## 2023-06-09 NOTE — CASE MANAGEMENT/SOCIAL WORK
Case Management Discharge Note      Final Note: home    Provided Post Acute Provider List?: N/A  N/A Provider List Comment: No needs identified  Provided Post Acute Provider Quality & Resource List?: N/A    Selected Continued Care - Discharged on 6/8/2023 Admission date: 6/5/2023 - Discharge disposition: Home or Self Care      Destination    No services have been selected for the patient.                Durable Medical Equipment    No services have been selected for the patient.                Dialysis/Infusion    No services have been selected for the patient.                Home Medical Care    No services have been selected for the patient.                Therapy    No services have been selected for the patient.                Community Resources    No services have been selected for the patient.                Community & DME    No services have been selected for the patient.                    Transportation Services  Private: Car    Final Discharge Disposition Code: 01 - home or self-care

## 2023-06-10 LAB
BACTERIA SPEC AEROBE CULT: NORMAL
BACTERIA SPEC AEROBE CULT: NORMAL

## 2023-06-14 ENCOUNTER — READMISSION MANAGEMENT (OUTPATIENT)
Dept: CALL CENTER | Facility: HOSPITAL | Age: 73
End: 2023-06-14
Payer: MEDICARE

## 2023-06-14 NOTE — OUTREACH NOTE
Medical Week 1 Survey      Flowsheet Row Responses   [facility] patient discharged from? [place]   Does the patient have one of the following disease processes/diagnoses(primary or secondary)? Other   Week 1 attempt successful? Yes   Call start time 1435   Call end time 1438   Discharge diagnosis Acute UTI   Person spoke with today (if not patient) and relationship pt   Meds reviewed with patient/caregiver? Yes   Is the patient having any side effects they believe may be caused by any medication additions or changes? No   Does the patient have all medications ordered at discharge? Yes   Is the patient taking all medications as directed (includes completed medication regime)? Yes   Does the patient have a primary care provider?  Yes   Does the patient have an appointment with their PCP within 7 days of discharge? Yes   Has the patient kept scheduled appointments due by today? Yes   Psychosocial issues? No   Did the patient receive a copy of their discharge instructions? Yes   Nursing interventions Reviewed instructions with patient   What is the patient's perception of their health status since discharge? Improving   Is the patient/caregiver able to teach back signs and symptoms related to disease process for when to call PCP? Yes   Is the patient/caregiver able to teach back signs and symptoms related to disease process for when to call 911? Yes   Is the patient/caregiver able to teach back the hierarchy of who to call/visit for symptoms/problems? PCP, Specialist, Home health nurse, Urgent Care, ED, 911 Yes   If the patient is a current smoker, are they able to teach back resources for cessation? Not a smoker   Week 1 call completed? Yes   Is the patient interested in additional calls from an ambulatory ?  NOTE:  applies to high risk patients requiring additional follow-up. No   Wrap up additional comments Pt states she is not feeling too well. Pt went to the PCP fu appt today, and found out she  has shingles. Reviewed AVS/medications with donovan GOMEZ - Registered Nurse

## 2023-07-31 ENCOUNTER — HOSPITAL ENCOUNTER (OUTPATIENT)
Facility: HOSPITAL | Age: 73
Setting detail: OBSERVATION
Discharge: HOME | End: 2023-08-03
Attending: EMERGENCY MEDICINE | Admitting: STUDENT IN AN ORGANIZED HEALTH CARE EDUCATION/TRAINING PROGRAM
Payer: MEDICARE

## 2023-07-31 ENCOUNTER — APPOINTMENT (OUTPATIENT)
Dept: CT IMAGING | Facility: HOSPITAL | Age: 73
End: 2023-07-31
Payer: MEDICARE

## 2023-07-31 ENCOUNTER — APPOINTMENT (OUTPATIENT)
Dept: GENERAL RADIOLOGY | Facility: HOSPITAL | Age: 73
End: 2023-07-31
Payer: MEDICARE

## 2023-07-31 DIAGNOSIS — E87.3 METABOLIC ALKALOSIS: ICD-10-CM

## 2023-07-31 DIAGNOSIS — N28.9 ACUTE ON CHRONIC RENAL INSUFFICIENCY: Primary | ICD-10-CM

## 2023-07-31 DIAGNOSIS — N18.9 ACUTE ON CHRONIC RENAL INSUFFICIENCY: Primary | ICD-10-CM

## 2023-07-31 DIAGNOSIS — Z87.09 HISTORY OF COPD: ICD-10-CM

## 2023-07-31 DIAGNOSIS — Z86.79 HISTORY OF CHF (CONGESTIVE HEART FAILURE): ICD-10-CM

## 2023-07-31 DIAGNOSIS — Z86.39 HISTORY OF DIABETES MELLITUS: ICD-10-CM

## 2023-07-31 DIAGNOSIS — E87.6 HYPOKALEMIA: ICD-10-CM

## 2023-07-31 DIAGNOSIS — N39.0 URINARY TRACT INFECTION IN FEMALE: ICD-10-CM

## 2023-07-31 PROBLEM — R41.0 CONFUSION: Status: ACTIVE | Noted: 2023-07-31

## 2023-07-31 PROBLEM — E87.20 METABOLIC ACIDOSIS: Status: ACTIVE | Noted: 2023-07-31

## 2023-07-31 LAB
ANION GAP SERPL CALCULATED.3IONS-SCNC: 13 MMOL/L (ref 5–15)
ARTERIAL PATENCY WRIST A: POSITIVE
ATMOSPHERIC PRESS: 752.9 MMHG
BACTERIA UR QL AUTO: ABNORMAL /HPF
BASE EXCESS BLDA CALC-SCNC: 18.7 MMOL/L (ref 0–2)
BASOPHILS # BLD AUTO: 0.04 10*3/MM3 (ref 0–0.2)
BASOPHILS NFR BLD AUTO: 0.5 % (ref 0–1.5)
BDY SITE: ABNORMAL
BILIRUB UR QL STRIP: NEGATIVE
BUN SERPL-MCNC: 62 MG/DL (ref 8–23)
BUN/CREAT SERPL: 36.3 (ref 7–25)
CALCIUM SPEC-SCNC: 10.3 MG/DL (ref 8.6–10.5)
CHLORIDE SERPL-SCNC: 78 MMOL/L (ref 98–107)
CLARITY UR: ABNORMAL
CO2 SERPL-SCNC: 39 MMOL/L (ref 22–29)
COLOR UR: YELLOW
CREAT SERPL-MCNC: 1.71 MG/DL (ref 0.57–1)
D-LACTATE SERPL-SCNC: 1.6 MMOL/L (ref 0.5–2)
DEPRECATED RDW RBC AUTO: 52.8 FL (ref 37–54)
EGFRCR SERPLBLD CKD-EPI 2021: 30.9 ML/MIN/1.73
EOSINOPHIL # BLD AUTO: 0.09 10*3/MM3 (ref 0–0.4)
EOSINOPHIL NFR BLD AUTO: 1.1 % (ref 0.3–6.2)
ERYTHROCYTE [DISTWIDTH] IN BLOOD BY AUTOMATED COUNT: 17.3 % (ref 12.3–15.4)
GAS FLOW AIRWAY: 3.5 LPM
GLUCOSE SERPL-MCNC: 200 MG/DL (ref 65–99)
GLUCOSE UR STRIP-MCNC: NEGATIVE MG/DL
HCO3 BLDA-SCNC: 42.7 MMOL/L (ref 22–28)
HCT VFR BLD AUTO: 42.8 % (ref 34–46.6)
HGB BLD-MCNC: 13.5 G/DL (ref 12–15.9)
HGB UR QL STRIP.AUTO: ABNORMAL
HYALINE CASTS UR QL AUTO: ABNORMAL /LPF
IMM GRANULOCYTES # BLD AUTO: 0.03 10*3/MM3 (ref 0–0.05)
IMM GRANULOCYTES NFR BLD AUTO: 0.4 % (ref 0–0.5)
KETONES UR QL STRIP: ABNORMAL
LEUKOCYTE ESTERASE UR QL STRIP.AUTO: ABNORMAL
LYMPHOCYTES # BLD AUTO: 0.99 10*3/MM3 (ref 0.7–3.1)
LYMPHOCYTES NFR BLD AUTO: 11.9 % (ref 19.6–45.3)
MAGNESIUM SERPL-MCNC: 2.5 MG/DL (ref 1.6–2.4)
MCH RBC QN AUTO: 26.9 PG (ref 26.6–33)
MCHC RBC AUTO-ENTMCNC: 31.5 G/DL (ref 31.5–35.7)
MCV RBC AUTO: 85.4 FL (ref 79–97)
MODALITY: ABNORMAL
MONOCYTES # BLD AUTO: 0.7 10*3/MM3 (ref 0.1–0.9)
MONOCYTES NFR BLD AUTO: 8.4 % (ref 5–12)
NEUTROPHILS NFR BLD AUTO: 6.5 10*3/MM3 (ref 1.7–7)
NEUTROPHILS NFR BLD AUTO: 77.7 % (ref 42.7–76)
NITRITE UR QL STRIP: NEGATIVE
NRBC BLD AUTO-RTO: 0 /100 WBC (ref 0–0.2)
NT-PROBNP SERPL-MCNC: 2030 PG/ML (ref 0–1800)
PCO2 BLDA: 42.8 MM HG (ref 35–45)
PH BLDA: 7.61 PH UNITS (ref 7.35–7.45)
PH UR STRIP.AUTO: 5.5 [PH] (ref 5–8)
PLATELET # BLD AUTO: 391 10*3/MM3 (ref 140–450)
PMV BLD AUTO: 8.6 FL (ref 6–12)
PO2 BLDA: 75.7 MM HG (ref 80–100)
POTASSIUM SERPL-SCNC: 2.7 MMOL/L (ref 3.5–5.2)
PROT UR QL STRIP: ABNORMAL
RBC # BLD AUTO: 5.01 10*6/MM3 (ref 3.77–5.28)
RBC # UR STRIP: ABNORMAL /HPF
REF LAB TEST METHOD: ABNORMAL
SAO2 % BLDCOA: 97 % (ref 92–99)
SODIUM SERPL-SCNC: 130 MMOL/L (ref 136–145)
SP GR UR STRIP: 1.02 (ref 1–1.03)
SQUAMOUS #/AREA URNS HPF: ABNORMAL /HPF
TOTAL RATE: 20 BREATHS/MINUTE
UROBILINOGEN UR QL STRIP: ABNORMAL
WBC # UR STRIP: ABNORMAL /HPF
WBC CASTS #/AREA URNS LPF: ABNORMAL /LPF
WBC NRBC COR # BLD: 8.35 10*3/MM3 (ref 3.4–10.8)

## 2023-07-31 PROCEDURE — 84300 ASSAY OF URINE SODIUM: CPT | Performed by: STUDENT IN AN ORGANIZED HEALTH CARE EDUCATION/TRAINING PROGRAM

## 2023-07-31 PROCEDURE — P9612 CATHETERIZE FOR URINE SPEC: HCPCS

## 2023-07-31 PROCEDURE — 71045 X-RAY EXAM CHEST 1 VIEW: CPT

## 2023-07-31 PROCEDURE — 80048 BASIC METABOLIC PNL TOTAL CA: CPT | Performed by: EMERGENCY MEDICINE

## 2023-07-31 PROCEDURE — 87186 SC STD MICRODIL/AGAR DIL: CPT | Performed by: STUDENT IN AN ORGANIZED HEALTH CARE EDUCATION/TRAINING PROGRAM

## 2023-07-31 PROCEDURE — 87086 URINE CULTURE/COLONY COUNT: CPT | Performed by: STUDENT IN AN ORGANIZED HEALTH CARE EDUCATION/TRAINING PROGRAM

## 2023-07-31 PROCEDURE — 99284 EMERGENCY DEPT VISIT MOD MDM: CPT

## 2023-07-31 PROCEDURE — 70450 CT HEAD/BRAIN W/O DYE: CPT

## 2023-07-31 PROCEDURE — 84133 ASSAY OF URINE POTASSIUM: CPT | Performed by: STUDENT IN AN ORGANIZED HEALTH CARE EDUCATION/TRAINING PROGRAM

## 2023-07-31 PROCEDURE — 36600 WITHDRAWAL OF ARTERIAL BLOOD: CPT

## 2023-07-31 PROCEDURE — 83735 ASSAY OF MAGNESIUM: CPT | Performed by: EMERGENCY MEDICINE

## 2023-07-31 PROCEDURE — 83880 ASSAY OF NATRIURETIC PEPTIDE: CPT | Performed by: EMERGENCY MEDICINE

## 2023-07-31 PROCEDURE — 87088 URINE BACTERIA CULTURE: CPT | Performed by: STUDENT IN AN ORGANIZED HEALTH CARE EDUCATION/TRAINING PROGRAM

## 2023-07-31 PROCEDURE — 81001 URINALYSIS AUTO W/SCOPE: CPT | Performed by: EMERGENCY MEDICINE

## 2023-07-31 PROCEDURE — 82803 BLOOD GASES ANY COMBINATION: CPT

## 2023-07-31 PROCEDURE — 82436 ASSAY OF URINE CHLORIDE: CPT | Performed by: STUDENT IN AN ORGANIZED HEALTH CARE EDUCATION/TRAINING PROGRAM

## 2023-07-31 PROCEDURE — 85025 COMPLETE CBC W/AUTO DIFF WBC: CPT | Performed by: EMERGENCY MEDICINE

## 2023-07-31 PROCEDURE — 83605 ASSAY OF LACTIC ACID: CPT | Performed by: EMERGENCY MEDICINE

## 2023-07-31 RX ADMIN — SODIUM CHLORIDE 500 ML: 9 INJECTION, SOLUTION INTRAVENOUS at 23:16

## 2023-08-01 ENCOUNTER — APPOINTMENT (OUTPATIENT)
Dept: CARDIOLOGY | Facility: HOSPITAL | Age: 73
End: 2023-08-01
Payer: MEDICARE

## 2023-08-01 PROBLEM — E87.1 HYPONATREMIA: Status: ACTIVE | Noted: 2023-08-01

## 2023-08-01 PROBLEM — E87.6 HYPOKALEMIA: Status: ACTIVE | Noted: 2023-08-01

## 2023-08-01 LAB
ANION GAP SERPL CALCULATED.3IONS-SCNC: 13 MMOL/L (ref 5–15)
ARTERIAL PATENCY WRIST A: POSITIVE
ATMOSPHERIC PRESS: 752.7 MMHG
BASE EXCESS BLDA CALC-SCNC: 16.4 MMOL/L (ref 0–2)
BASOPHILS # BLD AUTO: 0.07 10*3/MM3 (ref 0–0.2)
BASOPHILS NFR BLD AUTO: 0.9 % (ref 0–1.5)
BDY SITE: ABNORMAL
BH CV LOW VAS RIGHT LESSER SAPH VESSEL: 1
BH CV LOWER VASCULAR LEFT COMMON FEMORAL AUGMENT: NORMAL
BH CV LOWER VASCULAR LEFT COMMON FEMORAL COMPETENT: NORMAL
BH CV LOWER VASCULAR LEFT COMMON FEMORAL COMPRESS: NORMAL
BH CV LOWER VASCULAR LEFT COMMON FEMORAL PHASIC: NORMAL
BH CV LOWER VASCULAR LEFT COMMON FEMORAL SPONT: NORMAL
BH CV LOWER VASCULAR RIGHT COMMON FEMORAL AUGMENT: NORMAL
BH CV LOWER VASCULAR RIGHT COMMON FEMORAL COMPETENT: NORMAL
BH CV LOWER VASCULAR RIGHT COMMON FEMORAL COMPRESS: NORMAL
BH CV LOWER VASCULAR RIGHT COMMON FEMORAL PHASIC: NORMAL
BH CV LOWER VASCULAR RIGHT COMMON FEMORAL SPONT: NORMAL
BH CV LOWER VASCULAR RIGHT DISTAL FEMORAL COMPRESS: NORMAL
BH CV LOWER VASCULAR RIGHT GASTRONEMIUS COMPRESS: NORMAL
BH CV LOWER VASCULAR RIGHT GREATER SAPH AK COMPRESS: NORMAL
BH CV LOWER VASCULAR RIGHT GREATER SAPH BK COMPRESS: NORMAL
BH CV LOWER VASCULAR RIGHT LESSER SAPH COMPRESS: NORMAL
BH CV LOWER VASCULAR RIGHT LESSER SAPH THROMBUS: NORMAL
BH CV LOWER VASCULAR RIGHT MID FEMORAL AUGMENT: NORMAL
BH CV LOWER VASCULAR RIGHT MID FEMORAL COMPETENT: NORMAL
BH CV LOWER VASCULAR RIGHT MID FEMORAL COMPRESS: NORMAL
BH CV LOWER VASCULAR RIGHT MID FEMORAL PHASIC: NORMAL
BH CV LOWER VASCULAR RIGHT MID FEMORAL SPONT: NORMAL
BH CV LOWER VASCULAR RIGHT PERONEAL COMPRESS: NORMAL
BH CV LOWER VASCULAR RIGHT POPLITEAL AUGMENT: NORMAL
BH CV LOWER VASCULAR RIGHT POPLITEAL COMPETENT: NORMAL
BH CV LOWER VASCULAR RIGHT POPLITEAL COMPRESS: NORMAL
BH CV LOWER VASCULAR RIGHT POPLITEAL PHASIC: NORMAL
BH CV LOWER VASCULAR RIGHT POPLITEAL SPONT: NORMAL
BH CV LOWER VASCULAR RIGHT POSTERIOR TIBIAL COMPRESS: NORMAL
BH CV LOWER VASCULAR RIGHT PROFUNDA FEMORAL COMPRESS: NORMAL
BH CV LOWER VASCULAR RIGHT PROXIMAL FEMORAL COMPRESS: NORMAL
BH CV LOWER VASCULAR RIGHT SAPHENOFEMORAL JUNCTION COMPRESS: NORMAL
BILIRUB UR QL STRIP: NEGATIVE
BUN SERPL-MCNC: 56 MG/DL (ref 8–23)
BUN/CREAT SERPL: 45.5 (ref 7–25)
CALCIUM SPEC-SCNC: 9.3 MG/DL (ref 8.6–10.5)
CHLORIDE SERPL-SCNC: 86 MMOL/L (ref 98–107)
CHLORIDE UR-SCNC: <20 MMOL/L
CLARITY UR: ABNORMAL
CO2 SERPL-SCNC: 33 MMOL/L (ref 22–29)
COLOR UR: YELLOW
CREAT SERPL-MCNC: 1.23 MG/DL (ref 0.57–1)
DEPRECATED RDW RBC AUTO: 53.4 FL (ref 37–54)
EGFRCR SERPLBLD CKD-EPI 2021: 45.9 ML/MIN/1.73
EOSINOPHIL # BLD AUTO: 0.16 10*3/MM3 (ref 0–0.4)
EOSINOPHIL NFR BLD AUTO: 2 % (ref 0.3–6.2)
ERYTHROCYTE [DISTWIDTH] IN BLOOD BY AUTOMATED COUNT: 17.2 % (ref 12.3–15.4)
GAS FLOW AIRWAY: 3.5 LPM
GLUCOSE BLDC GLUCOMTR-MCNC: 178 MG/DL (ref 70–130)
GLUCOSE BLDC GLUCOMTR-MCNC: 207 MG/DL (ref 70–130)
GLUCOSE BLDC GLUCOMTR-MCNC: 217 MG/DL (ref 70–130)
GLUCOSE BLDC GLUCOMTR-MCNC: 251 MG/DL (ref 70–130)
GLUCOSE BLDC GLUCOMTR-MCNC: 259 MG/DL (ref 70–130)
GLUCOSE SERPL-MCNC: 203 MG/DL (ref 65–99)
GLUCOSE UR STRIP-MCNC: NEGATIVE MG/DL
HCO3 BLDA-SCNC: 41.1 MMOL/L (ref 22–28)
HCT VFR BLD AUTO: 38.6 % (ref 34–46.6)
HGB BLD-MCNC: 12.1 G/DL (ref 12–15.9)
HGB UR QL STRIP.AUTO: ABNORMAL
IMM GRANULOCYTES # BLD AUTO: 0.02 10*3/MM3 (ref 0–0.05)
IMM GRANULOCYTES NFR BLD AUTO: 0.2 % (ref 0–0.5)
INR PPP: 1.61 (ref 0.9–1.1)
KETONES UR QL STRIP: ABNORMAL
LEUKOCYTE ESTERASE UR QL STRIP.AUTO: ABNORMAL
LYMPHOCYTES # BLD AUTO: 0.78 10*3/MM3 (ref 0.7–3.1)
LYMPHOCYTES NFR BLD AUTO: 9.6 % (ref 19.6–45.3)
MAGNESIUM SERPL-MCNC: 2.2 MG/DL (ref 1.6–2.4)
MAGNESIUM SERPL-MCNC: 2.3 MG/DL (ref 1.6–2.4)
MCH RBC QN AUTO: 27 PG (ref 26.6–33)
MCHC RBC AUTO-ENTMCNC: 31.3 G/DL (ref 31.5–35.7)
MCV RBC AUTO: 86.2 FL (ref 79–97)
MODALITY: ABNORMAL
MONOCYTES # BLD AUTO: 0.63 10*3/MM3 (ref 0.1–0.9)
MONOCYTES NFR BLD AUTO: 7.8 % (ref 5–12)
NEUTROPHILS NFR BLD AUTO: 6.45 10*3/MM3 (ref 1.7–7)
NEUTROPHILS NFR BLD AUTO: 79.5 % (ref 42.7–76)
NITRITE UR QL STRIP: NEGATIVE
NRBC BLD AUTO-RTO: 0 /100 WBC (ref 0–0.2)
PCO2 BLDA: 46.7 MM HG (ref 35–45)
PH BLDA: 7.55 PH UNITS (ref 7.35–7.45)
PH UR STRIP.AUTO: 5.5 [PH] (ref 5–8)
PLATELET # BLD AUTO: 398 10*3/MM3 (ref 140–450)
PMV BLD AUTO: 9.2 FL (ref 6–12)
PO2 BLDA: 71.4 MM HG (ref 80–100)
POTASSIUM SERPL-SCNC: 3.1 MMOL/L (ref 3.5–5.2)
POTASSIUM UR-SCNC: 27.9 MMOL/L
PROT UR QL STRIP: ABNORMAL
PROTHROMBIN TIME: 19.5 SECONDS (ref 11.7–14.2)
RBC # BLD AUTO: 4.48 10*6/MM3 (ref 3.77–5.28)
SAO2 % BLDCOA: 95.8 % (ref 92–99)
SODIUM SERPL-SCNC: 132 MMOL/L (ref 136–145)
SODIUM UR-SCNC: 33 MMOL/L
SP GR UR STRIP: 1.01 (ref 1–1.03)
TOTAL RATE: 16 BREATHS/MINUTE
UROBILINOGEN UR QL STRIP: ABNORMAL
WBC NRBC COR # BLD: 8.11 10*3/MM3 (ref 3.4–10.8)

## 2023-08-01 PROCEDURE — 93971 EXTREMITY STUDY: CPT

## 2023-08-01 PROCEDURE — G0378 HOSPITAL OBSERVATION PER HR: HCPCS

## 2023-08-01 PROCEDURE — 82803 BLOOD GASES ANY COMBINATION: CPT

## 2023-08-01 PROCEDURE — 83735 ASSAY OF MAGNESIUM: CPT | Performed by: NURSE PRACTITIONER

## 2023-08-01 PROCEDURE — 63710000001 INSULIN LISPRO (HUMAN) PER 5 UNITS: Performed by: NURSE PRACTITIONER

## 2023-08-01 PROCEDURE — 25010000002 CEFTRIAXONE PER 250 MG: Performed by: EMERGENCY MEDICINE

## 2023-08-01 PROCEDURE — 36415 COLL VENOUS BLD VENIPUNCTURE: CPT

## 2023-08-01 PROCEDURE — 85025 COMPLETE CBC W/AUTO DIFF WBC: CPT | Performed by: NURSE PRACTITIONER

## 2023-08-01 PROCEDURE — 36600 WITHDRAWAL OF ARTERIAL BLOOD: CPT

## 2023-08-01 PROCEDURE — 83735 ASSAY OF MAGNESIUM: CPT | Performed by: HOSPITALIST

## 2023-08-01 PROCEDURE — 36415 COLL VENOUS BLD VENIPUNCTURE: CPT | Performed by: NURSE PRACTITIONER

## 2023-08-01 PROCEDURE — 82948 REAGENT STRIP/BLOOD GLUCOSE: CPT

## 2023-08-01 PROCEDURE — 85610 PROTHROMBIN TIME: CPT | Performed by: NURSE PRACTITIONER

## 2023-08-01 PROCEDURE — P9612 CATHETERIZE FOR URINE SPEC: HCPCS

## 2023-08-01 PROCEDURE — 80048 BASIC METABOLIC PNL TOTAL CA: CPT | Performed by: NURSE PRACTITIONER

## 2023-08-01 PROCEDURE — 96365 THER/PROPH/DIAG IV INF INIT: CPT

## 2023-08-01 PROCEDURE — 63710000001 INSULIN GLARGINE PER 5 UNITS: Performed by: HOSPITALIST

## 2023-08-01 PROCEDURE — 96361 HYDRATE IV INFUSION ADD-ON: CPT

## 2023-08-01 RX ORDER — TORSEMIDE 100 MG/1
100 TABLET ORAL DAILY
Status: DISCONTINUED | OUTPATIENT
Start: 2023-08-01 | End: 2023-08-02

## 2023-08-01 RX ORDER — SPIRONOLACTONE 25 MG/1
25 TABLET ORAL DAILY
Status: DISCONTINUED | OUTPATIENT
Start: 2023-08-01 | End: 2023-08-02

## 2023-08-01 RX ORDER — POTASSIUM CHLORIDE 1.5 G/1.58G
40 POWDER, FOR SOLUTION ORAL ONCE
Status: COMPLETED | OUTPATIENT
Start: 2023-08-01 | End: 2023-08-01

## 2023-08-01 RX ORDER — INSULIN LISPRO 100 [IU]/ML
2-7 INJECTION, SOLUTION INTRAVENOUS; SUBCUTANEOUS
Status: DISCONTINUED | OUTPATIENT
Start: 2023-08-01 | End: 2023-08-03 | Stop reason: HOSPADM

## 2023-08-01 RX ORDER — DEXTROSE MONOHYDRATE 25 G/50ML
25 INJECTION, SOLUTION INTRAVENOUS
Status: DISCONTINUED | OUTPATIENT
Start: 2023-07-31 | End: 2023-08-03 | Stop reason: HOSPADM

## 2023-08-01 RX ORDER — ACETAMINOPHEN 160 MG/5ML
650 SOLUTION ORAL EVERY 4 HOURS PRN
Status: DISCONTINUED | OUTPATIENT
Start: 2023-07-31 | End: 2023-08-03 | Stop reason: HOSPADM

## 2023-08-01 RX ORDER — SODIUM CHLORIDE 0.9 % (FLUSH) 0.9 %
10 SYRINGE (ML) INJECTION EVERY 12 HOURS SCHEDULED
Status: DISCONTINUED | OUTPATIENT
Start: 2023-08-01 | End: 2023-08-03 | Stop reason: HOSPADM

## 2023-08-01 RX ORDER — BISACODYL 5 MG/1
5 TABLET, DELAYED RELEASE ORAL DAILY PRN
Status: DISCONTINUED | OUTPATIENT
Start: 2023-07-31 | End: 2023-08-03 | Stop reason: HOSPADM

## 2023-08-01 RX ORDER — SODIUM CHLORIDE 9 MG/ML
40 INJECTION, SOLUTION INTRAVENOUS AS NEEDED
Status: DISCONTINUED | OUTPATIENT
Start: 2023-07-31 | End: 2023-08-03 | Stop reason: HOSPADM

## 2023-08-01 RX ORDER — ATORVASTATIN CALCIUM 20 MG/1
10 TABLET, FILM COATED ORAL NIGHTLY
Status: DISCONTINUED | OUTPATIENT
Start: 2023-08-01 | End: 2023-08-03 | Stop reason: HOSPADM

## 2023-08-01 RX ORDER — BUTALBITAL, ACETAMINOPHEN AND CAFFEINE 50; 325; 40 MG/1; MG/1; MG/1
1 TABLET ORAL EVERY 4 HOURS PRN
Status: DISCONTINUED | OUTPATIENT
Start: 2023-08-01 | End: 2023-08-03 | Stop reason: HOSPADM

## 2023-08-01 RX ORDER — PANTOPRAZOLE SODIUM 40 MG/1
40 TABLET, DELAYED RELEASE ORAL
Status: DISCONTINUED | OUTPATIENT
Start: 2023-08-02 | End: 2023-08-03 | Stop reason: HOSPADM

## 2023-08-01 RX ORDER — NITROGLYCERIN 0.4 MG/1
0.4 TABLET SUBLINGUAL
Status: DISCONTINUED | OUTPATIENT
Start: 2023-07-31 | End: 2023-08-03 | Stop reason: HOSPADM

## 2023-08-01 RX ORDER — POLYETHYLENE GLYCOL 3350 17 G/17G
17 POWDER, FOR SOLUTION ORAL DAILY PRN
Status: DISCONTINUED | OUTPATIENT
Start: 2023-07-31 | End: 2023-08-03 | Stop reason: HOSPADM

## 2023-08-01 RX ORDER — SODIUM CHLORIDE 0.9 % (FLUSH) 0.9 %
10 SYRINGE (ML) INJECTION AS NEEDED
Status: DISCONTINUED | OUTPATIENT
Start: 2023-07-31 | End: 2023-08-03 | Stop reason: HOSPADM

## 2023-08-01 RX ORDER — BUDESONIDE AND FORMOTEROL FUMARATE DIHYDRATE 160; 4.5 UG/1; UG/1
2 AEROSOL RESPIRATORY (INHALATION)
Status: DISCONTINUED | OUTPATIENT
Start: 2023-08-01 | End: 2023-08-03 | Stop reason: HOSPADM

## 2023-08-01 RX ORDER — SODIUM CHLORIDE, SODIUM LACTATE, POTASSIUM CHLORIDE, CALCIUM CHLORIDE 600; 310; 30; 20 MG/100ML; MG/100ML; MG/100ML; MG/100ML
75 INJECTION, SOLUTION INTRAVENOUS CONTINUOUS
Status: DISCONTINUED | OUTPATIENT
Start: 2023-08-01 | End: 2023-08-01

## 2023-08-01 RX ORDER — ACETAMINOPHEN 325 MG/1
650 TABLET ORAL EVERY 4 HOURS PRN
Status: DISCONTINUED | OUTPATIENT
Start: 2023-07-31 | End: 2023-08-03 | Stop reason: HOSPADM

## 2023-08-01 RX ORDER — POTASSIUM CHLORIDE 750 MG/1
40 TABLET, FILM COATED, EXTENDED RELEASE ORAL EVERY 4 HOURS
Status: COMPLETED | OUTPATIENT
Start: 2023-08-01 | End: 2023-08-01

## 2023-08-01 RX ORDER — BISACODYL 10 MG
10 SUPPOSITORY, RECTAL RECTAL DAILY PRN
Status: DISCONTINUED | OUTPATIENT
Start: 2023-07-31 | End: 2023-08-03 | Stop reason: HOSPADM

## 2023-08-01 RX ORDER — BUSPIRONE HYDROCHLORIDE 5 MG/1
5 TABLET ORAL 3 TIMES DAILY
Status: DISCONTINUED | OUTPATIENT
Start: 2023-08-01 | End: 2023-08-03 | Stop reason: HOSPADM

## 2023-08-01 RX ORDER — SODIUM CHLORIDE 9 MG/ML
100 INJECTION, SOLUTION INTRAVENOUS CONTINUOUS
Status: ACTIVE | OUTPATIENT
Start: 2023-08-01 | End: 2023-08-01

## 2023-08-01 RX ORDER — NICOTINE POLACRILEX 4 MG
15 LOZENGE BUCCAL
Status: DISCONTINUED | OUTPATIENT
Start: 2023-07-31 | End: 2023-08-03 | Stop reason: HOSPADM

## 2023-08-01 RX ORDER — ALLOPURINOL 100 MG/1
100 TABLET ORAL DAILY
Status: DISCONTINUED | OUTPATIENT
Start: 2023-08-01 | End: 2023-08-03 | Stop reason: HOSPADM

## 2023-08-01 RX ORDER — DOCUSATE SODIUM 100 MG/1
100 CAPSULE, LIQUID FILLED ORAL 2 TIMES DAILY
Status: DISCONTINUED | OUTPATIENT
Start: 2023-08-01 | End: 2023-08-03 | Stop reason: HOSPADM

## 2023-08-01 RX ORDER — ACETAMINOPHEN 650 MG/1
650 SUPPOSITORY RECTAL EVERY 4 HOURS PRN
Status: DISCONTINUED | OUTPATIENT
Start: 2023-07-31 | End: 2023-08-03 | Stop reason: HOSPADM

## 2023-08-01 RX ORDER — DULOXETIN HYDROCHLORIDE 60 MG/1
60 CAPSULE, DELAYED RELEASE ORAL DAILY
Status: DISCONTINUED | OUTPATIENT
Start: 2023-08-01 | End: 2023-08-03 | Stop reason: HOSPADM

## 2023-08-01 RX ORDER — ONDANSETRON 2 MG/ML
4 INJECTION INTRAMUSCULAR; INTRAVENOUS EVERY 6 HOURS PRN
Status: DISCONTINUED | OUTPATIENT
Start: 2023-07-31 | End: 2023-08-03 | Stop reason: HOSPADM

## 2023-08-01 RX ORDER — AMOXICILLIN 250 MG
2 CAPSULE ORAL 2 TIMES DAILY
Status: DISCONTINUED | OUTPATIENT
Start: 2023-08-01 | End: 2023-08-03 | Stop reason: HOSPADM

## 2023-08-01 RX ORDER — IBUPROFEN 600 MG/1
1 TABLET ORAL
Status: DISCONTINUED | OUTPATIENT
Start: 2023-07-31 | End: 2023-08-03 | Stop reason: HOSPADM

## 2023-08-01 RX ORDER — SODIUM CHLORIDE 9 MG/ML
75 INJECTION, SOLUTION INTRAVENOUS CONTINUOUS
Status: DISCONTINUED | OUTPATIENT
Start: 2023-08-01 | End: 2023-08-01

## 2023-08-01 RX ORDER — ALBUTEROL SULFATE 2.5 MG/3ML
2.5 SOLUTION RESPIRATORY (INHALATION) EVERY 4 HOURS PRN
Status: DISCONTINUED | OUTPATIENT
Start: 2023-08-01 | End: 2023-08-03 | Stop reason: HOSPADM

## 2023-08-01 RX ADMIN — INSULIN LISPRO 3 UNITS: 100 INJECTION, SOLUTION INTRAVENOUS; SUBCUTANEOUS at 09:04

## 2023-08-01 RX ADMIN — Medication 10 ML: at 01:00

## 2023-08-01 RX ADMIN — DULOXETINE HYDROCHLORIDE 60 MG: 60 CAPSULE, DELAYED RELEASE ORAL at 13:40

## 2023-08-01 RX ADMIN — INSULIN LISPRO 4 UNITS: 100 INJECTION, SOLUTION INTRAVENOUS; SUBCUTANEOUS at 20:30

## 2023-08-01 RX ADMIN — DOCUSATE SODIUM 100 MG: 100 CAPSULE, LIQUID FILLED ORAL at 19:58

## 2023-08-01 RX ADMIN — INSULIN GLARGINE 20 UNITS: 100 INJECTION, SOLUTION SUBCUTANEOUS at 20:30

## 2023-08-01 RX ADMIN — SENNOSIDES AND DOCUSATE SODIUM 2 TABLET: 50; 8.6 TABLET ORAL at 00:43

## 2023-08-01 RX ADMIN — POTASSIUM CHLORIDE 40 MEQ: 750 TABLET, EXTENDED RELEASE ORAL at 13:44

## 2023-08-01 RX ADMIN — CEFTRIAXONE 2000 MG: 2 INJECTION, POWDER, FOR SOLUTION INTRAMUSCULAR; INTRAVENOUS at 00:36

## 2023-08-01 RX ADMIN — ATORVASTATIN CALCIUM 10 MG: 20 TABLET, FILM COATED ORAL at 19:58

## 2023-08-01 RX ADMIN — POTASSIUM CHLORIDE 40 MEQ: 750 TABLET, EXTENDED RELEASE ORAL at 21:33

## 2023-08-01 RX ADMIN — DOCUSATE SODIUM 100 MG: 100 CAPSULE, LIQUID FILLED ORAL at 13:40

## 2023-08-01 RX ADMIN — TORSEMIDE 100 MG: 100 TABLET ORAL at 16:22

## 2023-08-01 RX ADMIN — BUTALBITAL, ACETAMINOPHEN, AND CAFFEINE 1 TABLET: 50; 325; 40 TABLET ORAL at 17:13

## 2023-08-01 RX ADMIN — SODIUM CHLORIDE 100 ML/HR: 9 INJECTION, SOLUTION INTRAVENOUS at 10:42

## 2023-08-01 RX ADMIN — APIXABAN 5 MG: 5 TABLET, FILM COATED ORAL at 21:33

## 2023-08-01 RX ADMIN — SODIUM CHLORIDE 100 ML/HR: 9 INJECTION, SOLUTION INTRAVENOUS at 00:40

## 2023-08-01 RX ADMIN — SENNOSIDES AND DOCUSATE SODIUM 2 TABLET: 50; 8.6 TABLET ORAL at 19:57

## 2023-08-01 RX ADMIN — BUSPIRONE HYDROCHLORIDE 5 MG: 5 TABLET ORAL at 21:33

## 2023-08-01 RX ADMIN — ALLOPURINOL 100 MG: 100 TABLET ORAL at 13:40

## 2023-08-01 RX ADMIN — INSULIN LISPRO 2 UNITS: 100 INJECTION, SOLUTION INTRAVENOUS; SUBCUTANEOUS at 17:41

## 2023-08-01 RX ADMIN — ACETAMINOPHEN 650 MG: 325 TABLET ORAL at 12:49

## 2023-08-01 RX ADMIN — Medication 10 ML: at 20:17

## 2023-08-01 RX ADMIN — POTASSIUM CHLORIDE 40 MEQ: 1.5 POWDER, FOR SOLUTION ORAL at 00:42

## 2023-08-01 RX ADMIN — POTASSIUM CHLORIDE 40 MEQ: 750 TABLET, EXTENDED RELEASE ORAL at 17:41

## 2023-08-01 RX ADMIN — Medication 10 ML: at 13:44

## 2023-08-01 RX ADMIN — APIXABAN 5 MG: 5 TABLET, FILM COATED ORAL at 13:40

## 2023-08-01 RX ADMIN — Medication 10 ML: at 09:06

## 2023-08-01 RX ADMIN — BUSPIRONE HYDROCHLORIDE 5 MG: 5 TABLET ORAL at 16:22

## 2023-08-01 RX ADMIN — SPIRONOLACTONE 25 MG: 25 TABLET ORAL at 13:40

## 2023-08-01 RX ADMIN — BUTALBITAL, ACETAMINOPHEN, AND CAFFEINE 1 TABLET: 50; 325; 40 TABLET ORAL at 21:33

## 2023-08-01 RX ADMIN — INSULIN LISPRO 3 UNITS: 100 INJECTION, SOLUTION INTRAVENOUS; SUBCUTANEOUS at 12:19

## 2023-08-01 NOTE — H&P
Patient Name:  Aydee Solis  YOB: 1950  MRN:  [id number]  Admit Date:  7/31/2023  Patient Care Team:  Bennett Duque DO as PCP - General (Internal Medicine)  Marco Mayes MD as PCP - Family Medicine      Subjective   History Present Illness     Chief Complaint   Patient presents with    Weakness - Generalized     History of Present Illness  Mrs. Solis is a 73 year old female with history of obstructive sleep apnea, chronic oxygen use, CKD stage III, type 2 diabetes, paroxysmal A-fib on chronic anticoagulation, COPD, hypertension, CHF who presents to the emergency room with generalized weakness and altered mental status.   who is with patient states that patient has had some generalized weakness and intermittent episodes of confusion for the last several days.  She did have a fall about a week ago and was evaluated at outside emergency room and had a CT of her head that was negative, she is on Eliquis for A-fib, at that time she also had x-ray of her right knee which was negative.  She has had some increased bruising and swelling of her lower extremities over the last week, which were contributed to her fall.  She has been complaining of some nausea and had decreased appetite but has not had any vomiting or diarrhea.  She denies any trouble with urination or difficulty with urination.  She was mostly brought in today due to her intermittent episodes of confusion.  She was on gabapentin and hydrocodone but she has not taken those for about a week because of the fall and the intermittent confusion and there was some thought that those could have contributed to the fall.  Patient is normally on 3 L of oxygen via nasal cannula at home, reportedly her oxygen saturations had dropped into the 80s at home.  She normally uses a walker to get around at home.  In the emergency room herePatient's pH is 7.6, PCO2 42.8, PO2 75.7 on 3-1/2 L of oxygen, chloride 78, sodium 130, potassium 2.7,  BNP 2030 which is down from 3000, creatinine 1.71 which is up from 1.1 about 6 weeks ago, lactate 1.6, white blood cell count 8.3, hemoglobin 13.5, hematocrit 42.8, platelets 391.  Urinalysis shows negative nitrites, 4+ bacteria, 3-6 squamous epithelial cells, 13-20 white blood cells.  Again patient denies any urinary symptoms, urine culture sent.  Patient was given a dose of Rocephin in the emergency room.    Review of Systems   Constitutional:  Positive for appetite change (decreased). Negative for fever.   HENT:  Negative for nosebleeds and trouble swallowing.    Eyes:  Negative for photophobia, redness and visual disturbance.   Respiratory:  Negative for cough, chest tightness, shortness of breath and wheezing.    Cardiovascular:  Negative for chest pain, palpitations and leg swelling.   Gastrointestinal:  Negative for abdominal distention, abdominal pain, nausea and vomiting.   Endocrine: Negative.    Genitourinary: Negative.    Musculoskeletal:  Negative for gait problem and joint swelling.        Fall, right leg bruising     Skin: Negative.    Neurological:  Negative for dizziness, seizures, speech difficulty, light-headedness and headaches.   Hematological: Negative.    Psychiatric/Behavioral:  Positive for confusion. Negative for behavioral problems.       Personal History     Past Medical History:   Diagnosis Date    Arthritis     Breast cancer     Cancer     Class 3 severe obesity due to excess calories with body mass index (BMI) of 50.0 to 59.9 in adult     COPD (chronic obstructive pulmonary disease)     Diabetes mellitus     EDWARDS (dyspnea on exertion)     Elephantiasis     left leg    Hyperlipidemia     Hypertension     Leukemia     Lung cancer     Lymphedema     left arm    Osteoporosis     Tracheal cancer      Past Surgical History:   Procedure Laterality Date    APPENDECTOMY      HYSTERECTOMY      KNEE ARTHROSCOPY Right     LAPAROSCOPIC GASTRIC BANDING      LYMPHADENECTOMY Left     MASTECTOMY Left       Family History   Problem Relation Age of Onset    Stroke Mother     Leukemia Father     COPD Sister     ALS Brother      Social History     Tobacco Use    Smoking status: Former   Vaping Use    Vaping Use: Never used   Substance Use Topics    Alcohol use: No    Drug use: No     No current facility-administered medications on file prior to encounter.     Current Outpatient Medications on File Prior to Encounter   Medication Sig Dispense Refill    albuterol (PROVENTIL HFA;VENTOLIN HFA) 108 (90 BASE) MCG/ACT inhaler Inhale 2 puffs Every 4 (Four) Hours As Needed for wheezing or shortness of air. 1 inhaler 0    allopurinol (ZYLOPRIM) 100 MG tablet Take 1 tablet by mouth Daily.      apixaban (ELIQUIS) 5 MG tablet tablet Take 1 tablet by mouth 2 (Two) Times a Day. 60 tablet 0    atorvastatin (LIPITOR) 10 MG tablet Take 1 tablet by mouth Daily.      busPIRone (BUSPAR) 5 MG tablet Take 1 tablet by mouth 3 (Three) Times a Day.      docusate sodium (COLACE) 100 MG capsule Take 1 capsule by mouth 2 (Two) Times a Day.      DULoxetine (CYMBALTA) 60 MG capsule Take 1 capsule by mouth Daily.      gabapentin (NEURONTIN) 100 MG capsule Take 1 capsule by mouth 3 (Three) Times a Day. 90 capsule 0    HYDROcodone-acetaminophen (NORCO)  MG per tablet 1 tablet Every 8 (Eight) Hours As Needed for Moderate Pain.      insulin aspart (NovoLOG FlexPen) 100 UNIT/ML solution pen-injector sc pen Novolog FlexPen U-100 Insulin aspart 100 unit/mL (3 mL) subcutaneous   Sliding scale.      insulin detemir (LEVEMIR) 100 UNIT/ML injection Inject 20 Units under the skin into the appropriate area as directed Every Night.      spironolactone (ALDACTONE) 25 MG tablet Take 1 tablet by mouth Daily. 30 tablet 0    torsemide (DEMADEX) 100 MG tablet Take 1 tablet by mouth Daily. 30 tablet 0    Trelegy Ellipta 100-62.5-25 MCG/INH inhaler        Allergies   Allergen Reactions    Ciprofloxacin        Objective    Objective     Vital Signs  Temp:  [98.4 øF  (36.9 øC)] 98.4 øF (36.9 øC)  Heart Rate:  [68-74] 70  Resp:  [18] 18  BP: (105-125)/(52-82) 125/71  SpO2:  [93 %-98 %] 95 %  on  Flow (L/min):  [3] 3;   Device (Oxygen Therapy): nasal cannula  Body mass index is 46 kg/mý.    Physical Exam  Vitals and nursing note reviewed.   Constitutional:       General: She is not in acute distress.     Appearance: She is well-developed.   HENT:      Head: Normocephalic.   Neck:      Vascular: No JVD.   Cardiovascular:      Rate and Rhythm: Normal rate. Rhythm irregular.      Comments: Appears normal sinus rhythm on monitor at this time with heart rate 72 during my exam  Pulmonary:      Effort: Pulmonary effort is normal.      Breath sounds: Normal breath sounds.      Comments: Diminished lung sounds otherwise clear, sats 95% on 3 L of oxygen during my exam  Abdominal:      General: There is no distension.      Palpations: Abdomen is soft.      Tenderness: There is no abdominal tenderness.   Musculoskeletal:         General: Normal range of motion.      Cervical back: Normal range of motion.   Skin:     General: Skin is warm and dry.      Capillary Refill: Capillary refill takes less than 2 seconds.   Neurological:      Mental Status: She is alert.      Comments: Patient alert and oriented to person and place, cannot tell me the month, she is following commands, no focal neurodeficits appreciated.  No slurred speech or facial droop   Psychiatric:         Behavior: Behavior normal.       Results Review:  I reviewed the patient's new clinical results.  I reviewed the patient's new imaging results and agree with the interpretation.  I reviewed the patient's other test results and agree with the interpretation  I personally viewed and interpreted the patient's EKG/Telemetry data  Discussed with ED provider.    Lab Results (last 24 hours)       Procedure Component Value Units Date/Time    CBC & Differential [[id number]]  (Abnormal) Collected: 07/31/23 2202    Specimen: Blood Updated:  07/31/23 2215    Narrative:      The following orders were created for panel order CBC & Differential.  Procedure                               Abnormality         Status                     ---------                               -----------         ------                     CBC Auto Differential[[id number]]        Abnormal            Final result                 Please view results for these tests on the individual orders.    Basic Metabolic Panel [[id number]]  (Abnormal) Collected: 07/31/23 2202    Specimen: Blood Updated: 07/31/23 2230     Glucose 200 mg/dL      BUN 62 mg/dL      Creatinine 1.71 mg/dL      Sodium 130 mmol/L      Potassium 2.7 mmol/L      Chloride 78 mmol/L      CO2 39.0 mmol/L      Calcium 10.3 mg/dL      BUN/Creatinine Ratio 36.3     Anion Gap 13.0 mmol/L      eGFR 30.9 mL/min/1.73     Narrative:      GFR Normal >60  Chronic Kidney Disease <60  Kidney Failure <15    The GFR formula is only valid for adults with stable renal function between ages 18 and 70.    BNP [[id number]]  (Abnormal) Collected: 07/31/23 2202    Specimen: Blood Updated: 07/31/23 2227     proBNP 2,030.0 pg/mL     Narrative:      Among patients with dyspnea, NT-proBNP is highly sensitive for the detection of acute congestive heart failure. In addition NT-proBNP of <300 pg/ml effectively rules out acute congestive heart failure with 99% negative predictive value.    Results may be falsely decreased if patient taking Biotin.      Magnesium [[id number]]  (Abnormal) Collected: 07/31/23 2202    Specimen: Blood Updated: 07/31/23 2230     Magnesium 2.5 mg/dL     CBC Auto Differential [[id number]]  (Abnormal) Collected: 07/31/23 2202    Specimen: Blood Updated: 07/31/23 2215     WBC 8.35 10*3/mm3      RBC 5.01 10*6/mm3      Hemoglobin 13.5 g/dL      Hematocrit 42.8 %      MCV 85.4 fL      MCH 26.9 pg      MCHC 31.5 g/dL      RDW 17.3 %      RDW-SD 52.8 fl      MPV 8.6 fL      Platelets 391 10*3/mm3      Neutrophil % 77.7 %      Lymphocyte  % 11.9 %      Monocyte % 8.4 %      Eosinophil % 1.1 %      Basophil % 0.5 %      Immature Grans % 0.4 %      Neutrophils, Absolute 6.50 10*3/mm3      Lymphocytes, Absolute 0.99 10*3/mm3      Monocytes, Absolute 0.70 10*3/mm3      Eosinophils, Absolute 0.09 10*3/mm3      Basophils, Absolute 0.04 10*3/mm3      Immature Grans, Absolute 0.03 10*3/mm3      nRBC 0.0 /100 WBC     Lactic Acid, Plasma [[id number]]  (Normal) Collected: 07/31/23 2202    Specimen: Blood Updated: 07/31/23 2228     Lactate 1.6 mmol/L     Urinalysis With Microscopic If Indicated (No Culture) - Urine, Catheter [[id number]]  (Abnormal) Collected: 07/31/23 2245    Specimen: Urine, Catheter Updated: 07/31/23 2310     Color, UA Yellow     Appearance, UA Cloudy     pH, UA 5.5     Specific Gravity, UA 1.020     Glucose, UA Negative     Ketones, UA Trace     Bilirubin, UA Negative     Blood, UA Moderate (2+)     Protein, UA 30 mg/dL (1+)     Leuk Esterase, UA Moderate (2+)     Nitrite, UA Negative     Urobilinogen, UA 4.0 E.U./dL    Urinalysis, Microscopic Only - Urine, Catheter [[id number]]  (Abnormal) Collected: 07/31/23 2245    Specimen: Urine, Catheter Updated: 07/31/23 2347     RBC, UA 0-2 /HPF      WBC, UA 13-20 /HPF      Bacteria, UA 4+ /HPF      Squamous Epithelial Cells, UA 3-6 /HPF      Hyaline Casts, UA 0-2 /LPF      WBC Casts 0-2 /LPF      Methodology Manual Light Microscopy    Blood Gas, Arterial - [[id number]]  (Abnormal) Collected: 07/31/23 2257    Specimen: Arterial Blood Updated: 07/31/23 2301     Site Arterial: right radial     Tee's Test Positive     pH, Arterial 7.607 pH units      Comment: SAT 97% Meter: [id number] : [id number] Darleen DeLgarrettCritical:Notify Dr NORRIS (31-Jul-23 22:59:51)Read back ok        pCO2, Arterial 42.8 mm Hg      pO2, Arterial 75.7 mm Hg      HCO3, Arterial 42.7 mmol/L      Base Excess, Arterial 18.7 mmol/L      O2 Saturation Calculated 97.0 %      Barometric Pressure for Blood Gas 752.9 mmHg       Modality Cannula     Flow Rate 3.5 lpm      Rate 20 Breaths/minute             Imaging Results (Last 24 Hours)       Procedure Component Value Units Date/Time    CT Head Without Contrast [[id number]] Collected: 07/31/23 2228     Updated: 07/31/23 2336    Narrative:      CT HEAD WITHOUT CONTRAST     HISTORY: Fall 1 week ago. On Eliquis. Altered mental status.     TECHNIQUE:  Head CT includes axial imaging from the base of skull to the  vertex without intravenous contrast. Radiation dose reduction techniques  were utilized, including automated exposure control and exposure  modulation based on body size.     COMPARISON:CT head without contrast 06/05/2023     FINDINGS: There is mild chronic small vessel ischemic white matter  change and cerebral and cerebellar atrophy. There are no abnormal areas  of increased attenuation intra-axially to suggest hemorrhage. No  extra-axial fluid collection is observed. Intracranial atherosclerotic  calcifications are present. Ventricles appear within normal limits for  size and configuration. Bone windows demonstrate no focal calvarial  abnormality. Mastoid air cells and visualized paranasal sinuses appear  clear.       Impression:      Mild chronic small vessel ischemic white matter change and  atrophy. Intracranial atherosclerotic calcifications. No evidence for  acute intracranial abnormality.     This report was finalized on 7/31/2023 11:33 PM by Dr. Abdiel Kraus M.D.       XR Chest 1 View [[id number]] Collected: 07/31/23 2044     Updated: 07/31/23 2048    Narrative:      XR CHEST 1 VW-  07/31/2023     HISTORY: Weakness. Hypoxia. Per heart size is mildly enlarged. Lungs are  underinflated with some vascular crowding and there is vascular  congestion. There is mild interstitial disease of the lungs with  somewhat more confluent area of increased density in the left mid to  lower lung. FINDINGS may represent mild pulmonary edema. Cardiac  pacemaker seen in good position. There is  some aortic calcification.       Impression:      1. Cardiomegaly and findings consistent with mild pulmonary edema  somewhat exaggerated by suboptimal inspiration.     This report was finalized on 7/31/2023 8:45 PM by Dr. Kasi Gracia M.D.               Results for orders placed during the hospital encounter of 01/21/23    Adult Transthoracic Echo Complete W/ Cont if Necessary Per Protocol    Interpretation Summary    Left ventricular systolic function is mildly decreased. Calculated left ventricular EF = 42.8% Left ventricular ejection fraction appears to be 41 - 45%.    The following left ventricular wall segments are hypokinetic: mid anterior, apical anterior, basal anterolateral, mid anterolateral, apical lateral, basal inferolateral, mid inferolateral, apical inferior, mid inferior, apical septal, basal inferoseptal, mid inferoseptal, apex hypokinetic, mid anteroseptal, basal anterior, basal inferior and basal inferoseptal.    Left ventricular diastolic function is consistent with (grade I) impaired relaxation.    Mildly reduced right ventricular systolic function noted.    The right ventricular cavity is moderately dilated.    The right atrial cavity is moderately  dilated.    There is moderate, bileaflet mitral valve thickening present.    Mild mitral valve stenosis is present. The mitral valve peak gradient is 8 mmHg. The mitral valve mean gradient is 3 mmHg.    Estimated right ventricular systolic pressure from tricuspid regurgitation is mildly elevated (35-45 mmHg).      No orders to display        Assessment/Plan     Active Hospital Problems    Diagnosis  POA    **Acute renal insufficiency [N28.9]  Yes    Hypokalemia [E87.6]  Yes    Hyponatremia [E87.1]  Yes    Confusion [R41.0]  Yes    Alkalosis, metabolic [E87.3]  Yes    Chronic systolic CHF (congestive heart failure) [I50.22]  Yes    PAULETTE (obstructive sleep apnea) [G47.33]  Yes    Stage 3b chronic kidney disease [N18.32]  Yes    Type 2 diabetes  mellitus, with long-term current use of insulin [E11.9, Z79.4]  Not Applicable    PAF (paroxysmal atrial fibrillation) [I48.0]  Yes    Chronic anticoagulation [Z79.01]  Not Applicable    COPD (chronic obstructive pulmonary disease) [J44.9]  Yes    Hypertension [I10]  Yes     Mrs. Solis is a 73 year old female with history of obstructive sleep apnea, chronic oxygen use, CKD stage III, type 2 diabetes, paroxysmal A-fib on chronic anticoagulation, COPD, hypertension, CHF who presents to the emergency room with generalized weakness and altered mental status.    Acute renal insufficiency/CKD  -We will give 1 L of IV fluid overnight, monitor fluid volume closely  -Recheck CBC, BMP in a.m.  -Strict I's and O's  -Daily weights  -Urine cultures pending, patient was given 1 dose of Rocephin in the emergency room    Hypokalemia/hyponatremia  -Normal saline at 100 cc an hour overnight for 1 L  -Recheck BMP in a.m.  -40 mill equivalents of potassium tonight and recheck BMP in a.m.    Confusion/metabolic alkalosis  -Neurochecks every 4 hours  -Recheck ABG in a.m.  -Paroxysmal A-fib/chronic anticoagulation    Type 2 diabetes  -Accu-Cheks before meals and at bedtime with correctional dose insulin  -Hold oral diabetic medications at this time    Paroxysmal A-fib/chronic anticoagulation  -Telemetry unit for monitoring  -Continue home medications when med rec is completed    COPD/obstructive sleep apnea  -O2 to keep sats above 88%  - may use home CPAP machine if available    I discussed the patient's findings and my recommendations with patient and spouse.    VTE Prophylaxis - Eliquis (home med).  Code Status - Full code.       TAMANNA Downing  [facility]  07/31/23  023:35 EDT

## 2023-08-01 NOTE — PROGRESS NOTES
Name: Aydee Solis ADMIT: 2023   : 1950  PCP: Bennett Duque DO    MRN: [id number] LOS: 0 days   AGE/SEX: 73 y.o. female  ROOM: [place]IN[place]     Subjective   Subjective   Awake and alert and appropriate and oriented. Has not been able to eat much because she states she has had problems swallowing mostly solids.     Objective   Objective   Vital Signs  Temp:  [97.9 øF (36.6 øC)-98.4 øF (36.9 øC)] 97.9 øF (36.6 øC)  Heart Rate:  [65-85] 80  Resp:  [16-18] 16  BP: (104-138)/(52-82) 110/64  SpO2:  [92 %-100 %] 93 %  on  Flow (L/min):  [3] 3;   Device (Oxygen Therapy): nasal cannula  Body mass index is 46 kg/mý.    Physical Exam  Constitutional:       General: She is not in acute distress.     Appearance: She is obese. She is ill-appearing. She is not toxic-appearing.   HENT:      Head: Normocephalic and atraumatic.   Eyes:      Extraocular Movements: Extraocular movements intact.   Cardiovascular:      Rate and Rhythm: Normal rate and regular rhythm.   Pulmonary:      Effort: Pulmonary effort is normal. No respiratory distress.      Breath sounds: Normal breath sounds. No wheezing or rhonchi.   Abdominal:      General: Bowel sounds are normal.      Palpations: Abdomen is soft.      Tenderness: There is no abdominal tenderness. There is no guarding or rebound.   Musculoskeletal:         General: No swelling.      Cervical back: Normal range of motion.   Skin:     General: Skin is warm and dry.   Neurological:      General: No focal deficit present.      Mental Status: She is alert and oriented to person, place, and time.      Comments: Oriented to self, [facility], year , situation: Fall   Psychiatric:         Mood and Affect: Mood normal.         Behavior: Behavior normal.         Thought Content: Thought content normal.     Results Review  I reviewed the patient's new clinical results.  Results from last 7 days   Lab Units 23  1006 23  2202   WBC 10*3/mm3 8.11 8.35   HEMOGLOBIN  g/dL 12.1 13.5   PLATELETS 10*3/mm3 398 391     Results from last 7 days   Lab Units 08/01/23  1006 07/31/23  2202   SODIUM mmol/L 132* 130*   POTASSIUM mmol/L 3.1* 2.7*   CHLORIDE mmol/L 86* 78*   CO2 mmol/L 33.0* 39.0*   BUN mg/dL 56* 62*   CREATININE mg/dL 1.23* 1.71*   GLUCOSE mg/dL 203* 200*     Lab Results   Component Value Date    ANIONGAP 13.0 08/01/2023     Estimated Creatinine Clearance: 54.4 mL/min (A) (by C-G formula based on SCr of 1.23 mg/dL (H)).        Results from last 7 days   Lab Units 08/01/23  1006 07/31/23  2202   CALCIUM mg/dL 9.3 10.3   MAGNESIUM mg/dL 2.2 2.5*     Results from last 7 days   Lab Units 07/31/23  2202   LACTATE mmol/L 1.6     Glucose   Date/Time Value Ref Range Status   08/01/2023 1214 217 (H) 70 - 130 mg/dL Final     Comment:     Meter: [id number] : [id number] Olayinka Crystal RN   08/01/2023 0733 207 (H) 70 - 130 mg/dL Final     Comment:     Meter: [id number] : [id number] Sugeyleni Francisco NA   08/01/2023 0239 251 (H) 70 - 130 mg/dL Final     Comment:     Meter: [id number] : [id number] Sonia Castillo CNA       CT Head Without Contrast    Result Date: 7/31/2023  Mild chronic small vessel ischemic white matter change and atrophy. Intracranial atherosclerotic calcifications. No evidence for acute intracranial abnormality.  This report was finalized on 7/31/2023 11:33 PM by Dr. Abdiel Kraus M.D.      XR Chest 1 View    Result Date: 7/31/2023  1. Cardiomegaly and findings consistent with mild pulmonary edema somewhat exaggerated by suboptimal inspiration.  This report was finalized on 7/31/2023 8:45 PM by Dr. Kasi Gracia M.D.       Scheduled Meds  allopurinol, 100 mg, Oral, Daily  apixaban, 5 mg, Oral, BID  atorvastatin, 10 mg, Oral, Daily  budesonide-formoterol, 2 puff, Inhalation, BID - RT   And  tiotropium bromide monohydrate, 2 puff, Inhalation, Daily - RT  busPIRone, 5 mg, Oral, TID  docusate sodium, 100 mg, Oral, BID  DULoxetine, 60 mg, Oral,  Daily  insulin glargine, 20 Units, Subcutaneous, Nightly  insulin lispro, 2-7 Units, Subcutaneous, 4x Daily AC & at Bedtime  senna-docusate sodium, 2 tablet, Oral, BID  sodium chloride, 10 mL, Intravenous, Q12H  spironolactone, 25 mg, Oral, Daily  torsemide, 100 mg, Oral, Daily    Continuous Infusions   PRN Meds    acetaminophen **OR** acetaminophen **OR** acetaminophen    albuterol sulfate HFA    senna-docusate sodium **AND** polyethylene glycol **AND** bisacodyl **AND** bisacodyl    Calcium Replacement - Follow Nurse / BPA Driven Protocol    dextrose    dextrose    glucagon (human recombinant)    Magnesium Low Dose Replacement - Follow Nurse / BPA Driven Protocol    nitroglycerin    ondansetron    Phosphorus Replacement - Follow Nurse / BPA Driven Protocol    Potassium Replacement - Follow Nurse / BPA Driven Protocol    sodium chloride    sodium chloride     Diet  Diet: Diabetic Diets; Consistent Carbohydrate; Texture: Regular Texture (IDDSI 7); Fluid Consistency: Thin (IDDSI 0)    I have personally reviewed:  [x]  Medications  [x]  Laboratory   []  Microbiology   [x]  Radiology   []  EKG/Telemetry   []  Cardiology/Vascular   []  Pathology   []  Records     Results for orders placed during the hospital encounter of 01/21/23    Adult Transthoracic Echo Complete W/ Cont if Necessary Per Protocol    Interpretation Summary    Left ventricular systolic function is mildly decreased. Calculated left ventricular EF = 42.8% Left ventricular ejection fraction appears to be 41 - 45%.    The following left ventricular wall segments are hypokinetic: mid anterior, apical anterior, basal anterolateral, mid anterolateral, apical lateral, basal inferolateral, mid inferolateral, apical inferior, mid inferior, apical septal, basal inferoseptal, mid inferoseptal, apex hypokinetic, mid anteroseptal, basal anterior, basal inferior and basal inferoseptal.    Left ventricular diastolic function is consistent with (grade  I) impaired relaxation.    Mildly reduced right ventricular systolic function noted.    The right ventricular cavity is moderately dilated.    The right atrial cavity is moderately  dilated.    There is moderate, bileaflet mitral valve thickening present.    Mild mitral valve stenosis is present. The mitral valve peak gradient is 8 mmHg. The mitral valve mean gradient is 3 mmHg.    Estimated right ventricular systolic pressure from tricuspid regurgitation is mildly elevated (35-45 mmHg).          Assessment/Plan     Active Hospital Problems    Diagnosis  POA    **Acute renal insufficiency [N28.9]  Yes    Hypokalemia [E87.6]  Yes    Hyponatremia [E87.1]  Yes    Confusion [R41.0]  Yes    Alkalosis, metabolic [E87.3]  Yes    Chronic systolic CHF (congestive heart failure) [I50.22]  Yes    PAULETTE (obstructive sleep apnea) [G47.33]  Yes    Stage 3b chronic kidney disease [N18.32]  Yes    Type 2 diabetes mellitus, with long-term current use of insulin [E11.9, Z79.4]  Not Applicable    PAF (paroxysmal atrial fibrillation) [I48.0]  Yes    Chronic anticoagulation [Z79.01]  Not Applicable    COPD (chronic obstructive pulmonary disease) [J44.9]  Yes    Hypertension [I10]  Yes      Resolved Hospital Problems   No resolved problems to display.     73 y.o. female with DM2, A-fib, COPD, HTN, severe obesity who presents with generalized weakness and reported altered mental status. Had a fall a week prior    Acute kidney injury on chronic kidney disease  Hyponatremia  Hypokalemia  -Creatinine improved back to baseline after IVF  -Replace K  -check mag    Metabolic encephalopathy due to above  -Seems improved  -Continue to monitor    COPD  PAULETTE  -Flow (L/min):  [3] 3     Chronic systolic CHF  Atrial fibrillation  -CXR some congestion. Currently without shortness of breath  -Echo from January above  -Resume diuretics monitoring renal function, intake  -Eliquis for AC    Dysphagia  -Esophagram    Eliquis (home med) for  DVT prophylaxis    Discussed with patient    Discharge: TBD. Therapies    Robi Vital MD  [facility]  08/01/23

## 2023-08-01 NOTE — ED NOTES
Nursing report ED to floor  Aydee Solis  73 y.o.  female    HPI :   Chief Complaint   Patient presents with    Weakness - Generalized       Admitting doctor:   Connor Avery MD    Admitting diagnosis:   The primary encounter diagnosis was Acute on chronic renal insufficiency. Diagnoses of Metabolic alkalosis, History of COPD, History of diabetes mellitus, Hypokalemia, History of CHF (congestive heart failure), and Urinary tract infection in female were also pertinent to this visit.    Code status:   Current Code Status       Date Active Code Status Order ID Comments User Context       8/1/2023 0000 CPR (Attempt to Resuscitate) [id number]  Crystal Green, APRN ED        Question Answer    Code Status (Patient has no pulse and is not breathing) CPR (Attempt to Resuscitate)    Medical Interventions (Patient has pulse or is breathing) Full Support    Release to patient Routine Release                    Allergies:   Ciprofloxacin    Isolation:   No active isolations    Intake and Output    Intake/Output Summary (Last 24 hours) at 8/1/2023 1152  Last data filed at 8/1/2023 0232  Gross per 24 hour   Intake 600 ml   Output --   Net 600 ml       Weight:       07/31/23 2006   Weight: 129 kg (285 lb)       Most recent vitals:   Vitals:    08/01/23 0701 08/01/23 0702 08/01/23 0907 08/01/23 1101   BP: 122/72  104/74 107/64   BP Location:       Patient Position:   Lying    Pulse:  76 69 79   Resp:   16    Temp:       TempSrc:       SpO2:  94% 92% 100%   Weight:       Height:           Active LDAs/IV Access:   Lines, Drains & Airways       Active LDAs       Name Placement date Placement time Site Days    Peripheral IV 06/05/23 1947 Right Antecubital 06/05/23 1947  Antecubital  56    Peripheral IV 07/31/23 2202 Anterior;Right;Upper Arm 07/31/23 2202  Arm  less than 1    External Urinary Catheter 01/21/23 2030  --  191    External Urinary Catheter 06/05/23 2300  --  56                    Labs (abnormal labs have a  star):   Labs Reviewed   BASIC METABOLIC PANEL - Abnormal; Notable for the following components:       Result Value    Glucose 200 (*)     BUN 62 (*)     Creatinine 1.71 (*)     Sodium 130 (*)     Potassium 2.7 (*)     Chloride 78 (*)     CO2 39.0 (*)     BUN/Creatinine Ratio 36.3 (*)     eGFR 30.9 (*)     All other components within normal limits    Narrative:     GFR Normal >60  Chronic Kidney Disease <60  Kidney Failure <15    The GFR formula is only valid for adults with stable renal function between ages 18 and 70.   URINALYSIS W/ MICROSCOPIC IF INDICATED (NO CULTURE) - Abnormal; Notable for the following components:    Appearance, UA Cloudy (*)     Ketones, UA Trace (*)     Blood, UA Moderate (2+) (*)     Protein, UA 30 mg/dL (1+) (*)     Leuk Esterase, UA Moderate (2+) (*)     Urobilinogen, UA 4.0 E.U./dL (*)     All other components within normal limits   BNP (IN-HOUSE) - Abnormal; Notable for the following components:    proBNP 2,030.0 (*)     All other components within normal limits    Narrative:     Among patients with dyspnea, NT-proBNP is highly sensitive for the detection of acute congestive heart failure. In addition NT-proBNP of <300 pg/ml effectively rules out acute congestive heart failure with 99% negative predictive value.    Results may be falsely decreased if patient taking Biotin.     MAGNESIUM - Abnormal; Notable for the following components:    Magnesium 2.5 (*)     All other components within normal limits   CBC WITH AUTO DIFFERENTIAL - Abnormal; Notable for the following components:    RDW 17.3 (*)     Neutrophil % 77.7 (*)     Lymphocyte % 11.9 (*)     All other components within normal limits   BLOOD GAS, ARTERIAL - Abnormal; Notable for the following components:    pH, Arterial 7.607 (*)     pO2, Arterial 75.7 (*)     HCO3, Arterial 42.7 (*)     Base Excess, Arterial 18.7 (*)     All other components within normal limits   URINALYSIS, MICROSCOPIC ONLY - Abnormal; Notable for the  following components:    WBC, UA 13-20 (*)     Bacteria, UA 4+ (*)     Squamous Epithelial Cells, UA 3-6 (*)     All other components within normal limits   BASIC METABOLIC PANEL - Abnormal; Notable for the following components:    Glucose 203 (*)     BUN 56 (*)     Creatinine 1.23 (*)     Sodium 132 (*)     Potassium 3.1 (*)     Chloride 86 (*)     CO2 33.0 (*)     BUN/Creatinine Ratio 45.5 (*)     eGFR 45.9 (*)     All other components within normal limits    Narrative:     GFR Normal >60  Chronic Kidney Disease <60  Kidney Failure <15    The GFR formula is only valid for adults with stable renal function between ages 18 and 70.   CBC WITH AUTO DIFFERENTIAL - Abnormal; Notable for the following components:    MCHC 31.3 (*)     RDW 17.2 (*)     Neutrophil % 79.5 (*)     Lymphocyte % 9.6 (*)     All other components within normal limits   PROTIME-INR - Abnormal; Notable for the following components:    Protime 19.5 (*)     INR 1.61 (*)     All other components within normal limits   URINALYSIS W/ CULTURE IF INDICATED - Abnormal; Notable for the following components:    Appearance, UA Hazy (*)     Ketones, UA Trace (*)     Blood, UA Moderate (2+) (*)     Protein, UA 30 mg/dL (1+) (*)     Leuk Esterase, UA Moderate (2+) (*)     Urobilinogen, UA 4.0 E.U./dL (*)     All other components within normal limits    Narrative:     In absence of clinical symptoms, the presence of pyuria, bacteria, and/or nitrites on the urinalysis result does not correlate with infection.   BLOOD GAS, ARTERIAL - Abnormal; Notable for the following components:    pH, Arterial 7.553 (*)     pCO2, Arterial 46.7 (*)     pO2, Arterial 71.4 (*)     HCO3, Arterial 41.1 (*)     Base Excess, Arterial 16.4 (*)     All other components within normal limits   POCT GLUCOSE FINGERSTICK - Abnormal; Notable for the following components:    Glucose 251 (*)     All other components within normal limits   POCT GLUCOSE FINGERSTICK - Abnormal; Notable for the  following components:    Glucose 207 (*)     All other components within normal limits   LACTIC ACID, PLASMA - Normal   MAGNESIUM - Normal   URINE CULTURE   BLOOD GAS, ARTERIAL   BLOOD GAS, ARTERIAL   SODIUM, URINE, RANDOM    Narrative:     Reference intervals for random urine have not been established.  Clinical usage is dependent upon physician's interpretation in combination with other laboratory tests.      POTASSIUM, URINE, RANDOM    Narrative:     Reference intervals for random urine have not been established.  Clinical usage is dependent upon physician's interpretation in combination with other laboratory tests.      CHLORIDE, URINE, RANDOM    Narrative:     Reference intervals for random urine have not been established.  Clinical usage is dependent upon physician's interpretation in combination with other laboratory tests.      POCT GLUCOSE FINGERSTICK   POCT GLUCOSE FINGERSTICK   POCT GLUCOSE FINGERSTICK   POCT GLUCOSE FINGERSTICK   POCT GLUCOSE FINGERSTICK   POCT GLUCOSE FINGERSTICK   CBC AND DIFFERENTIAL    Narrative:     The following orders were created for panel order CBC & Differential.  Procedure                               Abnormality         Status                     ---------                               -----------         ------                     CBC Auto Differential[[id number]]        Abnormal            Final result                 Please view results for these tests on the individual orders.       EKG:   No orders to display       Meds given in ED:   Medications   sodium chloride 0.9 % flush 10 mL (10 mL Intravenous Given 8/1/23 0906)   sodium chloride 0.9 % flush 10 mL (has no administration in time range)   sodium chloride 0.9 % infusion 40 mL (has no administration in time range)   sennosides-docusate (PERICOLACE) 8.6-50 MG per tablet 2 tablet (2 tablets Oral Not Given 8/1/23 0906)     And   polyethylene glycol (MIRALAX) packet 17 g (has no administration in time range)     And    bisacodyl (DULCOLAX) EC tablet 5 mg (has no administration in time range)     And   bisacodyl (DULCOLAX) suppository 10 mg (has no administration in time range)   dextrose (GLUTOSE) oral gel 15 g (has no administration in time range)   dextrose (D50W) (25 g/50 mL) IV injection 25 g (has no administration in time range)   glucagon (GLUCAGEN) injection 1 mg (has no administration in time range)   insulin lispro (HUMALOG/ADMELOG) injection 2-7 Units (3 Units Subcutaneous Given 8/1/23 0904)   nitroglycerin (NITROSTAT) SL tablet 0.4 mg (has no administration in time range)   sodium chloride 0.9 % infusion (100 mL/hr Intravenous New Bag 8/1/23 1042)   acetaminophen (TYLENOL) tablet 650 mg (has no administration in time range)     Or   acetaminophen (TYLENOL) 160 MG/5ML solution 650 mg (has no administration in time range)     Or   acetaminophen (TYLENOL) suppository 650 mg (has no administration in time range)   ondansetron (ZOFRAN) injection 4 mg (has no administration in time range)   sodium chloride 0.9 % bolus 500 mL (0 mL Intravenous Stopped 8/1/23 0125)   cefTRIAXone (ROCEPHIN) 2,000 mg in sodium chloride 0.9 % 100 mL IVPB-VTB (0 mg Intravenous Stopped 8/1/23 0232)   potassium chloride (KLOR-CON) packet 40 mEq (40 mEq Oral Given 8/1/23 0042)       Imaging results:  CT Head Without Contrast    Result Date: 7/31/2023  Mild chronic small vessel ischemic white matter change and atrophy. Intracranial atherosclerotic calcifications. No evidence for acute intracranial abnormality.  This report was finalized on 7/31/2023 11:33 PM by Dr. Abdiel Kraus M.D.      XR Chest 1 View    Result Date: 7/31/2023  1. Cardiomegaly and findings consistent with mild pulmonary edema somewhat exaggerated by suboptimal inspiration.  This report was finalized on 7/31/2023 8:45 PM by Dr. Kasi Gracia M.D.       Ambulatory status:   - up with assist    Social issues:   Social History     Socioeconomic History    Marital status:     Tobacco Use    Smoking status: Former   Vaping Use    Vaping Use: Never used   Substance and Sexual Activity    Alcohol use: No    Drug use: No    Sexual activity: Defer       NIH Stroke Scale:       Crystal Cline RN  08/01/23 11:52 EDT

## 2023-08-01 NOTE — PLAN OF CARE
Goal Outcome Evaluation:   Pt admitted for UTI with MELANIE.  Has multiple hematomas on RLE from fall 5 days ago.  Awaiting venous doppler results of that extremity to ro DVT and SVT.  Currently tolerating IV abx and electrolyte replacement.  Headache being treated with fioricet.  VSS, paced on the monitor.  Will CTM.

## 2023-08-01 NOTE — ED PROVIDER NOTES
EMERGENCY DEPARTMENT ENCOUNTER    Room Number:  38/38  PCP: Bennett Duque DO  Historian: Patient,       HPI:  Chief Complaint: Confusion  A complete HPI/ROS/PMH/PSH/SH/FH are unobtainable due to: None    Context: Aydee Solis is a 73 y.o. female who presents to the ED via EMS from home for generalized weakness and intermittent episodes of confusion over the last couple days.   states that she had a fall about a week and a half ago, is on Eliquis, was seen at [facility] and reportedly had a head CT done which was unremarkable.  Had an x-ray of the right knee performed that was negative for fracture.  Has had increasing bruising and swelling of her lower extremities right greater than left.  Has had some nausea but no vomiting or diarrhea.  Decreased p.o. intake.  Reports urinating well with no dysuria or urinary urgency or frequency but she is prone to UTIs per her .  Here today mostly for the intermittent episodes of confusion.  States that she was completely fine yesterday but the day before she had some confusion and then today she has had confusion.  They have stopped her gabapentin and hydrocodone about a week ago because of this.      MEDICAL RECORD REVIEW    External (non-ED) record review: Chart review in epic shows an ER visit on July 21, 2023 at [facility] after a fall from a vehicle lift.  CT head radiology report was reviewed with no evidence of any acute emergent findings.  Radiology report reviewed of the right knee x-ray that did not show any acute fractures, radiology report of the right hip x-ray was reviewed with no evidence of fracture or dislocation    PAST MEDICAL HISTORY  Active Ambulatory Problems     Diagnosis Date Noted    Pneumonia of right lower lobe due to infectious organism 01/27/2017    Cellulitis 01/28/2017    Hypertension 01/28/2017    Hyperlipidemia 01/28/2017    Breast cancer 01/28/2017    Lymphedema 01/28/2017    Osteoporosis 01/28/2017     Acute respiratory failure with hypoxia 01/29/2017    Morbid obesity due to excess calories 01/30/2017    Metabolic encephalopathy 01/25/2023    PAULETTE (obstructive sleep apnea) 01/25/2023    Chronic respiratory failure with hypoxia 01/25/2023    Stage 3b chronic kidney disease 01/25/2023    Type 2 diabetes mellitus, with long-term current use of insulin 01/25/2023    PAF (paroxysmal atrial fibrillation) 01/25/2023    Chronic anticoagulation 01/25/2023    COPD (chronic obstructive pulmonary disease) 01/25/2023    Chronic systolic CHF (congestive heart failure) 01/31/2023    Left leg cellulitis 06/05/2023    Acute UTI (urinary tract infection) 06/06/2023    Lymphedema 06/06/2023    Dizziness 06/06/2023    Opioid dependence 06/07/2023     Resolved Ambulatory Problems     Diagnosis Date Noted    Sepsis 01/30/2017    Acute on chronic combined systolic and diastolic CHF (congestive heart failure) 01/25/2023     Past Medical History:   Diagnosis Date    Arthritis     Cancer     Class 3 severe obesity due to excess calories with body mass index (BMI) of 50.0 to 59.9 in adult     Diabetes mellitus     EDWARDS (dyspnea on exertion)     Elephantiasis     Leukemia     Lung cancer     Tracheal cancer          PAST SURGICAL HISTORY  Past Surgical History:   Procedure Laterality Date    APPENDECTOMY      HYSTERECTOMY      KNEE ARTHROSCOPY Right     LAPAROSCOPIC GASTRIC BANDING      LYMPHADENECTOMY Left     MASTECTOMY Left          FAMILY HISTORY  Family History   Problem Relation Age of Onset    Stroke Mother     Leukemia Father     COPD Sister     ALS Brother          SOCIAL HISTORY  Social History     Socioeconomic History    Marital status:    Tobacco Use    Smoking status: Former   Vaping Use    Vaping Use: Never used   Substance and Sexual Activity    Alcohol use: No    Drug use: No    Sexual activity: Defer         ALLERGIES  Ciprofloxacin        REVIEW OF SYSTEMS  Review of Systems     All systems reviewed and negative  except for those discussed in HPI.       PHYSICAL EXAM    I have reviewed the triage vital signs and nursing notes.    ED Triage Vitals [07/31/23 2006]   Temp Heart Rate Resp BP SpO2   98.4 øF (36.9 øC) 68 18 110/82 98 %      Temp src Heart Rate Source Patient Position BP Location FiO2 (%)   -- Monitor -- -- --       Physical Exam  General: Chronically ill-appearing, nondiaphoretic  HEENT: Mucous membranes tacky, atraumatic, EOMI  Neck: Full ROM  Pulm: Symmetric chest rise, nonlabored, lungs mildly diminished bilaterally but no overt wheezes/rales/rhonchi  Cardiovascular: Regular rate and rhythm, intact distal pulses, chronic appearing bilateral lower extremity edema with chronic venous stasis changes, with ecchymosis around the right knee compared to the left  GI: Soft, nontender, nondistended, no rebound, no guarding, bowel sounds present  MSK: Full ROM, no deformity  Skin: Warm, dry  Neuro: Awake, alert, oriented x 4, GCS 15, moving all extremities, no focal deficits  Psych: Calm, cooperative        LAB RESULTS  Recent Results (from the past 24 hour(s))   Basic Metabolic Panel    Collection Time: 07/31/23 10:02 PM    Specimen: Blood   Result Value Ref Range    Glucose 200 (H) 65 - 99 mg/dL    BUN 62 (H) 8 - 23 mg/dL    Creatinine 1.71 (H) 0.57 - 1.00 mg/dL    Sodium 130 (L) 136 - 145 mmol/L    Potassium 2.7 (L) 3.5 - 5.2 mmol/L    Chloride 78 (L) 98 - 107 mmol/L    CO2 39.0 (H) 22.0 - 29.0 mmol/L    Calcium 10.3 8.6 - 10.5 mg/dL    BUN/Creatinine Ratio 36.3 (H) 7.0 - 25.0    Anion Gap 13.0 5.0 - 15.0 mmol/L    eGFR 30.9 (L) >60.0 mL/min/1.73   BNP    Collection Time: 07/31/23 10:02 PM    Specimen: Blood   Result Value Ref Range    proBNP 2,030.0 (H) 0.0 - 1,800.0 pg/mL   Magnesium    Collection Time: 07/31/23 10:02 PM    Specimen: Blood   Result Value Ref Range    Magnesium 2.5 (H) 1.6 - 2.4 mg/dL   CBC Auto Differential    Collection Time: 07/31/23 10:02 PM    Specimen: Blood   Result Value Ref Range    WBC 8.35  3.40 - 10.80 10*3/mm3    RBC 5.01 3.77 - 5.28 10*6/mm3    Hemoglobin 13.5 12.0 - 15.9 g/dL    Hematocrit 42.8 34.0 - 46.6 %    MCV 85.4 79.0 - 97.0 fL    MCH 26.9 26.6 - 33.0 pg    MCHC 31.5 31.5 - 35.7 g/dL    RDW 17.3 (H) 12.3 - 15.4 %    RDW-SD 52.8 37.0 - 54.0 fl    MPV 8.6 6.0 - 12.0 fL    Platelets 391 140 - 450 10*3/mm3    Neutrophil % 77.7 (H) 42.7 - 76.0 %    Lymphocyte % 11.9 (L) 19.6 - 45.3 %    Monocyte % 8.4 5.0 - 12.0 %    Eosinophil % 1.1 0.3 - 6.2 %    Basophil % 0.5 0.0 - 1.5 %    Immature Grans % 0.4 0.0 - 0.5 %    Neutrophils, Absolute 6.50 1.70 - 7.00 10*3/mm3    Lymphocytes, Absolute 0.99 0.70 - 3.10 10*3/mm3    Monocytes, Absolute 0.70 0.10 - 0.90 10*3/mm3    Eosinophils, Absolute 0.09 0.00 - 0.40 10*3/mm3    Basophils, Absolute 0.04 0.00 - 0.20 10*3/mm3    Immature Grans, Absolute 0.03 0.00 - 0.05 10*3/mm3    nRBC 0.0 0.0 - 0.2 /100 WBC   Lactic Acid, Plasma    Collection Time: 07/31/23 10:02 PM    Specimen: Blood   Result Value Ref Range    Lactate 1.6 0.5 - 2.0 mmol/L   Urinalysis With Microscopic If Indicated (No Culture) - Urine, Catheter    Collection Time: 07/31/23 10:45 PM    Specimen: Urine, Catheter   Result Value Ref Range    Color, UA Yellow Yellow, Straw    Appearance, UA Cloudy (A) Clear    pH, UA 5.5 5.0 - 8.0    Specific Gravity, UA 1.020 1.005 - 1.030    Glucose, UA Negative Negative    Ketones, UA Trace (A) Negative    Bilirubin, UA Negative Negative    Blood, UA Moderate (2+) (A) Negative    Protein, UA 30 mg/dL (1+) (A) Negative    Leuk Esterase, UA Moderate (2+) (A) Negative    Nitrite, UA Negative Negative    Urobilinogen, UA 4.0 E.U./dL (A) 0.2 - 1.0 E.U./dL   Urinalysis, Microscopic Only - Urine, Catheter    Collection Time: 07/31/23 10:45 PM    Specimen: Urine, Catheter   Result Value Ref Range    RBC, UA 0-2 None Seen, 0-2 /HPF    WBC, UA 13-20 (A) None Seen, 0-2 /HPF    Bacteria, UA 4+ (A) None Seen /HPF    Squamous Epithelial Cells, UA 3-6 (A) None Seen, 0-2 /HPF     Hyaline Casts, UA 0-2 None Seen /LPF    WBC Casts 0-2 None Seen /LPF    Methodology Manual Light Microscopy    Blood Gas, Arterial -    Collection Time: 07/31/23 10:57 PM    Specimen: Arterial Blood   Result Value Ref Range    Site Arterial: right radial     Tee's Test Positive     pH, Arterial 7.607 (C) 7.350 - 7.450 pH units    pCO2, Arterial 42.8 35.0 - 45.0 mm Hg    pO2, Arterial 75.7 (L) 80.0 - 100.0 mm Hg    HCO3, Arterial 42.7 (H) 22.0 - 28.0 mmol/L    Base Excess, Arterial 18.7 (H) 0.0 - 2.0 mmol/L    O2 Saturation Calculated 97.0 92.0 - 99.0 %    Barometric Pressure for Blood Gas 752.9 mmHg    Modality Cannula     Flow Rate 3.5 lpm    Rate 20 Breaths/minute       Ordered the above labs and independently interpreted results. My findings will be discussed in the medical decision making section below        RADIOLOGY  CT Head Without Contrast    Result Date: 7/31/2023  CT HEAD WITHOUT CONTRAST  HISTORY: Fall 1 week ago. On Eliquis. Altered mental status.  TECHNIQUE:  Head CT includes axial imaging from the base of skull to the vertex without intravenous contrast. Radiation dose reduction techniques were utilized, including automated exposure control and exposure modulation based on body size.  COMPARISON:CT head without contrast 06/05/2023  FINDINGS: There is mild chronic small vessel ischemic white matter change and cerebral and cerebellar atrophy. There are no abnormal areas of increased attenuation intra-axially to suggest hemorrhage. No extra-axial fluid collection is observed. Intracranial atherosclerotic calcifications are present. Ventricles appear within normal limits for size and configuration. Bone windows demonstrate no focal calvarial abnormality. Mastoid air cells and visualized paranasal sinuses appear clear.      Mild chronic small vessel ischemic white matter change and atrophy. Intracranial atherosclerotic calcifications. No evidence for acute intracranial abnormality.  This report was  finalized on 7/31/2023 11:33 PM by Dr. Abdiel Kraus M.D.      XR Chest 1 View    Result Date: 7/31/2023  XR CHEST 1 VW-  07/31/2023  HISTORY: Weakness. Hypoxia. Per heart size is mildly enlarged. Lungs are underinflated with some vascular crowding and there is vascular congestion. There is mild interstitial disease of the lungs with somewhat more confluent area of increased density in the left mid to lower lung. FINDINGS may represent mild pulmonary edema. Cardiac pacemaker seen in good position. There is some aortic calcification.      1. Cardiomegaly and findings consistent with mild pulmonary edema somewhat exaggerated by suboptimal inspiration.  This report was finalized on 7/31/2023 8:45 PM by Dr. Kasi Gracia M.D.       Ordered the above noted radiological studies.  Independently interpreted by me and my independent review of findings can be found in the ED Course.  See dictation for official radiology interpretation.      PROCEDURES    Procedures      MEDICATIONS GIVEN IN ER    Medications   cefTRIAXone (ROCEPHIN) 2,000 mg in sodium chloride 0.9 % 100 mL IVPB-VTB (has no administration in time range)   sodium chloride 0.9 % bolus 500 mL (500 mL Intravenous New Bag 7/31/23 0999)         PROGRESS, DATA ANALYSIS, CONSULTS, AND MEDICAL DECISION MAKING    Please note that this section constitutes my independent interpretation of clinical data including lab results, radiology, EKG's.  This constitutes my independent professional opinion regarding differential diagnosis and management of this patient.  It may include any factors such as history from outside sources, review of external records, social determinants of health, management of medications, response to those treatments, and discussions with other providers.    Differential Diagnosis and Plan: Initial concern for delayed intracranial hemorrhage, UTI, pneumonia, hypercapnic respiratory failure, renal failure, electrolyte abnormalities, anemia, heart  failure, among others.  Plan for labs, CT head, chest x-ray, and reevaluation with results.    Additional sources:  - Discussed/ obtained information from independent historians:  at bedside states that patient was previously evaluated within the last week for a fall with a CT of the head and x-rays of the lower extremity.     - Chronic or social conditions impacting care:      - Shared decision making:  Patient and  at bedside fully updated on and in agreement with the course and plan moving forward    ED Course as of 07/31/23 2349 Mon Jul 31, 2023 2044 XR Chest 1 View  Per my independent interpretation of the chest x-ray, no evidence of pneumothorax, pacemaker noted [DC]   2053 XR Chest 1 View  Radiology for reviewed, evidence of possible mild pulmonary edema [DC]   2215 WBC: 8.35 [DC]   2215 Hemoglobin: 13.5 [DC]   2215 Platelets: 391 [DC]   2229 Lactate: 1.6 [DC]   2229 proBNP(!): 2,030.0  3099 one month ago [DC]   2229 CT Head Without Contrast  Radiology report reviewed, no evidence of any acute emergent findings [DC]   2231 Magnesium(!): 2.5 [DC]   2231 Glucose(!): 200 [DC]   2231 BUN(!): 62 [DC]   2231 Creatinine(!): 1.71 [DC]   2232 Sodium(!): 130 [DC]   2232 Potassium(!): 2.7 [DC]   2306 pH, Arterial(!!): 7.607 [DC]   2306 pO2, Arterial(!): 75.7 [DC]   2306 pCO2, Arterial: 42.8 [DC]   2310 Nitrite, UA: Negative [DC]   2310 Leukocytes, UA(!): Moderate (2+) [DC]   2310 Blood, UA(!): Moderate (2+) [DC]   2310 Ketones, UA(!): Trace [DC]   2319 Discussed with TAMANNA Taylor, A, discussed patient's clinical course and findings today, treatment modalities, and need for hospitalization. [DC]   2330 Discussed with Dr. Monroe, ICU, discussed patient's clinical course advisedly, treatment modalities, he believes this is probably a contraction alkalosis likely from dehydration, recommends IV fluids which have been started.  He does not feel like this patient requires ICU stay at this time.  [DC]      ED Course User Index  [DC] Connor Fisher MD       Hospitalization Considered?: yes    Orders Placed During This Visit:  Orders Placed This Encounter   Procedures    XR Chest 1 View    CT Head Without Contrast    Basic Metabolic Panel    Urinalysis With Microscopic If Indicated (No Culture) - Urine, Catheter    BNP    Magnesium    CBC Auto Differential    Lactic Acid, Plasma    Blood Gas, Arterial -    Blood Gas, Arterial -    Urinalysis, Microscopic Only - Urine, Clean Catch    Urinalysis With Culture If Indicated - Straight Cath    [facility] (on-call MD unless specified) Details    Pulmonology (on-call MD unless specified)    Initiate Observation Status    CBC & Differential       Additional orders considered but not placed:      Independent interpretation of labs, radiology studies, and discussions with consultants: See ED Course        AS OF 23:49 EDT VITALS:    BP - 125/71  HR - 70  TEMP - 98.4 øF (36.9 øC)  02 SATS - 95%        DIAGNOSIS  Final diagnoses:   Acute on chronic renal insufficiency   Metabolic alkalosis   History of COPD   History of diabetes mellitus   Hypokalemia   History of CHF (congestive heart failure)   Urinary tract infection in female         DISPOSITION  HOSPITALIZATION    Discussed treatment plan and reason for hospitalization with pt/family and hospitalizing physician.  Pt/family voiced understanding of the plan for hospitalization for further testing/treatment as needed.                 --    Please note that portions of this were completed with a voice recognition program.       Note Disclaimer: At [facility], we believe that sharing information builds trust and better relationships. You are receiving this note because you are receiving care at [facility] or recently visited. It is possible you will see health information before a provider has talked with you about it. This kind of information can be easy to misunderstand. To help you fully understand what it means for  your health, we urge you to discuss this note with your provider.           Connor Fisher MD  07/31/23 8701       Connor Fisher MD  07/31/23 [id number]

## 2023-08-01 NOTE — ED TRIAGE NOTES
"Pt presents to the ED from home via [facility] for generalized weakness and pain \"everywhere\" starting today. Pt is alert and oriented Xs 3 with some intermittent confusion upon assessment questions. Pt wears 3 liters nasal cannula all the time and states her oxygen saturation was dropping to the 80s because the cannula kept falling out of her nose. Pt states she uses a walker at home to get around. States she had a fall 5 days ago, denies head injury, pt takes eliquis. Bruising and swelling to right knee present with redness to right lower extremity.  "

## 2023-08-02 ENCOUNTER — APPOINTMENT (OUTPATIENT)
Dept: GENERAL RADIOLOGY | Facility: HOSPITAL | Age: 73
End: 2023-08-02
Payer: MEDICARE

## 2023-08-02 LAB
ANION GAP SERPL CALCULATED.3IONS-SCNC: 9.7 MMOL/L (ref 5–15)
BUN SERPL-MCNC: 44 MG/DL (ref 8–23)
BUN/CREAT SERPL: 38.9 (ref 7–25)
CALCIUM SPEC-SCNC: 9.6 MG/DL (ref 8.6–10.5)
CHLORIDE SERPL-SCNC: 92 MMOL/L (ref 98–107)
CO2 SERPL-SCNC: 36.3 MMOL/L (ref 22–29)
CREAT SERPL-MCNC: 1.13 MG/DL (ref 0.57–1)
D-LACTATE SERPL-SCNC: 2.1 MMOL/L (ref 0.5–2)
D-LACTATE SERPL-SCNC: 2.7 MMOL/L (ref 0.5–2)
D-LACTATE SERPL-SCNC: 2.8 MMOL/L (ref 0.5–2)
DEPRECATED RDW RBC AUTO: 51.2 FL (ref 37–54)
EGFRCR SERPLBLD CKD-EPI 2021: 50.8 ML/MIN/1.73
ERYTHROCYTE [DISTWIDTH] IN BLOOD BY AUTOMATED COUNT: 16.6 % (ref 12.3–15.4)
GLUCOSE BLDC GLUCOMTR-MCNC: 153 MG/DL (ref 70–130)
GLUCOSE BLDC GLUCOMTR-MCNC: 168 MG/DL (ref 70–130)
GLUCOSE BLDC GLUCOMTR-MCNC: 206 MG/DL (ref 70–130)
GLUCOSE BLDC GLUCOMTR-MCNC: 209 MG/DL (ref 70–130)
GLUCOSE BLDC GLUCOMTR-MCNC: 227 MG/DL (ref 70–130)
GLUCOSE SERPL-MCNC: 163 MG/DL (ref 65–99)
HCT VFR BLD AUTO: 38.3 % (ref 34–46.6)
HGB BLD-MCNC: 12.2 G/DL (ref 12–15.9)
MCH RBC QN AUTO: 27.1 PG (ref 26.6–33)
MCHC RBC AUTO-ENTMCNC: 31.9 G/DL (ref 31.5–35.7)
MCV RBC AUTO: 84.9 FL (ref 79–97)
PLATELET # BLD AUTO: 387 10*3/MM3 (ref 140–450)
PMV BLD AUTO: 8.7 FL (ref 6–12)
POTASSIUM SERPL-SCNC: 3.7 MMOL/L (ref 3.5–5.2)
POTASSIUM SERPL-SCNC: 4.1 MMOL/L (ref 3.5–5.2)
RBC # BLD AUTO: 4.51 10*6/MM3 (ref 3.77–5.28)
SODIUM SERPL-SCNC: 138 MMOL/L (ref 136–145)
WBC NRBC COR # BLD: 6.6 10*3/MM3 (ref 3.4–10.8)

## 2023-08-02 PROCEDURE — 83605 ASSAY OF LACTIC ACID: CPT | Performed by: HOSPITALIST

## 2023-08-02 PROCEDURE — 97161 PT EVAL LOW COMPLEX 20 MIN: CPT

## 2023-08-02 PROCEDURE — 84132 ASSAY OF SERUM POTASSIUM: CPT | Performed by: HOSPITALIST

## 2023-08-02 PROCEDURE — 94761 N-INVAS EAR/PLS OXIMETRY MLT: CPT

## 2023-08-02 PROCEDURE — 25010000002 CEFTRIAXONE PER 250 MG: Performed by: HOSPITALIST

## 2023-08-02 PROCEDURE — 63710000001 INSULIN GLARGINE PER 5 UNITS: Performed by: HOSPITALIST

## 2023-08-02 PROCEDURE — 85027 COMPLETE CBC AUTOMATED: CPT | Performed by: HOSPITALIST

## 2023-08-02 PROCEDURE — 63710000001 INSULIN LISPRO (HUMAN) PER 5 UNITS: Performed by: NURSE PRACTITIONER

## 2023-08-02 PROCEDURE — G0378 HOSPITAL OBSERVATION PER HR: HCPCS

## 2023-08-02 PROCEDURE — 97110 THERAPEUTIC EXERCISES: CPT

## 2023-08-02 PROCEDURE — 74220 X-RAY XM ESOPHAGUS 1CNTRST: CPT

## 2023-08-02 PROCEDURE — 96366 THER/PROPH/DIAG IV INF ADDON: CPT

## 2023-08-02 PROCEDURE — 94799 UNLISTED PULMONARY SVC/PX: CPT

## 2023-08-02 PROCEDURE — 97166 OT EVAL MOD COMPLEX 45 MIN: CPT

## 2023-08-02 PROCEDURE — 97530 THERAPEUTIC ACTIVITIES: CPT

## 2023-08-02 PROCEDURE — 87040 BLOOD CULTURE FOR BACTERIA: CPT | Performed by: HOSPITALIST

## 2023-08-02 PROCEDURE — 82948 REAGENT STRIP/BLOOD GLUCOSE: CPT

## 2023-08-02 PROCEDURE — 80048 BASIC METABOLIC PNL TOTAL CA: CPT | Performed by: HOSPITALIST

## 2023-08-02 RX ADMIN — BUSPIRONE HYDROCHLORIDE 5 MG: 5 TABLET ORAL at 08:25

## 2023-08-02 RX ADMIN — CEFTRIAXONE 2000 MG: 2 INJECTION, POWDER, FOR SOLUTION INTRAMUSCULAR; INTRAVENOUS at 15:29

## 2023-08-02 RX ADMIN — ALLOPURINOL 100 MG: 100 TABLET ORAL at 08:25

## 2023-08-02 RX ADMIN — APIXABAN 5 MG: 5 TABLET, FILM COATED ORAL at 08:25

## 2023-08-02 RX ADMIN — INSULIN LISPRO 2 UNITS: 100 INJECTION, SOLUTION INTRAVENOUS; SUBCUTANEOUS at 08:24

## 2023-08-02 RX ADMIN — Medication 10 ML: at 20:53

## 2023-08-02 RX ADMIN — ATORVASTATIN CALCIUM 10 MG: 20 TABLET, FILM COATED ORAL at 20:54

## 2023-08-02 RX ADMIN — Medication 10 ML: at 08:25

## 2023-08-02 RX ADMIN — INSULIN LISPRO 3 UNITS: 100 INJECTION, SOLUTION INTRAVENOUS; SUBCUTANEOUS at 18:07

## 2023-08-02 RX ADMIN — INSULIN LISPRO 3 UNITS: 100 INJECTION, SOLUTION INTRAVENOUS; SUBCUTANEOUS at 20:53

## 2023-08-02 RX ADMIN — PANTOPRAZOLE SODIUM 40 MG: 40 TABLET, DELAYED RELEASE ORAL at 08:29

## 2023-08-02 RX ADMIN — DOCUSATE SODIUM 100 MG: 100 CAPSULE, LIQUID FILLED ORAL at 20:53

## 2023-08-02 RX ADMIN — DOCUSATE SODIUM 100 MG: 100 CAPSULE, LIQUID FILLED ORAL at 08:25

## 2023-08-02 RX ADMIN — DULOXETINE HYDROCHLORIDE 60 MG: 60 CAPSULE, DELAYED RELEASE ORAL at 08:25

## 2023-08-02 RX ADMIN — INSULIN GLARGINE 20 UNITS: 100 INJECTION, SOLUTION SUBCUTANEOUS at 20:53

## 2023-08-02 RX ADMIN — BARIUM SULFATE 135 ML: 980 POWDER, FOR SUSPENSION ORAL at 12:42

## 2023-08-02 RX ADMIN — SENNOSIDES AND DOCUSATE SODIUM 2 TABLET: 50; 8.6 TABLET ORAL at 08:25

## 2023-08-02 RX ADMIN — APIXABAN 5 MG: 5 TABLET, FILM COATED ORAL at 20:53

## 2023-08-02 RX ADMIN — TORSEMIDE 100 MG: 100 TABLET ORAL at 08:26

## 2023-08-02 RX ADMIN — SPIRONOLACTONE 25 MG: 25 TABLET ORAL at 08:25

## 2023-08-02 RX ADMIN — BARIUM SULFATE 183 ML: 960 POWDER, FOR SUSPENSION ORAL at 12:42

## 2023-08-02 RX ADMIN — BUSPIRONE HYDROCHLORIDE 5 MG: 5 TABLET ORAL at 20:54

## 2023-08-02 RX ADMIN — INSULIN LISPRO 3 UNITS: 100 INJECTION, SOLUTION INTRAVENOUS; SUBCUTANEOUS at 13:48

## 2023-08-02 NOTE — THERAPY EVALUATION
Acute Care - Physical Therapy Initial Evaluation  [facility]     Patient Name: Aydee Solis  : 1950  MRN: [id number]  Today's Date: 2023      Visit Dx:     ICD-10-CM ICD-9-CM   1. Acute on chronic renal insufficiency  N28.9 593.9    N18.9 585.9   2. Metabolic alkalosis  E87.3 276.3   3. History of COPD  Z87.09 V12.69   4. History of diabetes mellitus  Z86.39 V12.29   5. Hypokalemia  E87.6 276.8   6. History of CHF (congestive heart failure)  Z86.79 V12.59   7. Urinary tract infection in female  N39.0 599.0     Patient Active Problem List   Diagnosis    Pneumonia of right lower lobe due to infectious organism    Cellulitis    Hypertension    Hyperlipidemia    Breast cancer    Lymphedema    Osteoporosis    Acute respiratory failure with hypoxia    Morbid obesity due to excess calories    Metabolic encephalopathy    PAULETTE (obstructive sleep apnea)    Chronic respiratory failure with hypoxia    Stage 3b chronic kidney disease    Type 2 diabetes mellitus, with long-term current use of insulin    PAF (paroxysmal atrial fibrillation)    Chronic anticoagulation    COPD (chronic obstructive pulmonary disease)    Chronic systolic CHF (congestive heart failure)    Left leg cellulitis    UTI (urinary tract infection)    Lymphedema    Dizziness    Opioid dependence    Metabolic acidosis    Confusion    Acute renal insufficiency    Alkalosis, metabolic    Hypokalemia    Hyponatremia     Past Medical History:   Diagnosis Date    Arthritis     Breast cancer     Cancer     Class 3 severe obesity due to excess calories with body mass index (BMI) of 50.0 to 59.9 in adult     COPD (chronic obstructive pulmonary disease)     Diabetes mellitus     EDWARDS (dyspnea on exertion)     Elephantiasis     left leg    Hyperlipidemia     Hypertension     Leukemia     Lung cancer     Lymphedema     left arm    Osteoporosis     Tracheal cancer      Past Surgical History:   Procedure Laterality Date    APPENDECTOMY      HYSTERECTOMY       KNEE ARTHROSCOPY Right     LAPAROSCOPIC GASTRIC BANDING      LYMPHADENECTOMY Left     MASTECTOMY Left      PT Assessment (last 12 hours)       PT Evaluation and Treatment       [facility] Name 08/02/23 1500          Physical Therapy Time and Intention    Subjective Information no complaints  -     Document Type evaluation  -     Mode of Treatment individual therapy;physical therapy  -     Patient Effort good  -     Symptoms Noted During/After Treatment none  -[facility] Name 08/02/23 1500          General Information    Patient Profile Reviewed yes  -     Patient Observations alert;cooperative;agree to therapy  -     General Observations of Patient pt supine in bed no acute distress  -     Prior Level of Function independent:  -     Equipment Currently Used at Home walker, rolling  -     Existing Precautions/Restrictions oxygen therapy device and L/min;fall  -     Benefits Reviewed patient:  -     Barriers to Rehab medically complex  -[facility] Name 08/02/23 1500          Living Environment    Current Living Arrangements home  -LH       Row Name 08/02/23 1500          Pain    Pretreatment Pain Rating 3/10  -     Posttreatment Pain Rating 3/10  -     Pain Location - Side/Orientation Bilateral  -     Pain Location lower  -     Pain Location - extremity  -     Pain Intervention(s) Repositioned  -[facility] Name 08/02/23 1500          Cognition    Orientation Status (Cognition) oriented x 3  -[facility] Name 08/02/23 1500          Range of Motion Comprehensive    Comment, General Range of Motion LEs AROM impaired due to pain and bruising  -[facility] Name 08/02/23 1500          Strength Comprehensive (MMT)    Comment, General Manual Muscle Testing (MMT) Assessment LEs grossly at least 3/5 generalized weakness  -[facility] Name 08/02/23 1500          Bed Mobility    Bed Mobility sit-supine;supine-sit  -     Supine-Sit [place] (Bed Mobility) standby assist  -     Sit-Supine  "[place] (Bed Mobility) standby assist  -     Assistive Device (Bed Mobility) bed rails  -     Comment, (Bed Mobility) inc time to complete task  -       Row Name 08/02/23 1500          Transfers    Transfers stand-sit transfer;sit-stand transfer  -       Row Name 08/02/23 1500          Sit-Stand Transfer    Sit-Stand [place] (Transfers) standby assist  -     Assistive Device (Sit-Stand Transfers) walker, front-wheeled  -       Row Name 08/02/23 1500          Stand-Sit Transfer    Stand-Sit [place] (Transfers) standby assist  [facility]     Assistive Device (Stand-Sit Transfers) walker, front-wheeled  -       Row Name 08/02/23 1500          Gait/Stairs (Locomotion)    [place] Level (Gait) stand assist  -     Assistive Device (Gait) walker, front-wheeled  -     Distance in Feet (Gait) 20x2  -     Pattern (Gait) step-to  -     Deviations/Abnormal Patterns (Gait) bilateral deviations;antalgic;rachid decreased  -     Bilateral Gait Deviations forward flexed posture;heel strike decreased  -     Comment, (Gait/Stairs) on 02, pain and decreased endurance impaired mobility  -       Row Name 08/02/23 1500          Balance    Balance Assessment sitting static balance;standing static balance  -     Static Sitting Balance standby assist  -     Position, Sitting Balance unsupported;sitting edge of bed  -     Static Standing Balance standby assist  -     Position/Device Used, Standing Balance supported;walker, front-wheeled  -       Row Name 08/02/23 1500          Motor Skills    Therapeutic Exercise --  APs x 10  -       Row Name 08/02/23 1500          Plan of Care Review    Plan of Care Reviewed With patient  -     Outcome Evaluation Pt 72 yo female admitted to [facility] 2/2 to generalized weakness and pain \"everywhere\". Pmhx significant for DM2, A-fib, COPD, HTN, obesity. She reports recent fall out of her new truck noted bruising legs. She reports using 3L O2 nc at baseline " and she is independent with ADL's and mobility using a walker, has a scooter as needed. She lives with her . On PT exam pt req'ed SBA bed mobility, tsfs and short distance gait rwx 02. Pt may benefit from skilled PT acutely for generalized strengthening anticipate HHC w spouse assist.  -       Row Name 08/02/23 1500          Vital Signs    O2 Delivery Pre Treatment supplemental O2  -     O2 Delivery Intra Treatment supplemental O2  -     O2 Delivery Post Treatment supplemental O2  -[facility] Name 08/02/23 1500          Positioning and Restraints    Pre-Treatment Position in bed  -     Post Treatment Position bed  -     In Bed supine;call light within reach;encouraged to call for assist;exit alarm on  -       Row Name 08/02/23 1500          Therapy Assessment/Plan (PT)    Rehab Potential (PT) good, to achieve stated therapy goals  -     Criteria for Skilled Interventions Met (PT) yes  -     Therapy Frequency (PT) 6 times/wk  -       Row Name 08/02/23 1500          PT Evaluation Complexity    History, PT Evaluation Complexity 1-2 personal factors and/or comorbidities  -     Examination of Body Systems (PT Eval Complexity) 1-2 elements  -     Clinical Presentation (PT Evaluation Complexity) stable  -     Clinical Decision Making (PT Evaluation Complexity) low complexity  -     Overall Complexity (PT Evaluation Complexity) low complexity  -               User Key  (r) = Recorded By, (t) = Taken By, (c) = Cosigned By      Initials Name Provider Type     Susie Gilbert, PT Physical Therapist                    Physical Therapy Education       Title: PT OT SLP Therapies (In Progress)       Topic: Physical Therapy (In Progress)       Point: Mobility training (In Progress)       Learning Progress Summary             Patient Acceptance, E, NR by  at 8/2/2023 1600                         Point: Home exercise program (In Progress)       Learning Progress Summary             Patient  "Acceptance, E, NR by  at 8/2/2023 1600                         Point: Body mechanics (In Progress)       Learning Progress Summary             Patient Acceptance, E, NR by  at 8/2/2023 1600                         Point: Precautions (In Progress)       Learning Progress Summary             Patient Acceptance, E, NR by  at 8/2/2023 1600                                         User Key       Initials Effective Dates Name Provider Type Discipline     06/16/21 -  Susie Gilbert, PT Physical Therapist PT                  PT Recommendation and Plan  Anticipated Discharge Disposition (PT): home with /7 care, home with home health  Planned Therapy Interventions (PT): balance training, bed mobility training, gait training, home exercise program, ROM (range of motion), stair training, strengthening, stretching, transfer training  Therapy Frequency (PT): 6 times/wk  Plan of Care Reviewed With: patient  Outcome Evaluation: Pt 74 yo female admitted to [facility] 2/2 to generalized weakness and pain \"everywhere\". Pmhx significant for DM2, A-fib, COPD, HTN, obesity. She reports recent fall out of her new truck noted bruising legs. She reports using 3L O2 nc at baseline and she is independent with ADL's and mobility using a walker, has a scooter as needed. She lives with her . On PT exam pt req'ed SBA bed mobility, tsfs and short distance gait rwx 02. Pt may benefit from skilled PT acutely for generalized strengthening anticipate [facility] w spouse assist.   Outcome Measures       Row Name 08/02/23 1600             How much help from another person do you currently need...    Turning from your back to your side while in flat bed without using bedrails? 3  -LH      Moving from lying on back to sitting on the side of a flat bed without bedrails? 3  -LH      Moving to and from a bed to a chair (including a wheelchair)? 3  -LH      Standing up from a chair using your arms (e.g., wheelchair, bedside chair)? 3  -LH      Climbing 3-5 " steps with a railing? 2  -      To walk in hospital room? 3  -      AM-PAC 6 Clicks Score (PT) 17  -         Functional Assessment    Outcome Measure Options AM-PAC 6 Clicks Basic Mobility (PT)  -                User Key  (r) = Recorded By, (t) = Taken By, (c) = Cosigned By      Initials Name Provider Type     Susie Gilbert, PT Physical Therapist                     Time Calculation:    PT Charges       Row Name 08/02/23 1601             Time Calculation    Start Time 1406  -      Stop Time 1426  -      Time Calculation (min) 20 min  -      PT Received On 08/02/23  -      PT - Next Appointment 08/03/23  -      PT Goal Re-Cert Due Date 08/09/23  -         Time Calculation- PT    Total Timed Code Minutes- PT 10 minute(s)  -                User Key  (r) = Recorded By, (t) = Taken By, (c) = Cosigned By      Initials Name Provider Type     Susie Gilbert, PT Physical Therapist                  Therapy Charges for Today       Code Description Service Date Service Provider Modifiers Qty    51617039087 HC PT EVAL LOW COMPLEXITY 3 8/2/2023 Susie Gilbert, PT GP 1    07286282293 HC PT THER PROC EA 15 MIN 8/2/2023 Susie Gilbert, PT GP 1            PT G-Codes  Outcome Measure Options: AM-PAC 6 Clicks Basic Mobility (PT)  AM-PAC 6 Clicks Score (PT): 17  AM-PAC 6 Clicks Score (OT): 11  Modified [place] Scale: 5 - Severe disability.  Bedridden, incontinent, and requiring constant nursing care and attention.    Susie Gilbert, PT  8/2/2023

## 2023-08-02 NOTE — PLAN OF CARE
"Goal Outcome Evaluation:  Plan of Care Reviewed With: patient           Outcome Evaluation: Pt 72 yo female admitted to [facility] 2/2 to generalized weakness and pain \"everywhere\". Pmhx significant for DM2, A-fib, COPD, HTN, obesity. She reports recent fall out of her new truck noted bruising legs. She reports using 3L O2 nc at baseline and she is independent with ADL's and mobility using a walker, has a scooter as needed. She lives with her . On PT exam pt req'ed SBA bed mobility, tsfs and short distance gait rwx 02. Pt may benefit from skilled PT acutely for generalized strengthening anticipate C w spouse assist.      Anticipated Discharge Disposition (PT): home with 24/7 care, home with home health  "

## 2023-08-02 NOTE — PLAN OF CARE
"The pt was admitted to [facility] 2/2 to generalized weakness and pain \"everywhere\". Pmhx significant for DM2, A-fib, COPD, HTN, severe obesity. She reports many falls and has scattered bruising and healing wounds throughout her arms and legs. She reports using 3L O2 nc at baseline and she is independent with ADL's and mobility using a walker. She lives with her  and she reports multiple seated rest breaks when mobilizing to her bathroom and kitchen environments. She completed bed mobility with Min A and slow pacing. She stood with CGA + walker and stood long enough to take x1 orthostatic. She reported feeling weak and declined any chair or sinkside participation. Side steps with Min A. She returned to supine with Mod A for leg positioning. BUE weak with exercises. S/S feeding/grooming from bed level. The pt is OBS status and will be d/c home with assistance from her  - HH recommended. Recent D/C from a SNF reported.     Orthostatics on R forearm:  Supine 100/65  Sitting x1 minute 109/82  Standing x3 min 129/64  "

## 2023-08-02 NOTE — NURSING NOTE
"Diabetes Education  Assessment/Teaching    Patient Name:  Aydee Solis  YOB: 1950  MRN: [id number]  Admit Date:  7/31/2023      Assessment Date:  8/2/2023  Flowsheet Row Most Recent Value   General Information     Referral From: Database. Meet with 72 y/o at bedside in observation unit to assess needs for DM ed. Introduce self to pt. Explain role of DM educator.    Height 167.6 cm (66\")   Height Method Stated   Weight 115 kg (252 lb 13.9 oz)   Weight Method Bed scale   Diabetes History    What type of diabetes do you have? Type 2   Length of Diabetes Diagnosis -- >30 yrs per pt.   Education Preferences    Barriers to Learning -- no learning barriers evident at this time.   Assessment Topics    Healthy Coping - Assessment Competent   DM Goals    Contact Plan Follow-up medical care            Flowsheet Row Most Recent Value   DM Education Needs    Healthy Coping Appropriate   Discharge Plan -- pt reports outpt DM f/u with endo.   Teaching Method Discussion of plan of care   Patient Response Verbalized understanding        Other Comments:  Confirm w/pt that she can contact her endo office for needs. Pt denies new needs at this time.   Electronically signed by:  Kandy Ashton, RN, BSN, [facility]  08/02/23 15:15 EDT  "

## 2023-08-02 NOTE — PLAN OF CARE
Goal Outcome Evaluation:  Plan of Care Reviewed With: patient      Progress: improving  Outcome Evaluation: VSS, pt denies pain, worked c/ OT, cardiology & nephrology consulted, esophagram completed, DM educator met c/ pt, blood cultures drawn, Rocephin, pt's spouse at bedside

## 2023-08-02 NOTE — PLAN OF CARE
Goal Outcome Evaluation:  Plan of Care Reviewed With: patient        Progress: no change  Outcome Evaluation: VSS; Pt is alert and oriented; Recheck potassium was 3.7; BLE swelling and bruising from fall at home;Remains on home o2 of 3L; Will continue to monitor

## 2023-08-02 NOTE — CASE MANAGEMENT/SOCIAL WORK
Discharge Planning Assessment  [facility]     Patient Name: Aydee Solis  MRN: [id number]  Today's Date: 8/2/2023    Admit Date: 7/31/2023    Plan: Pt's plan is home w/o needs, per attending pt's  would like SNF (voice mail left for pt's  with request for return call, no return call received)   Discharge Needs Assessment       Row Name 08/02/23 1607       Living Environment    People in Home spouse    Name(s) of People in Home : Kyle    Current Living Arrangements home    Potentially Unsafe Housing Conditions none    Primary Care Provided by self    Provides Primary Care For no one    Family Caregiver if Needed spouse    Family Caregiver Names : Kyle    Quality of Family Relationships supportive    Able to Return to Prior Arrangements yes  pt said she is able to return home. PT eval pending       Resource/Environmental Concerns    Resource/Environmental Concerns none    Transportation Concerns none       Transition Planning    Patient/Family Anticipates Transition to home with family    Patient/Family Anticipated Services at Transition none;skilled nursing  pt plans home w/o needs.  per attending,  would like rehab at discharge    Transportation Anticipated family or friend will provide       Discharge Needs Assessment    Readmission Within the Last 30 Days no previous admission in last 30 days    Equipment Currently Used at Home cpap;oxygen;walker, rolling;ramp;rollator;grab bar;bath bench    Concerns to be Addressed basic needs;decision-making;adjustment to diagnosis/illness    Anticipated Changes Related to Illness none    Equipment Needed After Discharge none                   Discharge Plan       Row Name 08/02/23 1711       Plan    Plan Pt's plan is home w/o needs, per attending pt's  would like SNF (voice mail left for pt's  with request for return call, no return call received)    Patient/Family in Agreement with Plan unable to assess    Provided Post  "Acute Provider List? Yes    Post Acute Provider List Nursing Home;Home Health    Delivered To Patient    Method of Delivery In person    Plan Comments I initally met with pt and her  Kyle Solis, Mr Will said they lives in a multi level home that has 3 steps to enter, and he has recently had a ramp put on the house. Pt said she no longer drives and her  provides her with transportation.  I left the room when attending came to visit, attending later told me that pt's  would like for pt to go to rehab.  When I returned to pt's room her  was no longer there, I left him a voice mail and request a return call at 1600 no return call received. I spoke with pt, she said she is IADL, ambulates with either a walker or Rollator \"I have both\", pt said she has home oxygen, her  told me it is no longer provided by [facility] \"now it is Indigent\", I advised [employer] [facility], he said that might be who the provider is.  Pt said she has a CPAP that she uses every night, grab bars, cane and bathe bench.  Pt said she has had home health but can't recall the agency, she said she has been to a SNF but does not want to talk about it.  Pt said she plans to return home at discharge and does not anticipate any needs. Pt said at home she is somewhat active, \"I go out shopping\"    Liana Avilez RN                  Continued Care and Services - Admitted Since 7/31/2023    Coordination has not been started for this encounter.       Expected Discharge Date and Time       Expected Discharge Date Expected Discharge Time    Aug 2, 2023            Demographic Summary       Row Name 08/02/23 1604       General Information    Admission Type observation    Arrived From home    Required Notices Provided Observation Status Notice    Referral Source admission list    Reason for Consult discharge planning    Preferred Language English                   Functional Status       Row Name 08/02/23 1605       Functional " Status    Usual Activity Tolerance fair    Current Activity Tolerance --  not sure, has not been OOB       Functional Status, IADL    Medications assistive person    Meal Preparation assistive person    Housekeeping assistive person    Laundry assistive person    Shopping assistive person    IADL Comments Pt said she and her  share the chores, she said he provides her with transportation             Liana Avilez RN

## 2023-08-02 NOTE — CONSULTS
[facility] Consult Note      Patient Name: Aydee Solis  : 1950  MRN: [id number]  Primary Care Physician:  Bennett Duque DO  Referring Physician: Connor Avery MD  Date of admission: 2023    Subjective     Reason for Consult: Acute kidney injury on chronic kidney disease and metabolic alkalosis.    HPI:   Aydee Solis is a 73 y.o. female with medical history significant for  Chronic kidney disease stage IIIa [baseline serum creatinine 1.1-1.8], chronic metabolic alkalosis [carbon dioxide 29-37], COPD requiring home oxygen, diabetes mellitus type 2,  atrial fibrillation and hypertension who was admitted to this facility on 2023 for generalized weakness and altered mental status.  The patient was diagnosed with acute renal failure [serum creatinine 1.71 on presentation] and urinary tract infection.  The patient received IV fluid with improvement in serum creatinine.  Her carbon dioxide level was 39 on 2023 and 33 on 2023.  Her diuretics were restarted yesterday but discontinued today secondary to increasing carbon dioxide concentration [CO2 of 36].    Review of Systems:   14 point review of systems is otherwise negative except for mentioned above on HPI    Personal History     Past Medical History:   Diagnosis Date    Arthritis     Breast cancer     Cancer     Class 3 severe obesity due to excess calories with body mass index (BMI) of 50.0 to 59.9 in adult     COPD (chronic obstructive pulmonary disease)     Diabetes mellitus     EDWARDS (dyspnea on exertion)     Elephantiasis     left leg    Hyperlipidemia     Hypertension     Leukemia     Lung cancer     Lymphedema     left arm    Osteoporosis     Tracheal cancer        Past Surgical History:   Procedure Laterality Date    APPENDECTOMY      HYSTERECTOMY      KNEE ARTHROSCOPY Right     LAPAROSCOPIC GASTRIC BANDING      LYMPHADENECTOMY Left     MASTECTOMY Left        Family History: family history  includes ALS in her brother; COPD in her sister; Leukemia in her father; Stroke in her mother.    Social History:  reports that she has quit smoking. She does not have any smokeless tobacco history on file. She reports that she does not drink alcohol and does not use drugs.    Home Medications:  Prior to Admission medications    Medication Sig Start Date End Date Taking? Authorizing Provider   albuterol (PROVENTIL HFA;VENTOLIN HFA) 108 (90 BASE) MCG/ACT inhaler Inhale 2 puffs Every 4 (Four) Hours As Needed for wheezing or shortness of air. 1/31/17  Yes Bennett John MD   allopurinol (ZYLOPRIM) 100 MG tablet Take 1 tablet by mouth Daily.   Yes Brock Kenyon MD   apixaban (ELIQUIS) 5 MG tablet tablet Take 1 tablet by mouth 2 (Two) Times a Day. 1/5/21  Yes Camilo Armstrong MD   atorvastatin (LIPITOR) 10 MG tablet Take 1 tablet by mouth Daily. 1/21/22  Yes Brock Kenyon MD   busPIRone (BUSPAR) 5 MG tablet Take 1 tablet by mouth 3 (Three) Times a Day.   Yes Brock Kenyon MD   docusate sodium (COLACE) 100 MG capsule Take 1 capsule by mouth 2 (Two) Times a Day.   Yes Brock Kenyon MD   DULoxetine (CYMBALTA) 60 MG capsule Take 1 capsule by mouth Daily. 1/21/22  Yes Brock Kenyon MD   insulin aspart (NovoLOG FlexPen) 100 UNIT/ML solution pen-injector sc pen Novolog FlexPen U-100 Insulin aspart 100 unit/mL (3 mL) subcutaneous   Sliding scale.   Yes Brock Kenyon MD   insulin detemir (LEVEMIR) 100 UNIT/ML injection Inject 20 Units under the skin into the appropriate area as directed Every Night. 2/25/16  Yes Brock Kenyon MD   spironolactone (ALDACTONE) 25 MG tablet Take 1 tablet by mouth Daily. 6/8/23  Yes Noman Ramirez MD   torsemide (DEMADEX) 100 MG tablet Take 1 tablet by mouth Daily. 6/8/23  Yes Noman Ramirez MD   Trelegy Ellipta 100-62.5-25 MCG/INH inhaler  4/18/22  Yes Brock Kenyon MD   gabapentin (NEURONTIN) 100 MG capsule Take 1 capsule by  mouth 3 (Three) Times a Day. 6/8/23   Noman Ramirez MD   HYDROcodone-acetaminophen (NORCO)  MG per tablet 1 tablet Every 8 (Eight) Hours As Needed for Moderate Pain. 4/12/23   Provider, MD Brock       Allergies:  Allergies   Allergen Reactions    Ciprofloxacin        Objective     Vitals:   Temp:  [97.5 øF (36.4 øC)-97.9 øF (36.6 øC)] 97.5 øF (36.4 øC)  Heart Rate:  [69-89] 82  Resp:  [16] 16  BP: (103-127)/(61-82) 109/74  Flow (L/min):  [2-3] 2    Intake/Output Summary (Last 24 hours) at 8/2/2023 1503  Last data filed at 8/2/2023 0825  Gross per 24 hour   Intake 600 ml   Output 2700 ml   Net -2100 ml       Physical Exam:   Constitutional: Awake, alert, no acute distress.  HEENT: Sclera anicteric, no conjunctival injection  Neck: Supple, no JVD  Respiratory: Clear to auscultation bilaterally, nonlabored respiration  Cardiovascular: RRR, no murmurs, no rubs or gallops  Gastrointestinal: Positive bowel sounds, abdomen is soft, nontender and nondistended  Musculoskeletal: Lymphedema  Psychiatric: Appropriate affect, cooperative  Neurologic: Oriented x3, moving all extremities, normal speech and mental status  Skin: Warm and dry       Scheduled Meds:     allopurinol, 100 mg, Oral, Daily  apixaban, 5 mg, Oral, BID  atorvastatin, 10 mg, Oral, Nightly  budesonide-formoterol, 2 puff, Inhalation, BID - RT   And  tiotropium bromide monohydrate, 2 puff, Inhalation, Daily - RT  busPIRone, 5 mg, Oral, TID  cefTRIAXone, 2,000 mg, Intravenous, Q24H  docusate sodium, 100 mg, Oral, BID  DULoxetine, 60 mg, Oral, Daily  insulin glargine, 20 Units, Subcutaneous, Nightly  insulin lispro, 2-7 Units, Subcutaneous, 4x Daily AC & at Bedtime  pantoprazole, 40 mg, Oral, Q AM  senna-docusate sodium, 2 tablet, Oral, BID  sodium chloride, 10 mL, Intravenous, Q12H      IV Meds:        Results Reviewed:   I have personally reviewed the results from the time of this admission to 8/2/2023 15:03 EDT     Lab Results   Component Value  Date    GLUCOSE 163 (H) 08/02/2023    CALCIUM 9.6 08/02/2023     08/02/2023    K 4.1 08/02/2023    CO2 36.3 (H) 08/02/2023    CL 92 (L) 08/02/2023    BUN 44 (H) 08/02/2023    CREATININE 1.13 (H) 08/02/2023    EGFRIFNONA 39 (L) 01/05/2021    BCR 38.9 (H) 08/02/2023    ANIONGAP 9.7 08/02/2023      Lab Results   Component Value Date    MG 2.3 08/01/2023    PHOS 3.5 06/08/2023    ALBUMIN 3.2 (L) 06/08/2023           Assessment / Plan       Acute renal insufficiency    Hypertension    PAULETTE (obstructive sleep apnea)    Stage 3b chronic kidney disease    Type 2 diabetes mellitus, with long-term current use of insulin    PAF (paroxysmal atrial fibrillation)    Chronic anticoagulation    COPD (chronic obstructive pulmonary disease)    Chronic systolic CHF (congestive heart failure)    UTI (urinary tract infection)    Confusion    Alkalosis, metabolic    Hypokalemia    Hyponatremia      ASSESSMENT:  -Acute kidney injury, resolved.  -Underlying chronic kidney disease stage IIIa.  Baseline serum creatinine 1.1-1.8.  -Chronic metabolic alkalosis.  CO2 ranges from 29-37.  -Urine tract infection  -Altered mental status, resolved.  -COPD/obstructive sleep apnea requiring home oxygen  -Atrial fibrillation    PLAN:  -Agree with holding diuretics for now.  -Repeat labs in AM.    Thank you for involving us in the care of Aydee Solis.  Please feel free to call with any questions.    Shyam Hwang MD  08/02/23  15:03 EDT    [facility]  [phone]      Please note that portions of this note were completed with a voice recognition program.

## 2023-08-02 NOTE — PROGRESS NOTES
Name: Aydee Solis ADMIT: 2023   : 1950  PCP: Bennett Duque DO    MRN: [id number] LOS: 0 days   AGE/SEX: 73 y.o. female  ROOM: 124/1     Subjective   Subjective   Awake and alert and appropriate and oriented.  at bedside. States she has mentally improved much better not quite back to baseline.     Objective   Objective   Vital Signs  Temp:  [97.5 øF (36.4 øC)-97.9 øF (36.6 øC)] 97.5 øF (36.4 øC)  Heart Rate:  [64-89] 82  Resp:  [16-18] 16  BP: (103-127)/(61-82) 109/74  SpO2:  [92 %-100 %] 96 %  on  Flow (L/min):  [2-3] 2;   Device (Oxygen Therapy): nasal cannula  Body mass index is 40.81 kg/mý.    Physical Exam  Constitutional:       General: She is not in acute distress.     Appearance: She is obese. She is ill-appearing (chronic). She is not toxic-appearing.   HENT:      Head: Normocephalic and atraumatic.   Eyes:      Extraocular Movements: Extraocular movements intact.   Cardiovascular:      Rate and Rhythm: Normal rate and regular rhythm.   Pulmonary:      Effort: Pulmonary effort is normal. No respiratory distress.      Breath sounds: Normal breath sounds. No wheezing or rhonchi.   Abdominal:      General: Bowel sounds are normal.      Palpations: Abdomen is soft.      Tenderness: There is no abdominal tenderness. There is no guarding or rebound.   Musculoskeletal:      Cervical back: Normal range of motion.   Skin:     General: Skin is warm and dry.      Findings: Bruising present.   Neurological:      General: No focal deficit present.      Mental Status: She is alert and oriented to person, place, and time.   Psychiatric:         Mood and Affect: Mood normal.         Behavior: Behavior normal.         Thought Content: Thought content normal.     Results Review  I reviewed the patient's new clinical results.  Results from last 7 days   Lab Units 23  0748 23  1006 23  2202   WBC 10*3/mm3 6.60 8.11 8.35   HEMOGLOBIN g/dL 12.2 12.1 13.5   PLATELETS 10*3/mm3  387 398 391       Results from last 7 days   Lab Units 08/02/23  0748 08/02/23  0244 08/01/23  1006 07/31/23  2202   SODIUM mmol/L 138  --  132* 130*   POTASSIUM mmol/L 4.1 3.7 3.1* 2.7*   CHLORIDE mmol/L 92*  --  86* 78*   CO2 mmol/L 36.3*  --  33.0* 39.0*   BUN mg/dL 44*  --  56* 62*   CREATININE mg/dL 1.13*  --  1.23* 1.71*   GLUCOSE mg/dL 163*  --  203* 200*       Lab Results   Component Value Date    ANIONGAP 9.7 08/02/2023     Estimated Creatinine Clearance: 55.4 mL/min (A) (by C-G formula based on SCr of 1.13 mg/dL (H)).        Results from last 7 days   Lab Units 08/02/23  0748 08/01/23  1428 08/01/23  1006 07/31/23  2202   CALCIUM mg/dL 9.6  --  9.3 10.3   MAGNESIUM mg/dL  --  2.3 2.2 2.5*       Results from last 7 days   Lab Units 07/31/23  2202   LACTATE mmol/L 1.6       Glucose   Date/Time Value Ref Range Status   08/02/2023 0751 153 (H) 70 - 130 mg/dL Final     Comment:     Meter: [id number] : [id number] Livier Boykin    08/02/2023 0542 168 (H) 70 - 130 mg/dL Final     Comment:     Meter: [id number] : [id number] Alex Salinas NA   08/01/2023 2018 259 (H) 70 - 130 mg/dL Final     Comment:     Meter: [id number] : [id number] Alex Salinas NA   08/01/2023 1737 178 (H) 70 - 130 mg/dL Final     Comment:     Meter: [id number] : [id number] Carli Plaza RN   08/01/2023 1214 217 (H) 70 - 130 mg/dL Final     Comment:     Meter: [id number] : [id number] Olayinka Rojas RN   08/01/2023 0733 207 (H) 70 - 130 mg/dL Final     Comment:     Meter: [id number] : [id number] Tyrell Francisco DONNA   08/01/2023 0239 251 (H) 70 - 130 mg/dL Final     Comment:     Meter: [id number] : [id number] Sonia Castillo CNA       CT Head Without Contrast    Result Date: 7/31/2023  Mild chronic small vessel ischemic white matter change and atrophy. Intracranial atherosclerotic calcifications. No evidence for acute intracranial abnormality.  This report was finalized on 7/31/2023 11:33 PM by Dr. Abdiel Kraus M.D.       XR Chest 1 View    Result Date: 7/31/2023  1. Cardiomegaly and findings consistent with mild pulmonary edema somewhat exaggerated by suboptimal inspiration.  This report was finalized on 7/31/2023 8:45 PM by Dr. Kasi Gracia M.D.       Scheduled Meds  allopurinol, 100 mg, Oral, Daily  apixaban, 5 mg, Oral, BID  atorvastatin, 10 mg, Oral, Nightly  budesonide-formoterol, 2 puff, Inhalation, BID - RT   And  tiotropium bromide monohydrate, 2 puff, Inhalation, Daily - RT  busPIRone, 5 mg, Oral, TID  cefTRIAXone, 2,000 mg, Intravenous, Q24H  docusate sodium, 100 mg, Oral, BID  DULoxetine, 60 mg, Oral, Daily  insulin glargine, 20 Units, Subcutaneous, Nightly  insulin lispro, 2-7 Units, Subcutaneous, 4x Daily AC & at Bedtime  pantoprazole, 40 mg, Oral, Q AM  senna-docusate sodium, 2 tablet, Oral, BID  sodium chloride, 10 mL, Intravenous, Q12H  spironolactone, 25 mg, Oral, Daily  torsemide, 100 mg, Oral, Daily    Continuous Infusions   PRN Meds    acetaminophen **OR** acetaminophen **OR** acetaminophen    albuterol    senna-docusate sodium **AND** polyethylene glycol **AND** bisacodyl **AND** bisacodyl    butalbital-acetaminophen-caffeine    Calcium Replacement - Follow Nurse / BPA Driven Protocol    dextrose    dextrose    glucagon (human recombinant)    Magnesium Low Dose Replacement - Follow Nurse / BPA Driven Protocol    nitroglycerin    ondansetron    Phosphorus Replacement - Follow Nurse / BPA Driven Protocol    Potassium Replacement - Follow Nurse / BPA Driven Protocol    sodium chloride    sodium chloride     Diet  Diet: Diabetic Diets; Consistent Carbohydrate; Texture: Regular Texture (IDDSI 7); Fluid Consistency: Thin (IDDSI 0)    I have personally reviewed:  [x]  Medications  [x]  Laboratory   [x]  Microbiology   [x]  Radiology chronic RLE superficial thrombophlebitis  []  EKG/Telemetry   []  Cardiology/Vascular   []  Pathology   []  Records     Results for orders placed during the hospital encounter of  01/21/23    Adult Transthoracic Echo Complete W/ Cont if Necessary Per Protocol    Interpretation Summary    Left ventricular systolic function is mildly decreased. Calculated left ventricular EF = 42.8% Left ventricular ejection fraction appears to be 41 - 45%.    The following left ventricular wall segments are hypokinetic: mid anterior, apical anterior, basal anterolateral, mid anterolateral, apical lateral, basal inferolateral, mid inferolateral, apical inferior, mid inferior, apical septal, basal inferoseptal, mid inferoseptal, apex hypokinetic, mid anteroseptal, basal anterior, basal inferior and basal inferoseptal.    Left ventricular diastolic function is consistent with (grade I) impaired relaxation.    Mildly reduced right ventricular systolic function noted.    The right ventricular cavity is moderately dilated.    The right atrial cavity is moderately  dilated.    There is moderate, bileaflet mitral valve thickening present.    Mild mitral valve stenosis is present. The mitral valve peak gradient is 8 mmHg. The mitral valve mean gradient is 3 mmHg.    Estimated right ventricular systolic pressure from tricuspid regurgitation is mildly elevated (35-45 mmHg).          Assessment/Plan     Active Hospital Problems    Diagnosis  POA    **Acute renal insufficiency [N28.9]  Yes    Hypokalemia [E87.6]  Yes    Hyponatremia [E87.1]  Yes    Confusion [R41.0]  Yes    Alkalosis, metabolic [E87.3]  Yes    UTI (urinary tract infection) [N39.0]  Unknown    Chronic systolic CHF (congestive heart failure) [I50.22]  Yes    PAULETTE (obstructive sleep apnea) [G47.33]  Yes    Stage 3b chronic kidney disease [N18.32]  Yes    Type 2 diabetes mellitus, with long-term current use of insulin [E11.9, Z79.4]  Not Applicable    PAF (paroxysmal atrial fibrillation) [I48.0]  Yes    Chronic anticoagulation [Z79.01]  Not Applicable    COPD (chronic obstructive pulmonary disease) [J44.9]  Yes    Hypertension [I10]  Yes      Resolved Hospital  Problems   No resolved problems to display.     73 y.o. female with DM2, A-fib, COPD, HTN, severe obesity who presents with generalized weakness and reported altered mental status. Had a fall a week prior    Deconditioning  Frequent falls  -Therapies. May need SNF    Acute kidney injury on chronic kidney disease  Hyponatremia  Hypokalemia  Metabolic alkalosis  -Creatinine improved back to baseline after IVF  -no GI losses. contraction from diuretics? (restarted yesterday and CO2 higher today) will stop diuretics again and ask nephrology input  -Replaced K  -mag OK    UTI  -Urine culture greater than 100,000 E. coli  -Continue ceftriaxone await sensitivities    Metabolic encephalopathy due to above  -improved,  confirmed  -Continue to monitor    COPD  PAULETTE  -Flow (L/min):  [2-3] 2     Chronic systolic CHF  Atrial fibrillation  -CXR some congestion. Currently without shortness of breath  -Echo from January above  -Eliquis for AC may not be ideal candidate due to frequent falls and bruising. Ask cardiology opinion    Dysphagia  -Esophagram today    Eliquis (home med) for DVT prophylaxis    Discussed with patient, family, nursing staff, and CCP    Discharge: TBD may need SNF frequent falls.  at bedside states she is not motivated    Robi Vital MD  [facility]  08/02/23

## 2023-08-02 NOTE — CONSULTS
Aydee Solis   73 y.o.  female    LOS: 0 days   Patient Care Team:  Bennett Duque DO as PCP - General (Internal Medicine)  Marco Mayes MD as PCP - Family Medicine      Subjective     Chief Complaint: AMS, we have been atsf for recommendations for continued a.c.    History of Present Illness:  Ms Solis is a 73 y.o. female who follows with Dr Chacon with h/o  DM2, A-fib, COPD, HTN, Pulm Htn and severe obesity who presented with generalized weakness and reported altered mental status per family.  Patient had a fall about a week ago and was seen at outside facility with unremarkable CT scan.  Since that time she has had increasing bruising and swelling of lower extremities.  She tells em she is here because she fell 5 days ago, she doesn't remember if she came to [facility] then or not. She deines any fevers, no urinary symptoms and no soa or chest pain    PMH  At fib  Pulm htn, PA systolic pressure 67 mm, mild mitral stenosis gradient 3 mm, normal coronary arteries, normal LV function 2020   DM  AICD-cardiac resynchronization therapy defibrillator (CRT-D)  for tachy akash syndrome  HLD      Past Medical History:   Diagnosis Date    Arthritis     Breast cancer     Cancer     Class 3 severe obesity due to excess calories with body mass index (BMI) of 50.0 to 59.9 in adult     COPD (chronic obstructive pulmonary disease)     Diabetes mellitus     EDWARDS (dyspnea on exertion)     Elephantiasis     left leg    Hyperlipidemia     Hypertension     Leukemia     Lung cancer     Lymphedema     left arm    Osteoporosis     Tracheal cancer      Past Surgical History:   Procedure Laterality Date    APPENDECTOMY      HYSTERECTOMY      KNEE ARTHROSCOPY Right     LAPAROSCOPIC GASTRIC BANDING      LYMPHADENECTOMY Left     MASTECTOMY Left      Medications Prior to Admission   Medication Sig Dispense Refill Last Dose    albuterol (PROVENTIL HFA;VENTOLIN HFA) 108 (90 BASE) MCG/ACT inhaler Inhale 2 puffs Every 4 (Four) Hours As  Needed for wheezing or shortness of air. 1 inhaler 0     allopurinol (ZYLOPRIM) 100 MG tablet Take 1 tablet by mouth Daily.       apixaban (ELIQUIS) 5 MG tablet tablet Take 1 tablet by mouth 2 (Two) Times a Day. 60 tablet 0     atorvastatin (LIPITOR) 10 MG tablet Take 1 tablet by mouth Daily.       busPIRone (BUSPAR) 5 MG tablet Take 1 tablet by mouth 3 (Three) Times a Day.       docusate sodium (COLACE) 100 MG capsule Take 1 capsule by mouth 2 (Two) Times a Day.       DULoxetine (CYMBALTA) 60 MG capsule Take 1 capsule by mouth Daily.       insulin aspart (NovoLOG FlexPen) 100 UNIT/ML solution pen-injector sc pen Novolog FlexPen U-100 Insulin aspart 100 unit/mL (3 mL) subcutaneous   Sliding scale.       insulin detemir (LEVEMIR) 100 UNIT/ML injection Inject 20 Units under the skin into the appropriate area as directed Every Night.       spironolactone (ALDACTONE) 25 MG tablet Take 1 tablet by mouth Daily. 30 tablet 0     torsemide (DEMADEX) 100 MG tablet Take 1 tablet by mouth Daily. 30 tablet 0     Trelegy Ellipta 100-62.5-25 MCG/INH inhaler        gabapentin (NEURONTIN) 100 MG capsule Take 1 capsule by mouth 3 (Three) Times a Day. 90 capsule 0     HYDROcodone-acetaminophen (NORCO)  MG per tablet 1 tablet Every 8 (Eight) Hours As Needed for Moderate Pain.          Family History   Problem Relation Age of Onset    Stroke Mother     Leukemia Father     COPD Sister     ALS Brother      Social History     Socioeconomic History    Marital status:    Tobacco Use    Smoking status: Former   Vaping Use    Vaping Use: Never used   Substance and Sexual Activity    Alcohol use: No    Drug use: No    Sexual activity: Defer     Objective       Review of Systems:   Constitutional: Negative for diaphoresis, fatigue, fever and unexpected weight change.   HENT: Negative.    Eyes: Negative.    Respiratory: Negative for cough, shortness of breath and wheezing.    Cardiovascular: Negative for chest pain, palpitations and  leg swelling.   Gastrointestinal: Negative for abdominal pain, blood in stool, constipation, diarrhea, nausea and vomiting.   Endocrine: Negative.    Genitourinary: Negative for difficulty urinating, dysuria and frequency.   Musculoskeletal: Negative.    Skin: Negative.    Allergic/Immunologic: Negative for environmental allergies and food allergies.   Neurological: Negative.    Hematological: Negative.    Psychiatric/Behavioral: Negative.        Current Facility-Administered Medications:     acetaminophen (TYLENOL) tablet 650 mg, 650 mg, Oral, Q4H PRN, 650 mg at 08/01/23 1249 **OR** acetaminophen (TYLENOL) 160 MG/5ML solution 650 mg, 650 mg, Oral, Q4H PRN **OR** acetaminophen (TYLENOL) suppository 650 mg, 650 mg, Rectal, Q4H PRN, Crystal Green APRN    albuterol (PROVENTIL) nebulizer solution 0.083% 2.5 mg/3mL, 2.5 mg, Nebulization, Q4H PRN, Robi Vital MD    allopurinol (ZYLOPRIM) tablet 100 mg, 100 mg, Oral, Daily, Robi Vital MD, 100 mg at 08/02/23 0825    apixaban (ELIQUIS) tablet 5 mg, 5 mg, Oral, BID, Robi Vital MD, 5 mg at 08/02/23 0825    atorvastatin (LIPITOR) tablet 10 mg, 10 mg, Oral, Nightly, Robi Vital MD, 10 mg at 08/01/23 1958    sennosides-docusate (PERICOLACE) 8.6-50 MG per tablet 2 tablet, 2 tablet, Oral, BID, 2 tablet at 08/02/23 0825 **AND** polyethylene glycol (MIRALAX) packet 17 g, 17 g, Oral, Daily PRN **AND** bisacodyl (DULCOLAX) EC tablet 5 mg, 5 mg, Oral, Daily PRN **AND** bisacodyl (DULCOLAX) suppository 10 mg, 10 mg, Rectal, Daily PRN, Crystal Green, APRN    budesonide-formoterol (SYMBICORT) 160-4.5 MCG/ACT inhaler 2 puff, 2 puff, Inhalation, BID - RT **AND** tiotropium (SPIRIVA RESPIMAT) 2.5 mcg/act aerosol solution inhaler, 2 puff, Inhalation, Daily - RT, Robi Vital MD    busPIRone (BUSPAR) tablet 5 mg, 5 mg, Oral, TID, Robi Vital MD, 5 mg at 08/02/23 0825    butalbital-acetaminophen-caffeine (FIORICET, ESGIC) -40 MG per tablet  1 tablet, 1 tablet, Oral, Q4H PRN, Robi Vital MD, 1 tablet at 08/01/23 2133    Calcium Replacement - Follow Nurse / BPA Driven Protocol, , Does not apply, NE, Robi Vital MD    cefTRIAXone (ROCEPHIN) 2,000 mg in sodium chloride 0.9 % 100 mL IVPB-VTB, 2,000 mg, Intravenous, Q24H, Robi Vital MD    dextrose (D50W) (25 g/50 mL) IV injection 25 g, 25 g, Intravenous, Q15 Min PRN, Crystal Green APRN    dextrose (GLUTOSE) oral gel 15 g, 15 g, Oral, Q15 Min PRN, Crystal Green APRN    docusate sodium (COLACE) capsule 100 mg, 100 mg, Oral, BID, Robi Vital MD, 100 mg at 08/02/23 0825    DULoxetine (CYMBALTA) DR capsule 60 mg, 60 mg, Oral, Daily, Robi Vital MD, 60 mg at 08/02/23 0825    glucagon (GLUCAGEN) injection 1 mg, 1 mg, Intramuscular, Q15 Min PRN, Crystal Green APRN    insulin glargine (LANTUS, SEMGLEE) injection 20 Units, 20 Units, Subcutaneous, Nightly, Robi Vital MD, 20 Units at 08/01/23 2030    insulin lispro (HUMALOG/ADMELOG) injection 2-7 Units, 2-7 Units, Subcutaneous, 4x Daily AC & at Bedtime, Crystal Green APRN, 2 Units at 08/02/23 0824    Magnesium Low Dose Replacement - Follow Nurse / BPA Driven Protocol, , Does not apply, PRN, Robi Vital MD    nitroglycerin (NITROSTAT) SL tablet 0.4 mg, 0.4 mg, Sublingual, Q5 Min PRN, Crystal Green APRN    ondansetron (ZOFRAN) injection 4 mg, 4 mg, Intravenous, Q6H PRN, Crystal Green APRN    pantoprazole (PROTONIX) EC tablet 40 mg, 40 mg, Oral, Q AM, Robi Vital MD, 40 mg at 08/02/23 0829    Phosphorus Replacement - Follow Nurse / BPA Driven Protocol, , Does not apply, PRN, Robi Vital MD    Potassium Replacement - Follow Nurse / BPA Driven Protocol, , Does not apply, PRANNA, Robi Vital MD    sodium chloride 0.9 % flush 10 mL, 10 mL, Intravenous, Q12H, Crystal Green APRN, 10 mL at 08/02/23 0825    sodium chloride 0.9 % flush 10 mL, 10 mL, Intravenous, PRN, Peter,  TAMANNA Mehta    sodium chloride 0.9 % infusion 40 mL, 40 mL, Intravenous, PRN, Peter, Crystal BURT, TAMANNA      Physical Exam:   Vital Sign Min/Max for last 24 hours  Temp  Min: 97.5 øF (36.4 øC)  Max: 97.9 øF (36.6 øC)   BP  Min: 103/61  Max: 127/82    Pulse  Min: 64  Max: 89     Wt Readings from Last 3 Encounters:   08/02/23 115 kg (252 lb 13.9 oz)   06/30/23 129 kg (285 lb)   06/05/23 129 kg (285 lb 7.9 oz)       General Appearance:  Awake,  Alert, cooperative, obese female in no acute distress   Head:  Normocephalic, without obvious abnormality, atraumatic   Eyes:          Conjunctivae normal, anicteric, eom intact    Neck: No adenopathy, supple, trachea midline, no thyromegaly, no   carotid bruit, no JVD, no elevated cvp   Lungs:   Clear to auscultation,respirations regular, even and                  unlabored    Heart:  Regular rhythm and normal rate, normal S1 and S2,            No murmur, no gallop, no rub, no click    Chest Wall:  No abnormalities observed   Abdomen:   Normal bowel sounds, no masses, soft nontender, nondistended                    Rectal:   Deferred   Extremities: No edema. Moves all extremities well, no cyanosis, no erythema   Pulses: Pulses palpable and equal bilaterally   Skin: No bleeding, bruising or rash   Neurologic: Speech clear and appropriate, no facial drooping     : voids      MONITOR: paced    Results Review:     Sodium Sodium   Date Value Ref Range Status   08/02/2023 138 136 - 145 mmol/L Final   08/01/2023 132 (L) 136 - 145 mmol/L Final   07/31/2023 130 (L) 136 - 145 mmol/L Final      Potassium Potassium   Date Value Ref Range Status   08/02/2023 4.1 3.5 - 5.2 mmol/L Final     Comment:     Slight hemolysis detected by analyzer. Results may be affected.   08/02/2023 3.7 3.5 - 5.2 mmol/L Final   08/01/2023 3.1 (L) 3.5 - 5.2 mmol/L Final     Comment:     Slight hemolysis detected by analyzer. Results may be affected.   07/31/2023 2.7 (L) 3.5 - 5.2 mmol/L Final      Chloride  Chloride   Date Value Ref Range Status   08/02/2023 92 (L) 98 - 107 mmol/L Final   08/01/2023 86 (L) 98 - 107 mmol/L Final   07/31/2023 78 (L) 98 - 107 mmol/L Final      Bicarbonate No results found for: PLASMABICARB   BUN BUN   Date Value Ref Range Status   08/02/2023 44 (H) 8 - 23 mg/dL Final   08/01/2023 56 (H) 8 - 23 mg/dL Final   07/31/2023 62 (H) 8 - 23 mg/dL Final      Creatinine Creatinine   Date Value Ref Range Status   08/02/2023 1.13 (H) 0.57 - 1.00 mg/dL Final   08/01/2023 1.23 (H) 0.57 - 1.00 mg/dL Final   07/31/2023 1.71 (H) 0.57 - 1.00 mg/dL Final      Calcium Calcium   Date Value Ref Range Status   08/02/2023 9.6 8.6 - 10.5 mg/dL Final   08/01/2023 9.3 8.6 - 10.5 mg/dL Final   07/31/2023 10.3 8.6 - 10.5 mg/dL Final      Magnesium Magnesium   Date Value Ref Range Status   08/01/2023 2.3 1.6 - 2.4 mg/dL Final   08/01/2023 2.2 1.6 - 2.4 mg/dL Final   07/31/2023 2.5 (H) 1.6 - 2.4 mg/dL Final        Results from last 7 days   Lab Units 08/02/23  0748   WBC 10*3/mm3 6.60   HEMOGLOBIN g/dL 12.2   HEMATOCRIT % 38.3   PLATELETS 10*3/mm3 387     Lab Results   Lab Value Date/Time    TROPONINT 45 (H) 06/05/2023 2213    TROPONINT 46 (H) 06/05/2023 1946    TROPONINT <0.010 01/27/2017 1850     No results found for: CHOL  Lab Results   Component Value Date    HDL 22 (L) 03/02/2023    HDL 71 08/27/2019    HDL 56 12/21/2018     Lab Results   Component Value Date    LDL 53 08/27/2019    LDL 43 12/21/2018     Lab Results   Component Value Date    TRIG 51 03/02/2023    TRIG 86 08/27/2019    TRIG 60 12/21/2018     No components found for: CHOLHDL  No results found for: PTT  No components found for: PT/INR  Lab Results   Component Value Date    HGBA1C 8.40 (H) 06/08/2023      Lab Results   Component Value Date    TSH 8.770 (H) 06/05/2023        Echo EF Estimated  )No results found for: ECHOEFEST      Assessment/ Plan    Active Hospital Problems    Diagnosis  POA    **Acute renal insufficiency [N28.9]  Yes     Priority: Low     Hypokalemia [E87.6]  Yes     Priority: Low    Hyponatremia [E87.1]  Yes     Priority: Low    Confusion [R41.0]  Yes     Priority: Low    Alkalosis, metabolic [E87.3]  Yes     Priority: Low    UTI (urinary tract infection) [N39.0]  Unknown     Priority: Low    Chronic systolic CHF (congestive heart failure) [I50.22]  Yes     Priority: Low    PAULETTE (obstructive sleep apnea) [G47.33]  Yes     Priority: Low    Stage 3b chronic kidney disease [N18.32]  Yes     Priority: Low    Type 2 diabetes mellitus, with long-term current use of insulin [E11.9, Z79.4]  Not Applicable     Priority: Low    PAF (paroxysmal atrial fibrillation) [I48.0]  Yes     Priority: Low    Chronic anticoagulation [Z79.01]  Not Applicable     Priority: Low    COPD (chronic obstructive pulmonary disease) [J44.9]  Yes     Priority: Low    Hypertension [I10]  Yes     Priority: Low       AMS/E Coli UTI    2. MELANIE/CKD stage 3 with metabolic acidosis    3. At fib  Eliquis    4. Pulm htn,   PA systolic pressure 67 mm, mild mitral stenosis gradient 3 mm, normal coronary arteries, normal LV function 2020     [facility]  Interpretation Summary     Left ventricular systolic function is mildly decreased. Calculated left ventricular EF = 42.8% Left ventricular ejection fraction appears to be 41 - 45%.    The following left ventricular wall segments are hypokinetic: mid anterior, apical anterior, basal anterolateral, mid anterolateral, apical lateral, basal inferolateral, mid inferolateral, apical inferior, mid inferior, apical septal, basal inferoseptal, mid inferoseptal, apex hypokinetic, mid anteroseptal, basal anterior, basal inferior and basal inferoseptal.    Left ventricular diastolic function is consistent with (grade I) impaired relaxation.    Mildly reduced right ventricular systolic function noted.    The right ventricular cavity is moderately dilated.    The right atrial cavity is moderately  dilated.    There is moderate, bileaflet mitral valve  thickening present.    Mild mitral valve stenosis is present. The mitral valve peak gradient is 8 mmHg. The mitral valve mean gradient is 3 mmHg.    Estimated right ventricular systolic pressure from tricuspid regurgitation is mildly elevated (35-45 mmHg).    5. DM    6. AICD-cardiac resynchronization therapy defibrillator (CRT-D)  for tachy akash syndrome    7. HLD  statin      Plan  She is abx for UTI  She remains on a.c. but has h/o frequent falls, chadsvasc at least 4, md to address if pt should coonue on a.c.  Next appt with dr santamaria 11/13/2023    TAMANNA Paige  08/02/23  13:13 EDT    Discussed with  Time:     Patient seen and examined she has a high chart was choral for we will continue VAC for the time    Follow-up with Dr. Gasper Santamaria  Possible DC in a.m.

## 2023-08-02 NOTE — THERAPY EVALUATION
Patient Name: Aydee Solis  : 1950    MRN: [id number]                              Today's Date: 2023       Admit Date: 2023    Visit Dx:     ICD-10-CM ICD-9-CM   1. Acute on chronic renal insufficiency  N28.9 593.9    N18.9 585.9   2. Metabolic alkalosis  E87.3 276.3   3. History of COPD  Z87.09 V12.69   4. History of diabetes mellitus  Z86.39 V12.29   5. Hypokalemia  E87.6 276.8   6. History of CHF (congestive heart failure)  Z86.79 V12.59   7. Urinary tract infection in female  N39.0 599.0     Patient Active Problem List   Diagnosis    Pneumonia of right lower lobe due to infectious organism    Cellulitis    Hypertension    Hyperlipidemia    Breast cancer    Lymphedema    Osteoporosis    Acute respiratory failure with hypoxia    Morbid obesity due to excess calories    Metabolic encephalopathy    PAULETTE (obstructive sleep apnea)    Chronic respiratory failure with hypoxia    Stage 3b chronic kidney disease    Type 2 diabetes mellitus, with long-term current use of insulin    PAF (paroxysmal atrial fibrillation)    Chronic anticoagulation    COPD (chronic obstructive pulmonary disease)    Chronic systolic CHF (congestive heart failure)    Left leg cellulitis    Acute UTI (urinary tract infection)    Lymphedema    Dizziness    Opioid dependence    Metabolic acidosis    Confusion    Acute renal insufficiency    Alkalosis, metabolic    Hypokalemia    Hyponatremia     Past Medical History:   Diagnosis Date    Arthritis     Breast cancer     Cancer     Class 3 severe obesity due to excess calories with body mass index (BMI) of 50.0 to 59.9 in adult     COPD (chronic obstructive pulmonary disease)     Diabetes mellitus     EDWARDS (dyspnea on exertion)     Elephantiasis     left leg    Hyperlipidemia     Hypertension     Leukemia     Lung cancer     Lymphedema     left arm    Osteoporosis     Tracheal cancer      Past Surgical History:   Procedure Laterality Date    APPENDECTOMY      HYSTERECTOMY      KNEE  ARTHROSCOPY Right     LAPAROSCOPIC GASTRIC BANDING      LYMPHADENECTOMY Left     MASTECTOMY Left       General Information       Row Name 08/02/23 1116          OT Time and Intention    Document Type evaluation  -RB     Mode of Treatment individual therapy;occupational therapy  -RB       Row Name 08/02/23 1116          General Information    Patient Profile Reviewed yes  -RB     Prior Level of Function independent:;ADL's;transfer  + multiple falls, sitting rest breaks required when mobilizing in her home  -RB     Existing Precautions/Restrictions oxygen therapy device and L/min;fall  -RB     Barriers to Rehab medically complex;previous functional deficit  -RB       Row Name 08/02/23 1116          Living Environment    People in Home spouse  -RB       Row Name 08/02/23 1116          Home Main Entrance    Number of Stairs, Main Entrance three  -RB     Stair Railings, Main Entrance railings safe and in good condition  -RB       Row Name 08/02/23 1116          Stairs Within Home, Primary    Stairs, Within Home, Primary no steps inside her house  -RB       Row Name 08/02/23 1116          Cognition    Orientation Status (Cognition) oriented x 3  -RB       Row Name 08/02/23 1116          Safety Issues, Functional Mobility    Safety Issues Affecting Function (Mobility) safety precautions follow-through/compliance;safety precaution awareness;awareness of need for assistance;insight into deficits/self-awareness;problem-solving;positioning of assistive device;sequencing abilities  very slow pacing, cues to re-direct to task  -RB     Impairments Affecting Function (Mobility) balance;endurance/activity tolerance;strength;shortness of breath;pain  -RB               User Key  (r) = Recorded By, (t) = Taken By, (c) = Cosigned By      Initials Name Provider Type    RB Cecelia Doe OT Occupational Therapist                     Mobility/ADL's       Row Name 08/02/23 1118          Bed Mobility    Bed Mobility  supine-sit;sit-supine  -RB     Supine-Sit [place] (Bed Mobility) minimum assist (75% patient effort);verbal cues;1 person assist  -RB     Sit-Supine [place] (Bed Mobility) verbal cues;nonverbal cues (demo/gesture);moderate assist (50% patient effort);1 person assist  -RB     Assistive Device (Bed Mobility) bed rails  -RB       Row Name 08/02/23 1118          Transfers    Transfers sit-stand transfer;stand-sit transfer  -RB     Comment, (Transfers) stood for orthostatics, tolerated side steps towards the HOB. very slow pacing and delayed processing. impaired memory on instructions the therapist asked  -RB       Row Name 08/02/23 1118          Sit-Stand Transfer    Sit-Stand [place] (Transfers) minimum assist (75% patient effort);1 person assist;verbal cues  -RB     Assistive Device (Sit-Stand Transfers) walker, front-wheeled  -RB       Row Name 08/02/23 1118          Stand-Sit Transfer    Stand-Sit [place] (Transfers) verbal cues;minimum assist (75% patient effort)  -RB     Assistive Device (Stand-Sit Transfers) walker, front-wheeled  -RB       Row Name 08/02/23 1118          Functional Mobility    Functional Mobility- Comment Min A for side steps towards the HOB  -RB       Row Name 08/02/23 1118          Activities of Daily Living    BADL Assessment/Intervention lower body dressing;toileting;grooming  -       Row Name 08/02/23 1118          Lower Body Dressing Assessment/Training    [place] Level (Lower Body Dressing) lower body dressing skills;dependent (less than 25% patient effort)  -RB       Row Name 08/02/23 1118          Toileting Assessment/Training    [place] Level (Toileting) toileting skills;dependent (less than 25% patient effort)  pure wick  -RB       Row Name 08/02/23 1118          Grooming Assessment/Training    [place] Level (Grooming) grooming skills;set up  -RB     Position (Grooming) sitting up in bed  -RB               User Key  (r) = Recorded By, (t) = Taken  By, (c) = Cosigned By      Initials Name Provider Type    Cecelia Caraballo OT Occupational Therapist                   Obj/Interventions       Row Name 08/02/23 1120          Sensory Assessment (Somatosensory)    Sensory Assessment (Somatosensory) sensation intact  -RB       Row Name 08/02/23 1120          Vision Assessment/Intervention    Visual Impairment/Limitations WFL  -RB       Row Name 08/02/23 1120          Range of Motion Comprehensive    Comment, General Range of Motion BUE lacking ~25% full AROM, AAROM WFL  -RB       Row Name 08/02/23 1120          Strength Comprehensive (MMT)    Comment, General Manual Muscle Testing (MMT) Assessment BUE/BLE generalized weakness  -RB       Row Name 08/02/23 1120          Shoulder (Therapeutic Exercise)    Shoulder (Therapeutic Exercise) AROM (active range of motion)  -RB     Shoulder AROM (Therapeutic Exercise) bilateral;flexion;extension;10 repetitions;supine  -RB       Row Name 08/02/23 1120          Elbow/Forearm (Therapeutic Exercise)    Elbow/Forearm (Therapeutic Exercise) AROM (active range of motion)  -RB     Elbow/Forearm AROM (Therapeutic Exercise) bilateral;flexion;extension;10 repetitions;supine  -RB       Row Name 08/02/23 1120          Motor Skills    Therapeutic Exercise shoulder;elbow/forearm  -RB       Row Name 08/02/23 1120          Balance    Comment, Balance CGA sitting balance, Min A for standing balance with the use of a walker.  -RB               User Key  (r) = Recorded By, (t) = Taken By, (c) = Cosigned By      Initials Name Provider Type    Cecelia Caraballo OT Occupational Therapist                   Goals/Plan       Row Name 08/02/23 1123          Bed Mobility Goal 1 (OT)    Activity/Assistive Device (Bed Mobility Goal 1, OT) bed mobility activities, all  -RB     [place] Level/Cues Needed (Bed Mobility Goal 1, OT) supervision required  -RB     Time Frame (Bed Mobility Goal 1, OT) short term goal (STG);2 weeks  -RB      Progress/Outcomes (Bed Mobility Goal 1, OT) continuing progress toward goal  -RB       Row Name 08/02/23 1123          Transfer Goal 1 (OT)    Activity/Assistive Device (Transfer Goal 1, OT) transfers, all  -RB     [place] Level/Cues Needed (Transfer Goal 1, OT) supervision required  -RB     Time Frame (Transfer Goal 1, OT) short term goal (STG);2 weeks  -RB     Progress/Outcome (Transfer Goal 1, OT) continuing progress toward goal  -RB       Row Name 08/02/23 1123          Dressing Goal 1 (OT)    Activity/Device (Dressing Goal 1, OT) dressing skills, all  -RB     [place]/Cues Needed (Dressing Goal 1, OT) set-up required  -RB     Time Frame (Dressing Goal 1, OT) short term goal (STG);2 weeks  -RB     Progress/Outcome (Dressing Goal 1, OT) continuing progress toward goal  -RB       Row Name 08/02/23 1123          Toileting Goal 1 (OT)    Activity/Device (Toileting Goal 1, OT) toileting skills, all  -RB     [place] Level/Cues Needed (Toileting Goal 1, OT) set-up required  -RB     Time Frame (Toileting Goal 1, OT) short term goal (STG);2 weeks  -RB     Progress/Outcome (Toileting Goal 1, OT) continuing progress toward goal  -RB       Row Name 08/02/23 1123          Grooming Goal 1 (OT)    Activity/Device (Grooming Goal 1, OT) grooming skills, all  -RB     [place] (Grooming Goal 1, OT) set-up required  -RB     Time Frame (Grooming Goal 1, OT) short term goal (STG);2 weeks  -RB     Progress/Outcome (Grooming Goal 1, OT) continuing progress toward goal  -RB       Row Name 08/02/23 1123          Therapy Assessment/Plan (OT)    Planned Therapy Interventions (OT) strengthening exercise;transfer/mobility retraining;activity tolerance training;adaptive equipment training;BADL retraining;functional balance retraining;occupation/activity based interventions;patient/caregiver education/training;cognitive/visual perception retraining;edema control/reduction  -RB               User Key  (r) = Recorded By, (t) =  "Taken By, (c) = Cosigned By      Initials Name Provider Type    RB Cecelia Doe, ROBINSON Occupational Therapist                   Clinical Impression       Row Name 08/02/23 1122          Pain Assessment    Pain Location generalized  -RB     Pre/Posttreatment Pain Comment Generalized pain in BLE's due to hypersensation from bruising  -RB     Pain Intervention(s) Repositioned;Ambulation/increased activity;Elevated  -RB       Row Name 08/02/23 1122          Plan of Care Review    Plan of Care Reviewed With patient  -RB     Progress no change  -RB     Outcome Evaluation The pt was admitted to [facility] 2/2 to generalized weakness and pain \"everywhere\". Pmhx significant for DM2, A-fib, COPD, HTN, severe obesity. She reports many falls and has scattered bruising and healing wounds throughout her arms and legs. She reports using 3L O2 nc at baseline and she is independent with ADL's and mobility using a walker. She lives with her  and she reports multiple seated rest breaks when mobilizing to her bathroom and kitchen environments. She completed bed mobility with Min A and slow pacing. She stood with CGA + walker and stood long enough to take x2 orthostatics. She reported feeling weak and declined any chair or sinkside participation. She returned to supine with Mod A for leg positioning. BUE weak with exercises. S/S feeding/grooming from bed level. The pt is OBS status and will be d/c home with assistance from her  - HH recommended. Recent D/C from a SNF reported.  -RB       Row Name 08/02/23 1122          Therapy Assessment/Plan (OT)    Rehab Potential (OT) good, to achieve stated therapy goals  -RB     Criteria for Skilled Therapeutic Interventions Met (OT) yes;skilled treatment is necessary  -RB     Therapy Frequency (OT) 5 times/wk  -RB       Row Name 08/02/23 1121          Therapy Plan Review/Discharge Plan (OT)    Anticipated Discharge Disposition (OT) skilled nursing facility;home with 24/7 care;home with " home health  OBS status - medicare guidelines no SNF coverage. pt chronically weak and needs 24/7 assist at home vs. placement  -RB       Row Name 08/02/23 1122          Vital Signs    Pre Systolic BP Rehab 100  -RB     Pre Treatment Diastolic BP 65  -RB     Intra Systolic BP Rehab 109  -RB     Intra Treatment Diastolic BP 82  -RB     Post Systolic BP Rehab 129  -RB     Post Treatment Diastolic BP 64  -RB     Pre SpO2 (%) 96  -RB     O2 Delivery Pre Treatment supplemental O2  -RB     O2 Delivery Intra Treatment supplemental O2  -RB     Post SpO2 (%) 95  -RB     O2 Delivery Post Treatment supplemental O2  -RB     Pre Patient Position Supine  -RB     Intra Patient Position Standing  -RB     Post Patient Position Supine  -RB       Row Name 08/02/23 1122          Positioning and Restraints    Pre-Treatment Position in bed  -RB     Post Treatment Position bed  -RB     In Bed notified nsg;supine;call light within reach;encouraged to call for assist;exit alarm on;fowlers;legs elevated  -RB               User Key  (r) = Recorded By, (t) = Taken By, (c) = Cosigned By      Initials Name Provider Type    RB Cecelia Doe, ROBINSON Occupational Therapist                   Outcome Measures       Row Name 08/02/23 1123          How much help from another is currently needed...    Putting on and taking off regular lower body clothing? 1  -RB     Bathing (including washing, rinsing, and drying) 1  -RB     Toileting (which includes using toilet bed pan or urinal) 1  -RB     Putting on and taking off regular upper body clothing 2  -RB     Taking care of personal grooming (such as brushing teeth) 3  -RB     Eating meals 3  -RB     AM-PAC 6 Clicks Score (OT) 11  -RB       Row Name 08/02/23 1123          Modified [place] Scale    Modified [place] Scale 5 - Severe disability.  Bedridden, incontinent, and requiring constant nursing care and attention.  -RB       Row Name 08/02/23 1123          Functional Assessment    Outcome Measure Options  AM-PAC 6 Clicks Daily Activity (OT);Modified [place]  -RB               User Key  (r) = Recorded By, (t) = Taken By, (c) = Cosigned By      Initials Name Provider Type    Cecelia Caraballo OT Occupational Therapist                    Occupational Therapy Education       Title: PT OT SLP Therapies (Not Started)       Topic: Occupational Therapy (Not Started)       Point: ADL training (Not Started)       Description:   Instruct learner(s) on proper safety adaptation and remediation techniques during self care or transfers.   Instruct in proper use of assistive devices.                  Learner Progress:  Not documented in this visit.              Point: Home exercise program (Not Started)       Description:   Instruct learner(s) on appropriate technique for monitoring, assisting and/or progressing therapeutic exercises/activities.                  Learner Progress:  Not documented in this visit.              Point: Precautions (Not Started)       Description:   Instruct learner(s) on prescribed precautions during self-care and functional transfers.                  Learner Progress:  Not documented in this visit.              Point: Body mechanics (Not Started)       Description:   Instruct learner(s) on proper positioning and spine alignment during self-care, functional mobility activities and/or exercises.                  Learner Progress:  Not documented in this visit.                                  OT Recommendation and Plan  Planned Therapy Interventions (OT): strengthening exercise, transfer/mobility retraining, activity tolerance training, adaptive equipment training, BADL retraining, functional balance retraining, occupation/activity based interventions, patient/caregiver education/training, cognitive/visual perception retraining, edema control/reduction  Therapy Frequency (OT): 5 times/wk  Plan of Care Review  Plan of Care Reviewed With: patient  Progress: no change  Outcome Evaluation: The pt was  "admitted to [facility] 2/2 to generalized weakness and pain \"everywhere\". Pmhx significant for DM2, A-fib, COPD, HTN, severe obesity. She reports many falls and has scattered bruising and healing wounds throughout her arms and legs. She reports using 3L O2 nc at baseline and she is independent with ADL's and mobility using a walker. She lives with her  and she reports multiple seated rest breaks when mobilizing to her bathroom and kitchen environments. She completed bed mobility with Min A and slow pacing. She stood with CGA + walker and stood long enough to take x2 orthostatics. She reported feeling weak and declined any chair or sinkside participation. She returned to supine with Mod A for leg positioning. BUE weak with exercises. S/S feeding/grooming from bed level. The pt is OBS status and will be d/c home with assistance from her  - HH recommended. Recent D/C from a SNF reported.     Time Calculation:   Evaluation Complexity (OT)  Review Occupational Profile/Medical/Therapy History Complexity: expanded/moderate complexity  Assessment, Occupational Performance/Identification of Deficit Complexity: 3-5 performance deficits  Clinical Decision Making Complexity (OT): detailed assessment/moderate complexity  Overall Complexity of Evaluation (OT): moderate complexity     Time Calculation- OT       Row Name 08/02/23 1113             Time Calculation- OT    OT Start Time 1026  -RB      OT Stop Time 1057  -RB      OT Time Calculation (min) 31 min  -RB      Total Timed Code Minutes- OT 24 minute(s)  -RB      OT Received On 08/02/23  -RB      OT - Next Appointment 08/03/23  -RB      OT Goal Re-Cert Due Date 08/16/23  -RB         Timed Charges    47706 - OT Therapeutic Exercise Minutes 10  -RB      31560 - OT Therapeutic Activity Minutes 14  -RB         Untimed Charges    OT Eval/Re-eval Minutes 7  -RB         Total Minutes    Timed Charges Total Minutes 24  -RB      Untimed Charges Total Minutes 7  -RB       Total " Minutes 31  -RB                User Key  (r) = Recorded By, (t) = Taken By, (c) = Cosigned By      Initials Name Provider Type    Cecelia Caraballo OT Occupational Therapist                  Therapy Charges for Today       Code Description Service Date Service Provider Modifiers Qty    [id number]  OT THER PROC EA 15 MIN 8/2/2023 Cecelia Doe OT GO 1    [id number]  OT THERAPEUTIC ACT EA 15 MIN 8/2/2023 Cecelia Doe OT GO 1    [id number]  OT EVAL MOD COMPLEXITY 2 8/2/2023 Cecelia Doe OT GO 1                 Cecelia Doe OT  8/2/2023

## 2023-08-03 ENCOUNTER — READMISSION MANAGEMENT (OUTPATIENT)
Dept: CALL CENTER | Facility: HOSPITAL | Age: 73
End: 2023-08-03
Payer: MEDICARE

## 2023-08-03 VITALS
DIASTOLIC BLOOD PRESSURE: 62 MMHG | OXYGEN SATURATION: 97 % | BODY MASS INDEX: 42.45 KG/M2 | TEMPERATURE: 98.3 F | RESPIRATION RATE: 16 BRPM | SYSTOLIC BLOOD PRESSURE: 114 MMHG | HEIGHT: 66 IN | WEIGHT: 264.11 LBS | HEART RATE: 63 BPM

## 2023-08-03 LAB
ALBUMIN SERPL-MCNC: 2.9 G/DL (ref 3.5–5.2)
ANION GAP SERPL CALCULATED.3IONS-SCNC: 9.5 MMOL/L (ref 5–15)
BACTERIA SPEC AEROBE CULT: ABNORMAL
BUN SERPL-MCNC: 34 MG/DL (ref 8–23)
BUN/CREAT SERPL: 27 (ref 7–25)
CALCIUM SPEC-SCNC: 9.3 MG/DL (ref 8.6–10.5)
CHLORIDE SERPL-SCNC: 92 MMOL/L (ref 98–107)
CO2 SERPL-SCNC: 34.5 MMOL/L (ref 22–29)
CREAT SERPL-MCNC: 1.26 MG/DL (ref 0.57–1)
D-LACTATE SERPL-SCNC: 1.8 MMOL/L (ref 0.5–2)
DEPRECATED RDW RBC AUTO: 50 FL (ref 37–54)
EGFRCR SERPLBLD CKD-EPI 2021: 44.6 ML/MIN/1.73
ERYTHROCYTE [DISTWIDTH] IN BLOOD BY AUTOMATED COUNT: 16.5 % (ref 12.3–15.4)
GLUCOSE BLDC GLUCOMTR-MCNC: 192 MG/DL (ref 70–130)
GLUCOSE BLDC GLUCOMTR-MCNC: 195 MG/DL (ref 70–130)
GLUCOSE SERPL-MCNC: 180 MG/DL (ref 65–99)
HCT VFR BLD AUTO: 39.6 % (ref 34–46.6)
HGB BLD-MCNC: 12.5 G/DL (ref 12–15.9)
MCH RBC QN AUTO: 26.8 PG (ref 26.6–33)
MCHC RBC AUTO-ENTMCNC: 31.6 G/DL (ref 31.5–35.7)
MCV RBC AUTO: 84.8 FL (ref 79–97)
PHOSPHATE SERPL-MCNC: 2.5 MG/DL (ref 2.5–4.5)
PLATELET # BLD AUTO: 371 10*3/MM3 (ref 140–450)
PMV BLD AUTO: 8.4 FL (ref 6–12)
POTASSIUM SERPL-SCNC: 3.3 MMOL/L (ref 3.5–5.2)
RBC # BLD AUTO: 4.67 10*6/MM3 (ref 3.77–5.28)
SODIUM SERPL-SCNC: 136 MMOL/L (ref 136–145)
WBC NRBC COR # BLD: 7.1 10*3/MM3 (ref 3.4–10.8)

## 2023-08-03 PROCEDURE — 85027 COMPLETE CBC AUTOMATED: CPT | Performed by: HOSPITALIST

## 2023-08-03 PROCEDURE — 97110 THERAPEUTIC EXERCISES: CPT

## 2023-08-03 PROCEDURE — 25010000002 CEFTRIAXONE PER 250 MG: Performed by: HOSPITALIST

## 2023-08-03 PROCEDURE — G0378 HOSPITAL OBSERVATION PER HR: HCPCS

## 2023-08-03 PROCEDURE — 82948 REAGENT STRIP/BLOOD GLUCOSE: CPT

## 2023-08-03 PROCEDURE — 63710000001 INSULIN LISPRO (HUMAN) PER 5 UNITS: Performed by: NURSE PRACTITIONER

## 2023-08-03 PROCEDURE — 97530 THERAPEUTIC ACTIVITIES: CPT

## 2023-08-03 PROCEDURE — 80069 RENAL FUNCTION PANEL: CPT | Performed by: INTERNAL MEDICINE

## 2023-08-03 RX ORDER — TORSEMIDE 20 MG/1
40 TABLET ORAL DAILY
Status: DISCONTINUED | OUTPATIENT
Start: 2023-08-03 | End: 2023-08-03 | Stop reason: HOSPADM

## 2023-08-03 RX ORDER — POTASSIUM CHLORIDE 750 MG/1
40 TABLET, FILM COATED, EXTENDED RELEASE ORAL EVERY 4 HOURS
Status: DISCONTINUED | OUTPATIENT
Start: 2023-08-03 | End: 2023-08-03 | Stop reason: HOSPADM

## 2023-08-03 RX ORDER — CEPHALEXIN 500 MG/1
500 CAPSULE ORAL 3 TIMES DAILY
Qty: 6 CAPSULE | Refills: 0 | Status: SHIPPED | OUTPATIENT
Start: 2023-08-04 | End: 2023-08-06

## 2023-08-03 RX ADMIN — PANTOPRAZOLE SODIUM 40 MG: 40 TABLET, DELAYED RELEASE ORAL at 05:57

## 2023-08-03 RX ADMIN — BUSPIRONE HYDROCHLORIDE 5 MG: 5 TABLET ORAL at 08:47

## 2023-08-03 RX ADMIN — Medication 10 ML: at 08:48

## 2023-08-03 RX ADMIN — INSULIN LISPRO 2 UNITS: 100 INJECTION, SOLUTION INTRAVENOUS; SUBCUTANEOUS at 12:12

## 2023-08-03 RX ADMIN — DULOXETINE HYDROCHLORIDE 60 MG: 60 CAPSULE, DELAYED RELEASE ORAL at 08:47

## 2023-08-03 RX ADMIN — INSULIN LISPRO 2 UNITS: 100 INJECTION, SOLUTION INTRAVENOUS; SUBCUTANEOUS at 08:47

## 2023-08-03 RX ADMIN — CEFTRIAXONE 2000 MG: 2 INJECTION, POWDER, FOR SOLUTION INTRAMUSCULAR; INTRAVENOUS at 12:12

## 2023-08-03 RX ADMIN — APIXABAN 5 MG: 5 TABLET, FILM COATED ORAL at 08:47

## 2023-08-03 RX ADMIN — ALLOPURINOL 100 MG: 100 TABLET ORAL at 08:47

## 2023-08-03 RX ADMIN — POTASSIUM CHLORIDE 40 MEQ: 750 TABLET, EXTENDED RELEASE ORAL at 12:12

## 2023-08-03 NOTE — PROGRESS NOTES
Aydee Solis   73 y.o.  female    LOS: 0 days   Patient Care Team:  Bennett Duque DO as PCP - General (Internal Medicine)  Marco Mayes MD as PCP - Family Medicine      Subjective   No chest pain or dyspnea  Interval History:     Patient Complaints:     Review of Systems:       Medication Review:   Current Facility-Administered Medications:     acetaminophen (TYLENOL) tablet 650 mg, 650 mg, Oral, Q4H PRN, 650 mg at 08/01/23 1249 **OR** acetaminophen (TYLENOL) 160 MG/5ML solution 650 mg, 650 mg, Oral, Q4H PRN **OR** acetaminophen (TYLENOL) suppository 650 mg, 650 mg, Rectal, Q4H PRN, Crystal Green APRN    albuterol (PROVENTIL) nebulizer solution 0.083% 2.5 mg/3mL, 2.5 mg, Nebulization, Q4H PRN, Robi Vital MD    allopurinol (ZYLOPRIM) tablet 100 mg, 100 mg, Oral, Daily, Robi Vital MD, 100 mg at 08/03/23 0847    apixaban (ELIQUIS) tablet 5 mg, 5 mg, Oral, BID, Robi Vital MD, 5 mg at 08/03/23 0847    atorvastatin (LIPITOR) tablet 10 mg, 10 mg, Oral, Nightly, Robi Vital MD, 10 mg at 08/02/23 2054    sennosides-docusate (PERICOLACE) 8.6-50 MG per tablet 2 tablet, 2 tablet, Oral, BID, 2 tablet at 08/02/23 0825 **AND** polyethylene glycol (MIRALAX) packet 17 g, 17 g, Oral, Daily PRN **AND** bisacodyl (DULCOLAX) EC tablet 5 mg, 5 mg, Oral, Daily PRN **AND** bisacodyl (DULCOLAX) suppository 10 mg, 10 mg, Rectal, Daily PRN, Crystal Green, APRN    budesonide-formoterol (SYMBICORT) 160-4.5 MCG/ACT inhaler 2 puff, 2 puff, Inhalation, BID - RT **AND** tiotropium (SPIRIVA RESPIMAT) 2.5 mcg/act aerosol solution inhaler, 2 puff, Inhalation, Daily - RT, Robi Vital MD    busPIRone (BUSPAR) tablet 5 mg, 5 mg, Oral, TID, Robi Vital MD, 5 mg at 08/03/23 0847    butalbital-acetaminophen-caffeine (FIORICET, ESGIC) -40 MG per tablet 1 tablet, 1 tablet, Oral, Q4H PRN, Robi Vital MD, 1 tablet at 08/01/23 4895    Calcium Replacement - Follow Nurse / BPA Driven  Protocol, , Does not apply, PRN, Robi Vital MD    cefTRIAXone (ROCEPHIN) 2,000 mg in sodium chloride 0.9 % 100 mL IVPB-VTB, 2,000 mg, Intravenous, Q24H, Robi Vital MD, Stopped at 08/02/23 1937    dextrose (D50W) (25 g/50 mL) IV injection 25 g, 25 g, Intravenous, Q15 Min PRN, Crystal Green APRN    dextrose (GLUTOSE) oral gel 15 g, 15 g, Oral, Q15 Min PRN, Crystal Green APRN    docusate sodium (COLACE) capsule 100 mg, 100 mg, Oral, BID, Robi Vital MD, 100 mg at 08/02/23 2053    DULoxetine (CYMBALTA) DR capsule 60 mg, 60 mg, Oral, Daily, Robi Vital MD, 60 mg at 08/03/23 0847    glucagon (GLUCAGEN) injection 1 mg, 1 mg, Intramuscular, Q15 Min PRN, Crystal Green APRN    insulin glargine (LANTUS, SEMGLEE) injection 20 Units, 20 Units, Subcutaneous, Nightly, Robi Vital MD, 20 Units at 08/02/23 2053    insulin lispro (HUMALOG/ADMELOG) injection 2-7 Units, 2-7 Units, Subcutaneous, 4x Daily AC & at Bedtime, Crystal Green APRN, 2 Units at 08/03/23 0847    Magnesium Low Dose Replacement - Follow Nurse / BPA Driven Protocol, , Does not apply, PRN, Robi Vital MD    nitroglycerin (NITROSTAT) SL tablet 0.4 mg, 0.4 mg, Sublingual, Q5 Min PRN, Crystal Green APRN    ondansetron (ZOFRAN) injection 4 mg, 4 mg, Intravenous, Q6H PRN, Crystal Green APRN    pantoprazole (PROTONIX) EC tablet 40 mg, 40 mg, Oral, Q AM, Robi Vital MD, 40 mg at 08/03/23 0557    Phosphorus Replacement - Follow Nurse / BPA Driven Protocol, , Does not apply, Zahraa OLIVIA Minh Loc, MD    Potassium Replacement - Follow Nurse / BPA Driven Protocol, , Does not apply, Zahraa OLIVIA Minh Loc, MD    sodium chloride 0.9 % flush 10 mL, 10 mL, Intravenous, Q12H, Crystal Green APRN, 10 mL at 08/03/23 0848    sodium chloride 0.9 % flush 10 mL, 10 mL, Intravenous, PRN, Crystal Green APRN    sodium chloride 0.9 % infusion 40 mL, 40 mL, Intravenous, PRN, Crystal Green,  APRN    torsemide (DEMADEX) tablet 40 mg, 40 mg, Oral, Daily, Shyam Hwang MD      Objective   Vital Sign Min/Max for last 24 hours  Temp  Min: 97.5 øF (36.4 øC)  Max: 98.7 øF (37.1 øC)   BP  Min: 100/56  Max: 124/63    Pulse  Min: 63  Max: 88     Wt Readings from Last 3 Encounters:   08/03/23 120 kg (264 lb 1.8 oz)   06/30/23 129 kg (285 lb)   06/05/23 129 kg (285 lb 7.9 oz)        Intake/Output Summary (Last 24 hours) at 8/3/2023 0998  Last data filed at 8/3/2023 0503  Gross per 24 hour   Intake 480 ml   Output 800 ml   Net -320 ml     Physical Exam:      General Appearance:    Well developed and well nourished in no acute distress   Head:    Normocephalic, atraumatic   Eyes:            Conjunctivae normal, anicteric, no xanthelasma   Neck:   supple, trachea midline, no thyromegaly, no carotid bruit, no JVD, no elevated CVP   Lungs:     Clear to auscultation,respirations regular, even and                  unlabored    Heart:    Regular rhythm and normal rate, normal S1 and S2,            No murmur, no gallop, no rub, no click   Chest Wall:    No abnormalities observed   Abdomen:     Normal bowel sounds, no masses, no organomegaly, soft        nontender, nondistended, no guarding, no rebound                tenderness   Rectal:     Deferred   Extremities:   No edema. Moves all extremities well, no cyanosis, no erythema   Pulses:   Pulses palpable and equal bilaterally   Skin:   No bleeding, bruising or rash   Neurologic:   awake alert and oriented x3, speech clear and approp, no facial drooping     :    Monitor:      Results Review:         Sodium Sodium   Date Value Ref Range Status   08/03/2023 136 136 - 145 mmol/L Final   08/02/2023 138 136 - 145 mmol/L Final   08/01/2023 132 (L) 136 - 145 mmol/L Final   07/31/2023 130 (L) 136 - 145 mmol/L Final      Potassium Potassium   Date Value Ref Range Status   08/03/2023 3.3 (L) 3.5 - 5.2 mmol/L Final   08/02/2023 4.1 3.5 - 5.2 mmol/L Final      Comment:     Slight hemolysis detected by analyzer. Results may be affected.   08/02/2023 3.7 3.5 - 5.2 mmol/L Final   08/01/2023 3.1 (L) 3.5 - 5.2 mmol/L Final     Comment:     Slight hemolysis detected by analyzer. Results may be affected.   07/31/2023 2.7 (L) 3.5 - 5.2 mmol/L Final      Chloride Chloride   Date Value Ref Range Status   08/03/2023 92 (L) 98 - 107 mmol/L Final   08/02/2023 92 (L) 98 - 107 mmol/L Final   08/01/2023 86 (L) 98 - 107 mmol/L Final   07/31/2023 78 (L) 98 - 107 mmol/L Final      Bicarbonate No results found for: PLASMABICARB   BUN BUN   Date Value Ref Range Status   08/03/2023 34 (H) 8 - 23 mg/dL Final   08/02/2023 44 (H) 8 - 23 mg/dL Final   08/01/2023 56 (H) 8 - 23 mg/dL Final   07/31/2023 62 (H) 8 - 23 mg/dL Final      Creatinine Creatinine   Date Value Ref Range Status   08/03/2023 1.26 (H) 0.57 - 1.00 mg/dL Final   08/02/2023 1.13 (H) 0.57 - 1.00 mg/dL Final   08/01/2023 1.23 (H) 0.57 - 1.00 mg/dL Final   07/31/2023 1.71 (H) 0.57 - 1.00 mg/dL Final      Calcium Calcium   Date Value Ref Range Status   08/03/2023 9.3 8.6 - 10.5 mg/dL Final   08/02/2023 9.6 8.6 - 10.5 mg/dL Final   08/01/2023 9.3 8.6 - 10.5 mg/dL Final   07/31/2023 10.3 8.6 - 10.5 mg/dL Final      Magnesium Magnesium   Date Value Ref Range Status   08/01/2023 2.3 1.6 - 2.4 mg/dL Final   08/01/2023 2.2 1.6 - 2.4 mg/dL Final   07/31/2023 2.5 (H) 1.6 - 2.4 mg/dL Final        Results from last 7 days   Lab Units 08/03/23  0746   WBC 10*3/mm3 7.10   HEMOGLOBIN g/dL 12.5   HEMATOCRIT % 39.6   PLATELETS 10*3/mm3 371     Lab Results   Lab Value Date/Time    TROPONINT 45 (H) 06/05/2023 2213    TROPONINT 46 (H) 06/05/2023 1946    TROPONINT <0.010 01/27/2017 1850            Echo EF Estimated  No results found for: ECHOEFEST      Assessment/ Plan  Assessment & Plan   Active Hospital Problems    Diagnosis  POA    **Acute renal insufficiency [N28.9]  Yes    Hypokalemia [E87.6]  Yes    Hyponatremia [E87.1]  Yes    Confusion [R41.0]   Yes    Alkalosis, metabolic [E87.3]  Yes    UTI (urinary tract infection) [N39.0]  Unknown    Chronic systolic CHF (congestive heart failure) [I50.22]  Yes    PAULETTE (obstructive sleep apnea) [G47.33]  Yes    Stage 3b chronic kidney disease [N18.32]  Yes    Type 2 diabetes mellitus, with long-term current use of insulin [E11.9, Z79.4]  Not Applicable    PAF (paroxysmal atrial fibrillation) [I48.0]  Yes    Chronic anticoagulation [Z79.01]  Not Applicable    COPD (chronic obstructive pulmonary disease) [J44.9]  Yes    Hypertension [I10]  Yes     AMS/E Coli UTI     2. MELANIE/CKD stage 3 with metabolic acidosis     3. At fib  Eliquis     4. Pulm htn,   PA systolic pressure 67 mm, mild mitral stenosis gradient 3 mm, normal coronary arteries, normal LV function 2020      2decho 1/23/2023  Interpretation Summary     Left ventricular systolic function is mildly decreased. Calculated left ventricular EF = 42.8% Left ventricular ejection fraction appears to be 41 - 45%.    The following left ventricular wall segments are hypokinetic: mid anterior, apical anterior, basal anterolateral, mid anterolateral, apical lateral, basal inferolateral, mid inferolateral, apical inferior, mid inferior, apical septal, basal inferoseptal, mid inferoseptal, apex hypokinetic, mid anteroseptal, basal anterior, basal inferior and basal inferoseptal.    Left ventricular diastolic function is consistent with (grade I) impaired relaxation.    Mildly reduced right ventricular systolic function noted.    The right ventricular cavity is moderately dilated.    The right atrial cavity is moderately  dilated.    There is moderate, bileaflet mitral valve thickening present.    Mild mitral valve stenosis is present. The mitral valve peak gradient is 8 mmHg. The mitral valve mean gradient is 3 mmHg.    Estimated right ventricular systolic pressure from tricuspid regurgitation is mildly elevated (35-45 mmHg).     5. DM     6. AICD-cardiac resynchronization therapy  defibrillator (CRT-D)  for tachy akash syndrome     7. HLD  statin      Patient on torsemide 50 mg daily now for renal  We will continue Eliquis  Okay to discharge  Follow-up with Dr. Gasper Chacon as scheduled  We will follow as needed    Mu Bueno MD  08/03/23  09:58 EDT

## 2023-08-03 NOTE — DISCHARGE SUMMARY
[facility]    Date of Discharge:  8/3/2023    PCP: Bennett Duque, DO    Discharge Diagnosis:   Active Hospital Problems    Diagnosis  POA    **Acute renal insufficiency [N28.9]  Yes    Hypokalemia [E87.6]  Yes    Hyponatremia [E87.1]  Yes    Confusion [R41.0]  Yes    Alkalosis, metabolic [E87.3]  Yes    UTI (urinary tract infection) [N39.0]  Unknown    Chronic systolic CHF (congestive heart failure) [I50.22]  Yes    PAULETTE (obstructive sleep apnea) [G47.33]  Yes    Stage 3b chronic kidney disease [N18.32]  Yes    Type 2 diabetes mellitus, with long-term current use of insulin [E11.9, Z79.4]  Not Applicable    PAF (paroxysmal atrial fibrillation) [I48.0]  Yes    Chronic anticoagulation [Z79.01]  Not Applicable    COPD (chronic obstructive pulmonary disease) [J44.9]  Yes    Hypertension [I10]  Yes      Resolved Hospital Problems   No resolved problems to display.          Consults       Date and Time Order Name Status Description    8/2/2023 12:38 PM Inpatient Nephrology Consult      8/2/2023 12:29 PM Inpatient Cardiology Consult Completed     7/31/2023 11:00 PM [facility] (on-call MD unless specified) Details            Hospital Course  73 y.o. female admitted with a fall and confusion off and on for the last few days. She was diagnosed with a urinary tract infection and acute kidney injury. Urine culture grew E. coli susceptible to all. She received 3 days of ceftriaxone and will be discharged on cephalexin for a couple more days. She also had acute kidney injury. She was given IVF and her creatinine returned to baseline. She also had some metabolic alkalosis. It appears to be chronic (CO2 range 29-37). She has had no GI losses and it was felt possibly contraction from diuretics. Nephrology was asked to see and diuretics were held and they are restarting torsemide 40 mg a day (previously was on torsemide 100 mg daily and spironolactone 25 mg daily). Cardiology  was asked to see because of her falls and bruising and atrial fibrillation to help assess risk of continued anticoagulation. CHADS-VASc was 6. They recommended continued anticoagulation and follow-up with her cardiologist.    CHADS-VASc Risk Assessment              6 Total Score    1 CHF    1 Hypertension    2 Age >/= 75    1 DM    1 Sex: Female        Criteria that do not apply:    PRIOR STROKE/TIA/THROMBO    Vascular Disease    Age 65-74           Mental status improved and she is close to baseline. Today she is alert and appropriate and oriented to self, hospital, year, and situation (recent fall). There was consideration for SNF however she saw physical therapy and they felt she was at her baseline functional status. Patient also declines SNF.    I discussed the patient's findings and my recommendations with patient and nursing staff. Patient very much wants to go home today.    Temp:  [98 øF (36.7 øC)-98.7 øF (37.1 øC)] 98.3 øF (36.8 øC)  Heart Rate:  [63-88] 63  Resp:  [16-18] 16  BP: (100-124)/(56-73) 114/62  Body mass index is 42.63 kg/mý.    Physical Exam  Constitutional:       General: She is not in acute distress.     Appearance: Normal appearance. She is not toxic-appearing.   HENT:      Head: Normocephalic and atraumatic.   Eyes:      Extraocular Movements: Extraocular movements intact.   Cardiovascular:      Rate and Rhythm: Normal rate and regular rhythm.   Pulmonary:      Effort: Pulmonary effort is normal. No respiratory distress.      Breath sounds: Normal breath sounds. No wheezing or rhonchi.   Abdominal:      General: Bowel sounds are normal.      Palpations: Abdomen is soft.      Tenderness: There is no abdominal tenderness. There is no guarding or rebound.   Musculoskeletal:      Cervical back: Normal range of motion.   Skin:     General: Skin is warm and dry.      Findings: Bruising present.   Neurological:      General: No focal deficit present.      Mental Status: She is alert and oriented  to person, place, and time.   Psychiatric:         Mood and Affect: Mood normal.         Behavior: Behavior normal.         Thought Content: Thought content normal.     Disposition: Home or Self Care       Discharge Medications        New Medications        Instructions Start Date   cephalexin 500 MG capsule  Commonly known as: Keflex   500 mg, Oral, 3 Times Daily   Start Date: August 4, 2023            Changes to Medications        Instructions Start Date   Torsemide 40 MG tablet  What changed:   medication strength  how much to take   40 mg, Oral, Daily             Continue These Medications        Instructions Start Date   albuterol sulfate  (90 Base) MCG/ACT inhaler  Commonly known as: PROVENTIL HFA;VENTOLIN HFA;PROAIR HFA   2 puffs, Inhalation, Every 4 Hours PRN      allopurinol 100 MG tablet  Commonly known as: ZYLOPRIM   100 mg, Oral, Daily      apixaban 5 MG tablet tablet  Commonly known as: ELIQUIS   5 mg, Oral, 2 Times Daily      atorvastatin 10 MG tablet  Commonly known as: LIPITOR   10 mg, Oral, Daily      busPIRone 5 MG tablet  Commonly known as: BUSPAR   5 mg, Oral, 3 Times Daily      docusate sodium 100 MG capsule  Commonly known as: COLACE   100 mg, Oral, 2 Times Daily      DULoxetine 60 MG capsule  Commonly known as: CYMBALTA   60 mg, Oral, Daily      HYDROcodone-acetaminophen  MG per tablet  Commonly known as: NORCO   1 tablet, Every 8 Hours PRN      insulin detemir 100 UNIT/ML injection  Commonly known as: LEVEMIR   20 Units, Subcutaneous, Nightly      NovoLOG FlexPen 100 UNIT/ML solution pen-injector sc pen  Generic drug: insulin aspart   Novolog FlexPen U-100 Insulin aspart 100 unit/mL (3 mL) subcutaneous   Sliding scale.      Trelegy Ellipta 100-62.5-25 MCG/ACT inhaler  Generic drug: Fluticasone-Umeclidin-Vilant   No dose, route, or frequency recorded.             Stop These Medications      gabapentin 100 MG capsule  Commonly known as: NEURONTIN     spironolactone 25 MG  tablet  Commonly known as: ALDACTONE             Diet Instructions       Diet: Regular/House Diet      Discharge Diet: Regular/House Diet    Texture: Regular Texture (IDDSI 7)    Fluid Consistency: Thin (IDDSI 0)           Activity Instructions       Activity as Tolerated             Additional Instructions for the Follow-ups that You Need to Schedule       Call MD for problems / concerns.   As directed             Contact information for follow-up providers       Bennett Duque, DO Follow up in 1 week(s).    Specialty: Internal Medicine  Why: post hospital follow up, BP check, Lab check. BMP  Contact information:  [address] [facility] [address]  [place] [id number]  [phone]               Marco Mayes MD .    Specialty: Family Medicine  Contact information:  [address] [place] [address]  [phone]               Gasper Chacon MD Follow up.    Specialty: Cardiology  Contact information:  [address]  [facility] [address]  [place] [id number] [phone]                       Contact information for after-discharge care       Home Medical Care       [facility] .    Service: Home Health Services  Contact information:  [address]  [facility] [address] [phone]                                No future appointments.  Pending Labs       Order Current Status    Blood Culture - Blood, Arm, Right In process    Blood Culture - Blood, Hand, Right In process           Robi Vital MD  [facility]  08/03/23    Discharge time spent greater than 30 minutes.

## 2023-08-03 NOTE — PLAN OF CARE
Goal Outcome Evaluation:  Plan of Care Reviewed With: patient           Outcome Evaluation: Pt tolerated ambulation 50ft SBA rwx, on 3L 02. Pt w increased time to complete functional mobility tasks due to generalized pain and weakness. Pt reports she plans to return home- rec HHC and 24 hr assist and walker, pt also reports she has scooter as needed.      Anticipated Discharge Disposition (PT): home with 24/7 care, home with home health

## 2023-08-03 NOTE — PROGRESS NOTES
[facility] Progress Note      Patient Name: Aydee Solis  : 1950  MRN: [id number]  Primary Care Physician:  Bennett Duque DO  Date of admission: 2023    Subjective     Interval History:   Follow-up acute kidney injury/metabolic alkalosis.    No acute events overnight.    Diuretics are on hold.      Review of Systems:   Denies chest pain or shortness of breath  Denies nausea and vomiting.  Denies voiding symptoms.    Objective     Vitals:   Temp:  [97.5 øF (36.4 øC)-98.7 øF (37.1 øC)] 98.3 øF (36.8 øC)  Heart Rate:  [63-88] 63  Resp:  [16-18] 16  BP: (100-124)/(56-74) 114/62  Flow (L/min):  [2-3] 3    Intake/Output Summary (Last 24 hours) at 8/3/2023 0811  Last data filed at 8/3/2023 0503  Gross per 24 hour   Intake 720 ml   Output 800 ml   Net -80 ml       Physical Exam:    General Appearance: alert, oriented x 3, no acute distress   Skin: warm and dry  HEENT: oral mucosa normal, nonicteric sclera  Neck: supple, no JVD  Lungs: CTA  Heart: RRR, normal S1 and S2  Abdomen: Obese, soft, nontender, nondistended  : no palpable bladder  Extremities: Lymphedema  Neuro: normal speech and mental status     Scheduled Meds:     allopurinol, 100 mg, Oral, Daily  apixaban, 5 mg, Oral, BID  atorvastatin, 10 mg, Oral, Nightly  budesonide-formoterol, 2 puff, Inhalation, BID - RT   And  tiotropium bromide monohydrate, 2 puff, Inhalation, Daily - RT  busPIRone, 5 mg, Oral, TID  cefTRIAXone, 2,000 mg, Intravenous, Q24H  docusate sodium, 100 mg, Oral, BID  DULoxetine, 60 mg, Oral, Daily  insulin glargine, 20 Units, Subcutaneous, Nightly  insulin lispro, 2-7 Units, Subcutaneous, 4x Daily AC & at Bedtime  pantoprazole, 40 mg, Oral, Q AM  senna-docusate sodium, 2 tablet, Oral, BID  sodium chloride, 10 mL, Intravenous, Q12H      IV Meds:        Results Reviewed:   I have personally reviewed the results from the time of this admission to 8/3/2023 08:11 EDT     Results from last 7 days    Lab Units 08/02/23  0748 08/02/23  0244 08/01/23  1006 07/31/23  2202   SODIUM mmol/L 138  --  132* 130*   POTASSIUM mmol/L 4.1 3.7 3.1* 2.7*   CHLORIDE mmol/L 92*  --  86* 78*   CO2 mmol/L 36.3*  --  33.0* 39.0*   BUN mg/dL 44*  --  56* 62*   CREATININE mg/dL 1.13*  --  1.23* 1.71*   CALCIUM mg/dL 9.6  --  9.3 10.3   GLUCOSE mg/dL 163*  --  203* 200*       Estimated Creatinine Clearance: 56.8 mL/min (A) (by C-G formula based on SCr of 1.13 mg/dL (H)).    Results from last 7 days   Lab Units 08/01/23  1428 08/01/23  1006 07/31/23  2202   MAGNESIUM mg/dL 2.3 2.2 2.5*             Results from last 7 days   Lab Units 08/02/23  0748 08/01/23  1006 07/31/23  2202   WBC 10*3/mm3 6.60 8.11 8.35   HEMOGLOBIN g/dL 12.2 12.1 13.5   PLATELETS 10*3/mm3 387 398 391       Results from last 7 days   Lab Units 08/01/23  1006   INR  1.61*       Assessment / Plan     ASSESSMENT:  -Acute kidney injury, resolved.  -Underlying chronic kidney disease stage IIIa.  Baseline serum creatinine 1.1-1.8.  -Chronic metabolic alkalosis.  (CO2 ranges from 29-37). CO2 36 -->34  -Urine tract infection  -Altered mental status, resolved.  -COPD/obstructive sleep apnea requiring home oxygen  -Atrial fibrillation    PLAN:  -Restart torsemide 40 mg a day [takes torsemide 100 mg daily and spironolactone 25 mg daily at home].    Thank you for involving us in the care of Aydee ALBAN Woodwardey.  Please feel free to call with any questions.    Shyam Hwang MD  08/03/23  08:11 EDT    [facility]  [phone]    Please note that portions of this note were completed with a voice recognition program.

## 2023-08-03 NOTE — CASE MANAGEMENT/SOCIAL WORK
Continued Stay Note  [facility]     Patient Name: Aydee Solis  MRN: [id number]  Today's Date: 8/3/2023    Admit Date: 7/31/2023    Plan: Home with [facility]    Discharge Plan       Row Name 08/03/23 1354       Plan    Plan Comments CCP received message from [employer]; patient is not eligible for new walker and cost would be $100. CCP updated patient's spouse and he declined obtainng a new walker and states he will look online for one. [facility]                   Discharge Codes    No documentation.                 Expected Discharge Date and Time       Expected Discharge Date Expected Discharge Time    Aug 3, 2023               RUBENS [place]

## 2023-08-03 NOTE — THERAPY TREATMENT NOTE
Acute Care - Physical Therapy Treatment Note  [facility]     Patient Name: Aydee Solis  : 1950  MRN: [id number]  Today's Date: 8/3/2023      Visit Dx:     ICD-10-CM ICD-9-CM   1. Acute on chronic renal insufficiency  N28.9 593.9    N18.9 585.9   2. Metabolic alkalosis  E87.3 276.3   3. History of COPD  Z87.09 V12.69   4. History of diabetes mellitus  Z86.39 V12.29   5. Hypokalemia  E87.6 276.8   6. History of CHF (congestive heart failure)  Z86.79 V12.59   7. Urinary tract infection in female  N39.0 599.0     Patient Active Problem List   Diagnosis    Pneumonia of right lower lobe due to infectious organism    Cellulitis    Hypertension    Hyperlipidemia    Breast cancer    Lymphedema    Osteoporosis    Acute respiratory failure with hypoxia    Morbid obesity due to excess calories    Metabolic encephalopathy    PAULETTE (obstructive sleep apnea)    Chronic respiratory failure with hypoxia    Stage 3b chronic kidney disease    Type 2 diabetes mellitus, with long-term current use of insulin    PAF (paroxysmal atrial fibrillation)    Chronic anticoagulation    COPD (chronic obstructive pulmonary disease)    Chronic systolic CHF (congestive heart failure)    Left leg cellulitis    UTI (urinary tract infection)    Lymphedema    Dizziness    Opioid dependence    Metabolic acidosis    Confusion    Acute renal insufficiency    Alkalosis, metabolic    Hypokalemia    Hyponatremia     Past Medical History:   Diagnosis Date    Arthritis     Breast cancer     Cancer     Class 3 severe obesity due to excess calories with body mass index (BMI) of 50.0 to 59.9 in adult     COPD (chronic obstructive pulmonary disease)     Diabetes mellitus     EDWARDS (dyspnea on exertion)     Elephantiasis     left leg    Hyperlipidemia     Hypertension     Leukemia     Lung cancer     Lymphedema     left arm    Osteoporosis     Tracheal cancer      Past Surgical History:   Procedure Laterality Date    APPENDECTOMY      HYSTERECTOMY      KNEE  ARTHROSCOPY Right     LAPAROSCOPIC GASTRIC BANDING      LYMPHADENECTOMY Left     MASTECTOMY Left      PT Assessment (last 12 hours)       PT Evaluation and Treatment       Row Name 08/03/23 1100          Physical Therapy Time and Intention    Subjective Information no complaints  -     Document Type therapy note (daily note)  -     Mode of Treatment individual therapy;physical therapy  -     Patient Effort good  -     Symptoms Noted During/After Treatment none  -       Row Name 08/03/23 1100          General Information    Patient Profile Reviewed yes  -     Patient Observations alert;cooperative;agree to therapy  -     General Observations of Patient pt supine in bed no acute distress  -     Existing Precautions/Restrictions oxygen therapy device and L/min;fall  -     Barriers to Rehab medically complex  -[facility] Name 08/03/23 1100          Pain    Pretreatment Pain Rating 3/10  -     Posttreatment Pain Rating 3/10  -     Pain Location generalized  -     Pain Location - back  -     Pain Intervention(s) Repositioned  -[facility] Name 08/03/23 1100          Cognition    Orientation Status (Cognition) oriented x 3  -[facility] Name 08/03/23 1100          Bed Mobility    Supine-Sit [place] (Bed Mobility) contact guard  -     Sit-Supine [place] (Bed Mobility) standby assist  -     Assistive Device (Bed Mobility) bed rails  -     Comment, (Bed Mobility) inc time to complete task  -[facility] Name 08/03/23 1100          Sit-Stand Transfer    Sit-Stand [place] (Transfers) standby assist  -     Assistive Device (Sit-Stand Transfers) walker, front-wheeled  -[facility] Name 08/03/23 1100          Stand-Sit Transfer    Stand-Sit [place] (Transfers) standby assist  -     Assistive Device (Stand-Sit Transfers) walker, front-wheeled  -       Row Name 08/03/23 1100          Gait/Stairs (Locomotion)    [place] Level (Gait) standby assist  -     Assistive  Device (Gait) walker, front-wheeled  -     Distance in Feet (Gait) 50  -LH     Pattern (Gait) step-to;step-through  -     Deviations/Abnormal Patterns (Gait) bilateral deviations;antalgic;rachid decreased  -     Bilateral Gait Deviations forward flexed posture;heel strike decreased  -     Comment, (Gait/Stairs) on 02, pain and generalized deconditioning limiting  several brief standing rest breaks  -       Row Name 08/03/23 1100          Balance    Balance Assessment sitting static balance  -     Static Sitting Balance standby assist  -     Position, Sitting Balance unsupported;sitting edge of bed  -     Static Standing Balance standby assist  -     Position/Device Used, Standing Balance supported;walker, front-wheeled  -       Row Name 08/03/23 1100          Plan of Care Review    Plan of Care Reviewed With patient  -     Outcome Evaluation Pt tolerated ambulation 50ft SBA rwx, on 3L 02. Pt w increased time to complete functional mobility tasks due to generalized pain and weakness. Pt reports she plans to return home- rec HHC and 24 hr assist and walker, pt also reports she has scooter as needed.  -       Row Name 08/03/23 1100          Vital Signs    O2 Delivery Pre Treatment supplemental O2  -     O2 Delivery Intra Treatment supplemental O2  -     Post SpO2 (%) 95  -     O2 Delivery Post Treatment supplemental O2  -       Row Name 08/03/23 1100          Positioning and Restraints    Pre-Treatment Position in bed  -     Post Treatment Position bed  -     In Bed supine;call light within reach;encouraged to call for assist;exit alarm on;notified [facility]  -               User Key  (r) = Recorded By, (t) = Taken By, (c) = Cosigned By      Initials Name Provider Type     Susie Gilbert, PT Physical Therapist                    Physical Therapy Education       Title: PT OT SLP Therapies (In Progress)       Topic: Physical Therapy (In Progress)       Point: Mobility training (In  Progress)       Learning Progress Summary             Patient Acceptance, E, NR by  at 8/3/2023 1122    Acceptance, E, NR by  at 8/2/2023 1600                         Point: Home exercise program (In Progress)       Learning Progress Summary             Patient Acceptance, E, NR by  at 8/3/2023 1122    Acceptance, E, NR by  at 8/2/2023 1600                         Point: Body mechanics (In Progress)       Learning Progress Summary             Patient Acceptance, E, NR by  at 8/2/2023 1600                         Point: Precautions (In Progress)       Learning Progress Summary             Patient Acceptance, E, NR by  at 8/2/2023 1600                                         User Key       Initials Effective Dates Name Provider Type Discipline     06/16/21 -  Susie Gilbert, PT Physical Therapist PT                  PT Recommendation and Plan  Anticipated Discharge Disposition (PT): home with 24/7 care, home with home health  Planned Therapy Interventions (PT): balance training, bed mobility training, gait training, home exercise program, ROM (range of motion), stair training, strengthening, stretching, transfer training  Therapy Frequency (PT): 6 times/wk  Plan of Care Reviewed With: patient  Outcome Evaluation: Pt tolerated ambulation 50ft SBA rwx, on 3L 02. Pt w increased time to complete functional mobility tasks due to generalized pain and weakness. Pt reports she plans to return home- rec HH and 24 hr assist and walker, pt also reports she has scooter as needed.   Outcome Measures       Row Name 08/02/23 1600             How much help from another person do you currently need...    Turning from your back to your side while in flat bed without using bedrails? 3  -LH      Moving from lying on back to sitting on the side of a flat bed without bedrails? 3  -LH      Moving to and from a bed to a chair (including a wheelchair)? 3  -LH      Standing up from a chair using your arms (e.g., wheelchair,  bedside chair)? 3  -      Climbing 3-5 steps with a railing? 2  -      To walk in hospital room? 3  -      AM-PAC 6 Clicks Score (PT) 17  -         Functional Assessment    Outcome Measure Options AM-PAC 6 Clicks Basic Mobility (PT)  -                User Key  (r) = Recorded By, (t) = Taken By, (c) = Cosigned By      Initials Name Provider Type     Susie Gilbert PT Physical Therapist                     Time Calculation:    PT Charges       Row Name 08/03/23 1122             Time Calculation    Start Time 1025  -      Stop Time 1055  -      Time Calculation (min) 30 min  -      PT Received On 08/03/23  -      PT - Next Appointment 08/04/23  -                User Key  (r) = Recorded By, (t) = Taken By, (c) = Cosigned By      Initials Name Provider Type     Susie Gilbert PT Physical Therapist                  Therapy Charges for Today       Code Description Service Date Service Provider Modifiers Qty    35371014155 HC PT EVAL LOW COMPLEXITY 3 8/2/2023 Susie Gilbert, PT GP 1    38541963750 HC PT THER PROC EA 15 MIN 8/2/2023 Susie Gilbert, PT GP 1    20480219349 HC PT THER PROC EA 15 MIN 8/3/2023 Susie Gilbert, PT GP 1    04680782087 HC PT THERAPEUTIC ACT EA 15 MIN 8/3/2023 Susie Gilbert, PT GP 1            PT G-Codes  Outcome Measure Options: AM-PAC 6 Clicks Basic Mobility (PT)  AM-PAC 6 Clicks Score (PT): 17  AM-PAC 6 Clicks Score (OT): 11  Modified Delta Scale: 5 - Severe disability.  Bedridden, incontinent, and requiring constant nursing care and attention.    Susie Gilbert, PT  8/3/2023

## 2023-08-03 NOTE — CASE MANAGEMENT/SOCIAL WORK
Continued Stay Note  [facility]     Patient Name: Aydee Solis  MRN: [id number]  Today's Date: 8/3/2023    Admit Date: 7/31/2023    Plan: Home with [facility]   Discharge Plan       Row Name 08/03/23 1004       Plan    Plan Home with [facility]    Plan Comments CCP met with patient at bedside. Discussed discharge plans. Patient plans to return home at discharge and is not interested in SNF at this time. Patient gave CCP permission to speak with her  regarding discharge plans. CCP made outbound call to patient's . Patient's  states he is interested in SNF at discharge. CCP discussed patient does not qualify for SNF at this time due to observation status. CCP discussed patient is eligible for home health. Patient's spouse agreeable and would like [facility]. Patient has home 02 at home but is in need of a new walker. Patient's spouse prefers [facility]. CCP left voicemail for [facility]. Patient's spouse is home with her 24/7 and can assist as needed. CCP will follow and assist as needed. [facility]                   Discharge Codes    No documentation.                 Expected Discharge Date and Time       Expected Discharge Date Expected Discharge Time    Aug 3, 2023               RUBENS Newton  Continued Stay Note  [facility]     Patient Name: Aydee Solis  MRN: [id number]  Today's Date: 8/3/2023    Admit Date: 7/31/2023    Plan: Home with [facility]   Discharge Plan       Row Name 08/03/23 1004       Plan    Plan Home with [facility]    Plan Comments CCP met with patient at bedside. Discussed discharge plans. Patient plans to return home at discharge and is not interested in SNF at this time. Patient gave CCP permission to speak with her  regarding discharge plans. CCP made outbound call to patient's . Patient's  states he is interested in SNF at discharge. CCP discussed patient does not qualify for SNF at this time due to observation  status. CCP discussed patient is eligible for home health. Patient's spouse agreeable and would like [facility] . Patient has home 02 at home but is in need of a new walker. Patient's spouse prefers [facility]. CCP left voicemail for [facility]. Patient's spouse is home with her 24/7 and can assist as needed. CCP will follow and assist as needed. [facility]                   Discharge Codes    No documentation.                 Expected Discharge Date and Time       Expected Discharge Date Expected Discharge Time    Aug 3, 2023               RUBENS [place]

## 2023-08-07 ENCOUNTER — READMISSION MANAGEMENT (OUTPATIENT)
Dept: CALL CENTER | Facility: HOSPITAL | Age: 73
End: 2023-08-07
Payer: MEDICARE

## 2023-08-07 LAB
BACTERIA SPEC AEROBE CULT: NORMAL
BACTERIA SPEC AEROBE CULT: NORMAL

## 2023-08-07 NOTE — OUTREACH NOTE
Medical Week 1 Survey      Flowsheet Row Responses   [facility] patient discharged from? [place]   Does the patient have one of the following disease processes/diagnoses(primary or secondary)? Other   Week 1 attempt successful? Yes   Call start time 1645   Call end time 1649   Discharge diagnosis Acute renal insufficiency   Person spoke with today (if not patient) and relationship patient   Meds reviewed with patient/caregiver? Yes   Does the patient have all medications ordered at discharge? Yes   Prescription comments Start Keflex- Does not feel like it is working, and has been itching since before hospital admission.- patient to call pcp tomorrow.   Is the patient taking all medications as directed (includes completed medication regime)? Yes   Does the patient have a primary care provider?  Yes   Does the patient have an appointment with their PCP within 7 days of discharge? Greater than 7 days   Comments regarding PCP Patient has an appt to see pcp 08-14 however does not feel like abx is working advised to call office tomorrow for sooner appt- or if pcp can change abx.   What is the Home health agency?  [facility]    Has home health visited the patient within 72 hours of discharge? Yes   DME comments New walker ordered- however the charge is $100- made patient aware to pay the $100 for new walker- patient understands and has [facility] [employer] phone number.   Psychosocial issues? No   Did the patient receive a copy of their discharge instructions? Yes   Nursing interventions Reviewed instructions with patient   What is the patient's perception of their health status since discharge? Same   Is the patient/caregiver able to teach back signs and symptoms related to disease process for when to call PCP? Yes   Is the patient/caregiver able to teach back signs and symptoms related to disease process for when to call 911? Yes   Is the patient/caregiver able to teach back the hierarchy of who to call/visit for  symptoms/problems? PCP, Specialist, Home health nurse, Urgent Care, ED, 911 Yes   Week 1 call completed? Yes   Would this patient benefit from a Referral to [facility] Social Work? No   Is the patient interested in additional calls from an ambulatory ? No   Wrap up additional comments Patient states she is doing okay however still feels like she has a UTI- She is to call her pcp tomorrow for a sooner visit or another abx called in. HH is still coming to the home as well. She is aware to call [employer] and pay for walker before she is able to get one. No other concerns or questions noted.   Call end time 2301            Lisset MILAN - Registered Nurse

## 2023-08-16 ENCOUNTER — READMISSION MANAGEMENT (OUTPATIENT)
Dept: CALL CENTER | Facility: HOSPITAL | Age: 73
End: 2023-08-16
Payer: MEDICARE

## 2023-08-16 NOTE — OUTREACH NOTE
Medical Week 2 Survey      Flowsheet Row Responses   [facility] patient discharged from? [place]   Does the patient have one of the following disease processes/diagnoses(primary or secondary)? Other   Week 2 attempt successful? Yes   Call start time 1507   Discharge diagnosis Acute renal insufficiency, UTI   Call end time 1509   Person spoke with today (if not patient) and relationship patient   Meds reviewed with patient/caregiver? Yes   Does the patient have all medications ordered at discharge? Yes   Is the patient taking all medications as directed (includes completed medication regime)? Yes   Does the patient have a primary care provider?  Yes   Comments regarding PCP PCP Dr Duque. Patient states that she has had follow up with PCP since discharge.   Has the patient kept scheduled appointments due by today? Yes   What is the Home health agency?  [facility]   Psychosocial issues? No   Did the patient receive a copy of their discharge instructions? Yes   Nursing interventions Reviewed instructions with patient   What is the patient's perception of their health status since discharge? Improving   Is the patient/caregiver able to teach back signs and symptoms related to disease process for when to call PCP? Yes   Is the patient/caregiver able to teach back signs and symptoms related to disease process for when to call 911? Yes   Is the patient/caregiver able to teach back the hierarchy of who to call/visit for symptoms/problems? PCP, Specialist, Home health nurse, Urgent Care, ED, 911 Yes   If the patient is a current smoker, are they able to teach back resources for cessation? Not a smoker   Week 2 Call Completed? Yes   Is the patient interested in additional calls from an ambulatory ? No   Would this patient benefit from a Referral to Amb Social Work? No   Call end time 1509            JOSE LUIS CASTILLO - Registered Nurse

## 2023-08-29 ENCOUNTER — HOSPITAL ENCOUNTER (INPATIENT)
Facility: HOSPITAL | Age: 73
LOS: 7 days | Discharge: HOME HEALTH | DRG: 689 | End: 2023-09-06
Attending: EMERGENCY MEDICINE | Admitting: STUDENT IN AN ORGANIZED HEALTH CARE EDUCATION/TRAINING PROGRAM
Payer: MEDICARE

## 2023-08-29 DIAGNOSIS — N39.0 ACUTE UTI: Primary | ICD-10-CM

## 2023-08-29 DIAGNOSIS — R53.1 GENERALIZED WEAKNESS: ICD-10-CM

## 2023-08-29 DIAGNOSIS — R77.8 ELEVATED TROPONIN: ICD-10-CM

## 2023-08-29 DIAGNOSIS — N28.9 ACUTE RENAL INSUFFICIENCY: ICD-10-CM

## 2023-08-29 DIAGNOSIS — R54 AGE-RELATED PHYSICAL DEBILITY: ICD-10-CM

## 2023-08-29 LAB
ALBUMIN SERPL-MCNC: 3.2 G/DL (ref 3.5–5.2)
ALBUMIN/GLOB SERPL: 0.9 G/DL
ALP SERPL-CCNC: 143 U/L (ref 39–117)
ALT SERPL W P-5'-P-CCNC: 9 U/L (ref 1–33)
ANION GAP SERPL CALCULATED.3IONS-SCNC: 12.6 MMOL/L (ref 5–15)
AST SERPL-CCNC: 25 U/L (ref 1–32)
BACTERIA UR QL AUTO: ABNORMAL /HPF
BASOPHILS # BLD AUTO: 0.04 10*3/MM3 (ref 0–0.2)
BASOPHILS NFR BLD AUTO: 0.6 % (ref 0–1.5)
BILIRUB SERPL-MCNC: 1.2 MG/DL (ref 0–1.2)
BILIRUB UR QL STRIP: NEGATIVE
BUN SERPL-MCNC: 44 MG/DL (ref 8–23)
BUN/CREAT SERPL: 24 (ref 7–25)
CALCIUM SPEC-SCNC: 8.7 MG/DL (ref 8.6–10.5)
CHLORIDE SERPL-SCNC: 96 MMOL/L (ref 98–107)
CLARITY UR: ABNORMAL
CO2 SERPL-SCNC: 29.4 MMOL/L (ref 22–29)
COLOR UR: ABNORMAL
CREAT SERPL-MCNC: 1.83 MG/DL (ref 0.57–1)
D-LACTATE SERPL-SCNC: 1.3 MMOL/L (ref 0.5–2)
DEPRECATED RDW RBC AUTO: 48.3 FL (ref 37–54)
EGFRCR SERPLBLD CKD-EPI 2021: 28.5 ML/MIN/1.73
EOSINOPHIL # BLD AUTO: 0.02 10*3/MM3 (ref 0–0.4)
EOSINOPHIL NFR BLD AUTO: 0.3 % (ref 0.3–6.2)
ERYTHROCYTE [DISTWIDTH] IN BLOOD BY AUTOMATED COUNT: 15.5 % (ref 12.3–15.4)
GLOBULIN UR ELPH-MCNC: 3.5 GM/DL
GLUCOSE BLDC GLUCOMTR-MCNC: 178 MG/DL (ref 70–130)
GLUCOSE BLDC GLUCOMTR-MCNC: 90 MG/DL (ref 70–130)
GLUCOSE SERPL-MCNC: 119 MG/DL (ref 65–99)
GLUCOSE UR STRIP-MCNC: NEGATIVE MG/DL
HBA1C MFR BLD: 8.5 % (ref 4.8–5.6)
HCT VFR BLD AUTO: 39.6 % (ref 34–46.6)
HGB BLD-MCNC: 12.6 G/DL (ref 12–15.9)
HGB UR QL STRIP.AUTO: ABNORMAL
HYALINE CASTS UR QL AUTO: ABNORMAL /LPF
IMM GRANULOCYTES # BLD AUTO: 0.02 10*3/MM3 (ref 0–0.05)
IMM GRANULOCYTES NFR BLD AUTO: 0.3 % (ref 0–0.5)
KETONES UR QL STRIP: NEGATIVE
LEUKOCYTE ESTERASE UR QL STRIP.AUTO: ABNORMAL
LYMPHOCYTES # BLD AUTO: 0.95 10*3/MM3 (ref 0.7–3.1)
LYMPHOCYTES NFR BLD AUTO: 15.2 % (ref 19.6–45.3)
MAGNESIUM SERPL-MCNC: 2.1 MG/DL (ref 1.6–2.4)
MCH RBC QN AUTO: 27.3 PG (ref 26.6–33)
MCHC RBC AUTO-ENTMCNC: 31.8 G/DL (ref 31.5–35.7)
MCV RBC AUTO: 85.9 FL (ref 79–97)
MONOCYTES # BLD AUTO: 0.56 10*3/MM3 (ref 0.1–0.9)
MONOCYTES NFR BLD AUTO: 9 % (ref 5–12)
NEUTROPHILS NFR BLD AUTO: 4.64 10*3/MM3 (ref 1.7–7)
NEUTROPHILS NFR BLD AUTO: 74.6 % (ref 42.7–76)
NITRITE UR QL STRIP: NEGATIVE
NRBC BLD AUTO-RTO: 0.2 /100 WBC (ref 0–0.2)
PH UR STRIP.AUTO: 5.5 [PH] (ref 5–8)
PLATELET # BLD AUTO: 210 10*3/MM3 (ref 140–450)
PMV BLD AUTO: 9.8 FL (ref 6–12)
POTASSIUM SERPL-SCNC: 4.2 MMOL/L (ref 3.5–5.2)
PROT SERPL-MCNC: 6.7 G/DL (ref 6–8.5)
PROT UR QL STRIP: ABNORMAL
QT INTERVAL: 557 MS
QTC INTERVAL: 557 MS
RBC # BLD AUTO: 4.61 10*6/MM3 (ref 3.77–5.28)
RBC # UR STRIP: ABNORMAL /HPF
REF LAB TEST METHOD: ABNORMAL
SODIUM SERPL-SCNC: 138 MMOL/L (ref 136–145)
SP GR UR STRIP: 1.01 (ref 1–1.03)
SQUAMOUS #/AREA URNS HPF: ABNORMAL /HPF
TROPONIN T SERPL HS-MCNC: 39 NG/L
UROBILINOGEN UR QL STRIP: ABNORMAL
WBC # UR STRIP: ABNORMAL /HPF
WBC NRBC COR # BLD: 6.23 10*3/MM3 (ref 3.4–10.8)

## 2023-08-29 PROCEDURE — 36415 COLL VENOUS BLD VENIPUNCTURE: CPT | Performed by: EMERGENCY MEDICINE

## 2023-08-29 PROCEDURE — 82948 REAGENT STRIP/BLOOD GLUCOSE: CPT

## 2023-08-29 PROCEDURE — 63710000001 INSULIN GLARGINE PER 5 UNITS: Performed by: STUDENT IN AN ORGANIZED HEALTH CARE EDUCATION/TRAINING PROGRAM

## 2023-08-29 PROCEDURE — 87040 BLOOD CULTURE FOR BACTERIA: CPT | Performed by: EMERGENCY MEDICINE

## 2023-08-29 PROCEDURE — 84484 ASSAY OF TROPONIN QUANT: CPT | Performed by: EMERGENCY MEDICINE

## 2023-08-29 PROCEDURE — 83036 HEMOGLOBIN GLYCOSYLATED A1C: CPT | Performed by: STUDENT IN AN ORGANIZED HEALTH CARE EDUCATION/TRAINING PROGRAM

## 2023-08-29 PROCEDURE — 83735 ASSAY OF MAGNESIUM: CPT | Performed by: EMERGENCY MEDICINE

## 2023-08-29 PROCEDURE — G0378 HOSPITAL OBSERVATION PER HR: HCPCS

## 2023-08-29 PROCEDURE — 81001 URINALYSIS AUTO W/SCOPE: CPT | Performed by: EMERGENCY MEDICINE

## 2023-08-29 PROCEDURE — 85025 COMPLETE CBC W/AUTO DIFF WBC: CPT | Performed by: EMERGENCY MEDICINE

## 2023-08-29 PROCEDURE — 87147 CULTURE TYPE IMMUNOLOGIC: CPT | Performed by: EMERGENCY MEDICINE

## 2023-08-29 PROCEDURE — 80053 COMPREHEN METABOLIC PANEL: CPT | Performed by: EMERGENCY MEDICINE

## 2023-08-29 PROCEDURE — 63710000001 INSULIN LISPRO (HUMAN) PER 5 UNITS: Performed by: STUDENT IN AN ORGANIZED HEALTH CARE EDUCATION/TRAINING PROGRAM

## 2023-08-29 PROCEDURE — 87150 DNA/RNA AMPLIFIED PROBE: CPT | Performed by: EMERGENCY MEDICINE

## 2023-08-29 PROCEDURE — 93010 ELECTROCARDIOGRAM REPORT: CPT | Performed by: INTERNAL MEDICINE

## 2023-08-29 PROCEDURE — P9612 CATHETERIZE FOR URINE SPEC: HCPCS

## 2023-08-29 PROCEDURE — 87086 URINE CULTURE/COLONY COUNT: CPT | Performed by: EMERGENCY MEDICINE

## 2023-08-29 PROCEDURE — 83605 ASSAY OF LACTIC ACID: CPT | Performed by: EMERGENCY MEDICINE

## 2023-08-29 PROCEDURE — 25010000002 CEFTRIAXONE PER 250 MG: Performed by: EMERGENCY MEDICINE

## 2023-08-29 PROCEDURE — 94799 UNLISTED PULMONARY SVC/PX: CPT

## 2023-08-29 PROCEDURE — 99285 EMERGENCY DEPT VISIT HI MDM: CPT

## 2023-08-29 PROCEDURE — 87186 SC STD MICRODIL/AGAR DIL: CPT | Performed by: EMERGENCY MEDICINE

## 2023-08-29 PROCEDURE — 93005 ELECTROCARDIOGRAM TRACING: CPT | Performed by: EMERGENCY MEDICINE

## 2023-08-29 RX ORDER — POLYETHYLENE GLYCOL 3350 17 G/17G
17 POWDER, FOR SOLUTION ORAL DAILY PRN
Status: DISCONTINUED | OUTPATIENT
Start: 2023-08-29 | End: 2023-09-05

## 2023-08-29 RX ORDER — SODIUM CHLORIDE 0.9 % (FLUSH) 0.9 %
10 SYRINGE (ML) INJECTION AS NEEDED
Status: DISCONTINUED | OUTPATIENT
Start: 2023-08-29 | End: 2023-09-06 | Stop reason: HOSPADM

## 2023-08-29 RX ORDER — SODIUM CHLORIDE 0.9 % (FLUSH) 0.9 %
10 SYRINGE (ML) INJECTION EVERY 12 HOURS SCHEDULED
Status: DISCONTINUED | OUTPATIENT
Start: 2023-08-29 | End: 2023-09-06 | Stop reason: HOSPADM

## 2023-08-29 RX ORDER — INSULIN LISPRO 100 [IU]/ML
2-7 INJECTION, SOLUTION INTRAVENOUS; SUBCUTANEOUS
Status: DISCONTINUED | OUTPATIENT
Start: 2023-08-29 | End: 2023-09-06 | Stop reason: HOSPADM

## 2023-08-29 RX ORDER — CHOLECALCIFEROL (VITAMIN D3) 125 MCG
5 CAPSULE ORAL NIGHTLY PRN
Status: DISCONTINUED | OUTPATIENT
Start: 2023-08-29 | End: 2023-09-06 | Stop reason: HOSPADM

## 2023-08-29 RX ORDER — NICOTINE POLACRILEX 4 MG
15 LOZENGE BUCCAL
Status: DISCONTINUED | OUTPATIENT
Start: 2023-08-29 | End: 2023-09-06 | Stop reason: HOSPADM

## 2023-08-29 RX ORDER — BUDESONIDE AND FORMOTEROL FUMARATE DIHYDRATE 160; 4.5 UG/1; UG/1
2 AEROSOL RESPIRATORY (INHALATION)
Status: DISCONTINUED | OUTPATIENT
Start: 2023-08-29 | End: 2023-09-06 | Stop reason: HOSPADM

## 2023-08-29 RX ORDER — METOPROLOL SUCCINATE 25 MG/1
25 TABLET, EXTENDED RELEASE ORAL 2 TIMES DAILY
Status: DISCONTINUED | OUTPATIENT
Start: 2023-08-29 | End: 2023-09-06 | Stop reason: HOSPADM

## 2023-08-29 RX ORDER — ATORVASTATIN CALCIUM 20 MG/1
10 TABLET, FILM COATED ORAL DAILY
Status: DISCONTINUED | OUTPATIENT
Start: 2023-08-29 | End: 2023-09-06 | Stop reason: HOSPADM

## 2023-08-29 RX ORDER — SODIUM CHLORIDE 9 MG/ML
40 INJECTION, SOLUTION INTRAVENOUS AS NEEDED
Status: DISCONTINUED | OUTPATIENT
Start: 2023-08-29 | End: 2023-09-06 | Stop reason: HOSPADM

## 2023-08-29 RX ORDER — ACETAMINOPHEN 160 MG/5ML
650 SOLUTION ORAL EVERY 4 HOURS PRN
Status: DISCONTINUED | OUTPATIENT
Start: 2023-08-29 | End: 2023-09-06 | Stop reason: HOSPADM

## 2023-08-29 RX ORDER — ALLOPURINOL 100 MG/1
100 TABLET ORAL DAILY
Status: DISCONTINUED | OUTPATIENT
Start: 2023-08-29 | End: 2023-09-06 | Stop reason: HOSPADM

## 2023-08-29 RX ORDER — ONDANSETRON 2 MG/ML
4 INJECTION INTRAMUSCULAR; INTRAVENOUS EVERY 6 HOURS PRN
Status: DISCONTINUED | OUTPATIENT
Start: 2023-08-29 | End: 2023-09-06 | Stop reason: HOSPADM

## 2023-08-29 RX ORDER — DULOXETIN HYDROCHLORIDE 60 MG/1
60 CAPSULE, DELAYED RELEASE ORAL DAILY
Status: DISCONTINUED | OUTPATIENT
Start: 2023-08-29 | End: 2023-09-06 | Stop reason: HOSPADM

## 2023-08-29 RX ORDER — ONDANSETRON 4 MG/1
4 TABLET, FILM COATED ORAL EVERY 6 HOURS PRN
Status: DISCONTINUED | OUTPATIENT
Start: 2023-08-29 | End: 2023-09-06 | Stop reason: HOSPADM

## 2023-08-29 RX ORDER — ACETAMINOPHEN 650 MG/1
650 SUPPOSITORY RECTAL EVERY 4 HOURS PRN
Status: DISCONTINUED | OUTPATIENT
Start: 2023-08-29 | End: 2023-09-06 | Stop reason: HOSPADM

## 2023-08-29 RX ORDER — METOPROLOL SUCCINATE 25 MG/1
25 TABLET, EXTENDED RELEASE ORAL 2 TIMES DAILY
COMMUNITY

## 2023-08-29 RX ORDER — SODIUM CHLORIDE 9 MG/ML
75 INJECTION, SOLUTION INTRAVENOUS CONTINUOUS
Status: DISCONTINUED | OUTPATIENT
Start: 2023-08-29 | End: 2023-08-30

## 2023-08-29 RX ORDER — ALBUTEROL SULFATE 2.5 MG/3ML
2.5 SOLUTION RESPIRATORY (INHALATION) EVERY 6 HOURS PRN
Status: DISCONTINUED | OUTPATIENT
Start: 2023-08-29 | End: 2023-09-06 | Stop reason: HOSPADM

## 2023-08-29 RX ORDER — IBUPROFEN 600 MG/1
1 TABLET ORAL
Status: DISCONTINUED | OUTPATIENT
Start: 2023-08-29 | End: 2023-09-06 | Stop reason: HOSPADM

## 2023-08-29 RX ORDER — ACETAMINOPHEN 325 MG/1
650 TABLET ORAL EVERY 4 HOURS PRN
Status: DISCONTINUED | OUTPATIENT
Start: 2023-08-29 | End: 2023-09-06 | Stop reason: HOSPADM

## 2023-08-29 RX ORDER — BISACODYL 10 MG
10 SUPPOSITORY, RECTAL RECTAL DAILY PRN
Status: DISCONTINUED | OUTPATIENT
Start: 2023-08-29 | End: 2023-09-05

## 2023-08-29 RX ORDER — AMOXICILLIN 250 MG
2 CAPSULE ORAL 2 TIMES DAILY PRN
Status: DISCONTINUED | OUTPATIENT
Start: 2023-08-29 | End: 2023-09-05

## 2023-08-29 RX ORDER — BISACODYL 5 MG/1
5 TABLET, DELAYED RELEASE ORAL DAILY PRN
Status: DISCONTINUED | OUTPATIENT
Start: 2023-08-29 | End: 2023-09-05

## 2023-08-29 RX ORDER — DEXTROSE MONOHYDRATE 25 G/50ML
25 INJECTION, SOLUTION INTRAVENOUS
Status: DISCONTINUED | OUTPATIENT
Start: 2023-08-29 | End: 2023-09-06 | Stop reason: HOSPADM

## 2023-08-29 RX ORDER — SPIRONOLACTONE 25 MG/1
25 TABLET ORAL DAILY
COMMUNITY
End: 2023-09-06 | Stop reason: HOSPADM

## 2023-08-29 RX ADMIN — CEFTRIAXONE 2000 MG: 2 INJECTION, POWDER, FOR SOLUTION INTRAMUSCULAR; INTRAVENOUS at 17:37

## 2023-08-29 RX ADMIN — SODIUM CHLORIDE 75 ML/HR: 9 INJECTION, SOLUTION INTRAVENOUS at 21:09

## 2023-08-29 RX ADMIN — INSULIN GLARGINE 10 UNITS: 100 INJECTION, SOLUTION SUBCUTANEOUS at 21:08

## 2023-08-29 RX ADMIN — Medication 10 ML: at 21:09

## 2023-08-29 RX ADMIN — ATORVASTATIN CALCIUM 10 MG: 20 TABLET, FILM COATED ORAL at 21:08

## 2023-08-29 RX ADMIN — INSULIN LISPRO 2 UNITS: 100 INJECTION, SOLUTION INTRAVENOUS; SUBCUTANEOUS at 21:08

## 2023-08-29 RX ADMIN — METOPROLOL SUCCINATE 25 MG: 25 TABLET, EXTENDED RELEASE ORAL at 21:09

## 2023-08-29 RX ADMIN — ALLOPURINOL 100 MG: 100 TABLET ORAL at 21:09

## 2023-08-29 RX ADMIN — APIXABAN 5 MG: 5 TABLET, FILM COATED ORAL at 21:09

## 2023-08-29 RX ADMIN — SODIUM CHLORIDE, POTASSIUM CHLORIDE, SODIUM LACTATE AND CALCIUM CHLORIDE 500 ML: 600; 310; 30; 20 INJECTION, SOLUTION INTRAVENOUS at 15:13

## 2023-08-29 RX ADMIN — DULOXETINE HYDROCHLORIDE 60 MG: 60 CAPSULE, DELAYED RELEASE ORAL at 21:10

## 2023-08-29 NOTE — H&P
"    Patient Name:  Aydee Solis  YOB: 1950  MRN:  [id number]  Admit Date:  8/29/2023  Patient Care Team:  Bennett Duque DO as PCP - General (Internal Medicine)  Marco Mayes MD as PCP - Family Medicine      Subjective   History Present Illness     Chief Complaint   Patient presents with    Weakness - Generalized    Dysuria       Ms. Solis is a 73 y.o. former smoker with a history of CKD 3B, chronic systolic/diastolic CHF, paroxysmal A-fib, hypertension, chronic hypoxic respiratory failure/COPD that presents to [facility] complaining of altered mental status, weakness, falling.    History of Present Illness  73-year-old female with history as above who presents with a 3-day history of progressive weakness, falls and altered mental status.  The patient is accompanied by her  who provides all of the history.   reports patient has had increasing weakness over the last 3 days.  2 days ago she had an unwitnessed fall,  was not present but found her on the ground after a short period of time.  Yesterday when attempting to get out of bed patient was unable to bear her weight and slid out of bed.  This morning when she woke she was noted to be incoherent and he was unable to stand her up at all.  He called her home health agency who recommended he call his PCP.  His PCP instructed him to call 911 and she was brought to the hospital.   reports that she was admitted last month for a UTI and was confused at that time as well.  She has been using a pure wick at night.  He reports her urine has looked like \"orange juice \"the last several days.  Patient is complaining of right knee and hip pain, at the site of ecchymoses/hematomas.    Review of Systems   Constitutional:  Positive for fatigue. Negative for fever.   Respiratory:  Negative for cough and shortness of breath.    Cardiovascular:  Positive for leg swelling. Negative for chest pain and palpitations. "   Gastrointestinal:  Negative for abdominal pain, nausea and vomiting.   Genitourinary:  Negative for dysuria and frequency.   Neurological:  Positive for weakness.      Personal History     Past Medical History:   Diagnosis Date    Arthritis     Breast cancer     Cancer     Class 3 severe obesity due to excess calories with body mass index (BMI) of 50.0 to 59.9 in adult     COPD (chronic obstructive pulmonary disease)     Diabetes mellitus     EDWARDS (dyspnea on exertion)     Elephantiasis     left leg    Hyperlipidemia     Hypertension     Leukemia     Lung cancer     Lymphedema     left arm    Osteoporosis     Tracheal cancer      Past Surgical History:   Procedure Laterality Date    APPENDECTOMY      HYSTERECTOMY      KNEE ARTHROSCOPY Right     LAPAROSCOPIC GASTRIC BANDING      LYMPHADENECTOMY Left     MASTECTOMY Left      Family History   Problem Relation Age of Onset    Stroke Mother     Leukemia Father     COPD Sister     ALS Brother      Social History     Tobacco Use    Smoking status: Former   Vaping Use    Vaping Use: Never used   Substance Use Topics    Alcohol use: No    Drug use: No     No current facility-administered medications on file prior to encounter.     Current Outpatient Medications on File Prior to Encounter   Medication Sig Dispense Refill    albuterol (PROVENTIL HFA;VENTOLIN HFA) 108 (90 BASE) MCG/ACT inhaler Inhale 2 puffs Every 4 (Four) Hours As Needed for wheezing or shortness of air. 1 inhaler 0    allopurinol (ZYLOPRIM) 100 MG tablet Take 1 tablet by mouth Daily.      apixaban (ELIQUIS) 5 MG tablet tablet Take 1 tablet by mouth 2 (Two) Times a Day. 60 tablet 0    atorvastatin (LIPITOR) 10 MG tablet Take 1 tablet by mouth Daily.      docusate sodium (COLACE) 100 MG capsule Take 1 capsule by mouth 2 (Two) Times a Day.      DULoxetine (CYMBALTA) 60 MG capsule Take 1 capsule by mouth Daily.      insulin aspart (NovoLOG FlexPen) 100 UNIT/ML solution pen-injector sc pen Novolog FlexPen U-100  Insulin aspart 100 unit/mL (3 mL) subcutaneous   Sliding scale.      insulin detemir (LEVEMIR) 100 UNIT/ML injection Inject 20 Units under the skin into the appropriate area as directed Every Night.      torsemide 40 MG tablet Take 40 mg by mouth Daily. (Patient taking differently: Take 60 mg by mouth Daily.) 30 tablet 0    Trelegy Ellipta 100-62.5-25 MCG/INH inhaler       busPIRone (BUSPAR) 5 MG tablet Take 1 tablet by mouth 3 (Three) Times a Day. (Patient not taking: Reported on 8/29/2023)      Diclofenac Sodium (VOLTAREN) 1 % gel gel Apply 4 g topically to the appropriate area as directed 4 (Four) Times a Day As Needed.      HYDROcodone-acetaminophen (NORCO)  MG per tablet 1 tablet Every 8 (Eight) Hours As Needed for Moderate Pain. (Patient not taking: Reported on 8/29/2023)      metoprolol succinate XL (TOPROL-XL) 25 MG 24 hr tablet Take 1 tablet by mouth 2 (Two) Times a Day.      Povidone, PF, (iVIZIA Dry Eyes) 0.5 % solution Apply 0.5 % to eye(s) as directed by provider 2 (Two) Times a Day.      spironolactone (ALDACTONE) 25 MG tablet Take 1 tablet by mouth Daily.       Allergies   Allergen Reactions    Ciprofloxacin        Objective    Objective     Vital Signs  Temp:  [97.9 °F (36.6 °C)-98.1 °F (36.7 °C)] 97.9 °F (36.6 °C)  Heart Rate:  [] 60  Resp:  [18-20] 18  BP: ()/(67-73) 96/73  SpO2:  [94 %-98 %] 98 %  on  Flow (L/min):  [2-3] 3;   Device (Oxygen Therapy): nasal cannula  Body mass index is 39.56 kg/m².    Physical Exam  Constitutional:       General: She is not in acute distress.     Appearance: She is obese. She is ill-appearing (Chronically).   Eyes:      Comments: Stye of R eye   Cardiovascular:      Rate and Rhythm: Normal rate and regular rhythm.      Pulses: Normal pulses.      Heart sounds: No murmur heard.  Pulmonary:      Effort: Pulmonary effort is normal. No respiratory distress.      Breath sounds: Normal breath sounds. No wheezing.   Abdominal:      General: Abdomen is  flat. Bowel sounds are normal. There is no distension.      Palpations: Abdomen is soft.   Musculoskeletal:         General: No swelling or tenderness.      Right lower leg: Edema present.      Left lower leg: Edema present.      Comments: Bilateral lower extremity lymphedema, chronic venous stasis changes bilateral lower extremities  Ecchymosis/swelling immediately inferior to the right patella  Right hip ecchymosis     Skin:     General: Skin is warm and dry.   Neurological:      General: No focal deficit present.      Mental Status: Mental status is at baseline.      Comments: Patient oriented to person, place, year, event (I'm here because I'm weak)- improved from this AM per     Drowsy, sleeps on and off through exam       Results Review:  I reviewed the patient's new clinical results.  I reviewed the patient's new imaging results and agree with the interpretation.  I reviewed the patient's other test results and agree with the interpretation  I personally viewed and interpreted the patient's EKG/Telemetry data  Discussed with ED provider.    Lab Results (last 24 hours)       Procedure Component Value Units Date/Time    CBC & Differential [[id number]]  (Abnormal) Collected: 08/29/23 1415    Specimen: Blood Updated: 08/29/23 1435    Narrative:      The following orders were created for panel order CBC & Differential.  Procedure                               Abnormality         Status                     ---------                               -----------         ------                     CBC Auto Differential[[id number]]        Abnormal            Final result                 Please view results for these tests on the individual orders.    Comprehensive Metabolic Panel [[id number]]  (Abnormal) Collected: 08/29/23 1415    Specimen: Blood Updated: 08/29/23 1449     Glucose 119 mg/dL      BUN 44 mg/dL      Creatinine 1.83 mg/dL      Sodium 138 mmol/L      Potassium 4.2 mmol/L      Comment: Slight hemolysis  detected by analyzer. Results may be affected.        Chloride 96 mmol/L      CO2 29.4 mmol/L      Calcium 8.7 mg/dL      Total Protein 6.7 g/dL      Albumin 3.2 g/dL      ALT (SGPT) 9 U/L      AST (SGOT) 25 U/L      Alkaline Phosphatase 143 U/L      Total Bilirubin 1.2 mg/dL      Globulin 3.5 gm/dL      A/G Ratio 0.9 g/dL      BUN/Creatinine Ratio 24.0     Anion Gap 12.6 mmol/L      eGFR 28.5 mL/min/1.73     Narrative:      GFR Normal >60  Chronic Kidney Disease <60  Kidney Failure <15    The GFR formula is only valid for adults with stable renal function between ages 18 and 70.    Single High Sensitivity Troponin T [[id number]]  (Abnormal) Collected: 08/29/23 1415    Specimen: Blood Updated: 08/29/23 1449     HS Troponin T 39 ng/L     Narrative:      High Sensitive Troponin T Reference Range:  <10.0 ng/L- Negative Female for AMI  <15.0 ng/L- Negative Male for AMI  >=10 - Abnormal Female indicating possible myocardial injury.  >=15 - Abnormal Male indicating possible myocardial injury.   Clinicians would have to utilize clinical acumen, EKG, Troponin, and serial changes to determine if it is an Acute Myocardial Infarction or myocardial injury due to an underlying chronic condition.         Magnesium [[id number]]  (Normal) Collected: 08/29/23 1415    Specimen: Blood Updated: 08/29/23 1449     Magnesium 2.1 mg/dL     CBC Auto Differential [[id number]]  (Abnormal) Collected: 08/29/23 1415    Specimen: Blood Updated: 08/29/23 1435     WBC 6.23 10*3/mm3      RBC 4.61 10*6/mm3      Hemoglobin 12.6 g/dL      Hematocrit 39.6 %      MCV 85.9 fL      MCH 27.3 pg      MCHC 31.8 g/dL      RDW 15.5 %      RDW-SD 48.3 fl      MPV 9.8 fL      Platelets 210 10*3/mm3      Neutrophil % 74.6 %      Lymphocyte % 15.2 %      Monocyte % 9.0 %      Eosinophil % 0.3 %      Basophil % 0.6 %      Immature Grans % 0.3 %      Neutrophils, Absolute 4.64 10*3/mm3      Lymphocytes, Absolute 0.95 10*3/mm3      Monocytes, Absolute 0.56 10*3/mm3       Eosinophils, Absolute 0.02 10*3/mm3      Basophils, Absolute 0.04 10*3/mm3      Immature Grans, Absolute 0.02 10*3/mm3      nRBC 0.2 /100 WBC     Urinalysis With Microscopic If Indicated (No Culture) - Urine, Catheter [[id number]]  (Abnormal) Collected: 08/29/23 1416    Specimen: Urine, Catheter Updated: 08/29/23 1437     Color, UA Dark Yellow     Appearance, UA Turbid     pH, UA 5.5     Specific Gravity, UA 1.013     Glucose, UA Negative     Ketones, UA Negative     Bilirubin, UA Negative     Blood, UA Moderate (2+)     Protein, UA Trace     Leuk Esterase, UA Large (3+)     Nitrite, UA Negative     Urobilinogen, UA 1.0 E.U./dL    Urinalysis, Microscopic Only - Urine, Catheter [[id number]]  (Abnormal) Collected: 08/29/23 1416    Specimen: Urine, Catheter Updated: 08/29/23 1437     RBC, UA 31-50 /HPF      WBC, UA Too Numerous to Count /HPF      Bacteria, UA 4+ /HPF      Squamous Epithelial Cells, UA 7-12 /HPF      Hyaline Casts, UA 0-2 /LPF      Methodology Automated Microscopy    Urine Culture - Urine, Urine, Catheter [[id number]] Collected: 08/29/23 1416    Specimen: Urine, Catheter Updated: 08/29/23 1505    Lactic Acid, Plasma [[id number]]  (Normal) Collected: 08/29/23 1510    Specimen: Blood Updated: 08/29/23 1635     Lactate 1.3 mmol/L     Blood Culture - Blood, Arm, Left [[id number]] Collected: 08/29/23 1510    Specimen: Blood from Arm, Left Updated: 08/29/23 1519    POC Glucose Once [[id number]]  (Normal) Collected: 08/29/23 1624    Specimen: Blood Updated: 08/29/23 1626     Glucose 90 mg/dL     Blood Culture - Blood, Arm, Right [[id number]] Collected: 08/29/23 1656    Specimen: Blood from Arm, Right Updated: 08/29/23 1715            Imaging Results (Last 24 Hours)       ** No results found for the last 24 hours. **            Results for orders placed during the hospital encounter of 01/21/23    Adult Transthoracic Echo Complete W/ Cont if Necessary Per Protocol    Interpretation Summary    Left ventricular systolic  function is mildly decreased. Calculated left ventricular EF = 42.8% Left ventricular ejection fraction appears to be 41 - 45%.    The following left ventricular wall segments are hypokinetic: mid anterior, apical anterior, basal anterolateral, mid anterolateral, apical lateral, basal inferolateral, mid inferolateral, apical inferior, mid inferior, apical septal, basal inferoseptal, mid inferoseptal, apex hypokinetic, mid anteroseptal, basal anterior, basal inferior and basal inferoseptal.    Left ventricular diastolic function is consistent with (grade I) impaired relaxation.    Mildly reduced right ventricular systolic function noted.    The right ventricular cavity is moderately dilated.    The right atrial cavity is moderately  dilated.    There is moderate, bileaflet mitral valve thickening present.    Mild mitral valve stenosis is present. The mitral valve peak gradient is 8 mmHg. The mitral valve mean gradient is 3 mmHg.    Estimated right ventricular systolic pressure from tricuspid regurgitation is mildly elevated (35-45 mmHg).      ECG 12 Lead Other; Generalized weakness   Final Result   HEART RATE= 60  bpm   RR Interval= 1000  ms   MS Interval= 226  ms   P Horizontal Axis=   deg   P Front Axis= 255  deg   QRSD Interval= 144  ms   QT Interval= 557  ms   QTcB= 557  ms   QRS Axis= 170  deg   T Wave Axis= 22  deg   - ABNORMAL ECG -   Atrial-sensed ventricular-paced rhythm   No further analysis attempted due to paced rhythm   No change from previous tracing   Electronically Signed By: Jean Chua ([facility]) 29-Aug-2023 15:13:34   Date and Time of Study: 2023-08-29 14:34:26           Assessment/Plan     Active Hospital Problems    Diagnosis  POA    **Acute UTI [N39.0]  Yes    Acute renal insufficiency [N28.9]  Yes    Chronic systolic CHF (congestive heart failure) [I50.22]  Yes    PAULETTE (obstructive sleep apnea) [G47.33]  Yes    Chronic respiratory failure with hypoxia [J96.11]  Yes    Stage 3b chronic kidney  disease [N18.32]  Yes    Type 2 diabetes mellitus, with long-term current use of insulin [E11.9, Z79.4]  Not Applicable    PAF (paroxysmal atrial fibrillation) [I48.0]  Yes    Chronic anticoagulation [Z79.01]  Not Applicable    COPD (chronic obstructive pulmonary disease) [J44.9]  Yes    Metabolic encephalopathy [G93.41]  Yes    Morbid obesity due to excess calories [E66.01]  Yes    Hypertension [I10]  Yes    Hyperlipidemia [E78.5]  Yes    Lymphedema [I89.0]  Yes      Resolved Hospital Problems   No resolved problems to display.       Ms. Solis is a 73 y.o. former smoker with a history of CKD 3B, chronic systolic/diastolic CHF, paroxysmal A-fib, hypertension, chronic hypoxic respiratory failure/COPD who presents with AMS, weakness.      Acute UTI  Metabolic encephalopathy  - admission last month w/ pan-sensitive E Coli UTI  - UA + LE, WBC, 4+ bacteria but also 7-12 squamous epithelial cells  - pt taken off of Norco last month  - follow up urine/blood cxs  - continue rocephin    Weakness/falls/debility  - PT/OT consulted  - cardiology consulted last month given frequent falls and use of Eliquis  - they recommended continuing AC given her CHADSVasc2 and f/u with her cadiologist as an o/p    MELANIE on CKD3b  - Cr on admission 1.8, baseline appears ~1.2  - IVF 75cc overnight and repeat BMP in AM    Type 2 diabetes  - lantus 10 U nightly (home dose is 20u)  - Low dose SSI    Chronic systolic/diastolic CHF w/ AICD  Paroxysmal atrial fibrillation  - ECHO 1/2023 w/ EF 42%,G1DD  - continue Eliquis, metoprolol  - aldactone/torsemide on hold given MELANIE    HTN  - hold home meds- torsemide 60mg qd and aldactone 25 mg qd  - Pt BP 100s/70s  - continue metoprolol w hold parameters    COPD  Chronic hypoxic respiratory failure- on 2L via NC at home  - not in exacerbation  - continue symbicort/spiriva and PRN albuterol  - continue 2L for goal SpO2 88-92%    Gout  - home allopurinol    I discussed the patient's findings and my  recommendations with patient, spouse, and ED provider.    VTE Prophylaxis - Eliquis (home med).  Code Status - DNR. Confirmed at bedside w/        Juliette Spence MD  [facility]  08/29/23  19:06 EDT

## 2023-08-29 NOTE — ED PROVIDER NOTES
EMERGENCY DEPARTMENT ENCOUNTER    Room Number:  16/16  PCP: Bennett Duque DO  Historian: Patient, EMS,  at bedside      HPI:  Chief Complaint: Generalized weakness  A complete HPI/ROS/PMH/PSH/SH/FH are unobtainable due to: Nothing  Context: Aydee Solis is a 73 y.o. female who presents to the ED from home by EMS c/o generalized weakness and malaise for the past 3 days.  Patient reports decreased energy and increased fatigue.  Patient is catheterized nightly and then removes her catheter during the daytime.  She reports cloudy and malodorous urine for the past 2 days.  Denies cough, fever, chest pain, shortness of breath, abdominal pain, vomiting, diarrhea, or dysuria.  Patient was admitted here last month for UTI and acute kidney injury.  Per EMS, glucose was 132.  She was given a total of 450 cc of normal saline by EMS.   reports that the patient was too weak to get out of bed this morning even with his assistance.            PAST MEDICAL HISTORY  Active Ambulatory Problems     Diagnosis Date Noted    Pneumonia of right lower lobe due to infectious organism 01/27/2017    Cellulitis 01/28/2017    Hypertension 01/28/2017    Hyperlipidemia 01/28/2017    Breast cancer 01/28/2017    Lymphedema 01/28/2017    Osteoporosis 01/28/2017    Acute respiratory failure with hypoxia 01/29/2017    Morbid obesity due to excess calories 01/30/2017    Metabolic encephalopathy 01/25/2023    PAULETTE (obstructive sleep apnea) 01/25/2023    Chronic respiratory failure with hypoxia 01/25/2023    Stage 3b chronic kidney disease 01/25/2023    Type 2 diabetes mellitus, with long-term current use of insulin 01/25/2023    PAF (paroxysmal atrial fibrillation) 01/25/2023    Chronic anticoagulation 01/25/2023    COPD (chronic obstructive pulmonary disease) 01/25/2023    Chronic systolic CHF (congestive heart failure) 01/31/2023    Left leg cellulitis 06/05/2023    UTI (urinary tract infection) 06/06/2023    Lymphedema  06/06/2023    Dizziness 06/06/2023    Opioid dependence 06/07/2023    Metabolic acidosis 07/31/2023    Confusion 07/31/2023    Acute renal insufficiency 07/31/2023    Alkalosis, metabolic 07/31/2023    Hypokalemia 08/01/2023    Hyponatremia 08/01/2023     Resolved Ambulatory Problems     Diagnosis Date Noted    Sepsis 01/30/2017    Acute on chronic combined systolic and diastolic CHF (congestive heart failure) 01/25/2023     Past Medical History:   Diagnosis Date    Arthritis     Cancer     Class 3 severe obesity due to excess calories with body mass index (BMI) of 50.0 to 59.9 in adult     Diabetes mellitus     EDWARDS (dyspnea on exertion)     Elephantiasis     Leukemia     Lung cancer     Tracheal cancer          PAST SURGICAL HISTORY  Past Surgical History:   Procedure Laterality Date    APPENDECTOMY      HYSTERECTOMY      KNEE ARTHROSCOPY Right     LAPAROSCOPIC GASTRIC BANDING      LYMPHADENECTOMY Left     MASTECTOMY Left          FAMILY HISTORY  Family History   Problem Relation Age of Onset    Stroke Mother     Leukemia Father     COPD Sister     ALS Brother          SOCIAL HISTORY  Social History     Socioeconomic History    Marital status:    Tobacco Use    Smoking status: Former   Vaping Use    Vaping Use: Never used   Substance and Sexual Activity    Alcohol use: No    Drug use: No    Sexual activity: Defer         ALLERGIES  Ciprofloxacin    REVIEW OF SYSTEMS  All systems have been reviewed and are negative except for those listed in the HPI      PHYSICAL EXAM  ED Triage Vitals [08/29/23 1331]   Temp Heart Rate Resp BP SpO2   98.1 °F (36.7 °C) 103 18 91/70 94 %      Temp src Heart Rate Source Patient Position BP Location FiO2 (%)   -- -- -- -- --       Physical Exam      GENERAL: Awake, alert, and oriented oriented to person, place, month, and year but not to day.  Well-developed obese elderly female.  She is nontoxic-appearing.  No acute distress.  BMI 42.6  HENT: NCAT, nares patent, mildly dry  mucous membranes  EYES: EOMI  CV: regular rhythm, normal rate  RESPIRATORY: normal effort, clear to auscultation bilaterally  ABDOMEN: soft, obese, nontender, no CVA tenderness  MUSCULOSKELETAL: Extremities are nontender with full range of motion  NEURO: Speech is normal.  No facial droop.  Follows simple commands.  PSYCH:  calm, cooperative  SKIN: warm, dry    Vital signs and nursing notes reviewed.          LAB RESULTS  Recent Results (from the past 24 hour(s))   Comprehensive Metabolic Panel    Collection Time: 08/29/23  2:15 PM    Specimen: Blood   Result Value Ref Range    Glucose 119 (H) 65 - 99 mg/dL    BUN 44 (H) 8 - 23 mg/dL    Creatinine 1.83 (H) 0.57 - 1.00 mg/dL    Sodium 138 136 - 145 mmol/L    Potassium 4.2 3.5 - 5.2 mmol/L    Chloride 96 (L) 98 - 107 mmol/L    CO2 29.4 (H) 22.0 - 29.0 mmol/L    Calcium 8.7 8.6 - 10.5 mg/dL    Total Protein 6.7 6.0 - 8.5 g/dL    Albumin 3.2 (L) 3.5 - 5.2 g/dL    ALT (SGPT) 9 1 - 33 U/L    AST (SGOT) 25 1 - 32 U/L    Alkaline Phosphatase 143 (H) 39 - 117 U/L    Total Bilirubin 1.2 0.0 - 1.2 mg/dL    Globulin 3.5 gm/dL    A/G Ratio 0.9 g/dL    BUN/Creatinine Ratio 24.0 7.0 - 25.0    Anion Gap 12.6 5.0 - 15.0 mmol/L    eGFR 28.5 (L) >60.0 mL/min/1.73   Single High Sensitivity Troponin T    Collection Time: 08/29/23  2:15 PM    Specimen: Blood   Result Value Ref Range    HS Troponin T 39 (H) <10 ng/L   Magnesium    Collection Time: 08/29/23  2:15 PM    Specimen: Blood   Result Value Ref Range    Magnesium 2.1 1.6 - 2.4 mg/dL   CBC Auto Differential    Collection Time: 08/29/23  2:15 PM    Specimen: Blood   Result Value Ref Range    WBC 6.23 3.40 - 10.80 10*3/mm3    RBC 4.61 3.77 - 5.28 10*6/mm3    Hemoglobin 12.6 12.0 - 15.9 g/dL    Hematocrit 39.6 34.0 - 46.6 %    MCV 85.9 79.0 - 97.0 fL    MCH 27.3 26.6 - 33.0 pg    MCHC 31.8 31.5 - 35.7 g/dL    RDW 15.5 (H) 12.3 - 15.4 %    RDW-SD 48.3 37.0 - 54.0 fl    MPV 9.8 6.0 - 12.0 fL    Platelets 210 140 - 450 10*3/mm3     Neutrophil % 74.6 42.7 - 76.0 %    Lymphocyte % 15.2 (L) 19.6 - 45.3 %    Monocyte % 9.0 5.0 - 12.0 %    Eosinophil % 0.3 0.3 - 6.2 %    Basophil % 0.6 0.0 - 1.5 %    Immature Grans % 0.3 0.0 - 0.5 %    Neutrophils, Absolute 4.64 1.70 - 7.00 10*3/mm3    Lymphocytes, Absolute 0.95 0.70 - 3.10 10*3/mm3    Monocytes, Absolute 0.56 0.10 - 0.90 10*3/mm3    Eosinophils, Absolute 0.02 0.00 - 0.40 10*3/mm3    Basophils, Absolute 0.04 0.00 - 0.20 10*3/mm3    Immature Grans, Absolute 0.02 0.00 - 0.05 10*3/mm3    nRBC 0.2 0.0 - 0.2 /100 WBC   Urinalysis With Microscopic If Indicated (No Culture) - Urine, Catheter    Collection Time: 08/29/23  2:16 PM    Specimen: Urine, Catheter   Result Value Ref Range    Color, UA Dark Yellow (A) Yellow, Straw    Appearance, UA Turbid (A) Clear    pH, UA 5.5 5.0 - 8.0    Specific Gravity, UA 1.013 1.005 - 1.030    Glucose, UA Negative Negative    Ketones, UA Negative Negative    Bilirubin, UA Negative Negative    Blood, UA Moderate (2+) (A) Negative    Protein, UA Trace (A) Negative    Leuk Esterase, UA Large (3+) (A) Negative    Nitrite, UA Negative Negative    Urobilinogen, UA 1.0 E.U./dL 0.2 - 1.0 E.U./dL   Urinalysis, Microscopic Only - Urine, Catheter    Collection Time: 08/29/23  2:16 PM    Specimen: Urine, Catheter   Result Value Ref Range    RBC, UA 31-50 (A) None Seen, 0-2 /HPF    WBC, UA Too Numerous to Count (A) None Seen, 0-2 /HPF    Bacteria, UA 4+ (A) None Seen /HPF    Squamous Epithelial Cells, UA 7-12 (A) None Seen, 0-2 /HPF    Hyaline Casts, UA 0-2 None Seen /LPF    Methodology Automated Microscopy    ECG 12 Lead Other; Generalized weakness    Collection Time: 08/29/23  2:34 PM   Result Value Ref Range    QT Interval 557 ms    QTC Interval 557 ms       Ordered the above labs and reviewed the results.        RADIOLOGY  No Radiology Exams Resulted Within Past 24 Hours    Ordered the above noted radiological studies. Reviewed by me in PACS.             PROCEDURES  Procedures              MEDICATIONS GIVEN IN ER  Medications   sodium chloride 0.9 % flush 10 mL (has no administration in time range)   cefTRIAXone (ROCEPHIN) 2,000 mg in sodium chloride 0.9 % 100 mL IVPB-VTB (has no administration in time range)   lactated ringers bolus 500 mL (has no administration in time range)                   MEDICAL DECISION MAKING, PROGRESS, and CONSULTS    All labs have been independently reviewed by me.  All radiology studies have been reviewed by me and I have also reviewed the radiology report.   EKG's independently viewed and interpreted by me.  Discussion below represents my analysis of pertinent findings related to patient's condition, differential diagnosis, treatment plan and final disposition.      Additional sources:  - Discussed/ obtained information from independent historians: EMS,  at bedside    - External (non-ED) record review: Patient was admitted here last month for acute kidney injury and UTI due to E. coli.  E. coli was pan susceptible.  She was treated with ceftriaxone and discharged on Keflex.    - Chronic or social conditions impacting care: N/A          Orders placed during this visit:  Orders Placed This Encounter   Procedures    Blood Culture - Blood,    Blood Culture - Blood,    Urine Culture - Urine,    Comprehensive Metabolic Panel    Single High Sensitivity Troponin T    Urinalysis With Microscopic If Indicated (No Culture) - Urine, Catheter    Magnesium    CBC Auto Differential    Urinalysis, Microscopic Only - Urine, Clean Catch    Lactic Acid, Plasma    Monitor Blood Pressure    Pulse Oximetry, Continuous    LHA (on-call MD unless specified) Details    ECG 12 Lead Other; Generalized weakness    Insert Peripheral IV    Initiate Observation Status    CBC & Differential         Additional orders considered but not ordered:  N/A        Differential diagnosis:    Dehydration, UTI, anemia, electrolyte abnormality, sepsis, bacteremia,  deconditioning      Independent interpretation of labs, radiology studies, and discussions with consultants:  ED Course as of 08/29/23 1509   Tue Aug 29, 2023   1444 EKG personally interpreted by me at 1437.  My personal interpretation is:         EKG time: 1434  Rhythm/Rate: AV paced rhythm, rate 60  QRS, axis: IVCD  ST and T waves: Nonspecific ST/T wave changes in the anterior leads    Interpreted Contemporaneously by me, independently viewed  EKG is not significantly changed compared to prior EKG done on 6/5/2023   []   1453 Urinalysis reviewed.  Findings are consistent with UTI. [WH]   1453 HS Troponin T(!): 39 [WH]   1453 Creatinine(!): 1.83  1.26 three weeks ago [WH]   1454 WBC: 6.23 [WH]   1454 Hemoglobin: 12.6 [WH]   1458 Case discussed with Dr. Spence, hospitalist, and she agrees to admit the patient to a monitored bed.  Pertinent history, exam findings, test results, ED course, and diagnoses were discussed with her. [WH]   1501 Patient presented to the ED with complaints of generalized weakness and cloudy urine.  She was found to have a UTI.  Urine culture is pending.  She will be treated with IV Rocephin.  Patient also had acute kidney injury.  Potassium was normal.  Troponin was mildly elevated but she denied chest pain.  EKG did not have any acute ischemic changes.  Case was discussed with the hospitalist and she will be admitted. []      ED Course User Index  [] Kyle Benitez MD               DIAGNOSIS  Final diagnoses:   Acute UTI   Acute renal insufficiency   Generalized weakness   Elevated troponin         DISPOSITION  ADMISSION    Discussed treatment plan and reason for admission with pt/family and admitting physician.  Pt/family voiced understanding of the plan for admission for further testing/treatment as needed.               Latest Documented Vital Signs:  As of 15:09 EDT  BP- 97/68 HR- 67 Temp- 98.1 °F (36.7 °C) O2 sat- 97%              --    Please note that portions of this  were completed with a voice recognition program.       Note Disclaimer: At [facility], we believe that sharing information builds trust and better relationships. You are receiving this note because you are receiving care at [facility] or recently visited. It is possible you will see health information before a provider has talked with you about it. This kind of information can be easy to misunderstand. To help you fully understand what it means for your health, we urge you to discuss this note with your provider.             Kyle Benitez MD  08/29/23 8717

## 2023-08-29 NOTE — ED NOTES
Nursing report ED to floor  Aydee Solis  73 y.o.  female    HPI :   Chief Complaint   Patient presents with    Weakness - Generalized    Dysuria       Admitting doctor:   Juliette Spence MD    Admitting diagnosis:   The primary encounter diagnosis was Acute UTI. Diagnoses of Acute renal insufficiency, Generalized weakness, and Elevated troponin were also pertinent to this visit.    Code status:   Current Code Status       Date Active Code Status Order ID Comments User Context       Prior            Allergies:   Ciprofloxacin    Isolation:   No active isolations    Intake and Output    Intake/Output Summary (Last 24 hours) at 8/29/2023 1526  Last data filed at 8/29/2023 1429  Gross per 24 hour   Intake 450 ml   Output 300 ml   Net 150 ml       Weight:       08/29/23  1343   Weight: 120 kg (264 lb)       Most recent vitals:   Vitals:    08/29/23 1358 08/29/23 1431 08/29/23 1449 08/29/23 1509   BP: 98/67 97/68  105/70   Pulse:  61 67 59   Resp:    20   Temp:       SpO2:  94% 97% 97%   Weight:       Height:           Active LDAs/IV Access:   Lines, Drains & Airways       Active LDAs       Name Placement date Placement time Site Days    Peripheral IV 08/29/23 1328 Anterior;Distal;Right Forearm 08/29/23  1328  Forearm  less than 1    Peripheral IV 08/29/23 1356 Right Antecubital 08/29/23  1356  Antecubital  less than 1    External Urinary Catheter 01/21/23 2030  --  219                    Labs (abnormal labs have a star):   Labs Reviewed   COMPREHENSIVE METABOLIC PANEL - Abnormal; Notable for the following components:       Result Value    Glucose 119 (*)     BUN 44 (*)     Creatinine 1.83 (*)     Chloride 96 (*)     CO2 29.4 (*)     Albumin 3.2 (*)     Alkaline Phosphatase 143 (*)     eGFR 28.5 (*)     All other components within normal limits    Narrative:     GFR Normal >60  Chronic Kidney Disease <60  Kidney Failure <15    The GFR formula is only valid for adults with stable renal function between ages 18 and 70.    SINGLE HSTROPONIN T - Abnormal; Notable for the following components:    HS Troponin T 39 (*)     All other components within normal limits    Narrative:     High Sensitive Troponin T Reference Range:  <10.0 ng/L- Negative Female for AMI  <15.0 ng/L- Negative Male for AMI  >=10 - Abnormal Female indicating possible myocardial injury.  >=15 - Abnormal Male indicating possible myocardial injury.   Clinicians would have to utilize clinical acumen, EKG, Troponin, and serial changes to determine if it is an Acute Myocardial Infarction or myocardial injury due to an underlying chronic condition.        URINALYSIS W/ MICROSCOPIC IF INDICATED (NO CULTURE) - Abnormal; Notable for the following components:    Color, UA Dark Yellow (*)     Appearance, UA Turbid (*)     Blood, UA Moderate (2+) (*)     Protein, UA Trace (*)     Leuk Esterase, UA Large (3+) (*)     All other components within normal limits   CBC WITH AUTO DIFFERENTIAL - Abnormal; Notable for the following components:    RDW 15.5 (*)     Lymphocyte % 15.2 (*)     All other components within normal limits   URINALYSIS, MICROSCOPIC ONLY - Abnormal; Notable for the following components:    RBC, UA 31-50 (*)     WBC, UA Too Numerous to Count (*)     Bacteria, UA 4+ (*)     Squamous Epithelial Cells, UA 7-12 (*)     All other components within normal limits   MAGNESIUM - Normal   BLOOD CULTURE   BLOOD CULTURE   URINE CULTURE   LACTIC ACID, PLASMA   CBC AND DIFFERENTIAL    Narrative:     The following orders were created for panel order CBC & Differential.  Procedure                               Abnormality         Status                     ---------                               -----------         ------                     CBC Auto Differential[[id number]]        Abnormal            Final result                 Please view results for these tests on the individual orders.       EKG:   ECG 12 Lead Other; Generalized weakness   Final Result   HEART RATE= 60  bpm    RR Interval= 1000  ms   NJ Interval= 226  ms   P Horizontal Axis=   deg   P Front Axis= 255  deg   QRSD Interval= 144  ms   QT Interval= 557  ms   QTcB= 557  ms   QRS Axis= 170  deg   T Wave Axis= 22  deg   - ABNORMAL ECG -   Atrial-sensed ventricular-paced rhythm   No further analysis attempted due to paced rhythm   No change from previous tracing   Electronically Signed By: Jean Chua ([facility]) 29-Aug-2023 15:13:34   Date and Time of Study: 2023-08-29 14:34:26          Meds given in ED:   Medications   sodium chloride 0.9 % flush 10 mL (has no administration in time range)   cefTRIAXone (ROCEPHIN) 2,000 mg in sodium chloride 0.9 % 100 mL IVPB-VTB (has no administration in time range)   lactated ringers bolus 500 mL (500 mL Intravenous New Bag 8/29/23 1513)       Imaging results:  No radiology results for the last day    Ambulatory status:   - x2 assist    Social issues:   Social History     Socioeconomic History    Marital status:    Tobacco Use    Smoking status: Former   Vaping Use    Vaping Use: Never used   Substance and Sexual Activity    Alcohol use: No    Drug use: No    Sexual activity: Defer       NIH Stroke Scale:       Thai Lopez RN  08/29/23 15:26 EDT

## 2023-08-29 NOTE — ED NOTES
Patient from home via EMS. Patient reports increased weakness x 3 days. Patient is straight catheterized by  once a night, patient's  reports to EMS that her urine is cloudy and has a foul odor. Patient is alert and oriented x 4, slow to answer questions.

## 2023-08-30 ENCOUNTER — APPOINTMENT (OUTPATIENT)
Dept: GENERAL RADIOLOGY | Facility: HOSPITAL | Age: 73
DRG: 689 | End: 2023-08-30
Payer: MEDICARE

## 2023-08-30 LAB
ANION GAP SERPL CALCULATED.3IONS-SCNC: 11 MMOL/L (ref 5–15)
BACTERIA BLD CULT: ABNORMAL
BASOPHILS # BLD AUTO: 0.05 10*3/MM3 (ref 0–0.2)
BASOPHILS NFR BLD AUTO: 0.9 % (ref 0–1.5)
BOTTLE TYPE: ABNORMAL
BUN SERPL-MCNC: 44 MG/DL (ref 8–23)
BUN/CREAT SERPL: 28.4 (ref 7–25)
CALCIUM SPEC-SCNC: 8.2 MG/DL (ref 8.6–10.5)
CHLORIDE SERPL-SCNC: 99 MMOL/L (ref 98–107)
CO2 SERPL-SCNC: 28 MMOL/L (ref 22–29)
CREAT SERPL-MCNC: 1.55 MG/DL (ref 0.57–1)
DEPRECATED RDW RBC AUTO: 45.7 FL (ref 37–54)
EGFRCR SERPLBLD CKD-EPI 2021: 34.8 ML/MIN/1.73
EOSINOPHIL # BLD AUTO: 0.11 10*3/MM3 (ref 0–0.4)
EOSINOPHIL NFR BLD AUTO: 1.9 % (ref 0.3–6.2)
ERYTHROCYTE [DISTWIDTH] IN BLOOD BY AUTOMATED COUNT: 15.4 % (ref 12.3–15.4)
GLUCOSE BLDC GLUCOMTR-MCNC: 118 MG/DL (ref 70–130)
GLUCOSE BLDC GLUCOMTR-MCNC: 185 MG/DL (ref 70–130)
GLUCOSE BLDC GLUCOMTR-MCNC: 213 MG/DL (ref 70–130)
GLUCOSE BLDC GLUCOMTR-MCNC: 248 MG/DL (ref 70–130)
GLUCOSE SERPL-MCNC: 137 MG/DL (ref 65–99)
HCT VFR BLD AUTO: 37.7 % (ref 34–46.6)
HGB BLD-MCNC: 11.8 G/DL (ref 12–15.9)
IMM GRANULOCYTES # BLD AUTO: 0.04 10*3/MM3 (ref 0–0.05)
IMM GRANULOCYTES NFR BLD AUTO: 0.7 % (ref 0–0.5)
LYMPHOCYTES # BLD AUTO: 1.01 10*3/MM3 (ref 0.7–3.1)
LYMPHOCYTES NFR BLD AUTO: 17.4 % (ref 19.6–45.3)
MAGNESIUM SERPL-MCNC: 4.2 MG/DL (ref 1.6–2.4)
MCH RBC QN AUTO: 26.4 PG (ref 26.6–33)
MCHC RBC AUTO-ENTMCNC: 31.3 G/DL (ref 31.5–35.7)
MCV RBC AUTO: 84.3 FL (ref 79–97)
MONOCYTES # BLD AUTO: 0.63 10*3/MM3 (ref 0.1–0.9)
MONOCYTES NFR BLD AUTO: 10.9 % (ref 5–12)
NEUTROPHILS NFR BLD AUTO: 3.95 10*3/MM3 (ref 1.7–7)
NEUTROPHILS NFR BLD AUTO: 68.2 % (ref 42.7–76)
NRBC BLD AUTO-RTO: 0.2 /100 WBC (ref 0–0.2)
PHOSPHATE SERPL-MCNC: 3.6 MG/DL (ref 2.5–4.5)
PLATELET # BLD AUTO: 188 10*3/MM3 (ref 140–450)
PMV BLD AUTO: 10.2 FL (ref 6–12)
POTASSIUM SERPL-SCNC: 3.9 MMOL/L (ref 3.5–5.2)
RBC # BLD AUTO: 4.47 10*6/MM3 (ref 3.77–5.28)
SODIUM SERPL-SCNC: 138 MMOL/L (ref 136–145)
WBC NRBC COR # BLD: 5.79 10*3/MM3 (ref 3.4–10.8)

## 2023-08-30 PROCEDURE — 71045 X-RAY EXAM CHEST 1 VIEW: CPT

## 2023-08-30 PROCEDURE — 94640 AIRWAY INHALATION TREATMENT: CPT

## 2023-08-30 PROCEDURE — 25010000002 CEFTRIAXONE PER 250 MG: Performed by: STUDENT IN AN ORGANIZED HEALTH CARE EDUCATION/TRAINING PROGRAM

## 2023-08-30 PROCEDURE — 97535 SELF CARE MNGMENT TRAINING: CPT

## 2023-08-30 PROCEDURE — 83735 ASSAY OF MAGNESIUM: CPT | Performed by: STUDENT IN AN ORGANIZED HEALTH CARE EDUCATION/TRAINING PROGRAM

## 2023-08-30 PROCEDURE — 84100 ASSAY OF PHOSPHORUS: CPT | Performed by: STUDENT IN AN ORGANIZED HEALTH CARE EDUCATION/TRAINING PROGRAM

## 2023-08-30 PROCEDURE — 80048 BASIC METABOLIC PNL TOTAL CA: CPT | Performed by: STUDENT IN AN ORGANIZED HEALTH CARE EDUCATION/TRAINING PROGRAM

## 2023-08-30 PROCEDURE — 97110 THERAPEUTIC EXERCISES: CPT

## 2023-08-30 PROCEDURE — 85025 COMPLETE CBC W/AUTO DIFF WBC: CPT | Performed by: STUDENT IN AN ORGANIZED HEALTH CARE EDUCATION/TRAINING PROGRAM

## 2023-08-30 PROCEDURE — 97162 PT EVAL MOD COMPLEX 30 MIN: CPT

## 2023-08-30 PROCEDURE — 94799 UNLISTED PULMONARY SVC/PX: CPT

## 2023-08-30 PROCEDURE — 97530 THERAPEUTIC ACTIVITIES: CPT

## 2023-08-30 PROCEDURE — 82948 REAGENT STRIP/BLOOD GLUCOSE: CPT

## 2023-08-30 PROCEDURE — 97166 OT EVAL MOD COMPLEX 45 MIN: CPT

## 2023-08-30 PROCEDURE — 63710000001 INSULIN GLARGINE PER 5 UNITS: Performed by: STUDENT IN AN ORGANIZED HEALTH CARE EDUCATION/TRAINING PROGRAM

## 2023-08-30 PROCEDURE — 63710000001 INSULIN LISPRO (HUMAN) PER 5 UNITS: Performed by: STUDENT IN AN ORGANIZED HEALTH CARE EDUCATION/TRAINING PROGRAM

## 2023-08-30 RX ADMIN — INSULIN LISPRO 2 UNITS: 100 INJECTION, SOLUTION INTRAVENOUS; SUBCUTANEOUS at 12:39

## 2023-08-30 RX ADMIN — Medication 10 ML: at 20:50

## 2023-08-30 RX ADMIN — Medication 10 ML: at 08:48

## 2023-08-30 RX ADMIN — ACETAMINOPHEN 650 MG: 325 TABLET, FILM COATED ORAL at 08:47

## 2023-08-30 RX ADMIN — INSULIN GLARGINE 10 UNITS: 100 INJECTION, SOLUTION SUBCUTANEOUS at 20:50

## 2023-08-30 RX ADMIN — METOPROLOL SUCCINATE 25 MG: 25 TABLET, EXTENDED RELEASE ORAL at 20:50

## 2023-08-30 RX ADMIN — APIXABAN 5 MG: 5 TABLET, FILM COATED ORAL at 08:47

## 2023-08-30 RX ADMIN — TIOTROPIUM BROMIDE INHALATION SPRAY 2 PUFF: 3.12 SPRAY, METERED RESPIRATORY (INHALATION) at 08:37

## 2023-08-30 RX ADMIN — APIXABAN 5 MG: 5 TABLET, FILM COATED ORAL at 20:50

## 2023-08-30 RX ADMIN — BUDESONIDE AND FORMOTEROL FUMARATE DIHYDRATE 2 PUFF: 160; 4.5 AEROSOL RESPIRATORY (INHALATION) at 20:18

## 2023-08-30 RX ADMIN — INSULIN LISPRO 3 UNITS: 100 INJECTION, SOLUTION INTRAVENOUS; SUBCUTANEOUS at 20:50

## 2023-08-30 RX ADMIN — CEFTRIAXONE 2000 MG: 2 INJECTION, POWDER, FOR SOLUTION INTRAMUSCULAR; INTRAVENOUS at 17:35

## 2023-08-30 RX ADMIN — ATORVASTATIN CALCIUM 10 MG: 20 TABLET, FILM COATED ORAL at 08:47

## 2023-08-30 RX ADMIN — DULOXETINE HYDROCHLORIDE 60 MG: 60 CAPSULE, DELAYED RELEASE ORAL at 08:47

## 2023-08-30 RX ADMIN — ALLOPURINOL 100 MG: 100 TABLET ORAL at 08:47

## 2023-08-30 RX ADMIN — INSULIN LISPRO 3 UNITS: 100 INJECTION, SOLUTION INTRAVENOUS; SUBCUTANEOUS at 17:35

## 2023-08-30 RX ADMIN — BUDESONIDE AND FORMOTEROL FUMARATE DIHYDRATE 2 PUFF: 160; 4.5 AEROSOL RESPIRATORY (INHALATION) at 08:37

## 2023-08-30 NOTE — PROGRESS NOTES
Name: Aydee Solis ADMIT: 2023   : 1950  PCP: Bennett Duque DO    MRN: [id number] LOS: 0 days   AGE/SEX: 73 y.o. female  ROOM: [facility]     Subjective   Subjective   No acute events overnight.  Patient is awake today.  States that she is overall feeling okay.   is at bedside.       Objective   Objective     Vital Signs  Temp:  [97.3 °F (36.3 °C)-98.1 °F (36.7 °C)] 97.7 °F (36.5 °C)  Heart Rate:  [60-62] 60  Resp:  [18] 18  BP: ()/(41-73) 101/57  SpO2:  [90 %-98 %] 91 %  on  Flow (L/min):  [1-3] 3;   Device (Oxygen Therapy): nasal cannula  Body mass index is 39.56 kg/m².    Physical Exam  General: Alert, no acute distress.  Obese.  Oriented today.  ENT: No conjunctival injection or scleral icterus. Moist mucous membranes. No JVD.   Neuro: Eyes open and moving in all directions, strength normal in all extremities, no focal deficits.   Lungs: Clear to auscultation bilaterally. No wheeze or crackles. No distress.   Heart: RRR, no murmurs. No edema.  Abdomen: Soft, non-tender, non-distended. Normal bowel sounds. No hepatosplenomegaly.   Ext: Bilateral lower extremity lymphedema with chronic dizziness stasis changes.  Skin: No rashes or lesions. IV site without swelling or erythema.     Results Review     I reviewed the patient's new clinical results:  Results from last 7 days   Lab Units 23  0553 23  1415   WBC 10*3/mm3 5.79 6.23   HEMOGLOBIN g/dL 11.8* 12.6   PLATELETS 10*3/mm3 188 210     Results from last 7 days   Lab Units 23  0553 23  1415   SODIUM mmol/L 138 138   POTASSIUM mmol/L 3.9 4.2   CHLORIDE mmol/L 99 96*   CO2 mmol/L 28.0 29.4*   BUN mg/dL 44* 44*   CREATININE mg/dL 1.55* 1.83*   GLUCOSE mg/dL 137* 119*   EGFR mL/min/1.73 34.8* 28.5*     Results from last 7 days   Lab Units 23  1415   ALBUMIN g/dL 3.2*   BILIRUBIN mg/dL 1.2   ALK PHOS U/L 143*   AST (SGOT) U/L 25   ALT (SGPT) U/L 9     Results from last 7 days   Lab Units 23  0504  08/29/23  1415   CALCIUM mg/dL 8.2* 8.7   ALBUMIN g/dL  --  3.2*   MAGNESIUM mg/dL 4.2* 2.1   PHOSPHORUS mg/dL 3.6  --      Results from last 7 days   Lab Units 08/29/23  1510   LACTATE mmol/L 1.3     Hemoglobin A1C   Date/Time Value Ref Range Status   08/29/2023 2046 8.50 (H) 4.80 - 5.60 % Final     Glucose   Date/Time Value Ref Range Status   08/30/2023 1216 185 (H) 70 - 130 mg/dL Final   08/30/2023 0700 118 70 - 130 mg/dL Final   08/29/2023 2052 178 (H) 70 - 130 mg/dL Final   08/29/2023 1624 90 70 - 130 mg/dL Final       No radiology results for the last day    I have personally reviewed all medications:  Scheduled Medications  allopurinol, 100 mg, Oral, Daily  apixaban, 5 mg, Oral, BID  atorvastatin, 10 mg, Oral, Daily  budesonide-formoterol, 2 puff, Inhalation, BID - RT   And  tiotropium bromide monohydrate, 2 puff, Inhalation, Daily - RT  cefTRIAXone, 2,000 mg, Intravenous, Q24H  DULoxetine, 60 mg, Oral, Daily  insulin glargine, 10 Units, Subcutaneous, Nightly  insulin lispro, 2-7 Units, Subcutaneous, 4x Daily AC & at Bedtime  metoprolol succinate XL, 25 mg, Oral, BID  sodium chloride, 10 mL, Intravenous, Q12H    Infusions   Diet  Diet: Cardiac Diets, Diabetic Diets; Healthy Heart (2-3 Na+); Consistent Carbohydrate; Texture: Regular Texture (IDDSI 7); Fluid Consistency: Thin (IDDSI 0)    I have personally reviewed:  [x]  Laboratory   [x]  Microbiology   [x]  Radiology   [x]  EKG/Telemetry  []  Cardiology/Vascular   []  Pathology    []  Records      Assessment/Plan     Active Hospital Problems    Diagnosis  POA    **Acute UTI [N39.0]  Yes    Acute renal insufficiency [N28.9]  Yes    Chronic systolic CHF (congestive heart failure) [I50.22]  Yes    PAULETTE (obstructive sleep apnea) [G47.33]  Yes    Chronic respiratory failure with hypoxia [J96.11]  Yes    Stage 3b chronic kidney disease [N18.32]  Yes    Type 2 diabetes mellitus, with long-term current use of insulin [E11.9, Z79.4]  Not Applicable    PAF (paroxysmal  atrial fibrillation) [I48.0]  Yes    Chronic anticoagulation [Z79.01]  Not Applicable    COPD (chronic obstructive pulmonary disease) [J44.9]  Yes    Metabolic encephalopathy [G93.41]  Yes    Morbid obesity due to excess calories [E66.01]  Yes    Hypertension [I10]  Yes    Hyperlipidemia [E78.5]  Yes    Lymphedema [I89.0]  Yes      Resolved Hospital Problems   No resolved problems to display.       Ms. Solis is a 73 y.o. former smoker with a history of CKD 3B, chronic systolic/diastolic CHF, paroxysmal A-fib, hypertension, chronic hypoxic respiratory failure/COPD who presents with AMS, weakness.       Acute UTI  Metabolic encephalopathy  -Has improved significantly and the past 24 hours per   - admission last month w/ pan-sensitive E Coli UTI  - UA + LE, WBC, 4+ bacteria but also 7-12 squamous epithelial cells  - pt taken off of Norco last month  - follow up urine/blood cxs  - continue rocephin    Hypoxia  -Has been requiring oxygen via nasal cannula, overnight but also during day  -No CXR obtained on admission, so will obtain  -Continuous pulse ox with goal saturations above 90%    Hypotension  -BP did respond to IV fluids  -Given history of hyponatremia, hypotension, and increased pigmentation in her skin, will obtain AM cortisol level and additional work-up if indicated based on level     Weakness/falls/debility  - PT/OT consulted, recommending home with home health  - cardiology consulted last month given frequent falls and use of Eliquis  - they recommended continuing AC given her CHADSVasc2 and f/u with her cadiologist as an o/p     MELANIE on CKD3b  - Cr on admission 1.8, baseline appears ~1.2-1.5  -Creatinine 1.55 today, appears to be at baseline  -Discontinue IV fluids, repeat BMP with morning labs     Type 2 diabetes  -Hemoglobin A1c 8.5  - lantus 10 U nightly (home dose is 20u)  - Low dose SSI  -Assessment rhythm well controlled in past 24 hours, no need for adjustments today     Chronic  systolic/diastolic CHF w/ AICD  Paroxysmal atrial fibrillation  - ECHO 1/2023 w/ EF 42%,G1DD  - continue Eliquis, metoprolol  - aldactone/torsemide on hold given MELANIE, can likely restart tomorrow     HTN  - hold home meds- torsemide 60mg qd and aldactone 25 mg qd  - Pt BP 100s/70s  - continue metoprolol w hold parameters  -Work-up for hypotension as above     COPD  Chronic hypoxic respiratory failure- on 2L via NC at home  - not in exacerbation  - continue symbicort/spiriva and PRN albuterol  - continue 2L for goal SpO2 88-92%     Gout  - home allopurinol      Daphnie Garcia MD  [facility]  08/30/23  15:50 EDT

## 2023-08-30 NOTE — THERAPY EVALUATION
Patient Name: Aydee Solis  : 1950    MRN: [id number]                              Today's Date: 2023       Admit Date: 2023    Visit Dx:     ICD-10-CM ICD-9-CM   1. Acute UTI  N39.0 599.0   2. Acute renal insufficiency  N28.9 593.9   3. Generalized weakness  R53.1 780.79   4. Elevated troponin  R77.8 790.6     Patient Active Problem List   Diagnosis    Pneumonia of right lower lobe due to infectious organism    Cellulitis    Hypertension    Hyperlipidemia    Breast cancer    Lymphedema    Osteoporosis    Acute respiratory failure with hypoxia    Morbid obesity due to excess calories    Metabolic encephalopathy    PAULETTE (obstructive sleep apnea)    Chronic respiratory failure with hypoxia    Stage 3b chronic kidney disease    Type 2 diabetes mellitus, with long-term current use of insulin    PAF (paroxysmal atrial fibrillation)    Chronic anticoagulation    COPD (chronic obstructive pulmonary disease)    Chronic systolic CHF (congestive heart failure)    Left leg cellulitis    UTI (urinary tract infection)    Lymphedema    Dizziness    Opioid dependence    Metabolic acidosis    Confusion    Acute renal insufficiency    Alkalosis, metabolic    Hypokalemia    Hyponatremia    Acute UTI     Past Medical History:   Diagnosis Date    Arthritis     Breast cancer     Cancer     Class 3 severe obesity due to excess calories with body mass index (BMI) of 50.0 to 59.9 in adult     COPD (chronic obstructive pulmonary disease)     Diabetes mellitus     EDWARDS (dyspnea on exertion)     Elephantiasis     left leg    Hyperlipidemia     Hypertension     Leukemia     Lung cancer     Lymphedema     left arm    Osteoporosis     Tracheal cancer      Past Surgical History:   Procedure Laterality Date    APPENDECTOMY      HYSTERECTOMY      KNEE ARTHROSCOPY Right     LAPAROSCOPIC GASTRIC BANDING      LYMPHADENECTOMY Left     MASTECTOMY Left       General Information       Row Name 23 1231          OT Time and Intention     Document Type evaluation  -     Mode of Treatment occupational therapy  -       Row Name 08/30/23 1231          General Information    Patient Profile Reviewed yes  -     Prior Level of Function independent:;ADL's;all household mobility  pt reports spouse nearby with upright mobility, pt reports hx falls, use of rollator  -     Existing Precautions/Restrictions fall;oxygen therapy device and L/min  3L baseline  -     Barriers to Rehab medically complex  -       Row Name 08/30/23 1231          Living Environment    People in Home spouse  -       Row Name 08/30/23 1231          Cognition    Orientation Status (Cognition) oriented x 3  -MW       Row Name 08/30/23 1231          Safety Issues, Functional Mobility    Impairments Affecting Function (Mobility) endurance/activity tolerance;strength;shortness of breath;balance  -               User Key  (r) = Recorded By, (t) = Taken By, (c) = Cosigned By      Initials Name Provider Type     Camila Morales OT Occupational Therapist                     Mobility/ADL's       Row Name 08/30/23 1231          Bed Mobility    Bed Mobility supine-sit;sit-supine  -     Supine-Sit [place] (Bed Mobility) minimum assist (75% patient effort)  -     Sit-Supine [place] (Bed Mobility) minimum assist (75% patient effort)  -     Assistive Device (Bed Mobility) bed rails;head of bed elevated  -[facility] Name 08/30/23 1231          Transfers    Transfers sit-stand transfer;toilet transfer  -[facility] Name 08/30/23 123          Sit-Stand Transfer    Sit-Stand [place] (Transfers) contact guard;minimum assist (75% patient effort)  -     Assistive Device (Sit-Stand Transfers) walker, front-wheeled  -       Row Name 08/30/23 123          Toilet Transfer    Comment, (Toilet Transfer) demo approp distance to BR commode  -       Row Name 08/30/23 123          Functional Mobility    Functional Mobility- Ind. Level contact guard assist  -      Functional Mobility- Device walker, front-wheeled  -     Functional Mobility- Comment short distance, fatigued quickly  -       Row Name 08/30/23 1231          Activities of Daily Living    BADL Assessment/Intervention lower body dressing;toileting;feeding  -[facility] Name 08/30/23 1231          Lower Body Dressing Assessment/Training    [place] Level (Lower Body Dressing) don;socks;maximum assist (25% patient effort)  -       Row Name 08/30/23 1231          Toileting Assessment/Training    Comment, (Toileting) brief donned  -[facility] Name 08/30/23 1231          Self-Feeding Assessment/Training    Comment, (Feeding) hand to mouth WFL BUE  -               User Key  (r) = Recorded By, (t) = Taken By, (c) = Cosigned By      Initials Name Provider Type    Camila Michel OT Occupational Therapist                   Obj/Interventions       [facility] Name 08/30/23 1233          Sensory Assessment (Somatosensory)    Sensory Assessment (Somatosensory) UE sensation intact  -[facility] Name 08/30/23 1233          Vision Assessment/Intervention    Visual Impairment/Limitations WFL  -[facility] Name 08/30/23 1233          Range of Motion Comprehensive    General Range of Motion bilateral upper extremity ROM WNL  -[facility] Name 08/30/23 1233          Strength Comprehensive (MMT)    General Manual Muscle Testing (MMT) Assessment upper extremity strength deficits identified  -     Comment, General Manual Muscle Testing (MMT) Assessment generalized weakness BUE  -[facility] Name 08/30/23 1233          Motor Skills    Motor Skills functional endurance  -     Functional Endurance quick to fatigue, 3L baseline however 1L this date  -[facility] Name 08/30/23 1233          Balance    Balance Assessment sitting static balance;sitting dynamic balance;sit to stand dynamic balance;standing static balance;standing dynamic balance  -     Static Sitting Balance standby assist  -     Dynamic Sitting  Balance standby assist  -MW     Position, Sitting Balance sitting edge of bed  -MW     Sit to Stand Dynamic Balance contact guard;minimal assist  -MW     Static Standing Balance contact guard  -MW     Dynamic Standing Balance contact guard  -MW     Position/Device Used, Standing Balance supported;walker, front-wheeled  -MW     Balance Interventions occupation based/functional task  -MW               User Key  (r) = Recorded By, (t) = Taken By, (c) = Cosigned By      Initials Name Provider Type     Camila Morales, OT Occupational Therapist                   Goals/Plan       Row Name 08/30/23 1237          Transfer Goal 1 (OT)    Activity/Assistive Device (Transfer Goal 1, OT) transfers, all  -MW     [place] Level/Cues Needed (Transfer Goal 1, OT) modified independence  -MW     Time Frame (Transfer Goal 1, OT) short term goal (STG);2 weeks  -MW     Progress/Outcome (Transfer Goal 1, OT) goal ongoing  -       Row Name 08/30/23 1237          Dressing Goal 1 (OT)    Activity/Device (Dressing Goal 1, OT) dressing skills, all  -MW     [place]/Cues Needed (Dressing Goal 1, OT) contact guard required  -MW     Time Frame (Dressing Goal 1, OT) short term goal (STG);2 weeks  -MW     Progress/Outcome (Dressing Goal 1, OT) goal ongoing  -       Row Name 08/30/23 1237          Toileting Goal 1 (OT)    Activity/Device (Toileting Goal 1, OT) toileting skills, all  -MW     [place] Level/Cues Needed (Toileting Goal 1, OT) contact guard required  -MW     Time Frame (Toileting Goal 1, OT) short term goal (STG);2 weeks  -MW     Progress/Outcome (Toileting Goal 1, OT) goal ongoing  -       Row Name 08/30/23 1237          Grooming Goal 1 (OT)    Activity/Device (Grooming Goal 1, OT) grooming skills, all  -MW     [place] (Grooming Goal 1, OT) standby assist  -MW     Time Frame (Grooming Goal 1, OT) short term goal (STG);2 weeks  -MW     Progress/Outcome (Grooming Goal 1, OT) goal ongoing  -       Row Name  "08/30/23 4604          Therapy Assessment/Plan (OT)    Planned Therapy Interventions (OT) activity tolerance training;functional balance retraining;BADL retraining;neuromuscular control/coordination retraining;patient/caregiver education/training;transfer/mobility retraining;strengthening exercise;ROM/therapeutic exercise;occupation/activity based interventions  -MW               User Key  (r) = Recorded By, (t) = Taken By, (c) = Cosigned By      Initials Name Provider Type    MW Camila Morales, ROBINSON Occupational Therapist                   Clinical Impression       Row Name 08/30/23 123          Pain Assessment    Pre/Posttreatment Pain Comment pt reports \"I am always hurting\" did not rate pain  -MW       Row Name 08/30/23 1234          Plan of Care Review    Plan of Care Reviewed With patient  -MW     Progress no change  -MW     Outcome Evaluation Pt is a 74 yo female admitted with acute UTI, generalized weakness and AMS. Pt seen for OT eval this date, A&Ox3, reports she is on 3L at baseline, lives with her spouse, reporst mod (I) with ADLs and use of rollator. Today, pt presents with impaired act tolerance, strength, and overall decreased (I) with ADLs and functional transfers. Pt required min A for bed mob, max A for LBD, spO2 WFL throughout session however pt quick to fatigue. STS and func mob with CGA/min Ax1 using Rwx. Able to demo distance close to BR door before returning to EOB. Pt wants home at d/c, will continue to progress to promote return to prior level of (I). Rec home with spouse and HHOT.  -       Row Name 08/30/23 5615          Therapy Assessment/Plan (OT)    Rehab Potential (OT) good, to achieve stated therapy goals  -MW     Criteria for Skilled Therapeutic Interventions Met (OT) yes;meets criteria;skilled treatment is necessary  -MW     Therapy Frequency (OT) 5 times/wk  -MW       Row Name 08/30/23 2243          Therapy Plan Review/Discharge Plan (OT)    Anticipated Discharge Disposition " (OT) home;home with assist;home with home health  -MW       Row Name 08/30/23 1234          Vital Signs    O2 Delivery Pre Treatment room air  -MW     Pre Patient Position Supine  -MW     Intra Patient Position Standing  -MW     Post Patient Position Supine  -MW       Row Name 08/30/23 1234          Positioning and Restraints    Pre-Treatment Position in bed  -MW     Post Treatment Position bed  -MW     In Bed call light within reach;notified nsg;fowlers;encouraged to call for assist;exit alarm on;side rails up x2  -MW               User Key  (r) = Recorded By, (t) = Taken By, (c) = Cosigned By      Initials Name Provider Type    Camila Michel, OT Occupational Therapist                   Outcome Measures       Row Name 08/30/23 1238          How much help from another is currently needed...    Putting on and taking off regular lower body clothing? 1  -MW     Bathing (including washing, rinsing, and drying) 2  -MW     Toileting (which includes using toilet bed pan or urinal) 2  -MW     Putting on and taking off regular upper body clothing 3  -MW     Taking care of personal grooming (such as brushing teeth) 3  -MW     Eating meals 3  -MW     AM-PAC 6 Clicks Score (OT) 14  -MW       Row Name 08/30/23 0800          How much help from another person do you currently need...    Turning from your back to your side while in flat bed without using bedrails? 3  -LT     Moving from lying on back to sitting on the side of a flat bed without bedrails? 3  -LT     Moving to and from a bed to a chair (including a wheelchair)? 2  -LT     Standing up from a chair using your arms (e.g., wheelchair, bedside chair)? 1  -LT     Climbing 3-5 steps with a railing? 1  -LT     To walk in hospital room? 1  -LT     AM-PAC 6 Clicks Score (PT) 11  -LT     Highest level of mobility 4 --> Transferred to chair/commode  -LT       Row Name 08/30/23 1238          Functional Assessment    Outcome Measure Options AM-PAC 6 Clicks Daily Activity  (OT)  -               User Key  (r) = Recorded By, (t) = Taken By, (c) = Cosigned By      Initials Name Provider Type    Yvonne Taylor, RN Registered Nurse    Camila Michel OT Occupational Therapist                    Occupational Therapy Education       Title: PT OT SLP Therapies (In Progress)       Topic: Occupational Therapy (In Progress)       Point: ADL training (Done)       Description:   Instruct learner(s) on proper safety adaptation and remediation techniques during self care or transfers.   Instruct in proper use of assistive devices.                  Learning Progress Summary             Patient Acceptance, E, VU by  at 8/30/2023 1238    Comment: role of OT, d/c rec                         Point: Home exercise program (Not Started)       Description:   Instruct learner(s) on appropriate technique for monitoring, assisting and/or progressing therapeutic exercises/activities.                  Learner Progress:  Not documented in this visit.              Point: Precautions (Done)       Description:   Instruct learner(s) on prescribed precautions during self-care and functional transfers.                  Learning Progress Summary             Patient Acceptance, E, VU by  at 8/30/2023 1238    Comment: role of OT, d/c rec                         Point: Body mechanics (Done)       Description:   Instruct learner(s) on proper positioning and spine alignment during self-care, functional mobility activities and/or exercises.                  Learning Progress Summary             Patient Acceptance, E, VU by  at 8/30/2023 1238    Comment: role of OT, d/c rec                                         User Key       Initials Effective Dates Name Provider Type Discipline     08/20/21 -  Camila Morales OT Occupational Therapist OT                  OT Recommendation and Plan  Planned Therapy Interventions (OT): activity tolerance training, functional balance retraining, BADL retraining,  neuromuscular control/coordination retraining, patient/caregiver education/training, transfer/mobility retraining, strengthening exercise, ROM/therapeutic exercise, occupation/activity based interventions  Therapy Frequency (OT): 5 times/wk  Plan of Care Review  Plan of Care Reviewed With: patient  Progress: no change  Outcome Evaluation: Pt is a 74 yo female admitted with acute UTI, generalized weakness and AMS. Pt seen for OT eval this date, A&Ox3, reports she is on 3L at baseline, lives with her spouse, reporst mod (I) with ADLs and use of rollator. Today, pt presents with impaired act tolerance, strength, and overall decreased (I) with ADLs and functional transfers. Pt required min A for bed mob, max A for LBD, spO2 WFL throughout session however pt quick to fatigue. STS and func mob with CGA/min Ax1 using Rwx. Able to demo distance close to BR door before returning to EOB. Pt wants home at d/c, will continue to progress to promote return to prior level of (I). Rec home with spouse and HHOT.     Time Calculation:   Evaluation Complexity (OT)  Review Occupational Profile/Medical/Therapy History Complexity: expanded/moderate complexity  Assessment, Occupational Performance/Identification of Deficit Complexity: 3-5 performance deficits  Clinical Decision Making Complexity (OT): detailed assessment/moderate complexity  Overall Complexity of Evaluation (OT): moderate complexity     Time Calculation- OT       Row Name 08/30/23 1239             Time Calculation- OT    OT Start Time 0855  -MW      OT Stop Time 0925  -MW      OT Time Calculation (min) 30 min  -MW      Total Timed Code Minutes- OT 23 minute(s)  -MW      OT Received On 08/30/23  -MW      OT - Next Appointment 08/31/23  -MW      OT Goal Re-Cert Due Date 09/13/23  -MW         Timed Charges    86645 - OT Therapeutic Activity Minutes 13  -MW      92901 - OT Self Care/Mgmt Minutes 10  -MW         Untimed Charges    OT Eval/Re-eval Minutes 7  -MW         Total  Minutes    Timed Charges Total Minutes 23  -MW      Untimed Charges Total Minutes 7  -MW       Total Minutes 30  -MW                User Key  (r) = Recorded By, (t) = Taken By, (c) = Cosigned By      Initials Name Provider Type    Camila Michel OT Occupational Therapist                  Therapy Charges for Today       Code Description Service Date Service Provider Modifiers Qty    60012612883 HC OT THERAPEUTIC ACT EA 15 MIN 8/30/2023 Camila Morales OT GO 1    [id number]  OT SELF CARE/MGMT/TRAIN EA 15 MIN 8/30/2023 Camila Morales OT GO 1    [id number]  OT EVAL MOD COMPLEXITY 2 8/30/2023 Camila Morales OT GO 1                 Camila Morales OT  8/30/2023

## 2023-08-30 NOTE — PLAN OF CARE
Goal Outcome Evaluation:  Plan of Care Reviewed With: patient        Progress: no change  Outcome Evaluation: Pt is a 74 yo female admitted with acute UTI, generalized weakness and AMS. Pt seen for OT eval this date, A&Ox3, reports she is on 3L at baseline, lives with her spouse, reporst mod (I) with ADLs and use of rollator. Today, pt presents with impaired act tolerance, strength, and overall decreased (I) with ADLs and functional transfers. Pt required min A for bed mob, max A for LBD, spO2 WFL throughout session however pt quick to fatigue. STS and func mob with CGA/min Ax1 using Rwx. Able to demo distance close to BR door before returning to EOB. Pt wants home at d/c, will continue to progress to promote return to prior level of (I). Rec home with spouse and HHOT.      Anticipated Discharge Disposition (OT): home, home with assist, home with home health

## 2023-08-30 NOTE — PLAN OF CARE
Goal Outcome Evaluation:         Pt disoriented to time, purewick in place. IVF continues. Pt on 3L NC. Q2 turns. VSS. Will continue to monitor.

## 2023-08-30 NOTE — THERAPY TREATMENT NOTE
Patient Name: Aydee Solis  : 1950    MRN: [id number]                              Today's Date: 2023       Admit Date: 2023    Visit Dx:     ICD-10-CM ICD-9-CM   1. Acute UTI  N39.0 599.0   2. Acute renal insufficiency  N28.9 593.9   3. Generalized weakness  R53.1 780.79   4. Elevated troponin  R77.8 790.6     Patient Active Problem List   Diagnosis    Pneumonia of right lower lobe due to infectious organism    Cellulitis    Hypertension    Hyperlipidemia    Breast cancer    Lymphedema    Osteoporosis    Acute respiratory failure with hypoxia    Morbid obesity due to excess calories    Metabolic encephalopathy    PAULETTE (obstructive sleep apnea)    Chronic respiratory failure with hypoxia    Stage 3b chronic kidney disease    Type 2 diabetes mellitus, with long-term current use of insulin    PAF (paroxysmal atrial fibrillation)    Chronic anticoagulation    COPD (chronic obstructive pulmonary disease)    Chronic systolic CHF (congestive heart failure)    Left leg cellulitis    UTI (urinary tract infection)    Lymphedema    Dizziness    Opioid dependence    Metabolic acidosis    Confusion    Acute renal insufficiency    Alkalosis, metabolic    Hypokalemia    Hyponatremia    Acute UTI     Past Medical History:   Diagnosis Date    Arthritis     Breast cancer     Cancer     Class 3 severe obesity due to excess calories with body mass index (BMI) of 50.0 to 59.9 in adult     COPD (chronic obstructive pulmonary disease)     Diabetes mellitus     EDWARDS (dyspnea on exertion)     Elephantiasis     left leg    Hyperlipidemia     Hypertension     Leukemia     Lung cancer     Lymphedema     left arm    Osteoporosis     Tracheal cancer      Past Surgical History:   Procedure Laterality Date    APPENDECTOMY      HYSTERECTOMY      KNEE ARTHROSCOPY Right     LAPAROSCOPIC GASTRIC BANDING      LYMPHADENECTOMY Left     MASTECTOMY Left       General Information       Row Name 23 1301          Physical Therapy Time  and Intention    Document Type evaluation  -EJ     Mode of Treatment physical therapy  -EJ       Row Name 08/30/23 1301          General Information    Patient Profile Reviewed yes  -EJ     Prior Level of Function independent:;all household mobility;ADL's  uses rollator, pt reports spouse is always close by  -EJ     Existing Precautions/Restrictions fall;oxygen therapy device and L/min  -EJ     Barriers to Rehab medically complex  -EJ       Row Name 08/30/23 1301          Living Environment    People in Home spouse  -EJ       Row Name 08/30/23 1301          Cognition    Orientation Status (Cognition) oriented x 3  -EJ       Row Name 08/30/23 1301          Safety Issues, Functional Mobility    Impairments Affecting Function (Mobility) endurance/activity tolerance;strength;shortness of breath;balance  -EJ               User Key  (r) = Recorded By, (t) = Taken By, (c) = Cosigned By      Initials Name Provider Type    EJ Kera Doll, PT Physical Therapist                   Mobility       Row Name 08/30/23 1303          Bed Mobility    Supine-Sit [place] (Bed Mobility) minimum assist (75% patient effort);verbal cues  -EJ     Sit-Supine [place] (Bed Mobility) minimum assist (75% patient effort);verbal cues  -EJ     Assistive Device (Bed Mobility) bed rails;head of bed elevated  -EJ       Row Name 08/30/23 1303          Sit-Stand Transfer    Sit-Stand [place] (Transfers) contact guard;minimum assist (75% patient effort);verbal cues  -EJ     Assistive Device (Sit-Stand Transfers) walker, front-wheeled  -EJ       Row Name 08/30/23 1303          Gait/Stairs (Locomotion)    [place] Level (Gait) verbal cues;contact guard;minimum assist (75% patient effort)  -EJ     Assistive Device (Gait) walker, front-wheeled  -EJ     Distance in Feet (Gait) 15  -EJ     Deviations/Abnormal Patterns (Gait) rachid decreased;stride length decreased  -EJ     Bilateral Gait Deviations forward flexed posture;heel strike  decreased  -EJ     Comment, (Gait/Stairs) slow pace, limited by fatigue/weakness  -EJ               User Key  (r) = Recorded By, (t) = Taken By, (c) = Cosigned By      Initials Name Provider Type    Kera Pretty, PT Physical Therapist                   Obj/Interventions       Row Name 08/30/23 1307          Range of Motion Comprehensive    General Range of Motion no range of motion deficits identified  -EJ       Row Name 08/30/23 1307          Strength Comprehensive (MMT)    Comment, General Manual Muscle Testing (MMT) Assessment generalized weakness  -EJ       Row Name 08/30/23 1307          Balance    Static Sitting Balance standby assist  -EJ     Dynamic Sitting Balance standby assist  -EJ     Position, Sitting Balance sitting edge of bed  -EJ     Static Standing Balance contact guard  -EJ     Dynamic Standing Balance contact guard;minimal assist  -EJ     Position/Device Used, Standing Balance walker, front-wheeled  -EJ               User Key  (r) = Recorded By, (t) = Taken By, (c) = Cosigned By      Initials Name Provider Type    EJ Kera Doll, PT Physical Therapist                   Goals/Plan       Row Name 08/30/23 1324          Bed Mobility Goal 1 (PT)    Activity/Assistive Device (Bed Mobility Goal 1, PT) bed mobility activities, all  -EJ     [place] Level/Cues Needed (Bed Mobility Goal 1, PT) standby assist  -EJ     Time Frame (Bed Mobility Goal 1, PT) 1 week  -EJ       Row Name 08/30/23 1324          Transfer Goal 1 (PT)    Activity/Assistive Device (Transfer Goal 1, PT) transfers, all;walker, rolling  -EJ     [place] Level/Cues Needed (Transfer Goal 1, PT) standby assist  -EJ     Time Frame (Transfer Goal 1, PT) 1 week  -EJ       Row Name 08/30/23 1324          Gait Training Goal 1 (PT)    Activity/Assistive Device (Gait Training Goal 1, PT) gait (walking locomotion);walker, rolling  -EJ     [place] Level (Gait Training Goal 1, PT) contact guard required  -EJ     Distance  (Gait Training Goal 1, PT) 80  -EJ     Time Frame (Gait Training Goal 1, PT) 1 week  -EJ       Row Name 08/30/23 1324          Therapy Assessment/Plan (PT)    Planned Therapy Interventions (PT) balance training;bed mobility training;gait training;home exercise program;stretching;strengthening;stair training;ROM (range of motion);patient/family education;transfer training  -EJ               User Key  (r) = Recorded By, (t) = Taken By, (c) = Cosigned By      Initials Name Provider Type    EJ Kera Doll, PT Physical Therapist                   Clinical Impression       Row Name 08/30/23 1304          Pain    Posttreatment Pain Rating 8/10  -EJ     Pain Location generalized  -EJ     Pre/Posttreatment Pain Comment states she is always hurting  -EJ     Pain Intervention(s) Repositioned;Ambulation/increased activity  -       Row Name 08/30/23 1300          Plan of Care Review    Plan of Care Reviewed With patient  -EJ     Outcome Evaluation Pt agreeable to PT eval this am. She was admitted w acute UTI, generalized weakness, and AMS. TOday, pt is alert and oreinted x 3. She reports she is on 3L at baseline, lives with her spouse, reports mod (I) with ADLs and ambulatio w use of rollator. Today, pt presents w generalized weakness, decreased activity tolerance, and overall decreased functional mobility. She is requiring CGA/min A w bed mobility. Shew as able to stand w min A using Rwx and then ambulatd approx 15 ft. She does exhibit slow pace and is limited by fatigue and weakness. Pt hopes to return home at [place] w assist of spouse. She will continue to benefit from skilled PT to maximize safety, strength, and independence w mobility.  -       Row Name 08/30/23 1307          Therapy Assessment/Plan (PT)    Rehab Potential (PT) good, to achieve stated therapy goals  -EJ     Criteria for Skilled Interventions Met (PT) yes  -EJ     Therapy Frequency (PT) 5 times/wk  -       Row Name 08/30/23 7299          Positioning  and Restraints    Pre-Treatment Position in bed  -EJ     Post Treatment Position bed  -EJ     In Bed notified nsg;supine;call light within reach;encouraged to call for assist;exit alarm on  -EJ               User Key  (r) = Recorded By, (t) = Taken By, (c) = Cosigned By      Initials Name Provider Type    Kera Pretty, PT Physical Therapist                   Outcome Measures       Row Name 08/30/23 1325 08/30/23 0800       How much help from another person do you currently need...    Turning from your back to your side while in flat bed without using bedrails? 3  -EJ 3  -LT    Moving from lying on back to sitting on the side of a flat bed without bedrails? 3  -EJ 3  -LT    Moving to and from a bed to a chair (including a wheelchair)? 3  -EJ 2  -LT    Standing up from a chair using your arms (e.g., wheelchair, bedside chair)? 3  -EJ 1  -LT    Climbing 3-5 steps with a railing? 2  -EJ 1  -LT    To walk in hospital room? 3  -EJ 1  -LT    AM-PAC 6 Clicks Score (PT) 17  -EJ 11  -LT    Highest level of mobility 5 --> Static standing  -EJ 4 --> Transferred to chair/commode  -LT      Row Name 08/30/23 1238          Functional Assessment    Outcome Measure Options AM-PAC 6 Clicks Daily Activity (OT)  -MW               User Key  (r) = Recorded By, (t) = Taken By, (c) = Cosigned By      Initials Name Provider Type    Kera Pretty, PT Physical Therapist    LT Yvonne Pandey, RN Registered Nurse    Camila Michel OT Occupational Therapist                                 Physical Therapy Education       Title: PT OT SLP Therapies (In Progress)       Topic: Physical Therapy (Not Started)       Point: Mobility training (Not Started)       Learner Progress:  Not documented in this visit.              Point: Home exercise program (Not Started)       Learner Progress:  Not documented in this visit.              Point: Body mechanics (Not Started)       Learner Progress:  Not documented in this visit.               Point: Precautions (Not Started)       Learner Progress:  Not documented in this visit.                                  PT Recommendation and Plan  Planned Therapy Interventions (PT): balance training, bed mobility training, gait training, home exercise program, stretching, strengthening, stair training, ROM (range of motion), patient/family education, transfer training  Plan of Care Reviewed With: patient  Outcome Evaluation: Pt agreeable to PT eval this am. She was admitted w acute UTI, generalized weakness, and AMS. TOday, pt is alert and oreinted x 3. She reports she is on 3L at baseline, lives with her spouse, reports mod (I) with ADLs and ambulatio w use of rollator. Today, pt presents w generalized weakness, decreased activity tolerance, and overall decreased functional mobility. She is requiring CGA/min A w bed mobility. Shew as able to stand w min A using Rwx and then ambulatd approx 15 ft. She does exhibit slow pace and is limited by fatigue and weakness. Pt hopes to return home at [place] w assist of spouse. She will continue to benefit from skilled PT to maximize safety, strength, and independence w mobility.     Time Calculation:         PT Charges       Row Name 08/30/23 1325             Time Calculation    Start Time 0855  -EJ      Stop Time 0925  -EJ      Time Calculation (min) 30 min  -EJ      PT Received On 08/30/23  -EJ      PT - Next Appointment 08/31/23  -EJ      PT Goal Re-Cert Due Date 09/06/23  -EJ         Time Calculation- PT    Total Timed Code Minutes- PT 23 minute(s)  -EJ                User Key  (r) = Recorded By, (t) = Taken By, (c) = Cosigned By      Initials Name Provider Type     Kera Doll, PT Physical Therapist                  Therapy Charges for Today       Code Description Service Date Service Provider Modifiers Qty    99492642105 HC PT EVAL MOD COMPLEXITY 3 8/30/2023 Kera Doll, PT GP 1    73600570981 HC PT THER PROC EA 15 MIN 8/30/2023 eKra Doll,  PT GP 1            PT G-Codes  Outcome Measure Options: AM-PAC 6 Clicks Daily Activity (OT)  AM-PAC 6 Clicks Score (PT): 17  AM-PAC 6 Clicks Score (OT): 14  PT Discharge Summary  Anticipated Discharge Disposition (PT): home with assist, home with home health    Kera Doll, PT  8/30/2023

## 2023-08-30 NOTE — CASE MANAGEMENT/SOCIAL WORK
Discharge Planning Assessment  [facility]     Patient Name: Aydee Solis  MRN: [id number]  Today's Date: 8/30/2023    Admit Date: 8/29/2023    Plan: Home w/ current [facility]   Discharge Needs Assessment       Row Name 08/30/23 1328       Living Environment    People in Home spouse    Current Living Arrangements home    Primary Care Provided by self    Provides Primary Care For no one    Family Caregiver if Needed spouse    Able to Return to Prior Arrangements yes       Resource/Environmental Concerns    Resource/Environmental Concerns none       Transition Planning    Patient/Family Anticipates Transition to home with help/services    Patient/Family Anticipated Services at Transition home health care    Transportation Anticipated family or friend will provide       Discharge Needs Assessment    Equipment Currently Used at Home walker, rolling;rollator;cane, straight    Concerns to be Addressed discharge planning    Discharge Facility/Level of Care Needs home with home health                   Discharge Plan       Row Name 08/30/23 1328       Plan    Plan Home w/ current [facility]    Patient/Family in Agreement with Plan yes    Plan Comments CCP spoke with patient and spouse Kyle at bedside; explained role, verified facesheet, and discussed dc plan. Patient uses a walker and a cane at home. She lives with her spouse in a 2 level home but resides on 1 level. There are no steps to enter. She is current with [facility]. She has been to [facility] in the past. Patient denies any SNF, or DME needs at this time and reports plan is home w/ [facility] - family to transport. ALEXANDRU, RUBENS                  Continued Care and Services - Admitted Since 8/29/2023       Home Medical Care       Service Provider Request Status Selected Services Address Phone Fax Patient Preferred    [facility] Accept[facility] N/A [address], [address], [facility] [address] [phone] --                   Selected Continued Care - Prior Encounters Includes continued care and service providers with selected services from prior encounters from 5/31/2023 to 8/30/2023      Discharged on 8/3/2023 Admission date: 7/31/2023 - Discharge disposition: Home-Health Care [facility]      Home Medical Care       Service Provider Selected Services Address Phone Fax Patient Preferred    [facility] [address], [address], [facility] [address] [phone] --                             Demographic Summary       Row Name 08/30/23 1327       General Information    Admission Type observation    Arrived From home    Referral Source admission list    Reason for Consult discharge planning    Preferred Language English       Contact Information    Permission Granted to Share Info With family/designee                   Functional Status       Row Name 08/30/23 1327       Functional Status    Usual Activity Tolerance fair    Current Activity Tolerance fair       Functional Status, IADL    Medications assistive equipment    Meal Preparation assistive equipment    Housekeeping assistive equipment    Laundry assistive equipment    Shopping assistive equipment       Mental Status    General Appearance WDL WDL                   Psychosocial    No documentation.                  Abuse/Neglect    No documentation.                  Legal    No documentation.                  Substance Abuse    No documentation.                  Patient Forms    No documentation.                     RUBENS Waters

## 2023-08-30 NOTE — PLAN OF CARE
Goal Outcome Evaluation:  Plan of Care Reviewed With: patient           Outcome Evaluation: Pt agreeable to PT eval this am. She was admitted w acute UTI, generalized weakness, and AMS. TOday, pt is alert and oreinted x 3. She reports she is on 3L at baseline, lives with her spouse, reports mod (I) with ADLs and ambulatio w use of rollator. Today, pt presents w generalized weakness, decreased activity tolerance, and overall decreased functional mobility. She is requiring CGA/min A w bed mobility. Shew as able to stand w min A using Rwx and then ambulatd approx 15 ft. She does exhibit slow pace and is limited by fatigue and weakness. Pt hopes to return home at [place] w assist of spouse. She will continue to benefit from skilled PT to maximize safety, strength, and independence w mobility.      Anticipated Discharge Disposition (PT): home with assist, home with home health

## 2023-08-30 NOTE — DISCHARGE PLACEMENT REQUEST
"Aydee Solis (73 y.o. Female)       Date of Birth   1950    Social Security Number       Address   [facility] [place] [id number]    Home Phone   [phone]    MRN   [id number]       Restorationist   Taoism    Marital Status                               Admission Date   8/29/23    Admission Type   Emergency    Admitting Provider   Juliette Spence MD    Attending Provider   Daphnie Garcia MD    Department, Room/Bed   [address], [address]/1       Discharge Date       Discharge Disposition       Discharge Destination                                 Attending Provider: Daphnie Garcia MD    Allergies: Ciprofloxacin    Isolation: None   Infection: None   Code Status: No CPR    Ht: 167.6 cm (65.98\")   Wt: 111 kg (245 lb)    Admission Cmt: None   Principal Problem: Acute UTI [N39.0]                   Active Insurance as of 8/29/2023       Primary Coverage       Payor Plan Insurance Group Employer/Plan Group    MEDICARE MEDICARE A & B        Payor Plan Address Payor Plan Phone Number Payor Plan Fax Number Effective Dates    [address] [phone]  7/1/2013 - None Entered    [facility] [address]         Subscriber Name Subscriber Birth Date Member ID       AYDEE SOLIS 1950 [id number]               Secondary Coverage       Payor Plan Insurance Group Employer/Plan Group    ANTHEM BLUE CROSS [facility] SUPP [id number]       Payor Plan Address Payor Plan Phone Number Payor Plan Fax Number Effective Dates    [address]   12/1/2016 - None Entered    [facility] [id number]         Subscriber Name Subscriber Birth Date Member ID       AYDEE SOLIS 1950 [id number]                     Emergency Contacts        (Rel.) Home Phone Work Phone Mobile Phone    Kyle Solis (Spouse) [phone] -- [phone]                "

## 2023-08-31 LAB
ANION GAP SERPL CALCULATED.3IONS-SCNC: 10.6 MMOL/L (ref 5–15)
BACTERIA SPEC AEROBE CULT: ABNORMAL
BACTERIA SPEC AEROBE CULT: ABNORMAL
BUN SERPL-MCNC: 38 MG/DL (ref 8–23)
BUN/CREAT SERPL: 28.1 (ref 7–25)
CALCIUM SPEC-SCNC: 8.3 MG/DL (ref 8.6–10.5)
CHLORIDE SERPL-SCNC: 97 MMOL/L (ref 98–107)
CO2 SERPL-SCNC: 27.4 MMOL/L (ref 22–29)
CORTIS SERPL-MCNC: 5.02 MCG/DL
CREAT SERPL-MCNC: 1.35 MG/DL (ref 0.57–1)
DEPRECATED RDW RBC AUTO: 50.1 FL (ref 37–54)
EGFRCR SERPLBLD CKD-EPI 2021: 41.1 ML/MIN/1.73
ERYTHROCYTE [DISTWIDTH] IN BLOOD BY AUTOMATED COUNT: 15.7 % (ref 12.3–15.4)
GLUCOSE BLDC GLUCOMTR-MCNC: 129 MG/DL (ref 70–130)
GLUCOSE BLDC GLUCOMTR-MCNC: 139 MG/DL (ref 70–130)
GLUCOSE BLDC GLUCOMTR-MCNC: 150 MG/DL (ref 70–130)
GLUCOSE BLDC GLUCOMTR-MCNC: 164 MG/DL (ref 70–130)
GLUCOSE SERPL-MCNC: 142 MG/DL (ref 65–99)
GRAM STN SPEC: ABNORMAL
GRAM STN SPEC: ABNORMAL
HCT VFR BLD AUTO: 40.2 % (ref 34–46.6)
HGB BLD-MCNC: 12.4 G/DL (ref 12–15.9)
ISOLATED FROM: ABNORMAL
MAGNESIUM SERPL-MCNC: 2.2 MG/DL (ref 1.6–2.4)
MCH RBC QN AUTO: 27.1 PG (ref 26.6–33)
MCHC RBC AUTO-ENTMCNC: 30.8 G/DL (ref 31.5–35.7)
MCV RBC AUTO: 87.8 FL (ref 79–97)
PHOSPHATE SERPL-MCNC: 3 MG/DL (ref 2.5–4.5)
PLATELET # BLD AUTO: 183 10*3/MM3 (ref 140–450)
PMV BLD AUTO: 10.1 FL (ref 6–12)
POTASSIUM SERPL-SCNC: 4 MMOL/L (ref 3.5–5.2)
RBC # BLD AUTO: 4.58 10*6/MM3 (ref 3.77–5.28)
SODIUM SERPL-SCNC: 135 MMOL/L (ref 136–145)
WBC NRBC COR # BLD: 6.01 10*3/MM3 (ref 3.4–10.8)

## 2023-08-31 PROCEDURE — 97530 THERAPEUTIC ACTIVITIES: CPT

## 2023-08-31 PROCEDURE — 63710000001 INSULIN LISPRO (HUMAN) PER 5 UNITS: Performed by: STUDENT IN AN ORGANIZED HEALTH CARE EDUCATION/TRAINING PROGRAM

## 2023-08-31 PROCEDURE — 94664 DEMO&/EVAL PT USE INHALER: CPT

## 2023-08-31 PROCEDURE — 83735 ASSAY OF MAGNESIUM: CPT | Performed by: STUDENT IN AN ORGANIZED HEALTH CARE EDUCATION/TRAINING PROGRAM

## 2023-08-31 PROCEDURE — 94761 N-INVAS EAR/PLS OXIMETRY MLT: CPT

## 2023-08-31 PROCEDURE — 63710000001 INSULIN GLARGINE PER 5 UNITS: Performed by: STUDENT IN AN ORGANIZED HEALTH CARE EDUCATION/TRAINING PROGRAM

## 2023-08-31 PROCEDURE — 94799 UNLISTED PULMONARY SVC/PX: CPT

## 2023-08-31 PROCEDURE — 84100 ASSAY OF PHOSPHORUS: CPT | Performed by: STUDENT IN AN ORGANIZED HEALTH CARE EDUCATION/TRAINING PROGRAM

## 2023-08-31 PROCEDURE — 80048 BASIC METABOLIC PNL TOTAL CA: CPT | Performed by: STUDENT IN AN ORGANIZED HEALTH CARE EDUCATION/TRAINING PROGRAM

## 2023-08-31 PROCEDURE — 97116 GAIT TRAINING THERAPY: CPT

## 2023-08-31 PROCEDURE — 63710000001 ONDANSETRON PER 8 MG: Performed by: STUDENT IN AN ORGANIZED HEALTH CARE EDUCATION/TRAINING PROGRAM

## 2023-08-31 PROCEDURE — 82533 TOTAL CORTISOL: CPT | Performed by: STUDENT IN AN ORGANIZED HEALTH CARE EDUCATION/TRAINING PROGRAM

## 2023-08-31 PROCEDURE — 82948 REAGENT STRIP/BLOOD GLUCOSE: CPT

## 2023-08-31 PROCEDURE — 25010000002 CEFTRIAXONE PER 250 MG: Performed by: STUDENT IN AN ORGANIZED HEALTH CARE EDUCATION/TRAINING PROGRAM

## 2023-08-31 PROCEDURE — 85027 COMPLETE CBC AUTOMATED: CPT | Performed by: STUDENT IN AN ORGANIZED HEALTH CARE EDUCATION/TRAINING PROGRAM

## 2023-08-31 RX ORDER — SPIRONOLACTONE 25 MG/1
25 TABLET ORAL DAILY
Status: DISCONTINUED | OUTPATIENT
Start: 2023-08-31 | End: 2023-08-31

## 2023-08-31 RX ORDER — COSYNTROPIN 0.25 MG/ML
0.25 INJECTION, POWDER, FOR SOLUTION INTRAMUSCULAR; INTRAVENOUS ONCE
Status: DISCONTINUED | OUTPATIENT
Start: 2023-08-31 | End: 2023-08-31

## 2023-08-31 RX ORDER — TORSEMIDE 20 MG/1
60 TABLET ORAL DAILY
Status: DISCONTINUED | OUTPATIENT
Start: 2023-08-31 | End: 2023-08-31

## 2023-08-31 RX ADMIN — APIXABAN 5 MG: 5 TABLET, FILM COATED ORAL at 09:13

## 2023-08-31 RX ADMIN — ATORVASTATIN CALCIUM 10 MG: 20 TABLET, FILM COATED ORAL at 09:13

## 2023-08-31 RX ADMIN — Medication 10 ML: at 21:30

## 2023-08-31 RX ADMIN — ONDANSETRON HYDROCHLORIDE 4 MG: 4 TABLET, FILM COATED ORAL at 09:17

## 2023-08-31 RX ADMIN — BUDESONIDE AND FORMOTEROL FUMARATE DIHYDRATE 2 PUFF: 160; 4.5 AEROSOL RESPIRATORY (INHALATION) at 07:22

## 2023-08-31 RX ADMIN — TIOTROPIUM BROMIDE INHALATION SPRAY 2 PUFF: 3.12 SPRAY, METERED RESPIRATORY (INHALATION) at 07:24

## 2023-08-31 RX ADMIN — BUDESONIDE AND FORMOTEROL FUMARATE DIHYDRATE 2 PUFF: 160; 4.5 AEROSOL RESPIRATORY (INHALATION) at 20:20

## 2023-08-31 RX ADMIN — INSULIN GLARGINE 10 UNITS: 100 INJECTION, SOLUTION SUBCUTANEOUS at 21:29

## 2023-08-31 RX ADMIN — ALLOPURINOL 100 MG: 100 TABLET ORAL at 09:13

## 2023-08-31 RX ADMIN — INSULIN LISPRO 2 UNITS: 100 INJECTION, SOLUTION INTRAVENOUS; SUBCUTANEOUS at 13:04

## 2023-08-31 RX ADMIN — Medication 10 ML: at 09:14

## 2023-08-31 RX ADMIN — APIXABAN 5 MG: 5 TABLET, FILM COATED ORAL at 21:29

## 2023-08-31 RX ADMIN — DULOXETINE HYDROCHLORIDE 60 MG: 60 CAPSULE, DELAYED RELEASE ORAL at 09:13

## 2023-08-31 RX ADMIN — METOPROLOL SUCCINATE 25 MG: 25 TABLET, EXTENDED RELEASE ORAL at 09:13

## 2023-08-31 RX ADMIN — METOPROLOL SUCCINATE 25 MG: 25 TABLET, EXTENDED RELEASE ORAL at 21:29

## 2023-08-31 RX ADMIN — CEFTRIAXONE 2000 MG: 2 INJECTION, POWDER, FOR SOLUTION INTRAMUSCULAR; INTRAVENOUS at 17:42

## 2023-08-31 NOTE — PROGRESS NOTES
Name: Aydee Solis ADMIT: 2023   : 1950  PCP: Bennett Duque DO    MRN: [id number] LOS: 1 days   AGE/SEX: 73 y.o. female  ROOM: [facility]     Subjective   Subjective   No acute events overnight.  Patient still somewhat confused today, and has been agrees with this.  Vital signs of been stable.  She has been afebrile.  States she is feeling somewhat nauseous today.  Currently on 2 L nasal cannula which is her home oxygen requirement.     Objective   Objective     Vital Signs  Temp:  [97.3 °F (36.3 °C)-98.1 °F (36.7 °C)] 97.5 °F (36.4 °C)  Heart Rate:  [60-62] 60  Resp:  [16-18] 16  BP: ()/(50-77) 96/73  SpO2:  [86 %-99 %] 86 %  on  Flow (L/min):  [1-2] 2;   Device (Oxygen Therapy): nasal cannula  Body mass index is 39.56 kg/m².    Physical Exam  General: Alert, no acute distress.  Obese.  Fully oriented but slow speech, not sure why she is here.  ENT: No conjunctival injection or scleral icterus. Moist mucous membranes.   Neuro: Eyes open and moving in all directions, strength normal in all extremities, no focal deficits.   Lungs: Clear to auscultation bilaterally. No wheeze or crackles. No distress.   Heart: RRR, no murmurs. No edema.  Abdomen: Soft, non-tender, non-distended. Normal bowel sounds. No hepatosplenomegaly.   Ext: Bilateral lower extremity lymphedema with chronic venous stasis changes.  Skin: No rashes or lesions. IV site without swelling or erythema.     Results Review     I reviewed the patient's new clinical results:  Results from last 7 days   Lab Units 23  0634 23  0553 23  1415   WBC 10*3/mm3 6.01 5.79 6.23   HEMOGLOBIN g/dL 12.4 11.8* 12.6   PLATELETS 10*3/mm3 183 188 210     Results from last 7 days   Lab Units 23  0634 23  0553 23  1415   SODIUM mmol/L 135* 138 138   POTASSIUM mmol/L 4.0 3.9 4.2   CHLORIDE mmol/L 97* 99 96*   CO2 mmol/L 27.4 28.0 29.4*   BUN mg/dL 38* 44* 44*   CREATININE mg/dL 1.35* 1.55* 1.83*   GLUCOSE mg/dL  142* 137* 119*   EGFR mL/min/1.73 41.1* 34.8* 28.5*     Results from last 7 days   Lab Units 08/29/23  1415   ALBUMIN g/dL 3.2*   BILIRUBIN mg/dL 1.2   ALK PHOS U/L 143*   AST (SGOT) U/L 25   ALT (SGPT) U/L 9     Results from last 7 days   Lab Units 08/31/23  0634 08/30/23  0553 08/29/23  1415   CALCIUM mg/dL 8.3* 8.2* 8.7   ALBUMIN g/dL  --   --  3.2*   MAGNESIUM mg/dL 2.2 4.2* 2.1   PHOSPHORUS mg/dL 3.0 3.6  --      Results from last 7 days   Lab Units 08/29/23  1510   LACTATE mmol/L 1.3     Hemoglobin A1C   Date/Time Value Ref Range Status   08/29/2023 2046 8.50 (H) 4.80 - 5.60 % Final     Glucose   Date/Time Value Ref Range Status   08/31/2023 1247 164 (H) 70 - 130 mg/dL Final   08/31/2023 0657 129 70 - 130 mg/dL Final   08/30/2023 2026 213 (H) 70 - 130 mg/dL Final   08/30/2023 1619 248 (H) 70 - 130 mg/dL Final   08/30/2023 1216 185 (H) 70 - 130 mg/dL Final   08/30/2023 0700 118 70 - 130 mg/dL Final   08/29/2023 2052 178 (H) 70 - 130 mg/dL Final       XR Chest 1 View    Result Date: 8/30/2023  No focal pulmonary consolidation. Cardiomegaly. Follow-up as clinical indications persist.  This report was finalized on 8/30/2023 4:25 PM by Dr. Kristian Keenan M.D.       I have personally reviewed all medications:  Scheduled Medications  allopurinol, 100 mg, Oral, Daily  apixaban, 5 mg, Oral, BID  atorvastatin, 10 mg, Oral, Daily  budesonide-formoterol, 2 puff, Inhalation, BID - RT   And  tiotropium bromide monohydrate, 2 puff, Inhalation, Daily - RT  cefTRIAXone, 2,000 mg, Intravenous, Q24H  DULoxetine, 60 mg, Oral, Daily  insulin glargine, 10 Units, Subcutaneous, Nightly  insulin lispro, 2-7 Units, Subcutaneous, 4x Daily AC & at Bedtime  metoprolol succinate XL, 25 mg, Oral, BID  sodium chloride, 10 mL, Intravenous, Q12H    Infusions   Diet  Diet: Cardiac Diets, Diabetic Diets; Healthy Heart (2-3 Na+); Consistent Carbohydrate; Texture: Regular Texture (IDDSI 7); Fluid Consistency: Thin (IDDSI 0)    I have  personally reviewed:  [x]  Laboratory   [x]  Microbiology   [x]  Radiology   [x]  EKG/Telemetry  []  Cardiology/Vascular   []  Pathology    []  Records    Assessment/Plan     Active Hospital Problems    Diagnosis  POA    **Acute UTI [N39.0]  Yes    Acute renal insufficiency [N28.9]  Yes    Chronic systolic CHF (congestive heart failure) [I50.22]  Yes    PAULETTE (obstructive sleep apnea) [G47.33]  Yes    Chronic respiratory failure with hypoxia [J96.11]  Yes    Stage 3b chronic kidney disease [N18.32]  Yes    Type 2 diabetes mellitus, with long-term current use of insulin [E11.9, Z79.4]  Not Applicable    PAF (paroxysmal atrial fibrillation) [I48.0]  Yes    Chronic anticoagulation [Z79.01]  Not Applicable    COPD (chronic obstructive pulmonary disease) [J44.9]  Yes    Metabolic encephalopathy [G93.41]  Yes    Morbid obesity due to excess calories [E66.01]  Yes    Hypertension [I10]  Yes    Hyperlipidemia [E78.5]  Yes    Lymphedema [I89.0]  Yes      Resolved Hospital Problems   No resolved problems to display.     Ms. Solis is a 73 y.o. former smoker with a history of CKD 3B, chronic systolic/diastolic CHF, paroxysmal A-fib, hypertension, chronic hypoxic respiratory failure/COPD who presents with AMS, weakness.       Acute UTI  Metabolic encephalopathy  - Has improved significantly with admission, but still not back to baseline  - admission last month w/ pan-sensitive E Coli UTI  - UA + LE, WBC, 4+ bacteria but also 7-12 squamous epithelial cells  - pt taken off of Norco last month  - Blood culture 1/2 positive for leg negative staph, likely contaminant  - Urine culture growing E. coli, pansensitive  - continue rocephin, transition to Augmentin at discharge  - Plan to keep 1 more day with additional dose of antibiotics and monitor for improvement in mental status; should she still not improve, may consider additional work-up including imaging or neurology consult    Positive blood culture  -Blood cultures obtained 8/29  positive for coag negative Staphylococcus in 1/2 cultures  -Given growth in 1/2 cultures and no obvious likelihood of Staphylococcus bacteremia, this is most likely contaminant  -Urine growing E. Coli  -Should clinical picture changes or any signs of infection, will obtain repeat blood cultures    Hypotension  -BP did respond to IV fluids  -AM cortisol level checked and found to be 5, unequivocal  -Does have very mild hyponatremia, but no other evidence of adrenal insufficiency at this time  -We do not have endocrinology inpatient  -Recommend close follow-up with PCP and possible endocrinology referral outpatient    Weakness/falls/debility  - PT/OT consulted, recommending home with home health  - cardiology consulted last month given frequent falls and use of Eliquis  - they recommended continuing AC given her CHADSVasc2 and f/u with her cadiologist as an o/p     MELANIE on CKD3b  - Cr on admission 1.8, baseline appears ~1.2-1.5  -Creatinine 1.35 today, appears to be at baseline  -Repeat BMP with morning labs     Type 2 diabetes  -Hemoglobin A1c 8.5  - lantus 10 U nightly (home dose is 20u)  - Low dose SSI  -Blood glucose well controlled in past 24 hours, no need for adjustments today     Chronic systolic/diastolic CHF w/ AICD  Paroxysmal atrial fibrillation  - ECHO 1/2023 w/ EF 42%,G1DD  - continue Eliquis, metoprolol  -Continue holding home Aldactone and torsemide for now     HTN  -Blood pressures have been on the softer side  -Holding home spironolactone and torsemide  - continue metoprolol w hold parameters     COPD  Chronic hypoxic respiratory failure- on 2L via NC at home  - not in exacerbation  - continue symbicort/spiriva and PRN albuterol  - continue 2L for goal SpO2 88-92%  -CXR negative for any acute process or consolidation     Gout  - home allopurinol    Daphnie Garcia MD  [facility]  08/31/23  14:54 EDT

## 2023-08-31 NOTE — PLAN OF CARE
Goal Outcome Evaluation:     Patient confused per staff and spouse.  PT worked with patient this shift. Patient continues on IV Rocephin.  Oxygen 2L NC and baseline for patient.  Ambulates to bathroom.  VS and labs monitored.

## 2023-08-31 NOTE — PLAN OF CARE
Goal Outcome Evaluation:  Plan of Care Reviewed With: patient, spouse        Progress: improving  Outcome Evaluation: Pt agreeable to OT on check back in PM, she reports feeling better and no pain reported. pt's spouse present during session. Min A required for bed mob, max A for donning socks. STS with min A x2 and demo x2 functional mobility trials with RWx with CGA/min with mild unsteadiness with no overt LOBs. she will continue to benefit from skilled OT to address noted functional deficits, plans home with spouse 24/7 assist as needed and HHOT.      Anticipated Discharge Disposition (OT): home, home with assist, home with home health

## 2023-08-31 NOTE — PLAN OF CARE
Goal Outcome Evaluation:  Plan of Care Reviewed With: patient           Outcome Evaluation: Pt agreeable to PT this afternoon after declining earlier today. She reports she is feeling better and no pain at this time. Pt requiring min A for bed mobility today. She was able to stand w min A x 2 w use of Rwx. She was able to increase ambulation distance, ambulating approx 40 ft w CGA/min A and Rwx. She does exhibit slow pace w mild unsteadiness. Pt returned to bed at end of session. Spouse present in room. Pt plans home at [place] w 24/7 assist. Will continue to progress as tolerated.      Anticipated Discharge Disposition (PT): home with assist, home with home health

## 2023-08-31 NOTE — SIGNIFICANT NOTE
08/31/23 0956   OTHER   Discipline physical therapist   Rehab Time/Intention   Session Not Performed patient/family declined treatment;patient/family declined, not feeling well  (attempted to see this am, but pt declines, she states she is in too much pain at this time. encouraged pt and assisted her in repositioning in bed, pt still declines at this time. will follow up.)   Recommendation   PT - Next Appointment 09/01/23

## 2023-08-31 NOTE — THERAPY TREATMENT NOTE
Patient Name: Aydee Solis  : 1950    MRN: [id number]                              Today's Date: 2023       Admit Date: 2023    Visit Dx:     ICD-10-CM ICD-9-CM   1. Acute UTI  N39.0 599.0   2. Acute renal insufficiency  N28.9 593.9   3. Generalized weakness  R53.1 780.79   4. Elevated troponin  R77.8 790.6     Patient Active Problem List   Diagnosis    Pneumonia of right lower lobe due to infectious organism    Cellulitis    Hypertension    Hyperlipidemia    Breast cancer    Lymphedema    Osteoporosis    Acute respiratory failure with hypoxia    Morbid obesity due to excess calories    Metabolic encephalopathy    PAULETTE (obstructive sleep apnea)    Chronic respiratory failure with hypoxia    Stage 3b chronic kidney disease    Type 2 diabetes mellitus, with long-term current use of insulin    PAF (paroxysmal atrial fibrillation)    Chronic anticoagulation    COPD (chronic obstructive pulmonary disease)    Chronic systolic CHF (congestive heart failure)    Left leg cellulitis    UTI (urinary tract infection)    Lymphedema    Dizziness    Opioid dependence    Metabolic acidosis    Confusion    Acute renal insufficiency    Alkalosis, metabolic    Hypokalemia    Hyponatremia    Acute UTI     Past Medical History:   Diagnosis Date    Arthritis     Breast cancer     Cancer     Class 3 severe obesity due to excess calories with body mass index (BMI) of 50.0 to 59.9 in adult     COPD (chronic obstructive pulmonary disease)     Diabetes mellitus     EDWARDS (dyspnea on exertion)     Elephantiasis     left leg    Hyperlipidemia     Hypertension     Leukemia     Lung cancer     Lymphedema     left arm    Osteoporosis     Tracheal cancer      Past Surgical History:   Procedure Laterality Date    APPENDECTOMY      HYSTERECTOMY      KNEE ARTHROSCOPY Right     LAPAROSCOPIC GASTRIC BANDING      LYMPHADENECTOMY Left     MASTECTOMY Left       General Information       Row Name 23 1431          OT Time and Intention     Document Type therapy note (daily note)  -     Mode of Treatment occupational therapy  -       Row Name 08/31/23 1431          General Information    Patient Profile Reviewed yes  -     Existing Precautions/Restrictions fall;oxygen therapy device and L/min  3L  -       Row Name 08/31/23 1431          Cognition    Orientation Status (Cognition) oriented x 3  -[facility] Name 08/31/23 1431          Safety Issues, Functional Mobility    Impairments Affecting Function (Mobility) endurance/activity tolerance;strength;shortness of breath;balance  -               User Key  (r) = Recorded By, (t) = Taken By, (c) = Cosigned By      Initials Name Provider Type     Camila Morales OT Occupational Therapist                     Mobility/ADL's       Row Name 08/31/23 1432          Bed Mobility    Bed Mobility supine-sit;sit-supine  -     Supine-Sit [place] (Bed Mobility) minimum assist (75% patient effort);verbal cues  -     Sit-Supine [place] (Bed Mobility) minimum assist (75% patient effort);verbal cues  -     Assistive Device (Bed Mobility) bed rails;head of bed elevated  -[facility] Name 08/31/23 1432          Transfers    Transfers sit-stand transfer  -[facility] Name 08/31/23 1432          Sit-Stand Transfer    Sit-Stand [place] (Transfers) verbal cues;minimum assist (75% patient effort);2 person assist  -     Assistive Device (Sit-Stand Transfers) walker, front-wheeled  -     Comment, (Sit-Stand Transfer) bed level in lowest position  -[facility] Name 08/31/23 1432          Toilet Transfer    Comment, (Toilet Transfer) x2 to doorway functional mobility  -[facility] Name 08/31/23 1432          Functional Mobility    Functional Mobility- Ind. Level contact guard assist;minimum assist (75% patient effort)  -     Functional Mobility- Device walker, front-wheeled  -[facility] Name 08/31/23 1432          Activities of Daily Living    BADL Assessment/Intervention lower body  dressing;feeding  -       Row Name 08/31/23 1432          Lower Body Dressing Assessment/Training    [place] Level (Lower Body Dressing) don;socks;maximum assist (25% patient effort)  -       Row Name 08/31/23 1432          Toileting Assessment/Training    Comment, (Toileting) brief donned  -[facility] Name 08/31/23 1432          Self-Feeding Assessment/Training    [place] Level (Feeding) set up  -     Position (Self-Feeding) sitting up in bed  -               User Key  (r) = Recorded By, (t) = Taken By, (c) = Cosigned By      Initials Name Provider Type    MW Camila Morales OT Occupational Therapist                   Obj/Interventions       Row Name 08/31/23 1433          Motor Skills    Functional Endurance improving  -       Row Name 08/31/23 1433          Balance    Balance Assessment sitting static balance;sitting dynamic balance;sit to stand dynamic balance;standing static balance;standing dynamic balance  -     Static Sitting Balance standby assist  -     Dynamic Sitting Balance standby assist  -     Position, Sitting Balance sitting edge of bed  -     Sit to Stand Dynamic Balance minimal assist;2-person assist  -     Static Standing Balance contact guard  -     Dynamic Standing Balance contact guard;minimal assist  -     Position/Device Used, Standing Balance supported;walker, front-wheeled  -     Balance Interventions minimal challenge  -     Comment, Balance no overt LOBs, however unsteady throughout. Pt reports she was up in BR earlier and had a posterior LOB with nurse aide, they were able to provide assistance  -               User Key  (r) = Recorded By, (t) = Taken By, (c) = Cosigned By      Initials Name Provider Type    Camila Michel OT Occupational Therapist                   Goals/Plan    No documentation.                  Clinical Impression       Row Name 08/31/23 1434          Pain Assessment    Pretreatment Pain Rating 0/10 - no pain  -      Posttreatment Pain Rating 0/10 - no pain  -       Row Name 08/31/23 1434          Plan of Care Review    Plan of Care Reviewed With patient;spouse  -     Progress improving  -MW     Outcome Evaluation Pt agreeable to OT on check back in PM, she reports feeling better and no pain reported. pt's spouse present during session. Min A required for bed mob, max A for donning socks. STS with min A x2 and demo x2 functional mobility trials with RWx with CGA/min with mild unsteadiness with no overt LOBs. she will continue to benefit from skilled OT to address noted functional deficits, plans home with spouse 24/7 assist as needed and HHOT.  -       Row Name 08/31/23 1434          Therapy Plan Review/Discharge Plan (OT)    Anticipated Discharge Disposition (OT) home;home with assist;home with home health  -       Row Name 08/31/23 1434          Vital Signs    O2 Delivery Pre Treatment supplemental O2  -MW     O2 Delivery Intra Treatment supplemental O2  -MW     Post SpO2 (%) 92  -MW     O2 Delivery Post Treatment supplemental O2  -MW     Pre Patient Position Supine  -MW     Intra Patient Position Standing  -MW     Post Patient Position Supine  -MW       Row Name 08/31/23 1434          Positioning and Restraints    Pre-Treatment Position in bed  -MW     Post Treatment Position bed  -MW     In Bed notified nsg;fowlers;call light within reach;encouraged to call for assist;exit alarm on  -MW               User Key  (r) = Recorded By, (t) = Taken By, (c) = Cosigned By      Initials Name Provider Type    Camila Michel, ROBINSON Occupational Therapist                   Outcome Measures       Row Name 08/31/23 1436          How much help from another is currently needed...    Putting on and taking off regular lower body clothing? 2  -MW     Bathing (including washing, rinsing, and drying) 2  -MW     Toileting (which includes using toilet bed pan or urinal) 2  -MW     Putting on and taking off regular upper body clothing 3   -MW     Taking care of personal grooming (such as brushing teeth) 3  -MW     Eating meals 3  -MW     AM-PAC 6 Clicks Score (OT) 15  -MW       Row Name 08/31/23 0914          How much help from another person do you currently need...    Turning from your back to your side while in flat bed without using bedrails? 3  -SH     Moving from lying on back to sitting on the side of a flat bed without bedrails? 3  -SH     Moving to and from a bed to a chair (including a wheelchair)? 3  -SH     Standing up from a chair using your arms (e.g., wheelchair, bedside chair)? 3  -SH     Climbing 3-5 steps with a railing? 2  -SH     To walk in hospital room? 2  -SH     AM-PAC 6 Clicks Score (PT) 16  -SH     Highest level of mobility 5 --> Static standing  -SH       Row Name 08/31/23 1436          Functional Assessment    Outcome Measure Options AM-PAC 6 Clicks Daily Activity (OT)  -               User Key  (r) = Recorded By, (t) = Taken By, (c) = Cosigned By      Initials Name Provider Type    Liyah Alvarez, RN Registered Nurse    Camila Michel OT Occupational Therapist                    Occupational Therapy Education       Title: PT OT SLP Therapies (In Progress)       Topic: Occupational Therapy (In Progress)       Point: ADL training (Done)       Description:   Instruct learner(s) on proper safety adaptation and remediation techniques during self care or transfers.   Instruct in proper use of assistive devices.                  Learning Progress Summary             Patient Acceptance, E, VU by LINA at 8/30/2023 5210    Comment: role of OT, d/c rec                         Point: Home exercise program (Not Started)       Description:   Instruct learner(s) on appropriate technique for monitoring, assisting and/or progressing therapeutic exercises/activities.                  Learner Progress:  Not documented in this visit.              Point: Precautions (Done)       Description:   Instruct learner(s) on prescribed  precautions during self-care and functional transfers.                  Learning Progress Summary             Patient Acceptance, E, VU by  at 8/30/2023 1238    Comment: role of OT, d/c rec                         Point: Body mechanics (Done)       Description:   Instruct learner(s) on proper positioning and spine alignment during self-care, functional mobility activities and/or exercises.                  Learning Progress Summary             Patient Acceptance, E, VU by  at 8/30/2023 1238    Comment: role of OT, d/c rec                                         User Key       Initials Effective Dates Name Provider Type Discipline    LINA 08/20/21 -  Camila Morales OT Occupational Therapist OT                  OT Recommendation and Plan  Planned Therapy Interventions (OT): activity tolerance training, functional balance retraining, BADL retraining, neuromuscular control/coordination retraining, patient/caregiver education/training, transfer/mobility retraining, strengthening exercise, ROM/therapeutic exercise, occupation/activity based interventions  Therapy Frequency (OT): 5 times/wk  Plan of Care Review  Plan of Care Reviewed With: patient, spouse  Progress: improving  Outcome Evaluation: Pt agreeable to OT on check back in PM, she reports feeling better and no pain reported. pt's spouse present during session. Min A required for bed mob, max A for donning socks. STS with min A x2 and demo x2 functional mobility trials with RWx with CGA/min with mild unsteadiness with no overt LOBs. she will continue to benefit from skilled OT to address noted functional deficits, plans home with spouse 24/7 assist as needed and HHOT.     Time Calculation:   Evaluation Complexity (OT)  Review Occupational Profile/Medical/Therapy History Complexity: expanded/moderate complexity  Assessment, Occupational Performance/Identification of Deficit Complexity: 3-5 performance deficits  Clinical Decision Making Complexity (OT):  detailed assessment/moderate complexity  Overall Complexity of Evaluation (OT): moderate complexity     Time Calculation- OT       Row Name 08/31/23 1437 08/31/23 1140          Time Calculation- OT    OT Start Time 1325  -MW --     OT Stop Time 1349  -MW --     OT Time Calculation (min) 24 min  -MW --     Total Timed Code Minutes- OT 24 minute(s)  -MW --     OT Received On 08/31/23  -MW --     OT - Next Appointment 09/01/23  -MW 09/01/23  -MW        Timed Charges    50065 - OT Therapeutic Activity Minutes 24  -MW --        Total Minutes    Timed Charges Total Minutes 24  -MW --      Total Minutes 24  -MW --               User Key  (r) = Recorded By, (t) = Taken By, (c) = Cosigned By      Initials Name Provider Type     Camila Morales OT Occupational Therapist                  Therapy Charges for Today       Code Description Service Date Service Provider Modifiers Qty    80327618354 HC OT THERAPEUTIC ACT EA 15 MIN 8/30/2023 Camila Morales OT GO 1    87174499928 HC OT SELF CARE/MGMT/TRAIN EA 15 MIN 8/30/2023 Camila Morales OT GO 1    99855360474 HC OT EVAL MOD COMPLEXITY 2 8/30/2023 Camila Morales OT GO 1    31908778192 HC OT THERAPEUTIC ACT EA 15 MIN 8/31/2023 Camila Morales OT GO 2                 Camila Morales OT  8/31/2023

## 2023-08-31 NOTE — THERAPY TREATMENT NOTE
Patient Name: Aydee Solis  : 1950    MRN: [id number]                              Today's Date: 2023       Admit Date: 2023    Visit Dx:     ICD-10-CM ICD-9-CM   1. Acute UTI  N39.0 599.0   2. Acute renal insufficiency  N28.9 593.9   3. Generalized weakness  R53.1 780.79   4. Elevated troponin  R77.8 790.6     Patient Active Problem List   Diagnosis    Pneumonia of right lower lobe due to infectious organism    Cellulitis    Hypertension    Hyperlipidemia    Breast cancer    Lymphedema    Osteoporosis    Acute respiratory failure with hypoxia    Morbid obesity due to excess calories    Metabolic encephalopathy    PAULETTE (obstructive sleep apnea)    Chronic respiratory failure with hypoxia    Stage 3b chronic kidney disease    Type 2 diabetes mellitus, with long-term current use of insulin    PAF (paroxysmal atrial fibrillation)    Chronic anticoagulation    COPD (chronic obstructive pulmonary disease)    Chronic systolic CHF (congestive heart failure)    Left leg cellulitis    UTI (urinary tract infection)    Lymphedema    Dizziness    Opioid dependence    Metabolic acidosis    Confusion    Acute renal insufficiency    Alkalosis, metabolic    Hypokalemia    Hyponatremia    Acute UTI     Past Medical History:   Diagnosis Date    Arthritis     Breast cancer     Cancer     Class 3 severe obesity due to excess calories with body mass index (BMI) of 50.0 to 59.9 in adult     COPD (chronic obstructive pulmonary disease)     Diabetes mellitus     EDWARDS (dyspnea on exertion)     Elephantiasis     left leg    Hyperlipidemia     Hypertension     Leukemia     Lung cancer     Lymphedema     left arm    Osteoporosis     Tracheal cancer      Past Surgical History:   Procedure Laterality Date    APPENDECTOMY      HYSTERECTOMY      KNEE ARTHROSCOPY Right     LAPAROSCOPIC GASTRIC BANDING      LYMPHADENECTOMY Left     MASTECTOMY Left       General Information       Row Name 23 1440          Physical Therapy Time  and Intention    Document Type therapy note (daily note)  -EJ     Mode of Treatment physical therapy  -EJ       Row Name 08/31/23 1440          General Information    Existing Precautions/Restrictions fall;oxygen therapy device and L/min  -EJ               User Key  (r) = Recorded By, (t) = Taken By, (c) = Cosigned By      Initials Name Provider Type    EJ Kera Doll, PT Physical Therapist                   Mobility       Row Name 08/31/23 1440          Bed Mobility    Supine-Sit [place] (Bed Mobility) minimum assist (75% patient effort);verbal cues  -EJ     Assistive Device (Bed Mobility) bed rails;head of bed elevated  -EJ       Row Name 08/31/23 1440          Sit-Stand Transfer    Sit-Stand [place] (Transfers) verbal cues;minimum assist (75% patient effort);2 person assist  -EJ     Assistive Device (Sit-Stand Transfers) walker, front-wheeled  -EJ       Row Name 08/31/23 1440          Gait/Stairs (Locomotion)    [place] Level (Gait) verbal cues;contact guard;minimum assist (75% patient effort)  -EJ     Assistive Device (Gait) walker, front-wheeled  -EJ     Distance in Feet (Gait) 40  -EJ     Deviations/Abnormal Patterns (Gait) rachid decreased;stride length decreased  -EJ     Bilateral Gait Deviations forward flexed posture;heel strike decreased  -EJ     Comment, (Gait/Stairs) slow pace, mild unsteadiness  -EJ               User Key  (r) = Recorded By, (t) = Taken By, (c) = Cosigned By      Initials Name Provider Type    EJ Kera Doll, PT Physical Therapist                   Obj/Interventions    No documentation.                  Goals/Plan    No documentation.                  Clinical Impression       Row Name 08/31/23 1441          Pain    Pretreatment Pain Rating 0/10 - no pain  -EJ       Row Name 08/31/23 1441          Plan of Care Review    Plan of Care Reviewed With patient  -EJ     Outcome Evaluation Pt agreeable to PT this afternoon after declining earlier today. She  reports she is feeling better and no pain at this time. Pt requiring min A for bed mobility today. She was able to stand w min A x 2 w use of Rwx. She was able to increase ambulation distance, ambulating approx 40 ft w CGA/min A and Rwx. She does exhibit slow pace w mild unsteadiness. Pt returned to bed at end of session. Spouse present in room. Pt plans home at [place] w 24/7 assist. Will continue to progress as tolerated.  -KALIE       Row Name 08/31/23 1441          Positioning and Restraints    Pre-Treatment Position in bed  -EJ     Post Treatment Position bed  -EJ     In Bed notified nsg;supine;call light within reach;encouraged to call for assist;exit alarm on;with family/caregiver  -               User Key  (r) = Recorded By, (t) = Taken By, (c) = Cosigned By      Initials Name Provider Type    Kera Pretty, PT Physical Therapist                   Outcome Measures       Row Name 08/31/23 1443 08/31/23 0914       How much help from another person do you currently need...    Turning from your back to your side while in flat bed without using bedrails? 3  -EJ 3  -SH    Moving from lying on back to sitting on the side of a flat bed without bedrails? 3  -EJ 3  -SH    Moving to and from a bed to a chair (including a wheelchair)? 3  -EJ 3  -SH    Standing up from a chair using your arms (e.g., wheelchair, bedside chair)? 3  -EJ 3  -SH    Climbing 3-5 steps with a railing? 2  -EJ 2  -SH    To walk in hospital room? 3  -EJ 2  -SH    AM-PAC 6 Clicks Score (PT) 17  -EJ 16  -SH    Highest level of mobility 5 --> Static standing  -EJ 5 --> Static standing  -SH      Row Name 08/31/23 1436          Functional Assessment    Outcome Measure Options AM-PAC 6 Clicks Daily Activity (OT)  -MW               User Key  (r) = Recorded By, (t) = Taken By, (c) = Cosigned By      Initials Name Provider Type    Kera Pretty, PT Physical Therapist    SH Liyah Tuttle, RN Registered Nurse    Camila Michel OT Occupational  Therapist                                 Physical Therapy Education       Title: PT OT SLP Therapies (In Progress)       Topic: Physical Therapy (Not Started)       Point: Mobility training (Not Started)       Learner Progress:  Not documented in this visit.              Point: Home exercise program (Not Started)       Learner Progress:  Not documented in this visit.              Point: Body mechanics (Not Started)       Learner Progress:  Not documented in this visit.              Point: Precautions (Not Started)       Learner Progress:  Not documented in this visit.                                  PT Recommendation and Plan  Planned Therapy Interventions (PT): balance training, bed mobility training, gait training, home exercise program, stretching, strengthening, stair training, ROM (range of motion), patient/family education, transfer training  Plan of Care Reviewed With: patient  Outcome Evaluation: Pt agreeable to PT this afternoon after declining earlier today. She reports she is feeling better and no pain at this time. Pt requiring min A for bed mobility today. She was able to stand w min A x 2 w use of Rwx. She was able to increase ambulation distance, ambulating approx 40 ft w CGA/min A and Rwx. She does exhibit slow pace w mild unsteadiness. Pt returned to bed at end of session. Spouse present in room. Pt plans home at [place] w 24/7 assist. Will continue to progress as tolerated.     Time Calculation:         PT Charges       Row Name 08/31/23 1443 08/31/23 0956          Time Calculation    Start Time 1325  -EJ --     Stop Time 1350  -EJ --     Time Calculation (min) 25 min  -EJ --     PT Received On 08/31/23  -EJ --     PT - Next Appointment 09/01/23  -EJ 09/01/23  -EJ        Timed Charges    21615 - Gait Training Minutes  12  -EJ --     21150 - PT Therapeutic Activity Minutes 13  -EJ --        Total Minutes    Timed Charges Total Minutes 25  -EJ --      Total Minutes 25  -EJ --               User Key  (r)  = Recorded By, (t) = Taken By, (c) = Cosigned By      Initials Name Provider Type    EJ Kera Doll, PT Physical Therapist                  Therapy Charges for Today       Code Description Service Date Service Provider Modifiers Qty    29232826383  PT EVAL MOD COMPLEXITY 3 8/30/2023 Kera Doll, PT GP 1    45663615143 HC PT THER PROC EA 15 MIN 8/30/2023 Kera Doll, PT GP 1    93557675432 HC GAIT TRAINING EA 15 MIN 8/31/2023 Kera Doll, PT GP 1    98305091271  PT THERAPEUTIC ACT EA 15 MIN 8/31/2023 Kera Doll, PT GP 1            PT G-Codes  Outcome Measure Options: AM-PAC 6 Clicks Daily Activity (OT)  AM-PAC 6 Clicks Score (PT): 17  AM-PAC 6 Clicks Score (OT): 15  PT Discharge Summary  Anticipated Discharge Disposition (PT): home with assist, home with home health    Kera Doll, PT  8/31/2023

## 2023-09-01 LAB
ALBUMIN SERPL-MCNC: 3.1 G/DL (ref 3.5–5.2)
ALP SERPL-CCNC: 171 U/L (ref 39–117)
ALT SERPL W P-5'-P-CCNC: 11 U/L (ref 1–33)
AMMONIA BLD-SCNC: 41 UMOL/L (ref 11–51)
ANION GAP SERPL CALCULATED.3IONS-SCNC: 8.4 MMOL/L (ref 5–15)
AST SERPL-CCNC: 21 U/L (ref 1–32)
BILIRUB CONJ SERPL-MCNC: 0.5 MG/DL (ref 0–0.3)
BILIRUB INDIRECT SERPL-MCNC: 0.3 MG/DL
BILIRUB SERPL-MCNC: 0.8 MG/DL (ref 0–1.2)
BUN SERPL-MCNC: 36 MG/DL (ref 8–23)
BUN/CREAT SERPL: 27.9 (ref 7–25)
CALCIUM SPEC-SCNC: 8.8 MG/DL (ref 8.6–10.5)
CHLORIDE SERPL-SCNC: 98 MMOL/L (ref 98–107)
CO2 SERPL-SCNC: 27.6 MMOL/L (ref 22–29)
CREAT SERPL-MCNC: 1.29 MG/DL (ref 0.57–1)
DEPRECATED RDW RBC AUTO: 47.8 FL (ref 37–54)
EGFRCR SERPLBLD CKD-EPI 2021: 43.4 ML/MIN/1.73
ERYTHROCYTE [DISTWIDTH] IN BLOOD BY AUTOMATED COUNT: 15.4 % (ref 12.3–15.4)
GLUCOSE BLDC GLUCOMTR-MCNC: 130 MG/DL (ref 70–130)
GLUCOSE BLDC GLUCOMTR-MCNC: 174 MG/DL (ref 70–130)
GLUCOSE BLDC GLUCOMTR-MCNC: 194 MG/DL (ref 70–130)
GLUCOSE BLDC GLUCOMTR-MCNC: 209 MG/DL (ref 70–130)
GLUCOSE SERPL-MCNC: 136 MG/DL (ref 65–99)
HCT VFR BLD AUTO: 39.5 % (ref 34–46.6)
HGB BLD-MCNC: 12.3 G/DL (ref 12–15.9)
MAGNESIUM SERPL-MCNC: 2.2 MG/DL (ref 1.6–2.4)
MCH RBC QN AUTO: 26.9 PG (ref 26.6–33)
MCHC RBC AUTO-ENTMCNC: 31.1 G/DL (ref 31.5–35.7)
MCV RBC AUTO: 86.4 FL (ref 79–97)
PHOSPHATE SERPL-MCNC: 3.2 MG/DL (ref 2.5–4.5)
PLATELET # BLD AUTO: 190 10*3/MM3 (ref 140–450)
PMV BLD AUTO: 10.3 FL (ref 6–12)
POTASSIUM SERPL-SCNC: 4.3 MMOL/L (ref 3.5–5.2)
PROT SERPL-MCNC: 6.3 G/DL (ref 6–8.5)
RBC # BLD AUTO: 4.57 10*6/MM3 (ref 3.77–5.28)
SODIUM SERPL-SCNC: 134 MMOL/L (ref 136–145)
WBC NRBC COR # BLD: 5.7 10*3/MM3 (ref 3.4–10.8)

## 2023-09-01 PROCEDURE — 63710000001 INSULIN LISPRO (HUMAN) PER 5 UNITS: Performed by: STUDENT IN AN ORGANIZED HEALTH CARE EDUCATION/TRAINING PROGRAM

## 2023-09-01 PROCEDURE — 99222 1ST HOSP IP/OBS MODERATE 55: CPT | Performed by: PSYCHIATRY & NEUROLOGY

## 2023-09-01 PROCEDURE — 80076 HEPATIC FUNCTION PANEL: CPT | Performed by: NURSE PRACTITIONER

## 2023-09-01 PROCEDURE — 94799 UNLISTED PULMONARY SVC/PX: CPT

## 2023-09-01 PROCEDURE — 94664 DEMO&/EVAL PT USE INHALER: CPT

## 2023-09-01 PROCEDURE — 94761 N-INVAS EAR/PLS OXIMETRY MLT: CPT

## 2023-09-01 PROCEDURE — 97110 THERAPEUTIC EXERCISES: CPT

## 2023-09-01 PROCEDURE — 63710000001 INSULIN GLARGINE PER 5 UNITS: Performed by: STUDENT IN AN ORGANIZED HEALTH CARE EDUCATION/TRAINING PROGRAM

## 2023-09-01 PROCEDURE — 85027 COMPLETE CBC AUTOMATED: CPT | Performed by: STUDENT IN AN ORGANIZED HEALTH CARE EDUCATION/TRAINING PROGRAM

## 2023-09-01 PROCEDURE — 25010000002 CEFTRIAXONE PER 250 MG: Performed by: STUDENT IN AN ORGANIZED HEALTH CARE EDUCATION/TRAINING PROGRAM

## 2023-09-01 PROCEDURE — 82140 ASSAY OF AMMONIA: CPT | Performed by: NURSE PRACTITIONER

## 2023-09-01 PROCEDURE — 82948 REAGENT STRIP/BLOOD GLUCOSE: CPT

## 2023-09-01 PROCEDURE — 83735 ASSAY OF MAGNESIUM: CPT | Performed by: STUDENT IN AN ORGANIZED HEALTH CARE EDUCATION/TRAINING PROGRAM

## 2023-09-01 PROCEDURE — 80048 BASIC METABOLIC PNL TOTAL CA: CPT | Performed by: STUDENT IN AN ORGANIZED HEALTH CARE EDUCATION/TRAINING PROGRAM

## 2023-09-01 PROCEDURE — 87040 BLOOD CULTURE FOR BACTERIA: CPT | Performed by: NURSE PRACTITIONER

## 2023-09-01 PROCEDURE — 84100 ASSAY OF PHOSPHORUS: CPT | Performed by: STUDENT IN AN ORGANIZED HEALTH CARE EDUCATION/TRAINING PROGRAM

## 2023-09-01 RX ADMIN — METOPROLOL SUCCINATE 25 MG: 25 TABLET, EXTENDED RELEASE ORAL at 21:03

## 2023-09-01 RX ADMIN — INSULIN GLARGINE 10 UNITS: 100 INJECTION, SOLUTION SUBCUTANEOUS at 21:04

## 2023-09-01 RX ADMIN — Medication 10 ML: at 21:04

## 2023-09-01 RX ADMIN — BUDESONIDE AND FORMOTEROL FUMARATE DIHYDRATE 2 PUFF: 160; 4.5 AEROSOL RESPIRATORY (INHALATION) at 18:42

## 2023-09-01 RX ADMIN — METOPROLOL SUCCINATE 25 MG: 25 TABLET, EXTENDED RELEASE ORAL at 08:57

## 2023-09-01 RX ADMIN — BUDESONIDE AND FORMOTEROL FUMARATE DIHYDRATE 2 PUFF: 160; 4.5 AEROSOL RESPIRATORY (INHALATION) at 06:57

## 2023-09-01 RX ADMIN — ATORVASTATIN CALCIUM 10 MG: 20 TABLET, FILM COATED ORAL at 08:57

## 2023-09-01 RX ADMIN — DULOXETINE HYDROCHLORIDE 60 MG: 60 CAPSULE, DELAYED RELEASE ORAL at 08:57

## 2023-09-01 RX ADMIN — INSULIN LISPRO 2 UNITS: 100 INJECTION, SOLUTION INTRAVENOUS; SUBCUTANEOUS at 12:09

## 2023-09-01 RX ADMIN — ALLOPURINOL 100 MG: 100 TABLET ORAL at 08:57

## 2023-09-01 RX ADMIN — TIOTROPIUM BROMIDE INHALATION SPRAY 2 PUFF: 3.12 SPRAY, METERED RESPIRATORY (INHALATION) at 06:57

## 2023-09-01 RX ADMIN — Medication 10 ML: at 08:58

## 2023-09-01 RX ADMIN — APIXABAN 5 MG: 5 TABLET, FILM COATED ORAL at 08:57

## 2023-09-01 RX ADMIN — APIXABAN 5 MG: 5 TABLET, FILM COATED ORAL at 21:04

## 2023-09-01 RX ADMIN — INSULIN LISPRO 3 UNITS: 100 INJECTION, SOLUTION INTRAVENOUS; SUBCUTANEOUS at 21:04

## 2023-09-01 RX ADMIN — CEFTRIAXONE 2000 MG: 2 INJECTION, POWDER, FOR SOLUTION INTRAMUSCULAR; INTRAVENOUS at 17:00

## 2023-09-01 RX ADMIN — INSULIN LISPRO 2 UNITS: 100 INJECTION, SOLUTION INTRAVENOUS; SUBCUTANEOUS at 16:45

## 2023-09-01 NOTE — THERAPY TREATMENT NOTE
Patient Name: Aydee Solis  : 1950    MRN: [id number]                              Today's Date: 2023       Admit Date: 2023    Visit Dx:     ICD-10-CM ICD-9-CM   1. Acute UTI  N39.0 599.0   2. Acute renal insufficiency  N28.9 593.9   3. Generalized weakness  R53.1 780.79   4. Elevated troponin  R77.8 790.6     Patient Active Problem List   Diagnosis    Pneumonia of right lower lobe due to infectious organism    Cellulitis    Hypertension    Hyperlipidemia    Breast cancer    Lymphedema    Osteoporosis    Acute respiratory failure with hypoxia    Morbid obesity due to excess calories    Metabolic encephalopathy    PAULETTE (obstructive sleep apnea)    Chronic respiratory failure with hypoxia    Stage 3b chronic kidney disease    Type 2 diabetes mellitus, with long-term current use of insulin    PAF (paroxysmal atrial fibrillation)    Chronic anticoagulation    COPD (chronic obstructive pulmonary disease)    Chronic systolic CHF (congestive heart failure)    Left leg cellulitis    UTI (urinary tract infection)    Lymphedema    Dizziness    Opioid dependence    Metabolic acidosis    Confusion    Acute renal insufficiency    Alkalosis, metabolic    Hypokalemia    Hyponatremia    Acute UTI     Past Medical History:   Diagnosis Date    Arthritis     Breast cancer     Cancer     Class 3 severe obesity due to excess calories with body mass index (BMI) of 50.0 to 59.9 in adult     COPD (chronic obstructive pulmonary disease)     Diabetes mellitus     EDWARDS (dyspnea on exertion)     Elephantiasis     left leg    Hyperlipidemia     Hypertension     Leukemia     Lung cancer     Lymphedema     left arm    Osteoporosis     Tracheal cancer      Past Surgical History:   Procedure Laterality Date    APPENDECTOMY      HYSTERECTOMY      KNEE ARTHROSCOPY Right     LAPAROSCOPIC GASTRIC BANDING      LYMPHADENECTOMY Left     MASTECTOMY Left       General Information       Row Name 23 1448          Physical Therapy Time  and Intention    Document Type therapy note (daily note)  -     Mode of Treatment individual therapy;physical therapy  -       Row Name 09/01/23 1649          General Information    Patient Profile Reviewed yes  -     Existing Precautions/Restrictions fall;oxygen therapy device and L/min  2L  -       Row Name 09/01/23 1649          Living Environment    People in Home spouse  -       Row Name 09/01/23 1649          Cognition    Orientation Status (Cognition) oriented x 3  -       Row Name 09/01/23 1649          Safety Issues, Functional Mobility    Safety Issues Affecting Function (Mobility) awareness of need for assistance;insight into deficits/self-awareness;judgment;positioning of assistive device;problem-solving;safety precaution awareness;sequencing abilities  -     Impairments Affecting Function (Mobility) balance;coordination;endurance/activity tolerance;postural/trunk control;strength;shortness of breath  -               User Key  (r) = Recorded By, (t) = Taken By, (c) = Cosigned By      Initials Name Provider Type     Trixie Cabrera PTA Physical Therapist Assistant                   Mobility       Row Name 09/01/23 1650          Bed Mobility    Supine-Sit [place] (Bed Mobility) 2 person assist;minimum assist (75% patient effort)  -     Sit-Supine [place] (Bed Mobility) 2 person assist;moderate assist (50% patient effort);verbal cues;nonverbal cues (demo/gesture)  -       Row Name 09/01/23 1650          Sit-Stand Transfer    Sit-Stand [place] (Transfers) 2 person assist;minimum assist (75% patient effort);verbal cues  -     Assistive Device (Sit-Stand Transfers) walker, front-wheeled  -[facility] Name 09/01/23 1650          Gait/Stairs (Locomotion)    [place] Level (Gait) 2 person assist;minimum assist (75% patient effort);contact guard  -     Assistive Device (Gait) walker, front-wheeled  -     Distance in Feet (Gait) 60ft, struggled to guide rwx , kept  bumping into wx legs w/shoes and seemed unaware  -     Deviations/Abnormal Patterns (Gait) rachid decreased;ataxic;base of support, wide;festinating/shuffling;stride length decreased;weight shifting decreased  -     Bilateral Gait Deviations forward flexed posture;heel strike decreased  -     Comment, (Gait/Stairs) unsteady, req assist of 2 for safety today  -               User Key  (r) = Recorded By, (t) = Taken By, (c) = Cosigned By      Initials Name Provider Type    Trixie Viveros PTA Physical Therapist Assistant                   Obj/Interventions       Row Name 09/01/23 1654          Motor Skills    Therapeutic Exercise --  LAQS x10 reps  -               User Key  (r) = Recorded By, (t) = Taken By, (c) = Cosigned By      Initials Name Provider Type    Trixie Viveros PTA Physical Therapist Assistant                   Goals/Plan    No documentation.                  Clinical Impression       [facility] Name 09/01/23 1654          Pain    Posttreatment Pain Rating 8/10  -     Pain Location - back  -     Pain Intervention(s) Repositioned;Rest  -JM       Row Name 09/01/23 1654          Plan of Care Review    Plan of Care Reviewed With patient  -     Outcome Evaluation Pt agreed to PT session after some encouragement, pt pj bed mob w/min 2 OOB, MOD 2 into bed, diff moving LEs ; pt pj amb ~60ft, 3 standing rests, assist of 2 for safety, assist to guide rwx, some cues to initiate task, pt reports husb will assist at home, states she has all equipment, plans home would benefit from HH; hopefully will be assist of 1 soon for home safety  -[facility] Name 09/01/23 1654          Therapy Assessment/Plan (PT)    Criteria for Skilled Interventions Met (PT) yes  -[facility] Name 09/01/23 1654          Vital Signs    O2 Delivery Pre Treatment supplemental O2  -[facility] Name 09/01/23 1654          Positioning and Restraints    Pre-Treatment Position in bed  -     Post Treatment Position bed   -GERA     In Bed fowlers;call light within reach;encouraged to call for assist;notified nsg  bed exit not working, was not activated upon entry-educ offered  -               User Key  (r) = Recorded By, (t) = Taken By, (c) = Cosigned By      Initials Name Provider Type    Trixie Viveros PTA Physical Therapist Assistant                   Outcome Measures       Row Name 09/01/23 1700 09/01/23 0857       How much help from another person do you currently need...    Turning from your back to your side while in flat bed without using bedrails? 2  - 3  -SH    Moving from lying on back to sitting on the side of a flat bed without bedrails? 2  - 3  -SH    Moving to and from a bed to a chair (including a wheelchair)? 3  - 3  -SH    Standing up from a chair using your arms (e.g., wheelchair, bedside chair)? 3  - 3  -SH    Climbing 3-5 steps with a railing? 1  - 2  -SH    To walk in hospital room? 3  - 3  -SH    AM-PAC 6 Clicks Score (PT) 14  - 17  -    Highest level of mobility 4 --> Transferred to chair/commode  - 5 --> Static standing  -              User Key  (r) = Recorded By, (t) = Taken By, (c) = Cosigned By      Initials Name Provider Type    Trixie Viveros PTA Physical Therapist Assistant    Liyah Alvarez, RN Registered Nurse                                 Physical Therapy Education       Title: PT OT SLP Therapies (In Progress)       Topic: Physical Therapy (In Progress)       Point: Mobility training (In Progress)       Learning Progress Summary             Patient Acceptance, E,D, NR by GERA at 9/1/2023 1700                         Point: Home exercise program (In Progress)       Learning Progress Summary             Patient Acceptance, E,D, NR by GERA at 9/1/2023 1700                         Point: Body mechanics (In Progress)       Learning Progress Summary             Patient Acceptance, E,D, NR by GERA at 9/1/2023 1700                         Point: Precautions (In Progress)        Learning Progress Summary             Patient Acceptance, E,D, NR by GERA at 9/1/2023 1700                                         User Key       Initials Effective Dates Name Provider Type [facility] 03/07/18 -  Trixie Cabrera PTA Physical Therapist Assistant PT                  PT Recommendation and Plan     Plan of Care Reviewed With: patient  Outcome Evaluation: Pt agreed to PT session after some encouragement, pt pj bed mob w/min 2 OOB, MOD 2 into bed, diff moving LEs ; pt pj amb ~60ft, 3 standing rests, assist of 2 for safety, assist to guide rwx, some cues to initiate task, pt reports husb will assist at home, states she has all equipment, plans home would benefit from HH; hopefully will be assist of 1 soon for home safety     Time Calculation:         PT Charges       Row Name 09/01/23 1703             Time Calculation    Start Time 1040  -      Stop Time 1103  -      Time Calculation (min) 23 min  -      PT Received On 09/01/23  -GERA      PT - Next Appointment 09/02/23  -GERA                User Key  (r) = Recorded By, (t) = Taken By, (c) = Cosigned By      Initials Name Provider Type     Trixie Cabrera PTA Physical Therapist Assistant                  Therapy Charges for Today       Code Description Service Date Service Provider Modifiers Qty    83078079522 HC PT THER PROC EA 15 MIN 9/1/2023 Trixie Cabrera PTA GP 2    53451149833 HC PT THER SUPP EA 15 MIN 9/1/2023 Trixie Cabrera PTA GP 2            PT G-Codes  Outcome Measure Options: AM-PAC 6 Clicks Daily Activity (OT)  AM-PAC 6 Clicks Score (PT): 14  AM-PAC 6 Clicks Score (OT): 15  PT Discharge Summary  Anticipated Discharge Disposition (PT): home with assist, home with home health    Trixie Cabrera PTA  9/1/2023

## 2023-09-01 NOTE — CONSULTS
"Neurology Consult Note    Consult Date: 9/1/2023    Referring MD: Juliette Spence MD    Reason for Consult I have been asked to see the patient in neurological consultation to render advice and opinion regarding altered mental status    Aydee Solis is a 73 y.o. female with obesity, hypertension, hyperlipidemia, lung cancer, lymphedema, admitted for generalized weakness.  She was found to have an acute UTI.   complains of fluctuating inattention and altered mental status which is may be slightly better compared to yesterday.  We are consulted for altered mental status and for the 's complaint of \"poor color\".    Past Medical History:   Diagnosis Date    Arthritis     Breast cancer     Cancer     Class 3 severe obesity due to excess calories with body mass index (BMI) of 50.0 to 59.9 in adult     COPD (chronic obstructive pulmonary disease)     Diabetes mellitus     EDWARDS (dyspnea on exertion)     Elephantiasis     left leg    Hyperlipidemia     Hypertension     Leukemia     Lung cancer     Lymphedema     left arm    Osteoporosis     Tracheal cancer        Exam  BP 92/71 (BP Location: Right arm, Patient Position: Standing)   Pulse 58   Temp 97.9 °F (36.6 °C) (Oral)   Resp 18   Ht 167.6 cm (65.98\")   Wt 111 kg (245 lb)   SpO2 96%   BMI 39.56 kg/m²   Gen: NAD, vitals reviewed  MS: Disoriented, memory impaired, impaired attention/concentration, language intact, no neglect  CN: visual acuity grossly normal, PERRL, EOMI, no facial droop, no dysarthria  Motor: 4+/5 bilateral upper extremities, 3/5 bilateral lower extremities, no asterixis present  sensory: Diminished to vibration distal lower extremities      DATA:    Lab Results   Component Value Date    GLUCOSE 136 (H) 09/01/2023    CALCIUM 8.8 09/01/2023     (L) 09/01/2023    K 4.3 09/01/2023    CO2 27.6 09/01/2023    CL 98 09/01/2023    BUN 36 (H) 09/01/2023    CREATININE 1.29 (H) 09/01/2023    EGFRIFNONA 39 (L) 01/05/2021    BCR 27.9 (H) " 09/01/2023    ANIONGAP 8.4 09/01/2023     Lab Results   Component Value Date    WBC 5.70 09/01/2023    HGB 12.3 09/01/2023    HCT 39.5 09/01/2023    MCV 86.4 09/01/2023     09/01/2023       Lab review: Sodium 134, creatinine 1.3, CBC normal    Imaging review: Most recent head CT performed 7/31 showed mild small vessel disease.    Diagnoses:  Acute cystitis without hematuria  Metabolic encephalopathy due to above    Comment: Metabolic encephalopathy due to UTI    PLAN:  No further neurologic work-up needed

## 2023-09-01 NOTE — PLAN OF CARE
Goal Outcome Evaluation:  Plan of Care Reviewed With: patient           Outcome Evaluation: Pt agreed to PT session after some encouragement, pt pj bed mob w/min 2 OOB, MOD 2 into bed, diff moving LEs ; pt pj amb ~60ft, 3 standing rests, assist of 2 for safety, assist to guide rwx, some cues to initiate task, pt reports husb will assist at home, states she has all equipment, plans home would benefit from HH; hopefully will be assist of 1 soon for home safety      Anticipated Discharge Disposition (PT): home with assist, home with home health

## 2023-09-01 NOTE — PROGRESS NOTES
Name: Aydee Solis ADMIT: 2023   : 1950  PCP: Bennett Duque DO    MRN: [id number] LOS: 2 days   AGE/SEX: 73 y.o. female  ROOM: [facility]     Subjective   Subjective    Patient   confused and lethargic. Spouse at bedside states not at cognitive baseline. He thinks her color is off.  No infectious complaints.   She has been afebrile.  Very sleepy  Currently on 2 L nasal cannula which is her home oxygen requirement.     Objective   Objective     Vital Signs  Temp:  [97.3 °F (36.3 °C)-97.9 °F (36.6 °C)] 97.9 °F (36.6 °C)  Heart Rate:  [] 58  Resp:  [16-20] 18  BP: ()/(55-77) 92/71  SpO2:  [94 %-100 %] 96 %  on  Flow (L/min):  [2-2.5] 2;   Device (Oxygen Therapy): nasal cannula;high-flow nasal cannula  Body mass index is 39.56 kg/m².    Physical Exam  Vitals reviewed.   Constitutional:       General: She is not in acute distress.     Appearance: She is well-developed. She is obese. She is ill-appearing. She is not toxic-appearing.   HENT:      Head: Normocephalic and atraumatic.      Mouth/Throat:      Mouth: Mucous membranes are moist.   Cardiovascular:      Rate and Rhythm: Normal rate and regular rhythm.   Pulmonary:      Effort: Pulmonary effort is normal. No respiratory distress.      Breath sounds: Normal breath sounds.   Abdominal:      General: Bowel sounds are normal. There is no distension.      Palpations: Abdomen is soft.      Tenderness: There is no abdominal tenderness.   Skin:     General: Skin is warm and dry.   Neurological:      General: No focal deficit present.      Mental Status: She is alert and oriented to person, place, and time. She is lethargic.      Comments: Falls asleep mid sentence and continually during exam . Oriented x 3 but decreased awareness or surroundings   Psychiatric:         Mood and Affect: Mood normal.         Behavior: Behavior normal.         Thought Content: Thought content normal.     General: Alert,Results Review     I reviewed the  patient's new clinical results:  Results from last 7 days   Lab Units 09/01/23  0701 08/31/23  0634 08/30/23  0553 08/29/23  1415   WBC 10*3/mm3 5.70 6.01 5.79 6.23   HEMOGLOBIN g/dL 12.3 12.4 11.8* 12.6   PLATELETS 10*3/mm3 190 183 188 210     Results from last 7 days   Lab Units 09/01/23  0701 08/31/23  0634 08/30/23  0553 08/29/23  1415   SODIUM mmol/L 134* 135* 138 138   POTASSIUM mmol/L 4.3 4.0 3.9 4.2   CHLORIDE mmol/L 98 97* 99 96*   CO2 mmol/L 27.6 27.4 28.0 29.4*   BUN mg/dL 36* 38* 44* 44*   CREATININE mg/dL 1.29* 1.35* 1.55* 1.83*   GLUCOSE mg/dL 136* 142* 137* 119*   EGFR mL/min/1.73 43.4* 41.1* 34.8* 28.5*     Results from last 7 days   Lab Units 09/01/23  0701 08/29/23  1415   ALBUMIN g/dL 3.1* 3.2*   BILIRUBIN mg/dL 0.8 1.2   ALK PHOS U/L 171* 143*   AST (SGOT) U/L 21 25   ALT (SGPT) U/L 11 9     Results from last 7 days   Lab Units 09/01/23  0701 08/31/23  0634 08/30/23  0553 08/29/23  1415   CALCIUM mg/dL 8.8 8.3* 8.2* 8.7   ALBUMIN g/dL 3.1*  --   --  3.2*   MAGNESIUM mg/dL 2.2 2.2 4.2* 2.1   PHOSPHORUS mg/dL 3.2 3.0 3.6  --      Results from last 7 days   Lab Units 08/29/23  1510   LACTATE mmol/L 1.3     Hemoglobin A1C   Date/Time Value Ref Range Status   08/29/2023 2046 8.50 (H) 4.80 - 5.60 % Final     Glucose   Date/Time Value Ref Range Status   09/01/2023 1059 174 (H) 70 - 130 mg/dL Final   09/01/2023 0617 130 70 - 130 mg/dL Final   08/31/2023 1957 150 (H) 70 - 130 mg/dL Final   08/31/2023 1727 139 (H) 70 - 130 mg/dL Final   08/31/2023 1247 164 (H) 70 - 130 mg/dL Final   08/31/2023 0657 129 70 - 130 mg/dL Final   08/30/2023 2026 213 (H) 70 - 130 mg/dL Final       XR Chest 1 View    Result Date: 8/30/2023  No focal pulmonary consolidation. Cardiomegaly. Follow-up as clinical indications persist.  This report was finalized on 8/30/2023 4:25 PM by Dr. Kristian Keenan M.D.       I have personally reviewed all medications:  Scheduled Medications  allopurinol, 100 mg, Oral, Daily  apixaban, 5 mg,  Oral, BID  atorvastatin, 10 mg, Oral, Daily  budesonide-formoterol, 2 puff, Inhalation, BID - RT   And  tiotropium bromide monohydrate, 2 puff, Inhalation, Daily - RT  cefTRIAXone, 2,000 mg, Intravenous, Q24H  DULoxetine, 60 mg, Oral, Daily  insulin glargine, 10 Units, Subcutaneous, Nightly  insulin lispro, 2-7 Units, Subcutaneous, 4x Daily AC & at Bedtime  metoprolol succinate XL, 25 mg, Oral, BID  sodium chloride, 10 mL, Intravenous, Q12H    Infusions   Diet  Diet: Cardiac Diets, Diabetic Diets; Healthy Heart (2-3 Na+); Consistent Carbohydrate; Texture: Regular Texture (IDDSI 7); Fluid Consistency: Thin (IDDSI 0)    I have personally reviewed:  [x]  Laboratory   [x]  Microbiology   [x]  Radiology   [x]  EKG/Telemetry  []  Cardiology/Vascular   []  Pathology    []  Records    Assessment/Plan     Active Hospital Problems    Diagnosis  POA    **Acute UTI [N39.0]  Yes    Acute renal insufficiency [N28.9]  Yes    Chronic systolic CHF (congestive heart failure) [I50.22]  Yes    PAULETTE (obstructive sleep apnea) [G47.33]  Yes    Chronic respiratory failure with hypoxia [J96.11]  Yes    Stage 3b chronic kidney disease [N18.32]  Yes    Type 2 diabetes mellitus, with long-term current use of insulin [E11.9, Z79.4]  Not Applicable    PAF (paroxysmal atrial fibrillation) [I48.0]  Yes    Chronic anticoagulation [Z79.01]  Not Applicable    COPD (chronic obstructive pulmonary disease) [J44.9]  Yes    Metabolic encephalopathy [G93.41]  Yes    Morbid obesity due to excess calories [E66.01]  Yes    Hypertension [I10]  Yes    Hyperlipidemia [E78.5]  Yes    Lymphedema [I89.0]  Yes      Resolved Hospital Problems   No resolved problems to display.     Ms. Solis is a 73 y.o. former smoker with a history of CKD 3B, chronic systolic/diastolic CHF, paroxysmal A-fib, hypertension, chronic hypoxic respiratory failure/COPD who presents with AMS, weakness.       Acute UTI    - Has improved significantly with admission, but still not back to  baseline  - admission last month w/ pan-sensitive E Coli UTI  - UA + LE, WBC, 4+ bacteria but also 7-12 squamous epithelial cells  - pt taken off of Norco last month  - Blood culture 1/2 positive for leg negative staph, likely contaminant  - Urine culture growing E. coli, pansensitive  - continue rocephin, transition to Augmentin at discharge  - she is still not at her cognitive baseline. Ammonia normal, repeat blood cx, alk phos elevated but has been previously without GI sx.    neurology consult and defer additional imaging to specialist    Positive blood culture  -Blood cultures obtained 8/29 positive for coag negative Staphylococcus in 1/2 cultures  -Given growth in 1/2 cultures and no obvious likelihood of Staphylococcus bacteremia, this is most likely contaminant  -Urine growing E. Coli  -Should clinical picture changes or any signs of infection, will obtain repeat blood cultures    Hypotension  -BP did respond to IV fluids  -AM cortisol level checked and found to be 5, unequivocal  -Does have very mild hyponatremia, but no other evidence of adrenal insufficiency at this time  -We do not have endocrinology inpatient  -Recommend close follow-up with PCP and possible endocrinology referral outpatient    Weakness/falls/debility  - PT/OT consulted, recommending home with home health  - cardiology consulted last month given frequent falls and use of Eliquis  - they recommended continuing AC given her CHADSVasc2 and f/u with her cadiologist as an o/p     MELANIE on CKD3b  - Cr on admission 1.8, baseline appears ~1.2-1.5  -Creatinine 1.35 today, appears to be at baseline  -Repeat BMP with morning labs     Type 2 diabetes  -Hemoglobin A1c 8.5  - lantus 10 U nightly (home dose is 20u)  - Low dose SSI  -Blood glucose well controlled in past 24 hours, no need for adjustments today     Chronic systolic/diastolic CHF w/ AICD  Paroxysmal atrial fibrillation  - ECHO 1/2023 w/ EF 42%,G1DD  - continue Eliquis,  metoprolol  -Continue holding home Aldactone and torsemide for now     HTN  -Blood pressures have been on the softer side  -Holding home spironolactone and torsemide  - continue metoprolol w hold parameters     COPD  Chronic hypoxic respiratory failure- on 2L via NC at home  - not in exacerbation  - continue symbicort/spiriva and PRN albuterol  - continue 2L for goal SpO2 88-92%  -CXR negative for any acute process or consolidation     Gout  - home allopurinol      Not appropriate for DC today given lethargy and not close to baseline. Consult neuro.   Marilou NOLEN  [facility]  09/01/23  14:06 EDT

## 2023-09-01 NOTE — CASE MANAGEMENT/SOCIAL WORK
Continued Stay Note  [facility]     Patient Name: Aydee Solis  MRN: [id number]  Today's Date: 9/1/2023    Admit Date: 8/29/2023    Plan: Home with [facility] following   Discharge Plan       Row Name 09/01/23 1520       Plan    Plan Home with [facility] following    Patient/Family in Agreement with Plan yes    Plan Comments Spoke with patient and  at bedside.  Plan remains to return home at [place] with [facility] following.  She again states she does not want to go to SNF.  [facility] continues to follow.  Skyla CAGLE                 Expected Discharge Date and Time       Expected Discharge Date Expected Discharge Time    Sep 5, 2023               Becky S. [place], RN

## 2023-09-02 LAB
ANION GAP SERPL CALCULATED.3IONS-SCNC: 11 MMOL/L (ref 5–15)
BUN SERPL-MCNC: 36 MG/DL (ref 8–23)
BUN/CREAT SERPL: 31.6 (ref 7–25)
CALCIUM SPEC-SCNC: 8.7 MG/DL (ref 8.6–10.5)
CHLORIDE SERPL-SCNC: 99 MMOL/L (ref 98–107)
CO2 SERPL-SCNC: 27 MMOL/L (ref 22–29)
CREAT SERPL-MCNC: 1.14 MG/DL (ref 0.57–1)
DEPRECATED RDW RBC AUTO: 47.9 FL (ref 37–54)
EGFRCR SERPLBLD CKD-EPI 2021: 50.3 ML/MIN/1.73
ERYTHROCYTE [DISTWIDTH] IN BLOOD BY AUTOMATED COUNT: 15.5 % (ref 12.3–15.4)
GLUCOSE BLDC GLUCOMTR-MCNC: 146 MG/DL (ref 70–130)
GLUCOSE BLDC GLUCOMTR-MCNC: 147 MG/DL (ref 70–130)
GLUCOSE BLDC GLUCOMTR-MCNC: 186 MG/DL (ref 70–130)
GLUCOSE BLDC GLUCOMTR-MCNC: 226 MG/DL (ref 70–130)
GLUCOSE SERPL-MCNC: 124 MG/DL (ref 65–99)
HCT VFR BLD AUTO: 40.5 % (ref 34–46.6)
HGB BLD-MCNC: 12.5 G/DL (ref 12–15.9)
MAGNESIUM SERPL-MCNC: 4.2 MG/DL (ref 1.6–2.4)
MCH RBC QN AUTO: 26.7 PG (ref 26.6–33)
MCHC RBC AUTO-ENTMCNC: 30.9 G/DL (ref 31.5–35.7)
MCV RBC AUTO: 86.5 FL (ref 79–97)
PHOSPHATE SERPL-MCNC: 3.3 MG/DL (ref 2.5–4.5)
PLATELET # BLD AUTO: 193 10*3/MM3 (ref 140–450)
PMV BLD AUTO: 10.1 FL (ref 6–12)
POTASSIUM SERPL-SCNC: 4.6 MMOL/L (ref 3.5–5.2)
RBC # BLD AUTO: 4.68 10*6/MM3 (ref 3.77–5.28)
SODIUM SERPL-SCNC: 137 MMOL/L (ref 136–145)
WBC NRBC COR # BLD: 5.7 10*3/MM3 (ref 3.4–10.8)

## 2023-09-02 PROCEDURE — 85027 COMPLETE CBC AUTOMATED: CPT | Performed by: STUDENT IN AN ORGANIZED HEALTH CARE EDUCATION/TRAINING PROGRAM

## 2023-09-02 PROCEDURE — 25010000002 CEFTRIAXONE PER 250 MG: Performed by: STUDENT IN AN ORGANIZED HEALTH CARE EDUCATION/TRAINING PROGRAM

## 2023-09-02 PROCEDURE — 94761 N-INVAS EAR/PLS OXIMETRY MLT: CPT

## 2023-09-02 PROCEDURE — 63710000001 INSULIN GLARGINE PER 5 UNITS: Performed by: STUDENT IN AN ORGANIZED HEALTH CARE EDUCATION/TRAINING PROGRAM

## 2023-09-02 PROCEDURE — 84100 ASSAY OF PHOSPHORUS: CPT | Performed by: STUDENT IN AN ORGANIZED HEALTH CARE EDUCATION/TRAINING PROGRAM

## 2023-09-02 PROCEDURE — 94799 UNLISTED PULMONARY SVC/PX: CPT

## 2023-09-02 PROCEDURE — 83735 ASSAY OF MAGNESIUM: CPT | Performed by: STUDENT IN AN ORGANIZED HEALTH CARE EDUCATION/TRAINING PROGRAM

## 2023-09-02 PROCEDURE — 82948 REAGENT STRIP/BLOOD GLUCOSE: CPT

## 2023-09-02 PROCEDURE — 80048 BASIC METABOLIC PNL TOTAL CA: CPT | Performed by: STUDENT IN AN ORGANIZED HEALTH CARE EDUCATION/TRAINING PROGRAM

## 2023-09-02 PROCEDURE — 94664 DEMO&/EVAL PT USE INHALER: CPT

## 2023-09-02 PROCEDURE — 63710000001 INSULIN LISPRO (HUMAN) PER 5 UNITS: Performed by: STUDENT IN AN ORGANIZED HEALTH CARE EDUCATION/TRAINING PROGRAM

## 2023-09-02 RX ORDER — SPIRONOLACTONE 25 MG/1
25 TABLET ORAL DAILY
Status: DISCONTINUED | OUTPATIENT
Start: 2023-09-02 | End: 2023-09-04

## 2023-09-02 RX ORDER — TORSEMIDE 20 MG/1
40 TABLET ORAL DAILY
Status: DISCONTINUED | OUTPATIENT
Start: 2023-09-03 | End: 2023-09-05

## 2023-09-02 RX ORDER — MIDODRINE HYDROCHLORIDE 5 MG/1
5 TABLET ORAL
Status: DISCONTINUED | OUTPATIENT
Start: 2023-09-02 | End: 2023-09-06 | Stop reason: HOSPADM

## 2023-09-02 RX ADMIN — INSULIN GLARGINE 10 UNITS: 100 INJECTION, SOLUTION SUBCUTANEOUS at 20:25

## 2023-09-02 RX ADMIN — INSULIN LISPRO 2 UNITS: 100 INJECTION, SOLUTION INTRAVENOUS; SUBCUTANEOUS at 16:49

## 2023-09-02 RX ADMIN — BUDESONIDE AND FORMOTEROL FUMARATE DIHYDRATE 2 PUFF: 160; 4.5 AEROSOL RESPIRATORY (INHALATION) at 08:16

## 2023-09-02 RX ADMIN — TIOTROPIUM BROMIDE INHALATION SPRAY 2 PUFF: 3.12 SPRAY, METERED RESPIRATORY (INHALATION) at 08:15

## 2023-09-02 RX ADMIN — ALLOPURINOL 100 MG: 100 TABLET ORAL at 08:01

## 2023-09-02 RX ADMIN — APIXABAN 5 MG: 5 TABLET, FILM COATED ORAL at 08:01

## 2023-09-02 RX ADMIN — BUDESONIDE AND FORMOTEROL FUMARATE DIHYDRATE 2 PUFF: 160; 4.5 AEROSOL RESPIRATORY (INHALATION) at 20:37

## 2023-09-02 RX ADMIN — ACETAMINOPHEN 650 MG: 325 TABLET, FILM COATED ORAL at 16:48

## 2023-09-02 RX ADMIN — MIDODRINE HYDROCHLORIDE 5 MG: 5 TABLET ORAL at 16:48

## 2023-09-02 RX ADMIN — ATORVASTATIN CALCIUM 10 MG: 20 TABLET, FILM COATED ORAL at 08:01

## 2023-09-02 RX ADMIN — CEFTRIAXONE 2000 MG: 2 INJECTION, POWDER, FOR SOLUTION INTRAMUSCULAR; INTRAVENOUS at 16:48

## 2023-09-02 RX ADMIN — DULOXETINE HYDROCHLORIDE 60 MG: 60 CAPSULE, DELAYED RELEASE ORAL at 08:01

## 2023-09-02 RX ADMIN — APIXABAN 5 MG: 5 TABLET, FILM COATED ORAL at 20:25

## 2023-09-02 RX ADMIN — METOPROLOL SUCCINATE 25 MG: 25 TABLET, EXTENDED RELEASE ORAL at 20:25

## 2023-09-02 RX ADMIN — SPIRONOLACTONE 25 MG: 25 TABLET ORAL at 14:48

## 2023-09-02 RX ADMIN — Medication 10 ML: at 08:01

## 2023-09-02 RX ADMIN — Medication 10 ML: at 20:25

## 2023-09-02 RX ADMIN — INSULIN LISPRO 3 UNITS: 100 INJECTION, SOLUTION INTRAVENOUS; SUBCUTANEOUS at 20:28

## 2023-09-02 NOTE — PLAN OF CARE
Goal Outcome Evaluation:         Patient admitted to the floor for an acute UTI. Given antibiotics. Vss. Paced on demand. [facility] and Neurology consulting. Will continue to monitor for any changes

## 2023-09-02 NOTE — PROGRESS NOTES
"    DAILY PROGRESS NOTE  [facility]    Patient Identification:  Name: Aydee Solis  Age: 73 y.o.  Sex: female  :  1950  MRN: [id number]         Primary Care Physician: Bennett Duque, DO    Subjective:  Interval History:   Feeling better overall.  Afebrile and vitals still with some persistent hypotension.  Denies chest pain nausea vomiting    Objective: Elderly and frail    Scheduled Meds:allopurinol, 100 mg, Oral, Daily  apixaban, 5 mg, Oral, BID  atorvastatin, 10 mg, Oral, Daily  budesonide-formoterol, 2 puff, Inhalation, BID - RT   And  tiotropium bromide monohydrate, 2 puff, Inhalation, Daily - RT  cefTRIAXone, 2,000 mg, Intravenous, Q24H  DULoxetine, 60 mg, Oral, Daily  insulin glargine, 10 Units, Subcutaneous, Nightly  insulin lispro, 2-7 Units, Subcutaneous, 4x Daily AC & at Bedtime  metoprolol succinate XL, 25 mg, Oral, BID  sodium chloride, 10 mL, Intravenous, Q12H      Continuous Infusions:     Vital signs in last 24 hours:  Temp:  [97.7 °F (36.5 °C)-98.2 °F (36.8 °C)] 97.7 °F (36.5 °C)  Heart Rate:  [] 60  Resp:  [16-18] 18  BP: ()/(55-76) 94/57    Intake/Output:    Intake/Output Summary (Last 24 hours) at 2023 1041  Last data filed at 2023 1700  Gross per 24 hour   Intake 720 ml   Output --   Net 720 ml       Exam:  BP 94/57 (BP Location: Right arm, Patient Position: Lying)   Pulse 60   Temp 97.7 °F (36.5 °C) (Oral)   Resp 18   Ht 167.6 cm (65.98\")   Wt 111 kg (245 lb)   SpO2 90%   BMI 39.56 kg/m²     General Appearance:    Alert, improving mentation                          head:    Normocephalic, without obvious abnormality, atraumatic                           Eyes:    PERRL, conjunctivae/corneas clear, EOM's intact, both eyes                         Throat:   Oral mucosa pink and moist                           Neck:   No JVD                         Lungs:    Clear to auscultation bilaterally, respirations unlabored                 Chest " Wall:    No tenderness or deformity                          Heart:    Regular rate and rhythm, S1 and S2 normal                  Abdomen:     Soft, nontender, bowel sounds active                   neurologic:   CNII-XII intact, Lymphedema    Data Review:  Labs in chart were reviewed.    Assessment:  Active Hospital Problems    Diagnosis  POA    **Acute UTI [N39.0]  Yes    Acute renal insufficiency [N28.9]  Yes    Chronic systolic CHF (congestive heart failure) [I50.22]  Yes    PAULETTE (obstructive sleep apnea) [G47.33]  Yes    Chronic respiratory failure with hypoxia [J96.11]  Yes    Stage 3b chronic kidney disease [N18.32]  Yes    Type 2 diabetes mellitus, with long-term current use of insulin [E11.9, Z79.4]  Not Applicable    PAF (paroxysmal atrial fibrillation) [I48.0]  Yes    Chronic anticoagulation [Z79.01]  Not Applicable    COPD (chronic obstructive pulmonary disease) [J44.9]  Yes    Metabolic encephalopathy [G93.41]  Yes    Morbid obesity due to excess calories [E66.01]  Yes    Hypertension [I10]  Yes    Hyperlipidemia [E78.5]  Yes    Lymphedema [I89.0]  Yes      Resolved Hospital Problems   No resolved problems to display.       Plan:    UTI with secondary metabolic encephalopathy   -Repeat BC NGTD   -1 of 2 positive for staph in admission is contaminant   -Urine culture is a cath specimen growing gram-negative willie that is pansensitive   -Rocephin D5 will complete antibiotics          Hypotension resolving -probably runs low normal   -Parameters on beta-blocker      DM2 with hyperglycemia/CKD  - controlled much improved on basal bolus regimen     ARF resolved -CKD 3B at baseline    Chronic biventricular CHF with AICD and PAF   -Continue metoprolol/Eliquis   -Resume Aldactone today and torsemide in a.m. tomorrow with parameter    COPD/chronic hypoxic respiratory failure on baseline 2 L        PT following -[facility] for discharge needs -suggest home with [facility]    Addendum -called back to RN and  spironolactone is not being given to this point as blood pressure still a bit soft in the 90s.  I do not see any chronic steroid dosing and I am anticipating this is a chronic theme of low blood pressure due to the numerous meds she is on that affect her pressure.  I think this patient needs a baseline diuretic and we will try to get back on spironolactone today with torsemide in the a.m. if her BP can permit.  I would initiate patient on midodrine 5 mg 3 times daily and watch BP trends    Curt Dias MD  9/2/2023  10:41 EDT

## 2023-09-03 LAB
ANION GAP SERPL CALCULATED.3IONS-SCNC: 7.4 MMOL/L (ref 5–15)
BACTERIA SPEC AEROBE CULT: NORMAL
BUN SERPL-MCNC: 35 MG/DL (ref 8–23)
BUN/CREAT SERPL: 29.4 (ref 7–25)
CALCIUM SPEC-SCNC: 8.7 MG/DL (ref 8.6–10.5)
CHLORIDE SERPL-SCNC: 99 MMOL/L (ref 98–107)
CO2 SERPL-SCNC: 26.6 MMOL/L (ref 22–29)
CREAT SERPL-MCNC: 1.19 MG/DL (ref 0.57–1)
EGFRCR SERPLBLD CKD-EPI 2021: 47.8 ML/MIN/1.73
GLUCOSE BLDC GLUCOMTR-MCNC: 110 MG/DL (ref 70–130)
GLUCOSE BLDC GLUCOMTR-MCNC: 129 MG/DL (ref 70–130)
GLUCOSE BLDC GLUCOMTR-MCNC: 223 MG/DL (ref 70–130)
GLUCOSE BLDC GLUCOMTR-MCNC: 233 MG/DL (ref 70–130)
GLUCOSE SERPL-MCNC: 112 MG/DL (ref 65–99)
POTASSIUM SERPL-SCNC: 4.5 MMOL/L (ref 3.5–5.2)
SODIUM SERPL-SCNC: 133 MMOL/L (ref 136–145)

## 2023-09-03 PROCEDURE — 63710000001 INSULIN LISPRO (HUMAN) PER 5 UNITS: Performed by: STUDENT IN AN ORGANIZED HEALTH CARE EDUCATION/TRAINING PROGRAM

## 2023-09-03 PROCEDURE — 94799 UNLISTED PULMONARY SVC/PX: CPT

## 2023-09-03 PROCEDURE — 94761 N-INVAS EAR/PLS OXIMETRY MLT: CPT

## 2023-09-03 PROCEDURE — 63710000001 INSULIN GLARGINE PER 5 UNITS: Performed by: STUDENT IN AN ORGANIZED HEALTH CARE EDUCATION/TRAINING PROGRAM

## 2023-09-03 PROCEDURE — 94664 DEMO&/EVAL PT USE INHALER: CPT

## 2023-09-03 PROCEDURE — 82948 REAGENT STRIP/BLOOD GLUCOSE: CPT

## 2023-09-03 PROCEDURE — 80048 BASIC METABOLIC PNL TOTAL CA: CPT | Performed by: HOSPITALIST

## 2023-09-03 PROCEDURE — 97110 THERAPEUTIC EXERCISES: CPT

## 2023-09-03 RX ADMIN — INSULIN LISPRO 3 UNITS: 100 INJECTION, SOLUTION INTRAVENOUS; SUBCUTANEOUS at 21:09

## 2023-09-03 RX ADMIN — APIXABAN 5 MG: 5 TABLET, FILM COATED ORAL at 21:09

## 2023-09-03 RX ADMIN — BUDESONIDE AND FORMOTEROL FUMARATE DIHYDRATE 2 PUFF: 160; 4.5 AEROSOL RESPIRATORY (INHALATION) at 07:10

## 2023-09-03 RX ADMIN — DULOXETINE HYDROCHLORIDE 60 MG: 60 CAPSULE, DELAYED RELEASE ORAL at 08:14

## 2023-09-03 RX ADMIN — ALLOPURINOL 100 MG: 100 TABLET ORAL at 08:14

## 2023-09-03 RX ADMIN — SENNOSIDES AND DOCUSATE SODIUM 2 TABLET: 50; 8.6 TABLET ORAL at 16:53

## 2023-09-03 RX ADMIN — TORSEMIDE 40 MG: 20 TABLET ORAL at 08:14

## 2023-09-03 RX ADMIN — Medication 5 MG: at 02:10

## 2023-09-03 RX ADMIN — MIDODRINE HYDROCHLORIDE 5 MG: 5 TABLET ORAL at 16:53

## 2023-09-03 RX ADMIN — TIOTROPIUM BROMIDE INHALATION SPRAY 2 PUFF: 3.12 SPRAY, METERED RESPIRATORY (INHALATION) at 07:09

## 2023-09-03 RX ADMIN — METOPROLOL SUCCINATE 25 MG: 25 TABLET, EXTENDED RELEASE ORAL at 08:14

## 2023-09-03 RX ADMIN — ATORVASTATIN CALCIUM 10 MG: 20 TABLET, FILM COATED ORAL at 08:14

## 2023-09-03 RX ADMIN — MIDODRINE HYDROCHLORIDE 5 MG: 5 TABLET ORAL at 06:32

## 2023-09-03 RX ADMIN — ACETAMINOPHEN 650 MG: 325 TABLET, FILM COATED ORAL at 02:10

## 2023-09-03 RX ADMIN — Medication 10 ML: at 21:10

## 2023-09-03 RX ADMIN — APIXABAN 5 MG: 5 TABLET, FILM COATED ORAL at 08:14

## 2023-09-03 RX ADMIN — Medication 10 ML: at 08:14

## 2023-09-03 RX ADMIN — METOPROLOL SUCCINATE 25 MG: 25 TABLET, EXTENDED RELEASE ORAL at 21:10

## 2023-09-03 RX ADMIN — INSULIN LISPRO 3 UNITS: 100 INJECTION, SOLUTION INTRAVENOUS; SUBCUTANEOUS at 16:53

## 2023-09-03 RX ADMIN — MIDODRINE HYDROCHLORIDE 5 MG: 5 TABLET ORAL at 11:49

## 2023-09-03 RX ADMIN — SPIRONOLACTONE 25 MG: 25 TABLET ORAL at 08:14

## 2023-09-03 RX ADMIN — BUDESONIDE AND FORMOTEROL FUMARATE DIHYDRATE 2 PUFF: 160; 4.5 AEROSOL RESPIRATORY (INHALATION) at 22:21

## 2023-09-03 RX ADMIN — INSULIN GLARGINE 10 UNITS: 100 INJECTION, SOLUTION SUBCUTANEOUS at 21:09

## 2023-09-03 NOTE — THERAPY TREATMENT NOTE
Patient Name: Aydee Solis  : 1950    MRN: [id number]                              Today's Date: 9/3/2023       Admit Date: 2023    Visit Dx:     ICD-10-CM ICD-9-CM   1. Acute UTI  N39.0 599.0   2. Acute renal insufficiency  N28.9 593.9   3. Generalized weakness  R53.1 780.79   4. Elevated troponin  R77.8 790.6     Patient Active Problem List   Diagnosis    Pneumonia of right lower lobe due to infectious organism    Cellulitis    Hypertension    Hyperlipidemia    Breast cancer    Lymphedema    Osteoporosis    Acute respiratory failure with hypoxia    Morbid obesity due to excess calories    Metabolic encephalopathy    PAULETTE (obstructive sleep apnea)    Chronic respiratory failure with hypoxia    Stage 3b chronic kidney disease    Type 2 diabetes mellitus, with long-term current use of insulin    PAF (paroxysmal atrial fibrillation)    Chronic anticoagulation    COPD (chronic obstructive pulmonary disease)    Chronic systolic CHF (congestive heart failure)    Left leg cellulitis    UTI (urinary tract infection)    Lymphedema    Dizziness    Opioid dependence    Metabolic acidosis    Confusion    Acute renal insufficiency    Alkalosis, metabolic    Hypokalemia    Hyponatremia    Acute UTI     Past Medical History:   Diagnosis Date    Arthritis     Breast cancer     Cancer     Class 3 severe obesity due to excess calories with body mass index (BMI) of 50.0 to 59.9 in adult     COPD (chronic obstructive pulmonary disease)     Diabetes mellitus     EDWARDS (dyspnea on exertion)     Elephantiasis     left leg    Hyperlipidemia     Hypertension     Leukemia     Lung cancer     Lymphedema     left arm    Osteoporosis     Tracheal cancer      Past Surgical History:   Procedure Laterality Date    APPENDECTOMY      HYSTERECTOMY      KNEE ARTHROSCOPY Right     LAPAROSCOPIC GASTRIC BANDING      LYMPHADENECTOMY Left     MASTECTOMY Left       General Information       Row Name 23 1546          Physical Therapy Time  and Intention    Document Type therapy note (daily note)  -     Mode of Treatment individual therapy;physical therapy  -       Row Name 09/03/23 1546          General Information    Patient Profile Reviewed yes  -     Existing Precautions/Restrictions fall;oxygen therapy device and L/min  2L  -       Row Name 09/03/23 1546          Living Environment    People in Home spouse  -[facility] Name 09/03/23 1546          Cognition    Orientation Status (Cognition) oriented x 3  but slow to respond today, just stares at times  -[facility] Name 09/03/23 1546          Safety Issues, Functional Mobility    Impairments Affecting Function (Mobility) balance;coordination;endurance/activity tolerance;postural/trunk control;strength;shortness of breath  -               User Key  (r) = Recorded By, (t) = Taken By, (c) = Cosigned By      Initials Name Provider Type    Trixie Viveros PTA Physical Therapist Assistant                   Mobility       [facility] Name 09/03/23 1551          Bed Mobility    Supine-Sit [place] (Bed Mobility) 2 person assist;minimum assist (75% patient effort);moderate assist (50% patient effort)  -     Sit-Supine [place] (Bed Mobility) 2 person assist;maximum assist (25% patient effort)  -     Assistive Device (Bed Mobility) bed rails;head of bed elevated  -     Comment, (Bed Mobility) more assist required husb present and states its only him at home to help her, he states he could not get her IN/OOB at present assist level  -[facility] Name 09/03/23 1551          Bed-Chair Transfer    Bed-Chair [place] (Transfers) not tested  -[facility] Name 09/03/23 1551          Sit-Stand Transfer    Sit-Stand [place] (Transfers) 2 person assist;minimum assist (75% patient effort)  -     Assistive Device (Sit-Stand Transfers) walker, front-wheeled  -     Comment, (Sit-Stand Transfer) slightly elevated bed to assist  -[facility] Name 09/03/23 1551          Gait/Stairs  (Locomotion)    [place] Level (Gait) 2 person assist;minimum assist (75% patient effort);contact guard  -     Assistive Device (Gait) walker, front-wheeled  -     Distance in Feet (Gait) 60ft, 3-4 standing stops, unsure why, no c/o, just stares ahead, no conversation, assist to guide rwx away from doorway and objects in hallway  -     Deviations/Abnormal Patterns (Gait) rachid decreased;base of support, wide;festinating/shuffling  -     Bilateral Gait Deviations --  improved posture w/o cues  -     Comment, (Gait/Stairs) cues to keep on task  -               User Key  (r) = Recorded By, (t) = Taken By, (c) = Cosigned By      Initials Name Provider Type    Trixie Viveros PTA Physical Therapist Assistant                   Obj/Interventions       Row Name 09/03/23 1557          Motor Skills    Therapeutic Exercise --  assisted pt to BR and waited w/pt until finished due to safety, called nsg to re-apply brief once in bed  -               User Key  (r) = Recorded By, (t) = Taken By, (c) = Cosigned By      Initials Name Provider Type    Trixie Viveros PTA Physical Therapist Assistant                   Goals/Plan    No documentation.                  Clinical Impression       Row Name 09/03/23 1559          Pain    Pretreatment Pain Rating 0/10 - no pain  -     Posttreatment Pain Rating 0/10 - no pain  -       Row Name 09/03/23 1559          Plan of Care Review    Plan of Care Reviewed With patient;spouse;family  -     Outcome Evaluation Pt agreed to PT session, but incr slowness to respond today, denies feeling tired or lethargic, speech even slowed as if medicated, but not given any meds per fam; pt pj amb ~60ft, 3-4 standing stops, to stare/look around unsure why; pt req MAX 2 for bed mobility today, husb present and stated he would not be able to take care of her like this at home, just the 2 of them, educ at length w/fam/husb on option of SNU until pt at baseline, they all  agreed due to current pt assist level, plans SNU per husb although had been declined when offered earlier in her care  -       Row Name 09/03/23 1559          Therapy Assessment/Plan (PT)    Rehab Potential (PT) good, to achieve stated therapy goals  -     Criteria for Skilled Interventions Met (PT) yes  -       Row Name 09/03/23 1559          Vital Signs    O2 Delivery Pre Treatment supplemental O2  -       Row Name 09/03/23 1559          Positioning and Restraints    Pre-Treatment Position in bed  -     Post Treatment Position bed  -     In Bed fowlers;call light within reach;encouraged to call for assist;with nsg  -               User Key  (r) = Recorded By, (t) = Taken By, (c) = Cosigned By      Initials Name Provider Type    Trixie Viveros PTA Physical Therapist Assistant                   Outcome Measures       Row Name 09/03/23 1605 09/03/23 0814       How much help from another person do you currently need...    Turning from your back to your side while in flat bed without using bedrails? 2  - 2  -KE    Moving from lying on back to sitting on the side of a flat bed without bedrails? 2  - 2  -KE    Moving to and from a bed to a chair (including a wheelchair)? 2  - 2  -KE    Standing up from a chair using your arms (e.g., wheelchair, bedside chair)? 2  - 2  -KE    Climbing 3-5 steps with a railing? 1  - 1  -KE    To walk in hospital room? 2  - 2  -KE    AM-PAC 6 Clicks Score (PT) 11  - 11  -KE    Highest level of mobility 4 --> Transferred to chair/commode  - 4 --> Transferred to chair/commode  -              User Key  (r) = Recorded By, (t) = Taken By, (c) = Cosigned By      Initials Name Provider Type    Trixie Viveros PTA Physical Therapist Assistant    Kelly Chavez RN Registered Nurse                                 Physical Therapy Education       Title: PT OT SLP Therapies (In Progress)       Topic: Physical Therapy (Done)       Point: Mobility  training (Done)       Learning Progress Summary             Patient Acceptance, E,TB,D, VU,NR by  at 9/3/2023 1606    Acceptance, E,D, NR by  at 9/1/2023 1700   Family Acceptance, E,TB,D, VU,NR by  at 9/3/2023 1606                         Point: Home exercise program (Done)       Learning Progress Summary             Patient Acceptance, E,TB,D, VU,NR by  at 9/3/2023 1606    Acceptance, E,D, NR by  at 9/1/2023 1700   Family Acceptance, E,TB,D, VU,NR by  at 9/3/2023 1606                         Point: Body mechanics (Done)       Learning Progress Summary             Patient Acceptance, E,TB,D, VU,NR by  at 9/3/2023 1606    Acceptance, E,D, NR by  at 9/1/2023 1700   Family Acceptance, E,TB,D, VU,NR by  at 9/3/2023 1606                         Point: Precautions (Done)       Learning Progress Summary             Patient Acceptance, E,TB,D, VU,NR by  at 9/3/2023 1606    Acceptance, E,D, NR by  at 9/1/2023 1700   Family Acceptance, E,TB,D, VU,NR by  at 9/3/2023 1606                                         User Key       Initials Effective Dates Name Provider Type Discipline     03/07/18 -  Trixie Cabrera PTA Physical Therapist Assistant PT                  PT Recommendation and Plan     Plan of Care Reviewed With: patient, spouse, family  Outcome Evaluation: Pt agreed to PT session, but incr slowness to respond today, denies feeling tired or lethargic, speech even slowed as if medicated, but not given any meds per fam; pt pj amb ~60ft, 3-4 standing stops, to stare/look around unsure why; pt req MAX 2 for bed mobility today, husb present and stated he would not be able to take care of her like this at home, just the 2 of them, educ at length w/fam/husb on option of SNU until pt at baseline, they all agreed due to current pt assist level, plans SNU per husb although had been declined when offered earlier in her care     Time Calculation:         PT Charges       Row Name 09/03/23 1608              Time Calculation    Start Time 1410  -GERA      Stop Time 1506  -GERA      Time Calculation (min) 56 min  -GERA      PT Received On 09/03/23  -GERA      PT - Next Appointment 09/05/23  -GERA                User Key  (r) = Recorded By, (t) = Taken By, (c) = Cosigned By      Initials Name Provider Type    Trixie Viveros PTA Physical Therapist Assistant                  Therapy Charges for Today       Code Description Service Date Service Provider Modifiers Qty    28771423605 HC PT THER PROC EA 15 MIN 9/3/2023 Trixie Cabrera PTA GP 4    58369152476 HC PT THER SUPP EA 15 MIN 9/3/2023 Trixie Cabrera PTA GP 3            PT G-Codes  Outcome Measure Options: AM-PAC 6 Clicks Daily Activity (OT)  AM-PAC 6 Clicks Score (PT): 11  AM-PAC 6 Clicks Score (OT): 15  PT Discharge Summary  Anticipated Discharge Disposition (PT): skilled nursing facility (pending progress)    Trixie Cabrera PTA  9/3/2023

## 2023-09-03 NOTE — PROGRESS NOTES
"    DAILY PROGRESS NOTE  [facility]    Patient Identification:  Name: Aydee Solis  Age: 73 y.o.  Sex: female  :  1950  MRN: [id number]         Primary Care Physician: Bennett Duque, DO    Subjective:  Interval History: Patient feels much better today.  She does feel back to her usual.  Denies any nausea vomiting chest pain.  She is not complain of any worsening shortness of breath.  Her appetite is good as evident by the empty breakfast tray    Objective: Clinically looked much much better today.  Seemingly back to baseline.  No family at bedside    Scheduled Meds:allopurinol, 100 mg, Oral, Daily  apixaban, 5 mg, Oral, BID  atorvastatin, 10 mg, Oral, Daily  budesonide-formoterol, 2 puff, Inhalation, BID - RT   And  tiotropium bromide monohydrate, 2 puff, Inhalation, Daily - RT  DULoxetine, 60 mg, Oral, Daily  insulin glargine, 10 Units, Subcutaneous, Nightly  insulin lispro, 2-7 Units, Subcutaneous, 4x Daily AC & at Bedtime  metoprolol succinate XL, 25 mg, Oral, BID  midodrine, 5 mg, Oral, TID AC  sodium chloride, 10 mL, Intravenous, Q12H  spironolactone, 25 mg, Oral, Daily  torsemide, 40 mg, Oral, Daily      Continuous Infusions:     Vital signs in last 24 hours:  Temp:  [97.5 °F (36.4 °C)-98.2 °F (36.8 °C)] 97.5 °F (36.4 °C)  Heart Rate:  [59-64] 61  Resp:  [16-18] 18  BP: ()/(63-73) 114/68    Intake/Output:    Intake/Output Summary (Last 24 hours) at 9/3/2023 1105  Last data filed at 9/3/2023 0015  Gross per 24 hour   Intake 580 ml   Output --   Net 580 ml       Exam:  /68   Pulse 61   Temp 97.5 °F (36.4 °C) (Oral)   Resp 18   Ht 167.6 cm (65.98\")   Wt 111 kg (245 lb)   SpO2 99%   BMI 39.56 kg/m²     General Appearance:    Alert, cooperative, much more animated and seems back to baseline, oriented x3                         Throat:   Oral mucosa pink and moist                           Neck:   Supple, no JVD                         Lungs:    Clear to " auscultation bilaterally, respirations unlabored                          Heart:    Regular rate and rhythm, S1 and S2 normal                  Abdomen:     Soft, nontender, bowel sounds active                  Extremities: Lymphedema-like changes, volume status still not bad but she started to reaccumulate                        Pulses:   Pulses palpable in lower extremities                  Neurologic:   CNII-XII intact     Data Review:  Labs in chart were reviewed.    Assessment:  Active Hospital Problems    Diagnosis  POA    **Acute UTI [N39.0]  Yes    Acute renal insufficiency [N28.9]  Yes    Chronic systolic CHF (congestive heart failure) [I50.22]  Yes    PAULETTE (obstructive sleep apnea) [G47.33]  Yes    Chronic respiratory failure with hypoxia [J96.11]  Yes    Stage 3b chronic kidney disease [N18.32]  Yes    Type 2 diabetes mellitus, with long-term current use of insulin [E11.9, Z79.4]  Not Applicable    PAF (paroxysmal atrial fibrillation) [I48.0]  Yes    Chronic anticoagulation [Z79.01]  Not Applicable    COPD (chronic obstructive pulmonary disease) [J44.9]  Yes    Metabolic encephalopathy [G93.41]  Yes    Morbid obesity due to excess calories [E66.01]  Yes    Hypertension [I10]  Yes    Hyperlipidemia [E78.5]  Yes    Lymphedema [I89.0]  Yes      Resolved Hospital Problems   No resolved problems to display.       Plan:    UTI with secondary metabolic encephalopathy now resolved              -Repeat BC NGTD              -1 of 2 positive for staph in admission is contaminant              -Urine culture is a cath specimen growing gram-negative willie that is pansensitive              -Rocephin D5 antibiotics complete                               Hypotension resolving -probably runs low normal at baseline but her pressures limit the ability to tolerate diuretics              -Parameters on beta-blocker   -I initiated midodrine 9/2/23 and did so because I am trying to reinstate her diuretics on that same day.  I was  able to get her spironolactone started then and her Aldactone is set to start today 9/3/2023 pending blood pressures as these meds have parameters        DM2 with hyperglycemia/CKD  - controlled much improved on basal bolus regimen      ARF resolved -CKD 3B at baseline     Chronic biventricular CHF with AICD and PAF              -Continue metoprolol/Eliquis              -Diuretics as above   -HLD on statin   -Add uric acid to a.m. labs    COPD/chronic hypoxic respiratory failure on baseline 2 L     PT following -[facility] for discharge needs -suggest home with care [facility] health       Curt Dias MD  9/3/2023  11:05 EDT

## 2023-09-03 NOTE — PLAN OF CARE
Goal Outcome Evaluation:  Plan of Care Reviewed With: patient, spouse, family           Outcome Evaluation: Pt agreed to PT session, but incr slowness to respond today, denies feeling tired or lethargic, speech even slowed as if medicated, but not given any meds per fam; pt pj amb ~60ft, 3-4 standing stops, to stare/look around unsure why; pt req MAX 2 for bed mobility today, husb present and stated he would not be able to take care of her like this at home, just the 2 of them, educ at length w/fam/husb on option of SNU until pt at baseline, they all agreed due to current pt assist level, plans SNU per [facility] although had been declined when offered earlier in her care      Anticipated Discharge Disposition (PT): skilled nursing facility (pending progress)

## 2023-09-03 NOTE — PLAN OF CARE
Goal Outcome Evaluation:         Pt resting in bed , oriented to self, no sign of acute distress noted, VSS, SR on tele 2 L NC. Turn q 2. Nursing will continue to monitor.

## 2023-09-04 LAB
ANION GAP SERPL CALCULATED.3IONS-SCNC: 7.9 MMOL/L (ref 5–15)
BUN SERPL-MCNC: 39 MG/DL (ref 8–23)
BUN/CREAT SERPL: 28.7 (ref 7–25)
CALCIUM SPEC-SCNC: 9 MG/DL (ref 8.6–10.5)
CHLORIDE SERPL-SCNC: 100 MMOL/L (ref 98–107)
CO2 SERPL-SCNC: 29.1 MMOL/L (ref 22–29)
CORTIS SERPL-MCNC: 20.2 MCG/DL
CORTIS SERPL-MCNC: 24.9 MCG/DL
CORTIS SERPL-MCNC: 3.95 MCG/DL
CORTIS SERPL-MCNC: 7.13 MCG/DL
CREAT SERPL-MCNC: 1.36 MG/DL (ref 0.57–1)
EGFRCR SERPLBLD CKD-EPI 2021: 40.7 ML/MIN/1.73
GLUCOSE BLDC GLUCOMTR-MCNC: 131 MG/DL (ref 70–130)
GLUCOSE BLDC GLUCOMTR-MCNC: 175 MG/DL (ref 70–130)
GLUCOSE BLDC GLUCOMTR-MCNC: 221 MG/DL (ref 70–130)
GLUCOSE BLDC GLUCOMTR-MCNC: 224 MG/DL (ref 70–130)
GLUCOSE SERPL-MCNC: 139 MG/DL (ref 65–99)
POTASSIUM SERPL-SCNC: 4.3 MMOL/L (ref 3.5–5.2)
SODIUM SERPL-SCNC: 137 MMOL/L (ref 136–145)
URATE SERPL-MCNC: 6.4 MG/DL (ref 2.4–5.7)

## 2023-09-04 PROCEDURE — 94761 N-INVAS EAR/PLS OXIMETRY MLT: CPT

## 2023-09-04 PROCEDURE — 82533 TOTAL CORTISOL: CPT | Performed by: STUDENT IN AN ORGANIZED HEALTH CARE EDUCATION/TRAINING PROGRAM

## 2023-09-04 PROCEDURE — 25010000002 COSYNTROPIN PER 0.25 MG: Performed by: STUDENT IN AN ORGANIZED HEALTH CARE EDUCATION/TRAINING PROGRAM

## 2023-09-04 PROCEDURE — 63710000001 INSULIN LISPRO (HUMAN) PER 5 UNITS: Performed by: STUDENT IN AN ORGANIZED HEALTH CARE EDUCATION/TRAINING PROGRAM

## 2023-09-04 PROCEDURE — 94799 UNLISTED PULMONARY SVC/PX: CPT

## 2023-09-04 PROCEDURE — 94664 DEMO&/EVAL PT USE INHALER: CPT

## 2023-09-04 PROCEDURE — 82948 REAGENT STRIP/BLOOD GLUCOSE: CPT

## 2023-09-04 PROCEDURE — 84550 ASSAY OF BLOOD/URIC ACID: CPT | Performed by: HOSPITALIST

## 2023-09-04 PROCEDURE — 63710000001 INSULIN GLARGINE PER 5 UNITS: Performed by: STUDENT IN AN ORGANIZED HEALTH CARE EDUCATION/TRAINING PROGRAM

## 2023-09-04 PROCEDURE — 80048 BASIC METABOLIC PNL TOTAL CA: CPT | Performed by: HOSPITALIST

## 2023-09-04 PROCEDURE — 82024 ASSAY OF ACTH: CPT | Performed by: STUDENT IN AN ORGANIZED HEALTH CARE EDUCATION/TRAINING PROGRAM

## 2023-09-04 RX ORDER — COSYNTROPIN 0.25 MG/ML
0.25 INJECTION, POWDER, FOR SOLUTION INTRAMUSCULAR; INTRAVENOUS ONCE
Status: COMPLETED | OUTPATIENT
Start: 2023-09-04 | End: 2023-09-04

## 2023-09-04 RX ADMIN — ATORVASTATIN CALCIUM 10 MG: 20 TABLET, FILM COATED ORAL at 09:23

## 2023-09-04 RX ADMIN — INSULIN GLARGINE 10 UNITS: 100 INJECTION, SOLUTION SUBCUTANEOUS at 21:30

## 2023-09-04 RX ADMIN — COSYNTROPIN 0.25 MG: 0.25 INJECTION, POWDER, LYOPHILIZED, FOR SOLUTION INTRAMUSCULAR; INTRAVENOUS at 10:12

## 2023-09-04 RX ADMIN — INSULIN LISPRO 3 UNITS: 100 INJECTION, SOLUTION INTRAVENOUS; SUBCUTANEOUS at 17:23

## 2023-09-04 RX ADMIN — MIDODRINE HYDROCHLORIDE 5 MG: 5 TABLET ORAL at 17:23

## 2023-09-04 RX ADMIN — METOPROLOL SUCCINATE 25 MG: 25 TABLET, EXTENDED RELEASE ORAL at 09:23

## 2023-09-04 RX ADMIN — Medication 10 ML: at 21:30

## 2023-09-04 RX ADMIN — INSULIN LISPRO 2 UNITS: 100 INJECTION, SOLUTION INTRAVENOUS; SUBCUTANEOUS at 12:06

## 2023-09-04 RX ADMIN — MIDODRINE HYDROCHLORIDE 5 MG: 5 TABLET ORAL at 12:06

## 2023-09-04 RX ADMIN — MIDODRINE HYDROCHLORIDE 5 MG: 5 TABLET ORAL at 07:22

## 2023-09-04 RX ADMIN — METOPROLOL SUCCINATE 25 MG: 25 TABLET, EXTENDED RELEASE ORAL at 21:30

## 2023-09-04 RX ADMIN — BUDESONIDE AND FORMOTEROL FUMARATE DIHYDRATE 2 PUFF: 160; 4.5 AEROSOL RESPIRATORY (INHALATION) at 07:54

## 2023-09-04 RX ADMIN — APIXABAN 5 MG: 5 TABLET, FILM COATED ORAL at 09:23

## 2023-09-04 RX ADMIN — TIOTROPIUM BROMIDE INHALATION SPRAY 2 PUFF: 3.12 SPRAY, METERED RESPIRATORY (INHALATION) at 07:54

## 2023-09-04 RX ADMIN — ALLOPURINOL 100 MG: 100 TABLET ORAL at 09:23

## 2023-09-04 RX ADMIN — TORSEMIDE 40 MG: 20 TABLET ORAL at 09:23

## 2023-09-04 RX ADMIN — BUDESONIDE AND FORMOTEROL FUMARATE DIHYDRATE 2 PUFF: 160; 4.5 AEROSOL RESPIRATORY (INHALATION) at 21:34

## 2023-09-04 RX ADMIN — DULOXETINE HYDROCHLORIDE 60 MG: 60 CAPSULE, DELAYED RELEASE ORAL at 09:23

## 2023-09-04 RX ADMIN — Medication 10 ML: at 09:22

## 2023-09-04 RX ADMIN — APIXABAN 5 MG: 5 TABLET, FILM COATED ORAL at 21:30

## 2023-09-04 RX ADMIN — INSULIN LISPRO 3 UNITS: 100 INJECTION, SOLUTION INTRAVENOUS; SUBCUTANEOUS at 21:30

## 2023-09-04 NOTE — PLAN OF CARE
Goal Outcome Evaluation:      Pt disoriented to time, Q2 turns. Incontinence care provided. VSS. Will continue to monitor.

## 2023-09-04 NOTE — PROGRESS NOTES
Name: Aydee Solis ADMIT: 2023   : 1950  PCP: Bennett Duque DO    MRN: [id number] LOS: 5 days   AGE/SEX: 73 y.o. female  ROOM: [facility]     Subjective   Subjective   No complaints. She is confused. Her family is at bedside and they report that the confusion is significantly improved since admission, but certainly worse than baseline       Objective   Objective   Vital Signs  Temp:  [97.3 °F (36.3 °C)-97.5 °F (36.4 °C)] 97.3 °F (36.3 °C)  Heart Rate:  [59-68] 68  Resp:  [18] 18  BP: ()/(56-72) 93/72  SpO2:  [94 %-99 %] 99 %  on  Flow (L/min):  [2] 2;   Device (Oxygen Therapy): room air  Body mass index is 39.56 kg/m².  Physical Exam  Constitutional:       General: She is not in acute distress.     Appearance: She is obese. She is not toxic-appearing.   Cardiovascular:      Rate and Rhythm: Normal rate and regular rhythm.      Heart sounds: Normal heart sounds.   Pulmonary:      Effort: Pulmonary effort is normal.      Breath sounds: Normal breath sounds.   Abdominal:      General: Bowel sounds are normal.      Palpations: Abdomen is soft.   Musculoskeletal:         General: No tenderness.      Right lower leg: Edema present.      Left lower leg: Edema present.   Neurological:      Mental Status: She is alert.   Psychiatric:         Mood and Affect: Mood normal.         Behavior: Behavior normal.       Results Review     I reviewed the patient's new clinical results.  Results from last 7 days   Lab Units 23  0727 23  0701 23  0634 23  0553   WBC 10*3/mm3 5.70 5.70 6.01 5.79   HEMOGLOBIN g/dL 12.5 12.3 12.4 11.8*   PLATELETS 10*3/mm3 193 190 183 188     Results from last 7 days   Lab Units 23  0631 23  0622 23  0727 23  0701   SODIUM mmol/L 137 133* 137 134*   POTASSIUM mmol/L 4.3 4.5 4.6 4.3   CHLORIDE mmol/L 100 99 99 98   CO2 mmol/L 29.1* 26.6 27.0 27.6   BUN mg/dL 39* 35* 36* 36*   CREATININE mg/dL 1.36* 1.19* 1.14* 1.29*   GLUCOSE mg/dL  139* 112* 124* 136*   Estimated Creatinine Clearance: 45.1 mL/min (A) (by C-G formula based on SCr of 1.36 mg/dL (H)).  Results from last 7 days   Lab Units 09/01/23  0701 08/29/23  1415   ALBUMIN g/dL 3.1* 3.2*   BILIRUBIN mg/dL 0.8 1.2   ALK PHOS U/L 171* 143*   AST (SGOT) U/L 21 25   ALT (SGPT) U/L 11 9     Results from last 7 days   Lab Units 09/04/23  0631 09/03/23  0622 09/02/23  0727 09/01/23  0701 08/31/23  0634 08/30/23  0553 08/29/23  1415   CALCIUM mg/dL 9.0 8.7 8.7 8.8 8.3* 8.2* 8.7   ALBUMIN g/dL  --   --   --  3.1*  --   --  3.2*   MAGNESIUM mg/dL  --   --  4.2* 2.2 2.2 4.2* 2.1   PHOSPHORUS mg/dL  --   --  3.3 3.2 3.0 3.6  --      Results from last 7 days   Lab Units 08/29/23  1510   LACTATE mmol/L 1.3     COVID19   Date Value Ref Range Status   01/05/2021 Not Detected Not Detected - Ref. Range Final     SARS-CoV-2, ARIANNA   Date Value Ref Range Status   12/12/2022 NEGATIVE Negative Final     Comment:     The 2019-CoV rRT-PCR Assay is only for use under a Food and Drug Administration Emergency Use Authorization. The performance characteristics of the assay were verified by the Clinical Laboratory at [facility]. Results should be used in   conjunction with the patient's clinical symptoms, medical history, and other clinical/laboratory findings to determine an overall clinical diagnosis. Negative results do not preclude infection with SARS-CoV-2 (COVID-19).    Test parameters have not been validated for screening asymptomatic patients.   11/26/2022 NEGATIVE Negative Final     Comment:     The 2019-CoV rRT-PCR Assay is only for use under a Food and Drug Administration Emergency Use Authorization. The performance characteristics of the assay were verified by the Clinical Laboratory at [facility]. Results should be used in   conjunction with the patient's clinical symptoms, medical history, and other clinical/laboratory findings to determine an overall clinical diagnosis.  Negative results do not preclude infection with SARS-CoV-2 (COVID-19).    Test parameters have not been validated for screening asymptomatic patients.   08/09/2022 NEGATIVE Negative Final     Comment:     The 2019-CoV rRT-PCR Assay is only for use under a Food and Drug Administration Emergency Use Authorization. The performance characteristics of the assay were verified by the Clinical Laboratory at [facility]. Results should be used in   conjunction with the patient's clinical symptoms, medical history, and other clinical/laboratory findings to determine an overall clinical diagnosis. Negative results do not preclude infection with SARS-CoV-2 (COVID-19).    Test parameters have not been validated for screening asymptomatic patients.     Glucose   Date/Time Value Ref Range Status   09/04/2023 1126 175 (H) 70 - 130 mg/dL Final   09/04/2023 0610 131 (H) 70 - 130 mg/dL Final   09/03/2023 2037 233 (H) 70 - 130 mg/dL Final   09/03/2023 1640 223 (H) 70 - 130 mg/dL Final   09/03/2023 1143 129 70 - 130 mg/dL Final   09/03/2023 0612 110 70 - 130 mg/dL Final   09/02/2023 2023 226 (H) 70 - 130 mg/dL Final       XR Chest 1 View  Narrative: XR CHEST 1 VW-     HISTORY: Female who is 73 years-old, hypoxia     TECHNIQUE: Frontal view of the chest     COMPARISON: 7/31/2023     FINDINGS: The heart is enlarged. Left-sided pacemaker, cardiac leads are  seen. Pulmonary vasculature is unremarkable. Aorta is calcified. No  focal pulmonary consolidation, pleural effusion, or pneumothorax. No  acute osseous process.     Impression: No focal pulmonary consolidation. Cardiomegaly. Follow-up as  clinical indications persist.     This report was finalized on 8/30/2023 4:25 PM by Dr. Kristian Keenan M.D.       Scheduled Medications  allopurinol, 100 mg, Oral, Daily  apixaban, 5 mg, Oral, BID  atorvastatin, 10 mg, Oral, Daily  budesonide-formoterol, 2 puff, Inhalation, BID - RT   And  tiotropium bromide monohydrate, 2 puff,  Inhalation, Daily - RT  DULoxetine, 60 mg, Oral, Daily  insulin glargine, 10 Units, Subcutaneous, Nightly  insulin lispro, 2-7 Units, Subcutaneous, 4x Daily AC & at Bedtime  metoprolol succinate XL, 25 mg, Oral, BID  midodrine, 5 mg, Oral, TID AC  sodium chloride, 10 mL, Intravenous, Q12H  torsemide, 40 mg, Oral, Daily    Infusions   Diet  Diet: Cardiac Diets, Diabetic Diets; Healthy Heart (2-3 Na+); Consistent Carbohydrate; Texture: Regular Texture (IDDSI 7); Fluid Consistency: Thin (IDDSI 0)       Assessment/Plan     Active Hospital Problems    Diagnosis  POA    **Acute UTI [N39.0]  Yes    Acute renal insufficiency [N28.9]  Yes    Chronic systolic CHF (congestive heart failure) [I50.22]  Yes    PAULETTE (obstructive sleep apnea) [G47.33]  Yes    Chronic respiratory failure with hypoxia [J96.11]  Yes    Stage 3b chronic kidney disease [N18.32]  Yes    Type 2 diabetes mellitus, with long-term current use of insulin [E11.9, Z79.4]  Not Applicable    PAF (paroxysmal atrial fibrillation) [I48.0]  Yes    Chronic anticoagulation [Z79.01]  Not Applicable    COPD (chronic obstructive pulmonary disease) [J44.9]  Yes    Metabolic encephalopathy [G93.41]  Yes    Morbid obesity due to excess calories [E66.01]  Yes    Hypertension [I10]  Yes    Hyperlipidemia [E78.5]  Yes    Lymphedema [I89.0]  Yes      Resolved Hospital Problems   No resolved problems to display.       73 y.o. female admitted with Acute UTI.    E coli UTI-completed a course of ceftriaxone this admission  Metabolic encephalopathy-related to the above. Neurology evaluated   MELANIE on CKD 3a-resolved with IVF  Hypotension-started on midodrine yesterday to help augment bp for diuretics. Bp is still a bit low today. Holding spironolactone. Performed stim test this morning given low cortisol earlier this admission. This was normal. Continue current dose of midodrine. If not improved by tomorrow then will increase.  Chronic systolic heart failure s/p ICD-on torsemide,  metoprolol. Bp limits additional GDMT  Paroxsymal afib-eliquis, metoprolol  Hyperlipidemia-statin  Type 2 diabetes-A1c 8.5. glucose is at goal acutely  COPD with chronic respiratory failure with hypoxia on 2L home O2-LAMA/LABA/ICS  Eliquis (home med) for DVT prophylaxis.  Limited code (no CPR, no intubation).  Discussed with patient, family, and nursing staff.  Anticipate discharge home with HH vs SNU facility in 1-2 days.      Ab Campbell MD  [facility]  09/04/23  12:35 EDT    I wore protective equipment throughout this patient encounter including a face mask, gloves and protective eyewear.  Hand hygiene was performed before donning protective equipment and after removal when leaving the room.

## 2023-09-05 LAB
ANION GAP SERPL CALCULATED.3IONS-SCNC: 10.6 MMOL/L (ref 5–15)
BUN SERPL-MCNC: 40 MG/DL (ref 8–23)
BUN/CREAT SERPL: 27.6 (ref 7–25)
CALCIUM SPEC-SCNC: 9.2 MG/DL (ref 8.6–10.5)
CHLORIDE SERPL-SCNC: 98 MMOL/L (ref 98–107)
CO2 SERPL-SCNC: 29.4 MMOL/L (ref 22–29)
CREAT SERPL-MCNC: 1.45 MG/DL (ref 0.57–1)
DEPRECATED RDW RBC AUTO: 46.2 FL (ref 37–54)
EGFRCR SERPLBLD CKD-EPI 2021: 37.7 ML/MIN/1.73
ERYTHROCYTE [DISTWIDTH] IN BLOOD BY AUTOMATED COUNT: 15.2 % (ref 12.3–15.4)
GLUCOSE BLDC GLUCOMTR-MCNC: 132 MG/DL (ref 70–130)
GLUCOSE BLDC GLUCOMTR-MCNC: 171 MG/DL (ref 70–130)
GLUCOSE BLDC GLUCOMTR-MCNC: 213 MG/DL (ref 70–130)
GLUCOSE BLDC GLUCOMTR-MCNC: 259 MG/DL (ref 70–130)
GLUCOSE SERPL-MCNC: 141 MG/DL (ref 65–99)
HCT VFR BLD AUTO: 40.1 % (ref 34–46.6)
HGB BLD-MCNC: 12.3 G/DL (ref 12–15.9)
MCH RBC QN AUTO: 26.1 PG (ref 26.6–33)
MCHC RBC AUTO-ENTMCNC: 30.7 G/DL (ref 31.5–35.7)
MCV RBC AUTO: 85 FL (ref 79–97)
PLATELET # BLD AUTO: 240 10*3/MM3 (ref 140–450)
PMV BLD AUTO: 9.9 FL (ref 6–12)
POTASSIUM SERPL-SCNC: 4 MMOL/L (ref 3.5–5.2)
RBC # BLD AUTO: 4.72 10*6/MM3 (ref 3.77–5.28)
SODIUM SERPL-SCNC: 138 MMOL/L (ref 136–145)
WBC NRBC COR # BLD: 5.95 10*3/MM3 (ref 3.4–10.8)

## 2023-09-05 PROCEDURE — 80048 BASIC METABOLIC PNL TOTAL CA: CPT | Performed by: STUDENT IN AN ORGANIZED HEALTH CARE EDUCATION/TRAINING PROGRAM

## 2023-09-05 PROCEDURE — 94761 N-INVAS EAR/PLS OXIMETRY MLT: CPT

## 2023-09-05 PROCEDURE — 94799 UNLISTED PULMONARY SVC/PX: CPT

## 2023-09-05 PROCEDURE — 97110 THERAPEUTIC EXERCISES: CPT

## 2023-09-05 PROCEDURE — 94664 DEMO&/EVAL PT USE INHALER: CPT

## 2023-09-05 PROCEDURE — 63710000001 INSULIN GLARGINE PER 5 UNITS: Performed by: STUDENT IN AN ORGANIZED HEALTH CARE EDUCATION/TRAINING PROGRAM

## 2023-09-05 PROCEDURE — 82948 REAGENT STRIP/BLOOD GLUCOSE: CPT

## 2023-09-05 PROCEDURE — 85027 COMPLETE CBC AUTOMATED: CPT | Performed by: STUDENT IN AN ORGANIZED HEALTH CARE EDUCATION/TRAINING PROGRAM

## 2023-09-05 PROCEDURE — 97116 GAIT TRAINING THERAPY: CPT

## 2023-09-05 PROCEDURE — 63710000001 INSULIN LISPRO (HUMAN) PER 5 UNITS: Performed by: STUDENT IN AN ORGANIZED HEALTH CARE EDUCATION/TRAINING PROGRAM

## 2023-09-05 RX ORDER — BISACODYL 10 MG
10 SUPPOSITORY, RECTAL RECTAL DAILY PRN
Status: DISCONTINUED | OUTPATIENT
Start: 2023-09-05 | End: 2023-09-06 | Stop reason: HOSPADM

## 2023-09-05 RX ORDER — POLYETHYLENE GLYCOL 3350 17 G/17G
17 POWDER, FOR SOLUTION ORAL DAILY
Status: DISCONTINUED | OUTPATIENT
Start: 2023-09-05 | End: 2023-09-06 | Stop reason: HOSPADM

## 2023-09-05 RX ORDER — BISACODYL 5 MG/1
5 TABLET, DELAYED RELEASE ORAL DAILY PRN
Status: DISCONTINUED | OUTPATIENT
Start: 2023-09-05 | End: 2023-09-06 | Stop reason: HOSPADM

## 2023-09-05 RX ORDER — AMOXICILLIN 250 MG
2 CAPSULE ORAL 2 TIMES DAILY PRN
Status: DISCONTINUED | OUTPATIENT
Start: 2023-09-05 | End: 2023-09-06 | Stop reason: HOSPADM

## 2023-09-05 RX ADMIN — MIDODRINE HYDROCHLORIDE 5 MG: 5 TABLET ORAL at 11:37

## 2023-09-05 RX ADMIN — MIDODRINE HYDROCHLORIDE 5 MG: 5 TABLET ORAL at 17:14

## 2023-09-05 RX ADMIN — MIDODRINE HYDROCHLORIDE 5 MG: 5 TABLET ORAL at 06:44

## 2023-09-05 RX ADMIN — BUDESONIDE AND FORMOTEROL FUMARATE DIHYDRATE 2 PUFF: 160; 4.5 AEROSOL RESPIRATORY (INHALATION) at 07:50

## 2023-09-05 RX ADMIN — INSULIN LISPRO 4 UNITS: 100 INJECTION, SOLUTION INTRAVENOUS; SUBCUTANEOUS at 21:49

## 2023-09-05 RX ADMIN — ALLOPURINOL 100 MG: 100 TABLET ORAL at 09:37

## 2023-09-05 RX ADMIN — INSULIN GLARGINE 20 UNITS: 100 INJECTION, SOLUTION SUBCUTANEOUS at 21:50

## 2023-09-05 RX ADMIN — APIXABAN 5 MG: 5 TABLET, FILM COATED ORAL at 20:14

## 2023-09-05 RX ADMIN — Medication 10 ML: at 22:00

## 2023-09-05 RX ADMIN — BUDESONIDE AND FORMOTEROL FUMARATE DIHYDRATE 2 PUFF: 160; 4.5 AEROSOL RESPIRATORY (INHALATION) at 22:24

## 2023-09-05 RX ADMIN — DULOXETINE HYDROCHLORIDE 60 MG: 60 CAPSULE, DELAYED RELEASE ORAL at 09:37

## 2023-09-05 RX ADMIN — POLYETHYLENE GLYCOL 3350 17 G: 17 POWDER, FOR SOLUTION ORAL at 17:15

## 2023-09-05 RX ADMIN — INSULIN LISPRO 3 UNITS: 100 INJECTION, SOLUTION INTRAVENOUS; SUBCUTANEOUS at 11:37

## 2023-09-05 RX ADMIN — METOPROLOL SUCCINATE 25 MG: 25 TABLET, EXTENDED RELEASE ORAL at 09:37

## 2023-09-05 RX ADMIN — TIOTROPIUM BROMIDE INHALATION SPRAY 2 PUFF: 3.12 SPRAY, METERED RESPIRATORY (INHALATION) at 07:51

## 2023-09-05 RX ADMIN — APIXABAN 5 MG: 5 TABLET, FILM COATED ORAL at 09:37

## 2023-09-05 RX ADMIN — INSULIN LISPRO 2 UNITS: 100 INJECTION, SOLUTION INTRAVENOUS; SUBCUTANEOUS at 17:14

## 2023-09-05 RX ADMIN — Medication 10 ML: at 09:37

## 2023-09-05 RX ADMIN — ATORVASTATIN CALCIUM 10 MG: 20 TABLET, FILM COATED ORAL at 09:37

## 2023-09-05 RX ADMIN — METOPROLOL SUCCINATE 25 MG: 25 TABLET, EXTENDED RELEASE ORAL at 20:14

## 2023-09-05 NOTE — THERAPY TREATMENT NOTE
Patient Name: Aydee Solis  : 1950    MRN: [id number]                              Today's Date: 2023       Admit Date: 2023    Visit Dx:     ICD-10-CM ICD-9-CM   1. Acute UTI  N39.0 599.0   2. Acute renal insufficiency  N28.9 593.9   3. Generalized weakness  R53.1 780.79   4. Elevated troponin  R77.8 790.6     Patient Active Problem List   Diagnosis    Pneumonia of right lower lobe due to infectious organism    Cellulitis    Hypertension    Hyperlipidemia    Breast cancer    Lymphedema    Osteoporosis    Acute respiratory failure with hypoxia    Morbid obesity due to excess calories    Metabolic encephalopathy    PAULETTE (obstructive sleep apnea)    Chronic respiratory failure with hypoxia    Stage 3b chronic kidney disease    Type 2 diabetes mellitus, with long-term current use of insulin    PAF (paroxysmal atrial fibrillation)    Chronic anticoagulation    COPD (chronic obstructive pulmonary disease)    Chronic systolic CHF (congestive heart failure)    Left leg cellulitis    UTI (urinary tract infection)    Lymphedema    Dizziness    Opioid dependence    Metabolic acidosis    Confusion    Acute renal insufficiency    Alkalosis, metabolic    Hypokalemia    Hyponatremia    Acute UTI     Past Medical History:   Diagnosis Date    Arthritis     Breast cancer     Cancer     Class 3 severe obesity due to excess calories with body mass index (BMI) of 50.0 to 59.9 in adult     COPD (chronic obstructive pulmonary disease)     Diabetes mellitus     EDWARDS (dyspnea on exertion)     Elephantiasis     left leg    Hyperlipidemia     Hypertension     Leukemia     Lung cancer     Lymphedema     left arm    Osteoporosis     Tracheal cancer      Past Surgical History:   Procedure Laterality Date    APPENDECTOMY      HYSTERECTOMY      KNEE ARTHROSCOPY Right     LAPAROSCOPIC GASTRIC BANDING      LYMPHADENECTOMY Left     MASTECTOMY Left       General Information       Row Name 23 130          Physical Therapy Time  and Intention    Document Type therapy note (daily note)  -     Mode of Treatment physical therapy  -       Row Name 09/05/23 1303          General Information    Existing Precautions/Restrictions fall;oxygen therapy device and L/min  -       Row Name 09/05/23 1303          Cognition    Orientation Status (Cognition) oriented x 3  -               User Key  (r) = Recorded By, (t) = Taken By, (c) = Cosigned By      Initials Name Provider Type     Daphnie Cabrera PTA Physical Therapist Assistant                   Mobility       Row Name 09/05/23 1303          Bed Mobility    Bed Mobility supine-sit;sit-supine  -     Supine-Sit Mora (Bed Mobility) standby assist  -     Sit-Supine Mora (Bed Mobility) minimum assist (75% patient effort);2 person assist  -     Assistive Device (Bed Mobility) bed rails;head of bed elevated  -     Comment, (Bed Mobility) assist to raise B LEs into bed  -       Row Name 09/05/23 1303          Sit-Stand Transfer    Sit-Stand Mora (Transfers) contact guard  -     Assistive Device (Sit-Stand Transfers) walker, front-wheeled  -       Row Name 09/05/23 1303          Gait/Stairs (Locomotion)    Mora Level (Gait) contact guard;2 person assist  -     Assistive Device (Gait) walker, front-wheeled  -     Distance in Feet (Gait) 100  -     Deviations/Abnormal Patterns (Gait) rachid decreased;stride length decreased  -     Bilateral Gait Deviations forward flexed posture  -     Comment, (Gait/Stairs) 2nd person for safety; pt would pause every few steps and look around, though stated she felt ok  -               User Key  (r) = Recorded By, (t) = Taken By, (c) = Cosigned By      Initials Name Provider Type     Daphnie Cabrera PTA Physical Therapist Assistant                   Obj/Interventions       Row Name 09/05/23 1306          Motor Skills    Therapeutic Exercise --  seated AP and LAQ x5 reps  -               User Key   (r) = Recorded By, (t) = Taken By, (c) = Cosigned By      Initials Name Provider Type    Daphnie Salazar PTA Physical Therapist Assistant                   Goals/Plan    No documentation.                  Clinical Impression       Row Name 09/05/23 1307          Pain    Pretreatment Pain Rating 0/10 - no pain  -SM     Posttreatment Pain Rating 0/10 - no pain  -SM       Row Name 09/05/23 1307          Positioning and Restraints    Pre-Treatment Position in bed  -SM     Post Treatment Position bed  -SM     In Bed supine;call light within reach;encouraged to call for assist;exit alarm on  -SM               User Key  (r) = Recorded By, (t) = Taken By, (c) = Cosigned By      Initials Name Provider Type    Daphnie Salazar PTA Physical Therapist Assistant                   Outcome Measures       Row Name 09/05/23 1307          How much help from another person do you currently need...    Turning from your back to your side while in flat bed without using bedrails? 3  -SM     Moving from lying on back to sitting on the side of a flat bed without bedrails? 3  -SM     Moving to and from a bed to a chair (including a wheelchair)? 3  -SM     Standing up from a chair using your arms (e.g., wheelchair, bedside chair)? 3  -SM     Climbing 3-5 steps with a railing? 2  -SM     To walk in hospital room? 3  -SM     AM-PAC 6 Clicks Score (PT) 17  -SM     Highest level of mobility 5 --> Static standing  -SM       Row Name 09/05/23 1307          Functional Assessment    Outcome Measure Options AM-PAC 6 Clicks Basic Mobility (PT)  -SM               User Key  (r) = Recorded By, (t) = Taken By, (c) = Cosigned By      Initials Name Provider Type    Daphnie Salazar PTA Physical Therapist Assistant                                 Physical Therapy Education       Title: PT OT SLP Therapies (In Progress)       Topic: Physical Therapy (Done)       Point: Mobility training (Done)       Learning Progress Summary              Patient Acceptance, E,TB,D, VU,NR by  at 9/5/2023 1307    Acceptance, E,TB,D, VU,NR by  at 9/3/2023 1606    Acceptance, E,D, NR by  at 9/1/2023 1700   Family Acceptance, E,TB,D, VU,NR by  at 9/3/2023 1606                         Point: Home exercise program (Done)       Learning Progress Summary             Patient Acceptance, E,TB,D, VU,NR by  at 9/5/2023 1307    Acceptance, E,TB,D, VU,NR by  at 9/3/2023 1606    Acceptance, E,D, NR by  at 9/1/2023 1700   Family Acceptance, E,TB,D, VU,NR by  at 9/3/2023 1606                         Point: Body mechanics (Done)       Learning Progress Summary             Patient Acceptance, E,TB,D, VU,NR by  at 9/5/2023 1307    Acceptance, E,TB,D, VU,NR by  at 9/3/2023 1606    Acceptance, E,D, NR by  at 9/1/2023 1700   Family Acceptance, E,TB,D, VU,NR by  at 9/3/2023 1606                         Point: Precautions (Done)       Learning Progress Summary             Patient Acceptance, E,TB,D, VU,NR by  at 9/5/2023 1307    Acceptance, E,TB,D, VU,NR by  at 9/3/2023 1606    Acceptance, E,D, NR by  at 9/1/2023 1700   Family Acceptance, E,TB,D, VU,NR by  at 9/3/2023 1606                                         User Key       Initials Effective Dates Name Provider Type Discipline     03/07/18 -  Trixie Cabrera PTA Physical Therapist Assistant PT     03/07/18 -  Daphnie Cabrera PTA Physical Therapist Assistant PT                  PT Recommendation and Plan     Plan of Care Reviewed With: patient  Progress: improving  Outcome Evaluation: Pt tolerated treatment well this date. Increased gait distance to 100ft w/ Rw and CGA x2 for safety. Throughout ambulation, pt frequently paused and would look around, stating she felt ok when asked. Pt completed a few seated LE exercises, then required min A x2 to return to supine, raising LEs into bed. Encouraged pt to ambulate w/ nsg later if possible.     Time Calculation:         PT Charges       Row Name  09/05/23 1312             Time Calculation    Start Time 0852  -      Stop Time 0924  -      Time Calculation (min) 32 min  -      PT Received On 09/05/23  -      PT - Next Appointment 09/06/23  -                User Key  (r) = Recorded By, (t) = Taken By, (c) = Cosigned By      Initials Name Provider Type     Daphnie Cabrera PTA Physical Therapist Assistant                  Therapy Charges for Today       Code Description Service Date Service Provider Modifiers Qty    60138695281 HC GAIT TRAINING EA 15 MIN 9/5/2023 Daphnie Cabrera, OMAR GP 1    82079021660 HC PT THER PROC EA 15 MIN 9/5/2023 Daphnie Cabrera, OMAR GP 1    14354700037 HC PT THER SUPP EA 15 MIN 9/5/2023 Daphnie Cabrera, OMAR GP 1            PT G-Codes  Outcome Measure Options: AM-PAC 6 Clicks Basic Mobility (PT)  AM-PAC 6 Clicks Score (PT): 17  AM-PAC 6 Clicks Score (OT): 15  PT Discharge Summary  Anticipated Discharge Disposition (PT): home with home health    Daphnie Cabrera PTA  9/5/2023

## 2023-09-05 NOTE — PROGRESS NOTES
Name: Aydee Solis ADMIT: 2023   : 1950  PCP: Bennett Duque DO    MRN: [id number] LOS: 6 days   AGE/SEX: 73 y.o. female  ROOM: [email or web address]     Subjective   Subjective     No events overnight. She feels well. Her creatinine is slightly up today, but still at her historic baseline       Objective   Objective   Vital Signs  Temp:  [97.3 °F (36.3 °C)-98.2 °F (36.8 °C)] 97.5 °F (36.4 °C)  Heart Rate:  [60-63] 61  Resp:  [16-18] 18  BP: (105-117)/(58-75) 114/62  SpO2:  [83 %-100 %] 95 %  on  Flow (L/min):  [1-2] 1;   Device (Oxygen Therapy): nasal cannula;high-flow nasal cannula  Body mass index is 39.56 kg/m².  Physical Exam  Constitutional:       General: She is not in acute distress.     Appearance: She is obese. She is not toxic-appearing.   Cardiovascular:      Rate and Rhythm: Normal rate and regular rhythm.      Heart sounds: Normal heart sounds.   Pulmonary:      Effort: Pulmonary effort is normal.      Breath sounds: Normal breath sounds.   Abdominal:      General: Bowel sounds are normal.      Palpations: Abdomen is soft.   Musculoskeletal:         General: No tenderness.      Right lower leg: Edema present.      Left lower leg: Edema present.   Neurological:      Mental Status: She is alert.   Psychiatric:         Mood and Affect: Mood normal.         Behavior: Behavior normal.       Results Review     I reviewed the patient's new clinical results.  Results from last 7 days   Lab Units 23  0651 23  0727 23  0701 23  0634   WBC 10*3/mm3 5.95 5.70 5.70 6.01   HEMOGLOBIN g/dL 12.3 12.5 12.3 12.4   PLATELETS 10*3/mm3 240 193 190 183       Results from last 7 days   Lab Units 23  0651 23  0631 23  0622 23  0727   SODIUM mmol/L 138 137 133* 137   POTASSIUM mmol/L 4.0 4.3 4.5 4.6   CHLORIDE mmol/L 98 100 99 99   CO2 mmol/L 29.4* 29.1* 26.6 27.0   BUN mg/dL 40* 39* 35* 36*   CREATININE mg/dL 1.45* 1.36* 1.19* 1.14*   GLUCOSE mg/dL 141* 139* 112* 124*      Estimated Creatinine Clearance: 42.3 mL/min (A) (by C-G formula based on SCr of 1.45 mg/dL (H)).  Results from last 7 days   Lab Units 09/01/23  0701   ALBUMIN g/dL 3.1*   BILIRUBIN mg/dL 0.8   ALK PHOS U/L 171*   AST (SGOT) U/L 21   ALT (SGPT) U/L 11       Results from last 7 days   Lab Units 09/05/23  0651 09/04/23  0631 09/03/23  0622 09/02/23  0727 09/01/23  0701 08/31/23  0634 08/30/23  0553   CALCIUM mg/dL 9.2 9.0 8.7 8.7 8.8 8.3* 8.2*   ALBUMIN g/dL  --   --   --   --  3.1*  --   --    MAGNESIUM mg/dL  --   --   --  4.2* 2.2 2.2 4.2*   PHOSPHORUS mg/dL  --   --   --  3.3 3.2 3.0 3.6       Results from last 7 days   Lab Units 08/29/23  1510   LACTATE mmol/L 1.3       COVID19   Date Value Ref Range Status   01/05/2021 Not Detected Not Detected - Ref. Range Final     SARS-CoV-2, ARIANNA   Date Value Ref Range Status   12/12/2022 NEGATIVE Negative Final     Comment:     The 2019-CoV rRT-PCR Assay is only for use under a Food and Drug Administration Emergency Use Authorization. The performance characteristics of the assay were verified by the Clinical Laboratory at [facility]. Results should be used in   conjunction with the patient's clinical symptoms, medical history, and other clinical/laboratory findings to determine an overall clinical diagnosis. Negative results do not preclude infection with SARS-CoV-2 (COVID-19).    Test parameters have not been validated for screening asymptomatic patients.   11/26/2022 NEGATIVE Negative Final     Comment:     The 2019-CoV rRT-PCR Assay is only for use under a Food and Drug Administration Emergency Use Authorization. The performance characteristics of the assay were verified by the Clinical Laboratory at [facility]. Results should be used in   conjunction with the patient's clinical symptoms, medical history, and other clinical/laboratory findings to determine an overall clinical diagnosis. Negative results do not preclude infection with  SARS-CoV-2 (COVID-19).    Test parameters have not been validated for screening asymptomatic patients.   08/09/2022 NEGATIVE Negative Final     Comment:     The 2019-CoV rRT-PCR Assay is only for use under a Food and Drug Administration Emergency Use Authorization. The performance characteristics of the assay were verified by the Clinical Laboratory at [facility]. Results should be used in   conjunction with the patient's clinical symptoms, medical history, and other clinical/laboratory findings to determine an overall clinical diagnosis. Negative results do not preclude infection with SARS-CoV-2 (COVID-19).    Test parameters have not been validated for screening asymptomatic patients.     Glucose   Date/Time Value Ref Range Status   09/05/2023 1110 213 (H) 70 - 130 mg/dL Final   09/05/2023 0613 132 (H) 70 - 130 mg/dL Final   09/04/2023 2055 224 (H) 70 - 130 mg/dL Final   09/04/2023 1646 221 (H) 70 - 130 mg/dL Final   09/04/2023 1126 175 (H) 70 - 130 mg/dL Final   09/04/2023 0610 131 (H) 70 - 130 mg/dL Final   09/03/2023 2037 233 (H) 70 - 130 mg/dL Final       XR Chest 1 View  Narrative: XR CHEST 1 VW-     HISTORY: Female who is 73 years-old, hypoxia     TECHNIQUE: Frontal view of the chest     COMPARISON: 7/31/2023     FINDINGS: The heart is enlarged. Left-sided pacemaker, cardiac leads are  seen. Pulmonary vasculature is unremarkable. Aorta is calcified. No  focal pulmonary consolidation, pleural effusion, or pneumothorax. No  acute osseous process.     Impression: No focal pulmonary consolidation. Cardiomegaly. Follow-up as  clinical indications persist.     This report was finalized on 8/30/2023 4:25 PM by Dr. Kristian Keenan M.D.       Scheduled Medications  allopurinol, 100 mg, Oral, Daily  apixaban, 5 mg, Oral, BID  atorvastatin, 10 mg, Oral, Daily  budesonide-formoterol, 2 puff, Inhalation, BID - RT   And  tiotropium bromide monohydrate, 2 puff, Inhalation, Daily - RT  DULoxetine, 60  mg, Oral, Daily  insulin glargine, 10 Units, Subcutaneous, Nightly  insulin lispro, 2-7 Units, Subcutaneous, 4x Daily AC & at Bedtime  metoprolol succinate XL, 25 mg, Oral, BID  midodrine, 5 mg, Oral, TID AC  sodium chloride, 10 mL, Intravenous, Q12H    Infusions   Diet  Diet: Cardiac Diets, Diabetic Diets; Healthy Heart (2-3 Na+); Consistent Carbohydrate; Texture: Regular Texture (IDDSI 7); Fluid Consistency: Thin (IDDSI 0)       Assessment/Plan     Active Hospital Problems    Diagnosis  POA    **Acute UTI [N39.0]  Yes    Acute renal insufficiency [N28.9]  Yes    Chronic systolic CHF (congestive heart failure) [I50.22]  Yes    PAULETTE (obstructive sleep apnea) [G47.33]  Yes    Chronic respiratory failure with hypoxia [J96.11]  Yes    Stage 3b chronic kidney disease [N18.32]  Yes    Type 2 diabetes mellitus, with long-term current use of insulin [E11.9, Z79.4]  Not Applicable    PAF (paroxysmal atrial fibrillation) [I48.0]  Yes    Chronic anticoagulation [Z79.01]  Not Applicable    COPD (chronic obstructive pulmonary disease) [J44.9]  Yes    Metabolic encephalopathy [G93.41]  Yes    Morbid obesity due to excess calories [E66.01]  Yes    Hypertension [I10]  Yes    Hyperlipidemia [E78.5]  Yes    Lymphedema [I89.0]  Yes      Resolved Hospital Problems   No resolved problems to display.       73 y.o. female admitted with Acute UTI.    E coli UTI-completed a course of ceftriaxone this admission  Metabolic encephalopathy-related to the above. Neurology evaluated. Improving slowly  MELANIE on CKD 3a-resolved with IVF. Creatinine is up a little today probably related to relatively low bp yesterday but still at historic baseline. I will hold lasix today and check creatinine again tomorrow to ensure stability   Hypotension-improved with midodrine. Stim testing was normal.   Chronic systolic heart failure s/p ICD-torsemide held as above, metoprolol. Bp and renal function limits additional GDMT  Paroxsymal afib-eliquis,  metoprolol  Hyperlipidemia-statin  Type 2 diabetes-A1c 8.5. glucose is uncontrolled. Increase lantus to home dose  COPD with chronic respiratory failure with hypoxia on 2L home O2-LAMA/LABA/ICS  Eliquis (home med) for DVT prophylaxis.  Limited code (no CPR, no intubation).  Discussed with patient, family, and nursing staff.  Anticipate discharge home with home health in 1-2 days. Once creatinine stabilizes      Ab Campbell MD  [facility]  09/05/23  15:07 EDT    I wore protective equipment throughout this patient encounter including a face mask, gloves and protective eyewear.  Hand hygiene was performed before donning protective equipment and after removal when leaving the room.

## 2023-09-05 NOTE — PLAN OF CARE
Goal Outcome Evaluation:  Plan of Care Reviewed With: patient        Progress: improving  Outcome Evaluation: Pt tolerated treatment well this date. Increased gait distance to 100ft w/ Rw and CGA x2 for safety. Throughout ambulation, pt frequently paused and would look around, stating she felt ok when asked. Pt completed a few seated LE exercises, then required min A x2 to return to supine, raising LEs into bed. Encouraged pt to ambulate w/ nsg later if possible.      Anticipated Discharge Disposition (PT): home with home health

## 2023-09-05 NOTE — PLAN OF CARE
Goal Outcome Evaluation:      Pt alert to self, incontinence care provided. Q2 turns. On 2L NC. VSS. Will continue to monitor.

## 2023-09-06 ENCOUNTER — READMISSION MANAGEMENT (OUTPATIENT)
Dept: CALL CENTER | Facility: HOSPITAL | Age: 73
End: 2023-09-06
Payer: MEDICARE

## 2023-09-06 VITALS
SYSTOLIC BLOOD PRESSURE: 120 MMHG | TEMPERATURE: 97.5 F | RESPIRATION RATE: 20 BRPM | HEART RATE: 60 BPM | WEIGHT: 245 LBS | DIASTOLIC BLOOD PRESSURE: 69 MMHG | BODY MASS INDEX: 39.37 KG/M2 | HEIGHT: 66 IN | OXYGEN SATURATION: 98 %

## 2023-09-06 PROBLEM — N39.0 ACUTE UTI: Status: RESOLVED | Noted: 2023-08-29 | Resolved: 2023-09-06

## 2023-09-06 PROBLEM — G93.41 METABOLIC ENCEPHALOPATHY: Status: RESOLVED | Noted: 2023-01-25 | Resolved: 2023-09-06

## 2023-09-06 PROBLEM — N28.9 ACUTE RENAL INSUFFICIENCY: Status: RESOLVED | Noted: 2023-07-31 | Resolved: 2023-09-06

## 2023-09-06 LAB
ACTH PLAS-MCNC: 36.7 PG/ML (ref 7.2–63.3)
ANION GAP SERPL CALCULATED.3IONS-SCNC: 10 MMOL/L (ref 5–15)
BACTERIA SPEC AEROBE CULT: NORMAL
BACTERIA SPEC AEROBE CULT: NORMAL
BUN SERPL-MCNC: 42 MG/DL (ref 8–23)
BUN/CREAT SERPL: 35.3 (ref 7–25)
CALCIUM SPEC-SCNC: 9.1 MG/DL (ref 8.6–10.5)
CHLORIDE SERPL-SCNC: 98 MMOL/L (ref 98–107)
CO2 SERPL-SCNC: 27 MMOL/L (ref 22–29)
CREAT SERPL-MCNC: 1.19 MG/DL (ref 0.57–1)
DEPRECATED RDW RBC AUTO: 48.9 FL (ref 37–54)
EGFRCR SERPLBLD CKD-EPI 2021: 47.8 ML/MIN/1.73
ERYTHROCYTE [DISTWIDTH] IN BLOOD BY AUTOMATED COUNT: 15.5 % (ref 12.3–15.4)
GLUCOSE BLDC GLUCOMTR-MCNC: 105 MG/DL (ref 70–130)
GLUCOSE BLDC GLUCOMTR-MCNC: 121 MG/DL (ref 70–130)
GLUCOSE SERPL-MCNC: 96 MG/DL (ref 65–99)
HCT VFR BLD AUTO: 41.8 % (ref 34–46.6)
HGB BLD-MCNC: 12.7 G/DL (ref 12–15.9)
MCH RBC QN AUTO: 26.2 PG (ref 26.6–33)
MCHC RBC AUTO-ENTMCNC: 30.4 G/DL (ref 31.5–35.7)
MCV RBC AUTO: 86.2 FL (ref 79–97)
PLATELET # BLD AUTO: 260 10*3/MM3 (ref 140–450)
PMV BLD AUTO: 9.9 FL (ref 6–12)
POTASSIUM SERPL-SCNC: 4 MMOL/L (ref 3.5–5.2)
RBC # BLD AUTO: 4.85 10*6/MM3 (ref 3.77–5.28)
SODIUM SERPL-SCNC: 135 MMOL/L (ref 136–145)
WBC NRBC COR # BLD: 6.36 10*3/MM3 (ref 3.4–10.8)

## 2023-09-06 PROCEDURE — 82948 REAGENT STRIP/BLOOD GLUCOSE: CPT

## 2023-09-06 PROCEDURE — 94799 UNLISTED PULMONARY SVC/PX: CPT

## 2023-09-06 PROCEDURE — 85027 COMPLETE CBC AUTOMATED: CPT | Performed by: STUDENT IN AN ORGANIZED HEALTH CARE EDUCATION/TRAINING PROGRAM

## 2023-09-06 PROCEDURE — 80048 BASIC METABOLIC PNL TOTAL CA: CPT | Performed by: STUDENT IN AN ORGANIZED HEALTH CARE EDUCATION/TRAINING PROGRAM

## 2023-09-06 PROCEDURE — 94664 DEMO&/EVAL PT USE INHALER: CPT

## 2023-09-06 PROCEDURE — 94761 N-INVAS EAR/PLS OXIMETRY MLT: CPT

## 2023-09-06 RX ORDER — MIDODRINE HYDROCHLORIDE 5 MG/1
5 TABLET ORAL
Qty: 90 TABLET | Refills: 0 | Status: SHIPPED | OUTPATIENT
Start: 2023-09-06

## 2023-09-06 RX ORDER — TORSEMIDE 20 MG/1
40 TABLET ORAL DAILY
Qty: 60 TABLET | Refills: 0 | Status: SHIPPED | OUTPATIENT
Start: 2023-09-06

## 2023-09-06 RX ADMIN — TIOTROPIUM BROMIDE INHALATION SPRAY 2 PUFF: 3.12 SPRAY, METERED RESPIRATORY (INHALATION) at 07:11

## 2023-09-06 RX ADMIN — APIXABAN 5 MG: 5 TABLET, FILM COATED ORAL at 09:42

## 2023-09-06 RX ADMIN — METOPROLOL SUCCINATE 25 MG: 25 TABLET, EXTENDED RELEASE ORAL at 09:41

## 2023-09-06 RX ADMIN — DULOXETINE HYDROCHLORIDE 60 MG: 60 CAPSULE, DELAYED RELEASE ORAL at 09:41

## 2023-09-06 RX ADMIN — ALLOPURINOL 100 MG: 100 TABLET ORAL at 09:41

## 2023-09-06 RX ADMIN — MIDODRINE HYDROCHLORIDE 5 MG: 5 TABLET ORAL at 06:15

## 2023-09-06 RX ADMIN — BUDESONIDE AND FORMOTEROL FUMARATE DIHYDRATE 2 PUFF: 160; 4.5 AEROSOL RESPIRATORY (INHALATION) at 07:11

## 2023-09-06 RX ADMIN — ATORVASTATIN CALCIUM 10 MG: 20 TABLET, FILM COATED ORAL at 09:41

## 2023-09-06 RX ADMIN — POLYETHYLENE GLYCOL 3350 17 G: 17 POWDER, FOR SOLUTION ORAL at 09:41

## 2023-09-06 NOTE — PLAN OF CARE
Goal Outcome Evaluation:      Patient intermittently confused. VSS. Incontinence care provided. No complaints of pain. 2L NC. Assist with turns. All needs met.     0623: Notified Dr. Polo that patient removed her IV and is a difficult stick. She does not have any IV meds to be given. Possible discharge today. Per Dr. Polo, okay to not place a new IV at this time.

## 2023-09-06 NOTE — DISCHARGE SUMMARY
Patient Name: Aydee Solis  : 1950  MRN: [id number]    Date of Admission: 2023  Date of Discharge:  2023  Primary Care Physician: Bennett Duque DO      Chief Complaint:   Weakness - Generalized and Dysuria      Discharge Diagnoses     Active Hospital Problems    Diagnosis  POA    Chronic systolic CHF (congestive heart failure) [I50.22]  Yes    PAULETTE (obstructive sleep apnea) [G47.33]  Yes    Chronic respiratory failure with hypoxia [J96.11]  Yes    Stage 3b chronic kidney disease [N18.32]  Yes    Type 2 diabetes mellitus, with long-term current use of insulin [E11.9, Z79.4]  Not Applicable    PAF (paroxysmal atrial fibrillation) [I48.0]  Yes    Chronic anticoagulation [Z79.01]  Not Applicable    COPD (chronic obstructive pulmonary disease) [J44.9]  Yes    Morbid obesity due to excess calories [E66.01]  Yes    Hypertension [I10]  Yes    Hyperlipidemia [E78.5]  Yes    Lymphedema [I89.0]  Yes      Resolved Hospital Problems    Diagnosis Date Resolved POA    Acute UTI [N39.0] 2023 Yes    Acute renal insufficiency [N28.9] 2023 Yes    Metabolic encephalopathy [G93.41] 2023 Yes        Hospital Course     Ms. Solis is a 73 y.o. female with a history of CKD 3a, chronic systolic heart failure s/p ICD, paroxysmal afib, hyperlipidemia, type 2 diabetes, COPD with chronic respiratory failure with hypoxia on 2L home O2 who presented to [facility] initially complaining of weakness, confusion, and dysuria.  Please see the admitting history and physical for further details.  She was found to have an e coli uti, metabolic encephalopathy, and an MELANIE and was admitted to the hospital for further evaluation and treatment.      She was seen in consultation by neurology and she was started on ceftriaxone. Neurology felt that her confusion was related to the UTI, and her confusion did improve with treatment, but is not entirely back to normal at the time of discharge. I do expect  that it could take several weeks or even months for her mentation to return to normal. Her urine culture eventually grew E coli and this was sensitive to ceftriaxone. She finished a course of antibiotic therapy here in the hospital.    Her MELANIE resolved with holding her diuretics and administration of IVF. Her course was complicated by some persistent hypotension. A cortisol stim test was normal. She was started on midodrine with improvement in her BP. She will be restarted on just her torsemide 40 mg daily and I've instructed her to make a follow up appointment with her cardiologist for further adjustments.     Day of Discharge     Subjective:  No events overnight. No complaints. Her renal function is much better today.     Physical Exam:  Temp:  [97.5 °F (36.4 °C)-97.9 °F (36.6 °C)] 97.5 °F (36.4 °C)  Heart Rate:  [60-73] 60  Resp:  [18-20] 20  BP: (109-131)/(62-70) 120/69  Body mass index is 39.56 kg/m².  Physical Exam  Constitutional:       General: She is not in acute distress.     Appearance: She is obese. She is not toxic-appearing.   Cardiovascular:      Rate and Rhythm: Normal rate and regular rhythm.      Heart sounds: Normal heart sounds.   Pulmonary:      Effort: Pulmonary effort is normal.      Breath sounds: Normal breath sounds.   Abdominal:      General: Bowel sounds are normal.      Palpations: Abdomen is soft.   Musculoskeletal:         General: No tenderness.      Right lower leg: No edema.      Left lower leg: No edema.   Neurological:      Mental Status: She is alert.   Psychiatric:         Mood and Affect: Mood normal.         Behavior: Behavior normal.       Consultants     Consult Orders (all) (From admission, onward)       Start     Ordered    09/01/23 1412  Inpatient Neurology Consult General  Once        Specialty:  Neurology  Provider:  Myles Liao MD    09/01/23 1411    08/31/23 1131  Inpatient Endocrinology Consult  Once,   Status:  Canceled        Specialty:  Endocrinology   Provider:  (Not yet assigned)    08/31/23 1131    08/29/23 1653  Inpatient Case Management  Consult  Once        Provider:  (Not yet assigned)    08/29/23 1653    08/29/23 1454  [facility] (on-call MD unless specified) Details  Once        Specialty:  Hospitalist  Provider:  (Not yet assigned)    08/29/23 1453                  Procedures     Imaging Results (All)       Procedure Component Value Units Date/Time    XR Chest 1 View [[id number]] Collected: 08/30/23 1624     Updated: 08/30/23 1628    Narrative:      XR CHEST 1 VW-     HISTORY: Female who is 73 years-old, hypoxia     TECHNIQUE: Frontal view of the chest     COMPARISON: 7/31/2023     FINDINGS: The heart is enlarged. Left-sided pacemaker, cardiac leads are  seen. Pulmonary vasculature is unremarkable. Aorta is calcified. No  focal pulmonary consolidation, pleural effusion, or pneumothorax. No  acute osseous process.       Impression:      No focal pulmonary consolidation. Cardiomegaly. Follow-up as  clinical indications persist.     This report was finalized on 8/30/2023 4:25 PM by Dr. Kristian Keenan M.D.               Pertinent Labs     Results from last 7 days   Lab Units 09/06/23  0740 09/05/23  0651 09/02/23  0727 09/01/23  0701   WBC 10*3/mm3 6.36 5.95 5.70 5.70   HEMOGLOBIN g/dL 12.7 12.3 12.5 12.3   PLATELETS 10*3/mm3 260 240 193 190     Results from last 7 days   Lab Units 09/06/23  0740 09/05/23  0651 09/04/23  0631 09/03/23  0622   SODIUM mmol/L 135* 138 137 133*   POTASSIUM mmol/L 4.0 4.0 4.3 4.5   CHLORIDE mmol/L 98 98 100 99   CO2 mmol/L 27.0 29.4* 29.1* 26.6   BUN mg/dL 42* 40* 39* 35*   CREATININE mg/dL 1.19* 1.45* 1.36* 1.19*   GLUCOSE mg/dL 96 141* 139* 112*   Estimated Creatinine Clearance: 51.6 mL/min (A) (by C-G formula based on SCr of 1.19 mg/dL (H)).  Results from last 7 days   Lab Units 09/01/23  0701   ALBUMIN g/dL 3.1*   BILIRUBIN mg/dL 0.8   ALK PHOS U/L 171*   AST (SGOT) U/L 21   ALT (SGPT) U/L 11     Results from  last 7 days   Lab Units 09/06/23  0740 09/05/23  0651 09/04/23  0631 09/03/23  0622 09/02/23  0727 09/01/23  0701 08/31/23  0634   CALCIUM mg/dL 9.1 9.2 9.0 8.7 8.7 8.8 8.3*   ALBUMIN g/dL  --   --   --   --   --  3.1*  --    MAGNESIUM mg/dL  --   --   --   --  4.2* 2.2 2.2   PHOSPHORUS mg/dL  --   --   --   --  3.3 3.2 3.0     Results from last 7 days   Lab Units 09/01/23  1229   AMMONIA umol/L 41       Results from last 7 days   Lab Units 09/04/23  0631   URIC ACID mg/dL 6.4*         Invalid input(s): LDLCALC  Results from last 7 days   Lab Units 09/01/23  1431 09/01/23  1229   BLOODCX  No growth at 4 days No growth at 4 days       Test Results Pending at Discharge     Pending Labs       Order Current Status    ACTH In process    Blood Culture - Blood, Arm, Left Preliminary result    Blood Culture - Blood, Hand, Right Preliminary result            Discharge Details        Discharge Medications        New Medications        Instructions Start Date   midodrine 5 MG tablet  Commonly known as: PROAMATINE   5 mg, Oral, 3 Times Daily Before Meals             Changes to Medications        Instructions Start Date   torsemide 20 MG tablet  Commonly known as: DEMADEX  What changed: medication strength   40 mg, Oral, Daily             Continue These Medications        Instructions Start Date   albuterol sulfate  (90 Base) MCG/ACT inhaler  Commonly known as: PROVENTIL HFA;VENTOLIN HFA;PROAIR HFA   2 puffs, Inhalation, Every 4 Hours PRN      allopurinol 100 MG tablet  Commonly known as: ZYLOPRIM   100 mg, Oral, Daily      apixaban 5 MG tablet tablet  Commonly known as: ELIQUIS   5 mg, Oral, 2 Times Daily      atorvastatin 10 MG tablet  Commonly known as: LIPITOR   10 mg, Oral, Daily      Diclofenac Sodium 1 % gel gel  Commonly known as: VOLTAREN   4 g, Topical, 4 Times Daily PRN      docusate sodium 100 MG capsule  Commonly known as: COLACE   100 mg, Oral, 2 Times Daily      DULoxetine 60 MG capsule  Commonly known as:  CYMBALTA   60 mg, Oral, Daily      insulin detemir 100 UNIT/ML injection  Commonly known as: LEVEMIR   20 Units, Subcutaneous, Nightly      iVIZIA Dry Eyes 0.5 % solution  Generic drug: Povidone (PF)   0.5 %, Ophthalmic, 2 Times Daily      metoprolol succinate XL 25 MG 24 hr tablet  Commonly known as: TOPROL-XL   25 mg, Oral, 2 Times Daily      NovoLOG FlexPen 100 UNIT/ML solution pen-injector sc pen  Generic drug: insulin aspart   Novolog FlexPen U-100 Insulin aspart 100 unit/mL (3 mL) subcutaneous   Sliding scale.      Trelegy Ellipta 100-62.5-25 MCG/ACT inhaler  Generic drug: Fluticasone-Umeclidin-Vilant   No dose, route, or frequency recorded.             Stop These Medications      busPIRone 5 MG tablet  Commonly known as: BUSPAR     HYDROcodone-acetaminophen  MG per tablet  Commonly known as: NORCO     spironolactone 25 MG tablet  Commonly known as: ALDACTONE              Allergies   Allergen Reactions    Ciprofloxacin          Discharge Disposition:  Home-Health Care [facility]    Discharge Diet:  Diet Order   Procedures    Diet: Cardiac Diets, Diabetic Diets; Healthy Heart (2-3 Na+); Consistent Carbohydrate; Texture: Regular Texture (IDDSI 7); Fluid Consistency: Thin (IDDSI 0)       Discharge Activity:   Activity Instructions       Activity as Tolerated              CODE STATUS:    Code Status and Medical Interventions:   Ordered at: 08/29/23 5005     Medical Intervention Limits:    NO intubation (DNI)     Level Of Support Discussed With:    Next of Kin (If No Surrogate)    Patient     Code Status (Patient has no pulse and is not breathing):    No CPR (Do Not Attempt to Resuscitate)     Medical Interventions (Patient has pulse or is breathing):    Limited Support     Comments:    Discussed with        No future appointments.  Additional Instructions for the Follow-ups that You Need to Schedule       Ambulatory Referral to Home Health   As directed      Face to Face Visit Date: 9/6/2023   Follow-up  provider for Plan of Care?: I treated the patient in an acute care facility and will not continue treatment after discharge.   Follow-up provider: LEWIS DUQUE [[id number]]   Reason/Clinical Findings: debility/hospitalization   Describe mobility limitations that make leaving home difficult: debility/hospitalization   Nursing/Therapeutic Services Requested: Physical Therapy Skilled Nursing   Skilled nursing orders: Medication education Cardiopulmonary assessments   PT orders: Transfer training Home safety assessment Therapeutic exercise Strengthening   Frequency: 1 Week 1        Call MD With Problems / Concerns   As directed      Instructions: return to the hospital if you experience chest pain, shortness of breath, abdominal pain, nausea, vomiting, fevers, sweats, chills, or worsening of your symptoms    Order Comments: Instructions: return to the hospital if you experience chest pain, shortness of breath, abdominal pain, nausea, vomiting, fevers, sweats, chills, or worsening of your symptoms         Discharge Follow-up with PCP   As directed       Currently Documented PCP:    Lewis Duque DO    PCP Phone Number:    [phone]     Follow Up Details: 2 weeks               Contact information for follow-up providers       Lewis Duque DO .    Specialty: Internal Medicine  Why: 2 weeks  Contact information:  [address]  [address]  [facility] [address] [phone]               Marco Mayes MD .    Specialty: Family Medicine  Why: 2 weeks  Contact information:  [address]  [place] [id number] [phone]                       Contact information for after-discharge care       [facility] Medical [facility]       [facility]    Service: [facility] Services  Contact information:  [address]  [facility] [address] [phone]                                   Additional Instructions for the Follow-ups that You Need to Schedule       Ambulatory  Referral to Home Health   As directed      Face to Face Visit Date: 9/6/2023   Follow-up provider for Plan of Care?: I treated the patient in an acute care facility and will not continue treatment after discharge.   Follow-up provider: LEWIS DUQUE [[id number]]   Reason/Clinical Findings: debility/hospitalization   Describe mobility limitations that make leaving home difficult: debility/hospitalization   Nursing/Therapeutic Services Requested: Physical Therapy Skilled Nursing   Skilled nursing orders: Medication education Cardiopulmonary assessments   PT orders: Transfer training Home safety assessment Therapeutic exercise Strengthening   Frequency: 1 Week 1        Call MD With Problems / Concerns   As directed      Instructions: return to the hospital if you experience chest pain, shortness of breath, abdominal pain, nausea, vomiting, fevers, sweats, chills, or worsening of your symptoms    Order Comments: Instructions: return to the hospital if you experience chest pain, shortness of breath, abdominal pain, nausea, vomiting, fevers, sweats, chills, or worsening of your symptoms         Discharge Follow-up with PCP   As directed       Currently Documented PCP:    Lewis Duque DO    PCP Phone Number:    [phone]     Follow Up Details: 2 weeks            Time Spent on Discharge:  Greater than 30 minutes      Ab Campbell MD  [facility]  09/06/23  12:02 EDT

## 2023-09-07 NOTE — OUTREACH NOTE
Prep Survey      Flowsheet Row Responses   Denominational facility patient discharged from? [facility]   Is LACE score < 7 ? No   Eligibility Readm Mgmt   Discharge diagnosis Acute UTI   Does the patient have one of the following disease processes/diagnoses(primary or secondary)? Other   Does the patient have Home health ordered? Yes   What is the Home health agency?  [facility]   Is there a DME ordered? No   Prep survey completed? Yes            Lisset MILAN - Registered Nurse           Yes

## 2023-09-12 ENCOUNTER — READMISSION MANAGEMENT (OUTPATIENT)
Dept: CALL CENTER | Facility: HOSPITAL | Age: 73
End: 2023-09-12
Payer: MEDICARE

## 2023-09-12 NOTE — OUTREACH NOTE
Medical Week 1 Survey      Flowsheet Row Responses   [facility] patient discharged from? [place]   Does the patient have one of the following disease processes/diagnoses(primary or secondary)? Other   Week 1 attempt successful? Yes   Call start time 1602   Call end time 1607   Discharge diagnosis Acute UTI   Person spoke with today (if not patient) and relationship patient   Meds reviewed with patient/caregiver? Yes   Is the patient having any side effects they believe may be caused by any medication additions or changes? No   Does the patient have all medications ordered at discharge? Yes   Is the patient taking all medications as directed (includes completed medication regime)? Yes   Does the patient have a primary care provider?  Yes   Does the patient have an appointment with their PCP within 7 days of discharge? Yes   Comments regarding PCP 9/13/23   Has the patient kept scheduled appointments due by today? N/A   What is the Home health agency?  [facility]   Has home health visited the patient within 72 hours of discharge? Call prior to 72 hours   Psychosocial issues? No   Did the patient receive a copy of their discharge instructions? Yes   Nursing interventions Reviewed instructions with patient   What is the patient's perception of their health status since discharge? Improving   Is the patient/caregiver able to teach back signs and symptoms related to disease process for when to call PCP? Yes   Is the patient/caregiver able to teach back signs and symptoms related to disease process for when to call 911? Yes   Is the patient/caregiver able to teach back the hierarchy of who to call/visit for symptoms/problems? PCP, Specialist, Home health nurse, Urgent Care, ED, 911 Yes   If the patient is a current smoker, are they able to teach back resources for cessation? Not a smoker   Week 1 call completed? Yes   Would this patient benefit from a Referral to Amb Social Work? No   Is the  patient interested in additional calls from an ambulatory ? No   Wrap up additional comments Pt states she is having lower back pain, and urine is dark. Pt advised to call PCP office about lower back pain/dark urine. Pt reports that she does have a PCP fu appt tomorrow on 9/13/23.   Call end time 1607            Flakita GOMEZ - Registered Nurse

## 2023-09-20 ENCOUNTER — READMISSION MANAGEMENT (OUTPATIENT)
Dept: CALL CENTER | Facility: HOSPITAL | Age: 73
End: 2023-09-20
Payer: MEDICARE

## 2023-09-20 NOTE — OUTREACH NOTE
Medical Week 2 Survey      Flowsheet Row Responses   [facility] patient discharged from? [place]   Does the patient have one of the following disease processes/diagnoses(primary or secondary)? Other   Week 2 attempt successful? Yes   Call start time 1432   Discharge diagnosis e coli uti, metabolic encephalopathy, and an MELANIE   Call end time 1434   Person spoke with today (if not patient) and relationship patient   Meds reviewed with patient/caregiver? Yes   Does the patient have all medications ordered at discharge? Yes   Is the patient taking all medications as directed (includes completed medication regime)? Yes   Does the patient have a primary care provider?  Yes   Comments regarding PCP Follow up with Bennett Duque, DO PCP   Has the patient kept scheduled appointments due by today? Yes  [Patient states that she has had follow up checkup since discharge. ]   What is the Home health agency?  [facility]   Has home health visited the patient within 72 hours of discharge? Yes   Psychosocial issues? No   Did the patient receive a copy of their discharge instructions? Yes   Nursing interventions Reviewed instructions with patient   What is the patient's perception of their health status since discharge? Improving   Is the patient/caregiver able to teach back signs and symptoms related to disease process for when to call PCP? Yes   If the patient is a current smoker, are they able to teach back resources for cessation? Not a smoker   Week 2 Call Completed? Yes   Is the patient interested in additional calls from an ambulatory ? No   Would this patient benefit from a Referral to Amb Social Work? No   Call end time 1434            JOSE LUIS CASTILLO - Registered Nurse

## 2023-09-27 ENCOUNTER — TELEPHONE (OUTPATIENT)
Dept: SPORTS MEDICINE | Facility: CLINIC | Age: 73
End: 2023-09-27
Payer: MEDICARE

## 2023-09-27 NOTE — TELEPHONE ENCOUNTER
Called patient, was unsure how often could get gel injections. Advised next injection would be end of December. Suggested she can call back in early December to get insurance authorization process started to possibly have those done end of December.

## 2023-09-27 NOTE — TELEPHONE ENCOUNTER
Caller: Kyle Solis    Relationship to patient: Emergency Contact    Best call back number:     Chief complaint: BILATERAL KNEE PAIN    Type of visit: MONOVISC INJECTIONS   LAST INJECTIONS: 6/30/23    Requested date: ASAP

## 2023-09-29 ENCOUNTER — READMISSION MANAGEMENT (OUTPATIENT)
Dept: CALL CENTER | Facility: HOSPITAL | Age: 73
End: 2023-09-29
Payer: MEDICARE

## 2023-09-29 NOTE — OUTREACH NOTE
Medical Week 3 Survey      Flowsheet Row Responses   [facility] patient discharged from? [place]   Does the patient have one of the following disease processes/diagnoses(primary or secondary)? Other   Week 3 attempt successful? Yes   Call start time 1550   Call end time 1553   Discharge diagnosis e coli uti, metabolic encephalopathy, and an MELANIE   Meds reviewed with patient/caregiver? Yes   Is the patient having any side effects they believe may be caused by any medication additions or changes? No   Does the patient have all medications ordered at discharge? Yes   Is the patient taking all medications as directed (includes completed medication regime)? Yes   Does the patient have a primary care provider?  Yes   Does the patient have an appointment with their PCP within 7 days of discharge? Yes   Has the patient kept scheduled appointments due by today? Yes   What is the Home health agency?  [facility],[place]   Has home health visited the patient within 72 hours of discharge? Yes   Psychosocial issues? No   Did the patient receive a copy of their discharge instructions? Yes   Nursing interventions Reviewed instructions with patient   What is the patient's perception of their health status since discharge? Improving   Is the patient/caregiver able to teach back signs and symptoms related to disease process for when to call PCP? Yes   Is the patient/caregiver able to teach back signs and symptoms related to disease process for when to call 911? Yes   Is the patient/caregiver able to teach back the hierarchy of who to call/visit for symptoms/problems? PCP, Specialist, Home health nurse, Urgent Care, ED, 911 Yes   If the patient is a current smoker, are they able to teach back resources for cessation? Not a smoker   Week 3 Call Completed? Yes   Wrap up additional comments stated she is doing better   Call end time 1553            WILLIAM MILAN - Registered Nurse

## 2023-11-08 ENCOUNTER — HOSPITAL ENCOUNTER (INPATIENT)
Facility: HOSPITAL | Age: 73
LOS: 1 days | Discharge: HOME | DRG: 291 | End: 2023-11-11
Attending: EMERGENCY MEDICINE | Admitting: INTERNAL MEDICINE
Payer: MEDICARE

## 2023-11-08 ENCOUNTER — APPOINTMENT (OUTPATIENT)
Dept: CT IMAGING | Facility: HOSPITAL | Age: 73
DRG: 291 | End: 2023-11-08
Payer: MEDICARE

## 2023-11-08 ENCOUNTER — APPOINTMENT (OUTPATIENT)
Dept: GENERAL RADIOLOGY | Facility: HOSPITAL | Age: 73
DRG: 291 | End: 2023-11-08
Payer: MEDICARE

## 2023-11-08 DIAGNOSIS — I50.9 ACUTE CONGESTIVE HEART FAILURE, UNSPECIFIED HEART FAILURE TYPE: ICD-10-CM

## 2023-11-08 DIAGNOSIS — R09.02 HYPOXIA: ICD-10-CM

## 2023-11-08 DIAGNOSIS — N17.9 ACUTE RENAL FAILURE, UNSPECIFIED ACUTE RENAL FAILURE TYPE: Primary | ICD-10-CM

## 2023-11-08 DIAGNOSIS — R41.82 ALTERED MENTAL STATUS, UNSPECIFIED ALTERED MENTAL STATUS TYPE: ICD-10-CM

## 2023-11-08 PROBLEM — I50.42 CHRONIC COMBINED SYSTOLIC AND DIASTOLIC HEART FAILURE: Status: ACTIVE | Noted: 2021-08-03

## 2023-11-08 PROBLEM — I27.20 PULMONARY HYPERTENSION: Status: ACTIVE | Noted: 2022-03-27

## 2023-11-08 PROBLEM — M10.9 GOUT: Status: ACTIVE | Noted: 2023-02-24

## 2023-11-08 PROBLEM — K59.03 DRUG-INDUCED CONSTIPATION: Status: ACTIVE | Noted: 2023-04-06

## 2023-11-08 PROBLEM — I27.23 PULMONARY HYPERTENSION DUE TO LUNG DISEASES AND HYPOXIA: Status: ACTIVE | Noted: 2021-02-08

## 2023-11-08 PROBLEM — I89.0 LYMPHEDEMA: Chronic | Status: ACTIVE | Noted: 2023-06-06

## 2023-11-08 PROBLEM — G98.8 DISORDER OF NERVOUS SYSTEM: Status: ACTIVE | Noted: 2022-04-05

## 2023-11-08 PROBLEM — R80.9 MICROALBUMINURIA: Status: ACTIVE | Noted: 2021-01-04

## 2023-11-08 PROBLEM — M16.10 PRIMARY LOCALIZED OSTEOARTHRITIS OF PELVIC REGION AND THIGH: Status: ACTIVE | Noted: 2023-04-06

## 2023-11-08 PROBLEM — I05.0 MITRAL VALVE STENOSIS: Status: ACTIVE | Noted: 2020-07-31

## 2023-11-08 PROBLEM — J96.21 ACUTE ON CHRONIC RESPIRATORY FAILURE WITH HYPOXIA: Status: ACTIVE | Noted: 2017-01-29

## 2023-11-08 PROBLEM — Z74.09 REDUCED MOBILITY: Status: ACTIVE | Noted: 2017-03-20

## 2023-11-08 PROBLEM — I50.43 ACUTE ON CHRONIC COMBINED SYSTOLIC AND DIASTOLIC CHF (CONGESTIVE HEART FAILURE): Status: ACTIVE | Noted: 2023-11-08

## 2023-11-08 PROBLEM — Z99.81 OXYGEN DEPENDENT: Status: ACTIVE | Noted: 2017-02-08

## 2023-11-08 PROBLEM — I51.7 CARDIOMEGALY: Status: ACTIVE | Noted: 2020-07-14

## 2023-11-08 PROBLEM — M85.80 OSTEOPENIA: Status: ACTIVE | Noted: 2021-01-04

## 2023-11-08 PROBLEM — G89.4 CHRONIC PAIN DISORDER: Status: ACTIVE | Noted: 2023-02-24

## 2023-11-08 LAB
ALBUMIN SERPL-MCNC: 4.1 G/DL (ref 3.5–5.2)
ALBUMIN/GLOB SERPL: 1.1 G/DL
ALP SERPL-CCNC: 134 U/L (ref 39–117)
ALT SERPL W P-5'-P-CCNC: 18 U/L (ref 1–33)
AMMONIA BLD-SCNC: 21 UMOL/L (ref 11–51)
ANION GAP SERPL CALCULATED.3IONS-SCNC: 11.8 MMOL/L (ref 5–15)
ARTERIAL PATENCY WRIST A: POSITIVE
AST SERPL-CCNC: 35 U/L (ref 1–32)
ATMOSPHERIC PRESS: 743.8 MMHG
BACTERIA UR QL AUTO: NORMAL /HPF
BASE EXCESS BLDA CALC-SCNC: 4.6 MMOL/L (ref 0–2)
BASOPHILS # BLD AUTO: 0.05 10*3/MM3 (ref 0–0.2)
BASOPHILS NFR BLD AUTO: 1.1 % (ref 0–1.5)
BDY SITE: ABNORMAL
BILIRUB SERPL-MCNC: 3.1 MG/DL (ref 0–1.2)
BILIRUB UR QL STRIP: NEGATIVE
BUN SERPL-MCNC: 41 MG/DL (ref 8–23)
BUN/CREAT SERPL: 17.4 (ref 7–25)
CALCIUM SPEC-SCNC: 9.5 MG/DL (ref 8.6–10.5)
CHLORIDE SERPL-SCNC: 90 MMOL/L (ref 98–107)
CLARITY UR: CLEAR
CO2 BLDA-SCNC: 29.8 MMOL/L (ref 23–27)
CO2 SERPL-SCNC: 31.2 MMOL/L (ref 22–29)
COLOR UR: ABNORMAL
CREAT SERPL-MCNC: 2.36 MG/DL (ref 0.57–1)
D-LACTATE SERPL-SCNC: 1.7 MMOL/L (ref 0.5–2)
D-LACTATE SERPL-SCNC: 2.3 MMOL/L (ref 0.5–2)
D-LACTATE SERPL-SCNC: 2.4 MMOL/L (ref 0.5–2)
DEPRECATED RDW RBC AUTO: 52.1 FL (ref 37–54)
EGFRCR SERPLBLD CKD-EPI 2021: 21 ML/MIN/1.73
EOSINOPHIL # BLD AUTO: 0.04 10*3/MM3 (ref 0–0.4)
EOSINOPHIL NFR BLD AUTO: 0.9 % (ref 0.3–6.2)
ERYTHROCYTE [DISTWIDTH] IN BLOOD BY AUTOMATED COUNT: 18.5 % (ref 12.3–15.4)
ETHANOL BLD-MCNC: <10 MG/DL (ref 0–10)
ETHANOL UR QL: <0.01 %
GAS FLOW AIRWAY: 2 LPM
GLOBULIN UR ELPH-MCNC: 3.7 GM/DL
GLUCOSE SERPL-MCNC: 95 MG/DL (ref 65–99)
GLUCOSE UR STRIP-MCNC: NEGATIVE MG/DL
HCO3 BLDA-SCNC: 28.6 MMOL/L (ref 22–28)
HCT VFR BLD AUTO: 44.2 % (ref 34–46.6)
HEMODILUTION: NO
HGB BLD-MCNC: 13.5 G/DL (ref 12–15.9)
HGB UR QL STRIP.AUTO: NEGATIVE
HOLD SPECIMEN: NORMAL
HYALINE CASTS UR QL AUTO: NORMAL /LPF
IMM GRANULOCYTES # BLD AUTO: 0.01 10*3/MM3 (ref 0–0.05)
IMM GRANULOCYTES NFR BLD AUTO: 0.2 % (ref 0–0.5)
INHALED O2 CONCENTRATION: 28 %
KETONES UR QL STRIP: NEGATIVE
LEUKOCYTE ESTERASE UR QL STRIP.AUTO: ABNORMAL
LYMPHOCYTES # BLD AUTO: 0.96 10*3/MM3 (ref 0.7–3.1)
LYMPHOCYTES NFR BLD AUTO: 20.5 % (ref 19.6–45.3)
MCH RBC QN AUTO: 25 PG (ref 26.6–33)
MCHC RBC AUTO-ENTMCNC: 30.5 G/DL (ref 31.5–35.7)
MCV RBC AUTO: 81.9 FL (ref 79–97)
MODALITY: ABNORMAL
MONOCYTES # BLD AUTO: 0.49 10*3/MM3 (ref 0.1–0.9)
MONOCYTES NFR BLD AUTO: 10.5 % (ref 5–12)
NEUTROPHILS NFR BLD AUTO: 3.13 10*3/MM3 (ref 1.7–7)
NEUTROPHILS NFR BLD AUTO: 66.8 % (ref 42.7–76)
NITRITE UR QL STRIP: NEGATIVE
NRBC BLD AUTO-RTO: 0.6 /100 WBC (ref 0–0.2)
NT-PROBNP SERPL-MCNC: 5929 PG/ML (ref 0–1800)
O2 A-A PPRESDIFF RESPIRATORY: 0.4 MMHG
PCO2 BLDA: 39.2 MM HG (ref 35–45)
PH BLDA: 7.47 PH UNITS (ref 7.35–7.45)
PH UR STRIP.AUTO: 6 [PH] (ref 5–8)
PLATELET # BLD AUTO: 283 10*3/MM3 (ref 140–450)
PMV BLD AUTO: 9.9 FL (ref 6–12)
PO2 BLDA: 71.6 MM HG (ref 80–100)
POTASSIUM SERPL-SCNC: 3.8 MMOL/L (ref 3.5–5.2)
PROT SERPL-MCNC: 7.8 G/DL (ref 6–8.5)
PROT UR QL STRIP: NEGATIVE
RBC # BLD AUTO: 5.4 10*6/MM3 (ref 3.77–5.28)
RBC # UR STRIP: NORMAL /HPF
REF LAB TEST METHOD: NORMAL
SAO2 % BLDCOA: 95.2 % (ref 92–98.5)
SET MECH RESP RATE: 18
SODIUM SERPL-SCNC: 133 MMOL/L (ref 136–145)
SP GR UR STRIP: 1.01 (ref 1–1.03)
SQUAMOUS #/AREA URNS HPF: NORMAL /HPF
UROBILINOGEN UR QL STRIP: ABNORMAL
WBC # UR STRIP: NORMAL /HPF
WBC NRBC COR # BLD: 4.68 10*3/MM3 (ref 3.4–10.8)
WHOLE BLOOD HOLD COAG: NORMAL
WHOLE BLOOD HOLD SPECIMEN: NORMAL

## 2023-11-08 PROCEDURE — 94640 AIRWAY INHALATION TREATMENT: CPT

## 2023-11-08 PROCEDURE — 87086 URINE CULTURE/COLONY COUNT: CPT | Performed by: INTERNAL MEDICINE

## 2023-11-08 PROCEDURE — 93005 ELECTROCARDIOGRAM TRACING: CPT | Performed by: EMERGENCY MEDICINE

## 2023-11-08 PROCEDURE — 80053 COMPREHEN METABOLIC PANEL: CPT | Performed by: NURSE PRACTITIONER

## 2023-11-08 PROCEDURE — 81001 URINALYSIS AUTO W/SCOPE: CPT | Performed by: INTERNAL MEDICINE

## 2023-11-08 PROCEDURE — 94799 UNLISTED PULMONARY SVC/PX: CPT

## 2023-11-08 PROCEDURE — G0378 HOSPITAL OBSERVATION PER HR: HCPCS

## 2023-11-08 PROCEDURE — 83605 ASSAY OF LACTIC ACID: CPT | Performed by: NURSE PRACTITIONER

## 2023-11-08 PROCEDURE — 36600 WITHDRAWAL OF ARTERIAL BLOOD: CPT

## 2023-11-08 PROCEDURE — 82077 ASSAY SPEC XCP UR&BREATH IA: CPT | Performed by: NURSE PRACTITIONER

## 2023-11-08 PROCEDURE — 70450 CT HEAD/BRAIN W/O DYE: CPT

## 2023-11-08 PROCEDURE — 63710000001 INSULIN GLARGINE PER 5 UNITS: Performed by: INTERNAL MEDICINE

## 2023-11-08 PROCEDURE — 82803 BLOOD GASES ANY COMBINATION: CPT

## 2023-11-08 PROCEDURE — 82140 ASSAY OF AMMONIA: CPT | Performed by: NURSE PRACTITIONER

## 2023-11-08 PROCEDURE — 36415 COLL VENOUS BLD VENIPUNCTURE: CPT | Performed by: NURSE PRACTITIONER

## 2023-11-08 PROCEDURE — 85025 COMPLETE CBC W/AUTO DIFF WBC: CPT | Performed by: NURSE PRACTITIONER

## 2023-11-08 PROCEDURE — 87040 BLOOD CULTURE FOR BACTERIA: CPT | Performed by: NURSE PRACTITIONER

## 2023-11-08 PROCEDURE — 94761 N-INVAS EAR/PLS OXIMETRY MLT: CPT

## 2023-11-08 PROCEDURE — 25010000002 FUROSEMIDE PER 20 MG: Performed by: INTERNAL MEDICINE

## 2023-11-08 PROCEDURE — 93010 ELECTROCARDIOGRAM REPORT: CPT | Performed by: INTERNAL MEDICINE

## 2023-11-08 PROCEDURE — 99285 EMERGENCY DEPT VISIT HI MDM: CPT

## 2023-11-08 PROCEDURE — 83880 ASSAY OF NATRIURETIC PEPTIDE: CPT | Performed by: NURSE PRACTITIONER

## 2023-11-08 PROCEDURE — 71045 X-RAY EXAM CHEST 1 VIEW: CPT

## 2023-11-08 RX ORDER — SPIRONOLACTONE 50 MG/1
50 TABLET, FILM COATED ORAL DAILY
Status: DISCONTINUED | OUTPATIENT
Start: 2023-11-09 | End: 2023-11-10

## 2023-11-08 RX ORDER — IPRATROPIUM BROMIDE AND ALBUTEROL SULFATE 2.5; .5 MG/3ML; MG/3ML
3 SOLUTION RESPIRATORY (INHALATION) EVERY 4 HOURS PRN
Status: DISCONTINUED | OUTPATIENT
Start: 2023-11-08 | End: 2023-11-10

## 2023-11-08 RX ORDER — ACETAMINOPHEN 160 MG/5ML
650 SOLUTION ORAL EVERY 4 HOURS PRN
Status: DISCONTINUED | OUTPATIENT
Start: 2023-11-08 | End: 2023-11-11 | Stop reason: HOSPADM

## 2023-11-08 RX ORDER — BISACODYL 10 MG
10 SUPPOSITORY, RECTAL RECTAL DAILY PRN
Status: DISCONTINUED | OUTPATIENT
Start: 2023-11-08 | End: 2023-11-11 | Stop reason: HOSPADM

## 2023-11-08 RX ORDER — AMOXICILLIN 250 MG
2 CAPSULE ORAL 2 TIMES DAILY
Status: DISCONTINUED | OUTPATIENT
Start: 2023-11-08 | End: 2023-11-11 | Stop reason: HOSPADM

## 2023-11-08 RX ORDER — ACETAMINOPHEN 325 MG/1
650 TABLET ORAL EVERY 4 HOURS PRN
Status: DISCONTINUED | OUTPATIENT
Start: 2023-11-08 | End: 2023-11-11 | Stop reason: HOSPADM

## 2023-11-08 RX ORDER — ALBUTEROL SULFATE 2.5 MG/3ML
2.5 SOLUTION RESPIRATORY (INHALATION) 4 TIMES DAILY
Status: DISPENSED | OUTPATIENT
Start: 2023-11-08 | End: 2023-11-09

## 2023-11-08 RX ORDER — METOPROLOL SUCCINATE 25 MG/1
25 TABLET, EXTENDED RELEASE ORAL 2 TIMES DAILY
Status: DISCONTINUED | OUTPATIENT
Start: 2023-11-08 | End: 2023-11-11 | Stop reason: HOSPADM

## 2023-11-08 RX ORDER — BISACODYL 5 MG/1
5 TABLET, DELAYED RELEASE ORAL DAILY PRN
Status: DISCONTINUED | OUTPATIENT
Start: 2023-11-08 | End: 2023-11-11 | Stop reason: HOSPADM

## 2023-11-08 RX ORDER — SODIUM CHLORIDE 0.9 % (FLUSH) 0.9 %
10 SYRINGE (ML) INJECTION AS NEEDED
Status: DISCONTINUED | OUTPATIENT
Start: 2023-11-08 | End: 2023-11-11 | Stop reason: HOSPADM

## 2023-11-08 RX ORDER — SODIUM CHLORIDE 0.9 % (FLUSH) 0.9 %
10 SYRINGE (ML) INJECTION EVERY 12 HOURS SCHEDULED
Status: DISCONTINUED | OUTPATIENT
Start: 2023-11-08 | End: 2023-11-11 | Stop reason: HOSPADM

## 2023-11-08 RX ORDER — IBUPROFEN 600 MG/1
1 TABLET ORAL
Status: DISCONTINUED | OUTPATIENT
Start: 2023-11-08 | End: 2023-11-11 | Stop reason: HOSPADM

## 2023-11-08 RX ORDER — FUROSEMIDE 10 MG/ML
80 INJECTION INTRAMUSCULAR; INTRAVENOUS
Status: DISCONTINUED | OUTPATIENT
Start: 2023-11-09 | End: 2023-11-10

## 2023-11-08 RX ORDER — NICOTINE POLACRILEX 4 MG
15 LOZENGE BUCCAL
Status: DISCONTINUED | OUTPATIENT
Start: 2023-11-08 | End: 2023-11-11 | Stop reason: HOSPADM

## 2023-11-08 RX ORDER — ACETAMINOPHEN 650 MG/1
650 SUPPOSITORY RECTAL EVERY 4 HOURS PRN
Status: DISCONTINUED | OUTPATIENT
Start: 2023-11-08 | End: 2023-11-11 | Stop reason: HOSPADM

## 2023-11-08 RX ORDER — ONDANSETRON 4 MG/1
4 TABLET, FILM COATED ORAL EVERY 6 HOURS PRN
Status: DISCONTINUED | OUTPATIENT
Start: 2023-11-08 | End: 2023-11-11 | Stop reason: HOSPADM

## 2023-11-08 RX ORDER — ATORVASTATIN CALCIUM 20 MG/1
10 TABLET, FILM COATED ORAL DAILY
Status: DISCONTINUED | OUTPATIENT
Start: 2023-11-09 | End: 2023-11-11 | Stop reason: HOSPADM

## 2023-11-08 RX ORDER — ONDANSETRON 2 MG/ML
4 INJECTION INTRAMUSCULAR; INTRAVENOUS EVERY 6 HOURS PRN
Status: DISCONTINUED | OUTPATIENT
Start: 2023-11-08 | End: 2023-11-11 | Stop reason: HOSPADM

## 2023-11-08 RX ORDER — SPIRONOLACTONE 50 MG/1
75 TABLET, FILM COATED ORAL DAILY
COMMUNITY
End: 2023-11-11 | Stop reason: HOSPADM

## 2023-11-08 RX ORDER — DOCUSATE SODIUM 100 MG/1
100 CAPSULE, LIQUID FILLED ORAL 2 TIMES DAILY
Status: DISCONTINUED | OUTPATIENT
Start: 2023-11-08 | End: 2023-11-11 | Stop reason: HOSPADM

## 2023-11-08 RX ORDER — NITROGLYCERIN 0.4 MG/1
0.4 TABLET SUBLINGUAL
Status: DISCONTINUED | OUTPATIENT
Start: 2023-11-08 | End: 2023-11-11 | Stop reason: HOSPADM

## 2023-11-08 RX ORDER — BUDESONIDE AND FORMOTEROL FUMARATE DIHYDRATE 160; 4.5 UG/1; UG/1
2 AEROSOL RESPIRATORY (INHALATION)
Status: DISCONTINUED | OUTPATIENT
Start: 2023-11-08 | End: 2023-11-11 | Stop reason: HOSPADM

## 2023-11-08 RX ORDER — SODIUM CHLORIDE 9 MG/ML
40 INJECTION, SOLUTION INTRAVENOUS AS NEEDED
Status: DISCONTINUED | OUTPATIENT
Start: 2023-11-08 | End: 2023-11-11 | Stop reason: HOSPADM

## 2023-11-08 RX ORDER — ALLOPURINOL 100 MG/1
100 TABLET ORAL DAILY
Status: DISCONTINUED | OUTPATIENT
Start: 2023-11-09 | End: 2023-11-11 | Stop reason: HOSPADM

## 2023-11-08 RX ORDER — DEXTROSE MONOHYDRATE 25 G/50ML
25 INJECTION, SOLUTION INTRAVENOUS
Status: DISCONTINUED | OUTPATIENT
Start: 2023-11-08 | End: 2023-11-11 | Stop reason: HOSPADM

## 2023-11-08 RX ORDER — POLYETHYLENE GLYCOL 3350 17 G/17G
17 POWDER, FOR SOLUTION ORAL DAILY PRN
Status: DISCONTINUED | OUTPATIENT
Start: 2023-11-08 | End: 2023-11-11 | Stop reason: HOSPADM

## 2023-11-08 RX ORDER — ALBUTEROL SULFATE 2.5 MG/3ML
2.5 SOLUTION RESPIRATORY (INHALATION) EVERY 6 HOURS PRN
Status: DISCONTINUED | OUTPATIENT
Start: 2023-11-08 | End: 2023-11-11 | Stop reason: HOSPADM

## 2023-11-08 RX ORDER — MIDODRINE HYDROCHLORIDE 5 MG/1
5 TABLET ORAL
Status: DISCONTINUED | OUTPATIENT
Start: 2023-11-09 | End: 2023-11-11 | Stop reason: HOSPADM

## 2023-11-08 RX ORDER — CALCIUM CARBONATE 500 MG/1
2 TABLET, CHEWABLE ORAL 2 TIMES DAILY PRN
Status: DISCONTINUED | OUTPATIENT
Start: 2023-11-08 | End: 2023-11-11 | Stop reason: HOSPADM

## 2023-11-08 RX ORDER — FUROSEMIDE 10 MG/ML
80 INJECTION INTRAMUSCULAR; INTRAVENOUS ONCE
Status: COMPLETED | OUTPATIENT
Start: 2023-11-08 | End: 2023-11-08

## 2023-11-08 RX ORDER — INSULIN LISPRO 100 [IU]/ML
2-9 INJECTION, SOLUTION INTRAVENOUS; SUBCUTANEOUS
Status: DISCONTINUED | OUTPATIENT
Start: 2023-11-08 | End: 2023-11-11 | Stop reason: HOSPADM

## 2023-11-08 RX ADMIN — APIXABAN 5 MG: 5 TABLET, FILM COATED ORAL at 20:30

## 2023-11-08 RX ADMIN — Medication 10 ML: at 20:31

## 2023-11-08 RX ADMIN — FUROSEMIDE 80 MG: 40 INJECTION, SOLUTION INTRAMUSCULAR; INTRAVENOUS at 20:29

## 2023-11-08 RX ADMIN — ALBUTEROL SULFATE 2.5 MG: 2.5 SOLUTION RESPIRATORY (INHALATION) at 19:56

## 2023-11-08 RX ADMIN — INSULIN GLARGINE 16 UNITS: 100 INJECTION, SOLUTION SUBCUTANEOUS at 20:39

## 2023-11-08 RX ADMIN — METOPROLOL SUCCINATE 25 MG: 25 TABLET, EXTENDED RELEASE ORAL at 21:30

## 2023-11-08 RX ADMIN — DOCUSATE SODIUM 100 MG: 100 CAPSULE, LIQUID FILLED ORAL at 20:30

## 2023-11-08 RX ADMIN — DOCUSATE SODIUM 50MG AND SENNOSIDES 8.6MG 2 TABLET: 8.6; 5 TABLET, FILM COATED ORAL at 20:30

## 2023-11-08 NOTE — ED NOTES
"Nursing report ED to floor  Aydee Soils  73 y.o.  female    HPI :   Chief Complaint   Patient presents with    Dizziness       Admitting doctor:   Samantha Manjarrez MD    Admitting diagnosis:   The primary encounter diagnosis was Acute renal failure, unspecified acute renal failure type. Diagnoses of Altered mental status, unspecified altered mental status type, Hypoxia, and Acute congestive heart failure, unspecified heart failure type were also pertinent to this visit.    Code status:   Current Code Status       Date Active Code Status Order ID Comments User Context       Prior            Allergies:   Ciprofloxacin    Isolation:   No active isolations    Intake and Output  No intake or output data in the 24 hours ending 11/08/23 1636    Weight:       11/08/23  1423   Weight: 118 kg (260 lb 3.2 oz)       Most recent vitals:   Vitals:    11/08/23 1423 11/08/23 1424 11/08/23 1456 11/08/23 1526   BP: 113/69  122/60 114/78   Pulse:   59 60   Resp:   18 18   Temp:       TempSrc:       SpO2:  94% 100% 95%   Weight: 118 kg (260 lb 3.2 oz)      Height: 167.6 cm (65.98\")          Active LDAs/IV Access:   Lines, Drains & Airways       Active LDAs       Name Placement date Placement time Site Days    Peripheral IV 11/08/23 1447 Right Antecubital 11/08/23  1447  Antecubital  less than 1                    Labs (abnormal labs have a star):   Labs Reviewed   COMPREHENSIVE METABOLIC PANEL - Abnormal; Notable for the following components:       Result Value    BUN 41 (*)     Creatinine 2.36 (*)     Sodium 133 (*)     Chloride 90 (*)     CO2 31.2 (*)     AST (SGOT) 35 (*)     Alkaline Phosphatase 134 (*)     Total Bilirubin 3.1 (*)     eGFR 21.0 (*)     All other components within normal limits    Narrative:     GFR Normal >60  Chronic Kidney Disease <60  Kidney Failure <15    The GFR formula is only valid for adults with stable renal function between ages 18 and 70.   LACTIC ACID, PLASMA - Abnormal; Notable for the following " components:    Lactate 2.4 (*)     All other components within normal limits   CBC WITH AUTO DIFFERENTIAL - Abnormal; Notable for the following components:    RBC 5.40 (*)     MCH 25.0 (*)     MCHC 30.5 (*)     RDW 18.5 (*)     nRBC 0.6 (*)     All other components within normal limits   BNP (IN-HOUSE) - Abnormal; Notable for the following components:    proBNP 5,929.0 (*)     All other components within normal limits    Narrative:     This assay is used as an aid in the diagnosis of individuals suspected of having heart failure. It can be used as an aid in the diagnosis of acute decompensated heart failure (ADHF) in patients presenting with signs and symptoms of ADHF to the emergency department (ED). In addition, NT-proBNP of <300 pg/mL indicates ADHF is not likely.    Age Range Result Interpretation  NT-proBNP Concentration (pg/mL:      <50             Positive            >450                   Gray                 300-450                    Negative             <300    50-75           Positive            >900                  Gray                300-900                  Negative            <300      >75             Positive            >1800                  Gray                300-1800                  Negative            <300   BLOOD GAS, ARTERIAL - Abnormal; Notable for the following components:    pH, Arterial 7.471 (*)     pO2, Arterial 71.6 (*)     HCO3, Arterial 28.6 (*)     Base Excess, Arterial 4.6 (*)     CO2 Content 29.8 (*)     All other components within normal limits   AMMONIA - Normal   BLOOD CULTURE   BLOOD CULTURE   ETHANOL   BLOOD GAS, ARTERIAL   RAINBOW DRAW    Narrative:     The following orders were created for panel order [place] Draw.  Procedure                               Abnormality         Status                     ---------                               -----------         ------                     Green Top (Gel)[[id number]]                                  Final result                Lavender Top[[id number]]                                     Final result               Gold Top - SST[[id number]]                                                              Light Blue Top[[id number]]                                   Final result                 Please view results for these tests on the individual orders.   URINALYSIS W/ CULTURE IF INDICATED   LACTIC ACID, REFLEX   CBC AND DIFFERENTIAL    Narrative:     The following orders were created for panel order CBC & Differential.  Procedure                               Abnormality         Status                     ---------                               -----------         ------                     CBC Auto Differential[[id number]]        Abnormal            Final result                 Please view results for these tests on the individual orders.   GREEN TOP   LAVENDER TOP   LIGHT BLUE TOP   GOLD TOP - SST       EKG:   ECG 12 Lead Dyspnea   Preliminary Result   HEART RATE= 60  bpm   RR Interval= 1000  ms   VA Interval= 53  ms   P Horizontal Axis= 63  deg   P Front Axis= 0  deg   QRSD Interval= 182  ms   QT Interval= 522  ms   QTcB= 522  ms   QRS Axis= 248  deg   T Wave Axis= 4  deg   - ABNORMAL ECG -   Ventricular-paced rhythm   No further analysis attempted due to paced rhythm   Electronically Signed By:    Date and Time of Study: 2023-11-08 16:32:33          Meds given in ED:   Medications   sodium chloride 0.9 % flush 10 mL (has no administration in time range)       Imaging results:  CT Head Without Contrast    Result Date: 11/8/2023  There is no evidence of acute infarction, intracranial hemorrhage or of abnormal extra-axial fluid. Age-appropriate atrophy, small vessel ischemic disease and vascular calcification is noted. The right vertebral artery is dominant. Further evaluation could be performed with a MRI examination of the brain as indicated.    Radiation dose reduction techniques were utilized, including automated exposure control and  exposure modulation based on body size.       XR Chest 1 View    Result Date: 11/8/2023  No acute findings    This report was finalized on 11/8/2023 2:55 PM by Dr. Carlos Nunez M.D on Workstation: [id number]       Ambulatory status:   - assist x 2    Social issues:   Social History     Socioeconomic History    Marital status:    Tobacco Use    Smoking status: Former   Vaping Use    Vaping Use: Never used   Substance and Sexual Activity    Alcohol use: No    Drug use: No    Sexual activity: Defer       NIH Stroke Scale:       Shivani Ramon RN  11/08/23 16:36 EST

## 2023-11-08 NOTE — ED NOTES
Pt was brought in by family for generalized weakness, decreased appetite, and hallucinations that started a couple days ago.

## 2023-11-08 NOTE — ED PROVIDER NOTES
MD ATTESTATION NOTE    The JASWANT and I have discussed this patient's history, physical exam, and treatment plan.  I have reviewed the documentation and personally had a face to face interaction with the patient. I affirm the documentation and agree with the treatment and plan.  The attached note describes my personal findings.      I provided a substantive portion of the care of the patient.  I personally performed the physical exam in its entirety, and below are my findings.      Brief HPI: Patient presents for evaluation of confusion.  Patient states she feels lightheaded.  Patient wears intermittent oxygen at home.  Family states she has been having increasing confusion.  States he is not eating or drinking.  Just finished antibiotics for UTI.    PHYSICAL EXAM  ED Triage Vitals   Temp Heart Rate Resp BP SpO2   11/08/23 1414 11/08/23 1414 11/08/23 1414 11/08/23 1423 11/08/23 1414   97 °F (36.1 °C) 63 20 113/69 (!) 83 %      Temp src Heart Rate Source Patient Position BP Location FiO2 (%)   11/08/23 1414 11/08/23 1414 -- -- --   Tympanic Monitor            GENERAL: no acute distress patient is chronically ill-appearing  HENT: nares patent  EYES: no scleral icterus  CV: regular rhythm, normal rate  RESPIRATORY: normal effort  ABDOMEN: soft  MUSCULOSKELETAL: no deformity.  Bilateral edema  NEURO: alert, moves all extremities, follows commands  PSYCH:  calm, cooperative  SKIN: warm, dry    Vital signs and nursing notes reviewed.    EKG          EKG time: 1632  Rhythm/Rate: Paced rhythm rate 60  Interpreted Contemporaneously by me, independently viewed  No prior    ED Course as of 11/08/23 1637   Wed Nov 08, 2023   1528 Creatinine(!): 2.36  1.19 2 months ago []   1623 16:23 EST  Patient presents for confusion.  Patient does have hypoxia here.  Has BNP that is elevated.  Patient also in acute renal failure.  Patient is refusing catheterization for urinalysis of still waiting for that.  Patient has been placed on oxygen  with normal pulse ox.  Discussed with Dr. Manjarrez who will admit. [SL]      ED Course User Index  [AH] Cheli Fournier APRN  [] Iker Williamson MD        Diagnosis Plan   1. Acute renal failure, unspecified acute renal failure type        2. Altered mental status, unspecified altered mental status type        3. Hypoxia        4. Acute congestive heart failure, unspecified heart failure type               Plan: Admit       Iker Williamson MD  11/08/23 9434

## 2023-11-08 NOTE — ED PROVIDER NOTES
" EMERGENCY DEPARTMENT ENCOUNTER    Room Number:    PCP: Bennett Duque DO  Historian: pt,       HPI:  Chief Complaint: dizziness, confusion  A complete HPI/ROS/PMH/PSH/SH/FH are unobtainable due to: clinical condition  Context: Aydee Solis is a pleasant afebrile 73 y.o.  female who presents to the ED c/o dizziness.       Dizziness like she was going to pass out that started today, she denies any chest pain, headache or focal weakness  Pt's  states she is asking questions that are \"off the wall\" patient has been asking her  about a dog that is been  for years  Had a lap band years ago and has had poor PO intake since then   Pt's  reports concern she could have a UTI, she just finished antibiotic yesterday which was a 5 day course  No fever or chills  In triage patient's oxygen saturation was noted to be 83%, she wears oxygen at bedtime with use of her CPAP and wears it daily as needed  She states that she was scheduled to see Dr. Guallpa in the office for chronic kidney disease at the recommendation of her primary care provider but missed their appointment recently so she has never been seen by a nephrologist      PAST MEDICAL HISTORY  Active Ambulatory Problems     Diagnosis Date Noted    Pneumonia of right lower lobe due to infectious organism 2017    Cellulitis 2017    Hypertension 2017    Hyperlipidemia 2017    Breast cancer 2017    Lymphedema 2017    Osteoporosis 2017    Acute respiratory failure with hypoxia 2017    Morbid obesity due to excess calories 2017    PAULETTE (obstructive sleep apnea) 2023    Chronic respiratory failure with hypoxia 2023    Stage 3b chronic kidney disease 2023    Type 2 diabetes mellitus, with long-term current use of insulin 2023    PAF (paroxysmal atrial fibrillation) 2023    Chronic anticoagulation 2023    COPD (chronic obstructive pulmonary " disease) 01/25/2023    Chronic systolic CHF (congestive heart failure) 01/31/2023    Left leg cellulitis 06/05/2023    UTI (urinary tract infection) 06/06/2023    Lymphedema 06/06/2023    Dizziness 06/06/2023    Opioid dependence 06/07/2023    Metabolic acidosis 07/31/2023    Confusion 07/31/2023    Alkalosis, metabolic 07/31/2023    Hypokalemia 08/01/2023    Hyponatremia 08/01/2023     Resolved Ambulatory Problems     Diagnosis Date Noted    Sepsis 01/30/2017    Metabolic encephalopathy 01/25/2023    Acute on chronic combined systolic and diastolic CHF (congestive heart failure) 01/25/2023    Acute renal insufficiency 07/31/2023    Acute UTI 08/29/2023     Past Medical History:   Diagnosis Date    Arthritis     Cancer     Class 3 severe obesity due to excess calories with body mass index (BMI) of 50.0 to 59.9 in adult     Diabetes mellitus     EDWARDS (dyspnea on exertion)     Elephantiasis     Leukemia     Lung cancer     Tracheal cancer          PAST SURGICAL HISTORY  Past Surgical History:   Procedure Laterality Date    APPENDECTOMY      HYSTERECTOMY      KNEE ARTHROSCOPY Right     LAPAROSCOPIC GASTRIC BANDING      LYMPHADENECTOMY Left     MASTECTOMY Left          FAMILY HISTORY  Family History   Problem Relation Age of Onset    Stroke Mother     Leukemia Father     COPD Sister     ALS Brother          SOCIAL HISTORY  Social History     Socioeconomic History    Marital status:    Tobacco Use    Smoking status: Former   Vaping Use    Vaping Use: Never used   Substance and Sexual Activity    Alcohol use: No    Drug use: No    Sexual activity: Defer         ALLERGIES  Ciprofloxacin        REVIEW OF SYSTEMS  Review of Systems   Neurological:  Positive for dizziness.         PHYSICAL EXAM  ED Triage Vitals   Temp Heart Rate Resp BP SpO2   11/08/23 1414 11/08/23 1414 11/08/23 1414 11/08/23 1423 11/08/23 1414   97 °F (36.1 °C) 63 20 113/69 (!) 83 %      Temp src Heart Rate Source Patient Position BP Location FiO2  (%)   11/08/23 1414 11/08/23 1414 -- -- --   Tympanic Monitor          Physical Exam      GENERAL: Alert and oriented x4, no acute distress, chronically ill-appearing/appears older than stated age  HENT: nares patent, mucous membranes are moist and intact  EYES: no scleral icterus, no subconjunctival pallor  CV: regular rhythm, normal rate, 3-4+ bilateral lower extremity edema  RESPIRATORY: Mild tachypnea, diminished breath sounds to bilateral lower lobes  ABDOMEN: soft and nontender diffusely, morbid obesity, bowel sounds WNL  MUSCULOSKELETAL: no deformity, normal active range of motion all extremities  NEURO: alert, moves all extremities, follows commands, some bizarre answering of questions  PSYCH:  calm, cooperative  SKIN: warm, dry, chronic venous stasis changes of bilateral lower extremities with generalized sallow present    Vital signs and nursing notes reviewed.          LAB RESULTS  Recent Results (from the past 24 hour(s))   Comprehensive Metabolic Panel    Collection Time: 11/08/23  2:44 PM    Specimen: Blood   Result Value Ref Range    Glucose 95 65 - 99 mg/dL    BUN 41 (H) 8 - 23 mg/dL    Creatinine 2.36 (H) 0.57 - 1.00 mg/dL    Sodium 133 (L) 136 - 145 mmol/L    Potassium 3.8 3.5 - 5.2 mmol/L    Chloride 90 (L) 98 - 107 mmol/L    CO2 31.2 (H) 22.0 - 29.0 mmol/L    Calcium 9.5 8.6 - 10.5 mg/dL    Total Protein 7.8 6.0 - 8.5 g/dL    Albumin 4.1 3.5 - 5.2 g/dL    ALT (SGPT) 18 1 - 33 U/L    AST (SGOT) 35 (H) 1 - 32 U/L    Alkaline Phosphatase 134 (H) 39 - 117 U/L    Total Bilirubin 3.1 (H) 0.0 - 1.2 mg/dL    Globulin 3.7 gm/dL    A/G Ratio 1.1 g/dL    BUN/Creatinine Ratio 17.4 7.0 - 25.0    Anion Gap 11.8 5.0 - 15.0 mmol/L    eGFR 21.0 (L) >60.0 mL/min/1.73   Lactic Acid, Plasma    Collection Time: 11/08/23  2:44 PM    Specimen: Blood   Result Value Ref Range    Lactate 2.4 (C) 0.5 - 2.0 mmol/L   Ammonia    Collection Time: 11/08/23  2:44 PM    Specimen: Blood   Result Value Ref Range    Ammonia 21 11 -  51 umol/L   Ethanol    Collection Time: 11/08/23  2:44 PM    Specimen: Blood   Result Value Ref Range    Ethanol <10 0 - 10 mg/dL    Ethanol % <0.010 %   Green Top (Gel)    Collection Time: 11/08/23  2:44 PM   Result Value Ref Range    Extra Tube Hold for add-ons.    Lavender Top    Collection Time: 11/08/23  2:44 PM   Result Value Ref Range    Extra Tube hold for add-on    Light Blue Top    Collection Time: 11/08/23  2:44 PM   Result Value Ref Range    Extra Tube Hold for add-ons.    CBC Auto Differential    Collection Time: 11/08/23  2:44 PM    Specimen: Blood   Result Value Ref Range    WBC 4.68 3.40 - 10.80 10*3/mm3    RBC 5.40 (H) 3.77 - 5.28 10*6/mm3    Hemoglobin 13.5 12.0 - 15.9 g/dL    Hematocrit 44.2 34.0 - 46.6 %    MCV 81.9 79.0 - 97.0 fL    MCH 25.0 (L) 26.6 - 33.0 pg    MCHC 30.5 (L) 31.5 - 35.7 g/dL    RDW 18.5 (H) 12.3 - 15.4 %    RDW-SD 52.1 37.0 - 54.0 fl    MPV 9.9 6.0 - 12.0 fL    Platelets 283 140 - 450 10*3/mm3    Neutrophil % 66.8 42.7 - 76.0 %    Lymphocyte % 20.5 19.6 - 45.3 %    Monocyte % 10.5 5.0 - 12.0 %    Eosinophil % 0.9 0.3 - 6.2 %    Basophil % 1.1 0.0 - 1.5 %    Immature Grans % 0.2 0.0 - 0.5 %    Neutrophils, Absolute 3.13 1.70 - 7.00 10*3/mm3    Lymphocytes, Absolute 0.96 0.70 - 3.10 10*3/mm3    Monocytes, Absolute 0.49 0.10 - 0.90 10*3/mm3    Eosinophils, Absolute 0.04 0.00 - 0.40 10*3/mm3    Basophils, Absolute 0.05 0.00 - 0.20 10*3/mm3    Immature Grans, Absolute 0.01 0.00 - 0.05 10*3/mm3    nRBC 0.6 (H) 0.0 - 0.2 /100 WBC   BNP    Collection Time: 11/08/23  2:44 PM    Specimen: Blood   Result Value Ref Range    proBNP 5,929.0 (H) 0.0 - 1,800.0 pg/mL   Blood Gas, Arterial -    Collection Time: 11/08/23  3:16 PM    Specimen: Arterial Blood   Result Value Ref Range    Site Right Radial     Tee's Test Positive     pH, Arterial 7.471 (H) 7.350 - 7.450 pH units    pCO2, Arterial 39.2 35.0 - 45.0 mm Hg    pO2, Arterial 71.6 (L) 80.0 - 100.0 mm Hg    HCO3, Arterial 28.6 (H) 22.0 -  28.0 mmol/L    Base Excess, Arterial 4.6 (H) 0.0 - 2.0 mmol/L    O2 Saturation, Arterial 95.2 92.0 - 98.5 %    A-a DO2 0.4 mmHg    CO2 Content 29.8 (H) 23 - 27 mmol/L    Barometric Pressure for Blood Gas 743.8000 mmHg    Modality Cannula     FIO2 28 %    Flow Rate 2.0000 lpm    Set Mech Resp Rate 18     Hemodilution No        Ordered the above labs and reviewed the results.        RADIOLOGY  XR Chest 1 View    Result Date: 11/8/2023  AP CHEST  HISTORY: Dizziness, altered mental status  COMPARISON: 8/30/2023  FINDINGS: Stable cardiomegaly. Mild atelectasis in the lung bases. Stable ICD. No pneumothorax. Atherosclerotic calcification aorta. Left axillary surgical clips.      No acute findings    This report was finalized on 11/8/2023 2:55 PM by Dr. Carlos Nunez M.D on Workstation: [id number]       Ordered the above noted radiological studies. Reviewed by me in PACS.            PROCEDURES  Procedures      MEDICATIONS GIVEN IN ER  Medications - No data to display                MEDICAL DECISION MAKING, PROGRESS, and CONSULTS    All labs have been independently reviewed by me.  All radiology studies have been reviewed by me and I have also reviewed the radiology report.   EKG's independently viewed and interpreted by me.  Discussion below represents my analysis of pertinent findings related to patient's condition, differential diagnosis, treatment plan and final disposition.      Additional sources:  - Discussed/ obtained information from independent historians: History obtained from patient and     - External (non-ED) record review: 9/20/23 endocrine dr. Tabares patient's Levemir dose to 20 units at bedtime, NovoLog sliding scale, over-the-counter vitamin D,    - Chronic or social conditions impacting care: pt has good social support    - Shared decision making:  discussed tx and options with pt and family, agreeable to hospital admission      Orders placed during this visit:  No orders of the defined types were  placed in this encounter.        Additional orders considered but not ordered:  I considered a CTA of pt's chest but suspect she will not be a good candidate based on CKD        Differential diagnosis includes but is not limited to:    ACS, uti, sepsis      Independent interpretation of labs, radiology studies, and discussions with consultants:  ED Course as of 11/08/23 1548   Wed Nov 08, 2023   1528 Creatinine(!): 2.36  1.19 2 months ago []      ED Course User Index  [] Cheli Fournier APRN             I have worn appropriate PPE during this patient encounter, sanitized my hands both with entering and exiting patient's room.      DIAGNOSIS  Final diagnoses:   Acute renal failure, unspecified acute renal failure type         DISPOSITION  To be admitted, care transferred to dr. english          Latest Documented Vital Signs:  As of 14:34 EST  BP- 113/69 HR- 63 Temp- 97 °F (36.1 °C) (Tympanic) O2 sat- 94%              --    Please note that portions of this were completed with a voice recognition program.       Note Disclaimer: At [facility], we believe that sharing information builds trust and better relationships. You are receiving this note because you are receiving care at [facility] or recently visited. It is possible you will see health information before a provider has talked with you about it. This kind of information can be easy to misunderstand. To help you fully understand what it means for your health, we urge you to discuss this note with your provider.             Cheli Fournier APRN  11/08/23 1112

## 2023-11-08 NOTE — PLAN OF CARE
Goal Outcome Evaluation:  Plan of Care Reviewed With: patient, spouse        Progress: no change     New admit to floor from ED. Database complete. Alert and oriented x2. 2 Liters nasal cannula. V paced on monitor. Patient states she hasn't urinated today. Bladder scan was 0mL.

## 2023-11-09 PROBLEM — Z95.0 PACEMAKER: Chronic | Status: ACTIVE | Noted: 2023-11-09

## 2023-11-09 LAB
ALBUMIN SERPL-MCNC: 3.6 G/DL (ref 3.5–5.2)
ALBUMIN/GLOB SERPL: 0.9 G/DL
ALP SERPL-CCNC: 133 U/L (ref 39–117)
ALT SERPL W P-5'-P-CCNC: 13 U/L (ref 1–33)
ANION GAP SERPL CALCULATED.3IONS-SCNC: 12.4 MMOL/L (ref 5–15)
ANION GAP SERPL CALCULATED.3IONS-SCNC: 13.3 MMOL/L (ref 5–15)
AST SERPL-CCNC: 28 U/L (ref 1–32)
B PARAPERT DNA SPEC QL NAA+PROBE: NOT DETECTED
B PERT DNA SPEC QL NAA+PROBE: NOT DETECTED
BACTERIA SPEC AEROBE CULT: NO GROWTH
BASOPHILS # BLD AUTO: 0.04 10*3/MM3 (ref 0–0.2)
BASOPHILS NFR BLD AUTO: 0.9 % (ref 0–1.5)
BILIRUB SERPL-MCNC: 2.7 MG/DL (ref 0–1.2)
BUN SERPL-MCNC: 39 MG/DL (ref 8–23)
BUN SERPL-MCNC: 39 MG/DL (ref 8–23)
BUN/CREAT SERPL: 19.8 (ref 7–25)
BUN/CREAT SERPL: 21 (ref 7–25)
C PNEUM DNA NPH QL NAA+NON-PROBE: NOT DETECTED
CA-I BLD-MCNC: 4.6 MG/DL (ref 4.6–5.4)
CA-I SERPL ISE-MCNC: 1.15 MMOL/L (ref 1.15–1.35)
CALCIUM SPEC-SCNC: 9.3 MG/DL (ref 8.6–10.5)
CALCIUM SPEC-SCNC: 9.8 MG/DL (ref 8.6–10.5)
CHLORIDE SERPL-SCNC: 92 MMOL/L (ref 98–107)
CHLORIDE SERPL-SCNC: 93 MMOL/L (ref 98–107)
CO2 SERPL-SCNC: 30.7 MMOL/L (ref 22–29)
CO2 SERPL-SCNC: 35.6 MMOL/L (ref 22–29)
CREAT SERPL-MCNC: 1.86 MG/DL (ref 0.57–1)
CREAT SERPL-MCNC: 1.97 MG/DL (ref 0.57–1)
DEPRECATED RDW RBC AUTO: 50.4 FL (ref 37–54)
EGFRCR SERPLBLD CKD-EPI 2021: 26.1 ML/MIN/1.73
EGFRCR SERPLBLD CKD-EPI 2021: 28 ML/MIN/1.73
EOSINOPHIL # BLD AUTO: 0.08 10*3/MM3 (ref 0–0.4)
EOSINOPHIL NFR BLD AUTO: 1.8 % (ref 0.3–6.2)
ERYTHROCYTE [DISTWIDTH] IN BLOOD BY AUTOMATED COUNT: 18 % (ref 12.3–15.4)
FLUAV SUBTYP SPEC NAA+PROBE: NOT DETECTED
FLUBV RNA ISLT QL NAA+PROBE: NOT DETECTED
GEN 5 2HR TROPONIN T REFLEX: 31 NG/L
GLOBULIN UR ELPH-MCNC: 3.9 GM/DL
GLUCOSE BLDC GLUCOMTR-MCNC: 138 MG/DL (ref 70–130)
GLUCOSE BLDC GLUCOMTR-MCNC: 143 MG/DL (ref 70–130)
GLUCOSE BLDC GLUCOMTR-MCNC: 190 MG/DL (ref 70–130)
GLUCOSE BLDC GLUCOMTR-MCNC: 90 MG/DL (ref 70–130)
GLUCOSE SERPL-MCNC: 125 MG/DL (ref 65–99)
GLUCOSE SERPL-MCNC: 125 MG/DL (ref 65–99)
HADV DNA SPEC NAA+PROBE: NOT DETECTED
HCOV 229E RNA SPEC QL NAA+PROBE: NOT DETECTED
HCOV HKU1 RNA SPEC QL NAA+PROBE: NOT DETECTED
HCOV NL63 RNA SPEC QL NAA+PROBE: NOT DETECTED
HCOV OC43 RNA SPEC QL NAA+PROBE: NOT DETECTED
HCT VFR BLD AUTO: 42.8 % (ref 34–46.6)
HGB BLD-MCNC: 13.3 G/DL (ref 12–15.9)
HMPV RNA NPH QL NAA+NON-PROBE: NOT DETECTED
HPIV1 RNA ISLT QL NAA+PROBE: NOT DETECTED
HPIV2 RNA SPEC QL NAA+PROBE: NOT DETECTED
HPIV3 RNA NPH QL NAA+PROBE: NOT DETECTED
HPIV4 P GENE NPH QL NAA+PROBE: NOT DETECTED
IMM GRANULOCYTES # BLD AUTO: 0.02 10*3/MM3 (ref 0–0.05)
IMM GRANULOCYTES NFR BLD AUTO: 0.5 % (ref 0–0.5)
INR PPP: 2.87 (ref 0.9–1.1)
LYMPHOCYTES # BLD AUTO: 1.26 10*3/MM3 (ref 0.7–3.1)
LYMPHOCYTES NFR BLD AUTO: 28.4 % (ref 19.6–45.3)
M PNEUMO IGG SER IA-ACNC: NOT DETECTED
MAGNESIUM SERPL-MCNC: 2.3 MG/DL (ref 1.6–2.4)
MCH RBC QN AUTO: 25.2 PG (ref 26.6–33)
MCHC RBC AUTO-ENTMCNC: 31.1 G/DL (ref 31.5–35.7)
MCV RBC AUTO: 81.1 FL (ref 79–97)
MONOCYTES # BLD AUTO: 0.62 10*3/MM3 (ref 0.1–0.9)
MONOCYTES NFR BLD AUTO: 14 % (ref 5–12)
NEUTROPHILS NFR BLD AUTO: 2.41 10*3/MM3 (ref 1.7–7)
NEUTROPHILS NFR BLD AUTO: 54.4 % (ref 42.7–76)
NRBC BLD AUTO-RTO: 0.2 /100 WBC (ref 0–0.2)
OSMOLALITY SERPL: 297 MOSM/KG (ref 280–301)
PHOSPHATE SERPL-MCNC: 3.6 MG/DL (ref 2.5–4.5)
PHOSPHATE SERPL-MCNC: 3.7 MG/DL (ref 2.5–4.5)
PLATELET # BLD AUTO: 262 10*3/MM3 (ref 140–450)
PMV BLD AUTO: 9.8 FL (ref 6–12)
POTASSIUM SERPL-SCNC: 3.1 MMOL/L (ref 3.5–5.2)
POTASSIUM SERPL-SCNC: 3.3 MMOL/L (ref 3.5–5.2)
PROT SERPL-MCNC: 7.5 G/DL (ref 6–8.5)
PROTHROMBIN TIME: 30.7 SECONDS (ref 11.7–14.2)
RBC # BLD AUTO: 5.28 10*6/MM3 (ref 3.77–5.28)
RHINOVIRUS RNA SPEC NAA+PROBE: NOT DETECTED
RSV RNA NPH QL NAA+NON-PROBE: NOT DETECTED
SARS-COV-2 RNA NPH QL NAA+NON-PROBE: NOT DETECTED
SODIUM SERPL-SCNC: 137 MMOL/L (ref 136–145)
SODIUM SERPL-SCNC: 140 MMOL/L (ref 136–145)
T4 FREE SERPL-MCNC: 0.53 NG/DL (ref 0.93–1.7)
TROPONIN T DELTA: -3 NG/L
TROPONIN T SERPL HS-MCNC: 34 NG/L
TSH SERPL DL<=0.05 MIU/L-ACNC: 33.5 UIU/ML (ref 0.27–4.2)
WBC NRBC COR # BLD: 4.43 10*3/MM3 (ref 3.4–10.8)

## 2023-11-09 PROCEDURE — 97535 SELF CARE MNGMENT TRAINING: CPT

## 2023-11-09 PROCEDURE — 97530 THERAPEUTIC ACTIVITIES: CPT

## 2023-11-09 PROCEDURE — 63710000001 INSULIN LISPRO (HUMAN) PER 5 UNITS: Performed by: INTERNAL MEDICINE

## 2023-11-09 PROCEDURE — 84484 ASSAY OF TROPONIN QUANT: CPT | Performed by: INTERNAL MEDICINE

## 2023-11-09 PROCEDURE — 84100 ASSAY OF PHOSPHORUS: CPT | Performed by: INTERNAL MEDICINE

## 2023-11-09 PROCEDURE — 97166 OT EVAL MOD COMPLEX 45 MIN: CPT

## 2023-11-09 PROCEDURE — 25010000002 FUROSEMIDE PER 20 MG: Performed by: INTERNAL MEDICINE

## 2023-11-09 PROCEDURE — G0378 HOSPITAL OBSERVATION PER HR: HCPCS

## 2023-11-09 PROCEDURE — 0202U NFCT DS 22 TRGT SARS-COV-2: CPT | Performed by: INTERNAL MEDICINE

## 2023-11-09 PROCEDURE — 94664 DEMO&/EVAL PT USE INHALER: CPT

## 2023-11-09 PROCEDURE — 80053 COMPREHEN METABOLIC PANEL: CPT | Performed by: INTERNAL MEDICINE

## 2023-11-09 PROCEDURE — 85610 PROTHROMBIN TIME: CPT | Performed by: INTERNAL MEDICINE

## 2023-11-09 PROCEDURE — 82948 REAGENT STRIP/BLOOD GLUCOSE: CPT

## 2023-11-09 PROCEDURE — 83930 ASSAY OF BLOOD OSMOLALITY: CPT | Performed by: INTERNAL MEDICINE

## 2023-11-09 PROCEDURE — 85025 COMPLETE CBC W/AUTO DIFF WBC: CPT | Performed by: INTERNAL MEDICINE

## 2023-11-09 PROCEDURE — 94799 UNLISTED PULMONARY SVC/PX: CPT

## 2023-11-09 PROCEDURE — 84443 ASSAY THYROID STIM HORMONE: CPT | Performed by: INTERNAL MEDICINE

## 2023-11-09 PROCEDURE — 94761 N-INVAS EAR/PLS OXIMETRY MLT: CPT

## 2023-11-09 PROCEDURE — 84439 ASSAY OF FREE THYROXINE: CPT | Performed by: INTERNAL MEDICINE

## 2023-11-09 PROCEDURE — 97162 PT EVAL MOD COMPLEX 30 MIN: CPT

## 2023-11-09 PROCEDURE — 83735 ASSAY OF MAGNESIUM: CPT | Performed by: INTERNAL MEDICINE

## 2023-11-09 PROCEDURE — 63710000001 INSULIN GLARGINE PER 5 UNITS: Performed by: INTERNAL MEDICINE

## 2023-11-09 PROCEDURE — 82330 ASSAY OF CALCIUM: CPT | Performed by: INTERNAL MEDICINE

## 2023-11-09 RX ORDER — TORSEMIDE 20 MG/1
80 TABLET ORAL
COMMUNITY
End: 2023-11-11 | Stop reason: HOSPADM

## 2023-11-09 RX ORDER — HYDROCODONE BITARTRATE AND ACETAMINOPHEN 10; 325 MG/1; MG/1
1 TABLET ORAL EVERY 4 HOURS PRN
COMMUNITY
Start: 2023-04-12

## 2023-11-09 RX ORDER — PANTOPRAZOLE SODIUM 40 MG/1
40 TABLET, DELAYED RELEASE ORAL DAILY
COMMUNITY

## 2023-11-09 RX ORDER — SENNOSIDES 8.6 MG
1300 CAPSULE ORAL EVERY 8 HOURS PRN
COMMUNITY

## 2023-11-09 RX ORDER — POTASSIUM CHLORIDE 750 MG/1
40 TABLET, FILM COATED, EXTENDED RELEASE ORAL EVERY 4 HOURS
Status: COMPLETED | OUTPATIENT
Start: 2023-11-09 | End: 2023-11-09

## 2023-11-09 RX ORDER — LEVOTHYROXINE SODIUM 0.03 MG/1
25 TABLET ORAL
Status: DISCONTINUED | OUTPATIENT
Start: 2023-11-10 | End: 2023-11-11 | Stop reason: HOSPADM

## 2023-11-09 RX ADMIN — APIXABAN 5 MG: 5 TABLET, FILM COATED ORAL at 20:30

## 2023-11-09 RX ADMIN — POTASSIUM CHLORIDE 40 MEQ: 750 TABLET, EXTENDED RELEASE ORAL at 20:30

## 2023-11-09 RX ADMIN — METOPROLOL SUCCINATE 25 MG: 25 TABLET, EXTENDED RELEASE ORAL at 08:28

## 2023-11-09 RX ADMIN — INSULIN GLARGINE 16 UNITS: 100 INJECTION, SOLUTION SUBCUTANEOUS at 20:30

## 2023-11-09 RX ADMIN — BUDESONIDE AND FORMOTEROL FUMARATE DIHYDRATE 2 PUFF: 160; 4.5 AEROSOL RESPIRATORY (INHALATION) at 19:39

## 2023-11-09 RX ADMIN — ALLOPURINOL 100 MG: 100 TABLET ORAL at 08:27

## 2023-11-09 RX ADMIN — ATORVASTATIN CALCIUM 10 MG: 20 TABLET, FILM COATED ORAL at 08:27

## 2023-11-09 RX ADMIN — APIXABAN 5 MG: 5 TABLET, FILM COATED ORAL at 08:28

## 2023-11-09 RX ADMIN — MIDODRINE HYDROCHLORIDE 5 MG: 5 TABLET ORAL at 17:26

## 2023-11-09 RX ADMIN — FUROSEMIDE 80 MG: 10 INJECTION, SOLUTION INTRAMUSCULAR; INTRAVENOUS at 08:27

## 2023-11-09 RX ADMIN — DOCUSATE SODIUM 100 MG: 100 CAPSULE, LIQUID FILLED ORAL at 20:30

## 2023-11-09 RX ADMIN — FUROSEMIDE 80 MG: 10 INJECTION, SOLUTION INTRAMUSCULAR; INTRAVENOUS at 17:26

## 2023-11-09 RX ADMIN — POTASSIUM CHLORIDE 40 MEQ: 750 TABLET, EXTENDED RELEASE ORAL at 16:28

## 2023-11-09 RX ADMIN — SPIRONOLACTONE 50 MG: 50 TABLET, FILM COATED ORAL at 08:28

## 2023-11-09 RX ADMIN — MIDODRINE HYDROCHLORIDE 5 MG: 5 TABLET ORAL at 11:29

## 2023-11-09 RX ADMIN — INSULIN LISPRO 2 UNITS: 100 INJECTION, SOLUTION INTRAVENOUS; SUBCUTANEOUS at 20:30

## 2023-11-09 RX ADMIN — ALBUTEROL SULFATE 2.5 MG: 2.5 SOLUTION RESPIRATORY (INHALATION) at 14:27

## 2023-11-09 RX ADMIN — ALBUTEROL SULFATE 2.5 MG: 2.5 SOLUTION RESPIRATORY (INHALATION) at 07:09

## 2023-11-09 RX ADMIN — MIDODRINE HYDROCHLORIDE 5 MG: 5 TABLET ORAL at 08:27

## 2023-11-09 RX ADMIN — Medication 10 ML: at 08:30

## 2023-11-09 RX ADMIN — Medication 10 ML: at 20:30

## 2023-11-09 RX ADMIN — POTASSIUM CHLORIDE 40 MEQ: 750 TABLET, EXTENDED RELEASE ORAL at 11:43

## 2023-11-09 RX ADMIN — METOPROLOL SUCCINATE 25 MG: 25 TABLET, EXTENDED RELEASE ORAL at 20:30

## 2023-11-09 NOTE — DISCHARGE PLACEMENT REQUEST
"Aydee Alcantara (73 y.o. Female)       Date of Birth   1950    Social Security Number       Address   [facility] [place] [id number]    Home Phone   [phone]    MRN   [id number]       Roman Catholic   M[employer]onism    Marital Status                               Admission Date   11/8/23    Admission Type   Emergency    Admitting Provider   Samantha Manjarrez MD    Attending Provider   Buster Marshall MD    Department, Room/Bed   [address], [address]       Discharge Date       Discharge Disposition       Discharge Destination                                 Attending Provider: Buster Marshall MD    Allergies: Ciprofloxacin    Isolation: Enh Drop/Con   Infection: COVID (rule out) (11/08/23)   Code Status: CPR    Ht: 167.6 cm (65.98\")   Wt: 118 kg (260 lb 3.2 oz)    Admission Cmt: None   Principal Problem: AMS (altered mental status) [R41.82]                   Active Insurance as of 11/8/2023       Primary Coverage       Payor Plan Insurance Group Employer/Plan Group    MEDICARE MEDICARE A & B        Payor Plan Address Payor Plan Phone Number Payor Plan Fax Number Effective Dates    [address] [phone]  7/1/2013 - None Entered    [facility] [id number]         Subscriber Name Subscriber Birth Date Member ID       AYDEE ALCANTARA 1950 [id number]               Secondary Coverage       Payor Plan Insurance Group Employer/Plan Group    [employer]HEM BLUE CROSS [facility] SUPP [id number]       Payor Plan Address Payor Plan Phone Number Payor Plan Fax Number Effective Dates    [address]   12/1/2016 - None Entered    [facility] [address]         Subscriber Name Subscriber Birth Date Member ID       AYDEE ALCANTARA 1950 [id number]                     Emergency Contacts        (Rel.) Home Phone Work Phone Mobile Phone    WillKyle jaime (Spouse) [phone] -- [phone]          "

## 2023-11-09 NOTE — THERAPY EVALUATION
Patient Name: Aydee Solis  : 1950    MRN: [id number]                              Today's Date: 2023       Admit Date: 2023    Visit Dx:     ICD-10-CM ICD-9-CM   1. Acute renal failure, unspecified acute renal failure type  N17.9 584.9   2. Altered mental status, unspecified altered mental status type  R41.82 780.97   3. Hypoxia  R09.02 799.02   4. Acute congestive heart failure, unspecified heart failure type  I50.9 428.0     Patient Active Problem List   Diagnosis    Pneumonia of right lower lobe due to infectious organism    Cellulitis    Hypertension    Hyperlipidemia    Breast cancer    Lymphedema    Osteoporosis    Acute on chronic respiratory failure with hypoxia    Morbid obesity due to excess calories    Acute metabolic encephalopathy    PAULETTE (obstructive sleep apnea)    Chronic respiratory failure with hypoxia    Stage 3b chronic kidney disease    Type 2 diabetes mellitus, with long-term current use of insulin    PAF (paroxysmal atrial fibrillation)    Chronic anticoagulation    COPD (chronic obstructive pulmonary disease)    Chronic systolic CHF (congestive heart failure)    Left leg cellulitis    UTI (urinary tract infection)    Lymphedema BLE and LUE    Dizziness    Opioid dependence    Metabolic acidosis    Confusion    Alkalosis, metabolic    Hypokalemia    Hyponatremia    AMS (altered mental status)    Acute on chronic combined systolic and diastolic CHF (congestive heart failure)    Chronic pain disorder    Cardiomegaly    Disorder of nervous system    Drug-induced constipation    Estrogen receptor positive status (ER+)    Family history of leukemia    Family history of malignant neoplasm of breast    FH: malignant neoplasm of trachea, bronchus and lung    Gout    Microalbuminuria    Mitral valve stenosis    Oxygen dependent    Osteopenia    Personal history of breast cancer    Postmastectomy lymphedema syndrome    Primary localized osteoarthritis of pelvic region and thigh     Prophylactic use of aromatase inhibitors    Pulmonary hypertension    Pulmonary hypertension due to lung diseases and hypoxia    Reduced mobility    Chronic combined systolic and diastolic heart failure    Pacemaker     Past Medical History:   Diagnosis Date    Arthritis     Breast cancer     Cancer     Chronic anticoagulation 01/25/2023    Chronic combined systolic and diastolic heart failure 08/03/2021    Chronic pain disorder 02/24/2023    Class 3 severe obesity due to excess calories with body mass index (BMI) of 50.0 to 59.9 in adult     COPD (chronic obstructive pulmonary disease)     Diabetes mellitus     EDWARDS (dyspnea on exertion)     Elephantiasis     left leg    Hyperlipidemia     Hypertension     Leukemia     Lung cancer     Lymphedema     left arm    Lymphedema BLE and LUE 06/06/2023    Osteoporosis     Pacemaker 11/09/2023    PAF (paroxysmal atrial fibrillation) 01/25/2023    Pulmonary hypertension 03/27/2022    Tracheal cancer      Past Surgical History:   Procedure Laterality Date    APPENDECTOMY      HYSTERECTOMY      KNEE ARTHROSCOPY Right     LAPAROSCOPIC GASTRIC BANDING      LYMPHADENECTOMY Left     MASTECTOMY Left       General Information       Row Name 11/09/23 1152          Physical Therapy Time and Intention    Document Type evaluation  -CS     Mode of Treatment individual therapy;physical therapy  -CS       Row Name 11/09/23 1152          General Information    Patient Profile Reviewed yes  -CS     Prior Level of Function gait;transfer;bed mobility  RW & SC for mobility at BL  -CS     Existing Precautions/Restrictions fall  -CS     Barriers to Rehab previous functional deficit  -CS       Row Name 11/09/23 1152          Living Environment    People in Home spouse  -CS       Row Name 11/09/23 1152          Home Main Entrance    Number of Stairs, Main Entrance none;other (see comments)  ramp  -CS       Row Name 11/09/23 1152          Stairs Within Home, Primary    Number of Stairs, Within Home,  Primary none  -CS       Row Name 11/09/23 1152          Cognition    Orientation Status (Cognition) oriented x 3  -CS       Row Name 11/09/23 1152          Safety Issues, Functional Mobility    Impairments Affecting Function (Mobility) endurance/activity tolerance;strength  -CS               User Key  (r) = Recorded By, (t) = Taken By, (c) = Cosigned By      Initials Name Provider Type    CS Rosalia Thompson, PT Physical Therapist                   Mobility       Row Name 11/09/23 1153          Bed Mobility    Bed Mobility sit-supine  -CS     Sit-Supine [place] (Bed Mobility) standby assist  -CS     Comment, (Bed Mobility) UIC upon arrival  -CS       Row Name 11/09/23 1153          Sit-Stand Transfer    Sit-Stand Odell (Transfers) contact guard;verbal cues  -CS     Assistive Device (Sit-Stand Transfers) walker, front-wheeled  -CS     Comment, (Sit-Stand Transfer) cues for UE placement  -CS       Row Name 11/09/23 1153          Gait/Stairs (Locomotion)    [place] Level (Gait) contact guard  -CS     Assistive Device (Gait) walker, front-wheeled  -CS     Distance in Feet (Gait) 40'  -CS     Deviations/Abnormal Patterns (Gait) rachid decreased;gait speed decreased;stride length decreased  -CS     [place] Level (Stairs) not tested  -CS     Comment, (Gait/Stairs) slow pace; no LOB  -CS               User Key  (r) = Recorded By, (t) = Taken By, (c) = Cosigned By      Initials Name Provider Type    CS Rosalia Thompson, PT Physical Therapist                   Obj/Interventions       Row Name 11/09/23 1154          Range of Motion Comprehensive    General Range of Motion bilateral lower extremity ROM WFL  -CS       Row Name 11/09/23 1154          Strength Comprehensive (MMT)    General Manual Muscle Testing (MMT) Assessment other (see comments)  -CS     Comment, General Manual Muscle Testing (MMT) Assessment generalized weakness; B LE grossly 3+/5  -CS       Row Name 11/09/23 1154          Balance     Balance Assessment sitting static balance;sitting dynamic balance;standing static balance;standing dynamic balance  -CS     Static Sitting Balance standby assist  -CS     Dynamic Sitting Balance standby assist  -CS     Position, Sitting Balance unsupported;sitting edge of bed  -CS     Static Standing Balance contact guard  -CS     Dynamic Standing Balance contact guard  -CS     Position/Device Used, Standing Balance supported;walker, front-wheeled  -CS               User Key  (r) = Recorded By, (t) = Taken By, (c) = Cosigned By      Initials Name Provider Type    CS Rosalia Thompson, PT Physical Therapist                   Goals/Plan       Row Name 11/09/23 1159          Bed Mobility Goal 1 (PT)    Activity/Assistive Device (Bed Mobility Goal 1, PT) bed mobility activities, all  -CS     [place] Level/Cues Needed (Bed Mobility Goal 1, PT) supervision required  -CS     Time Frame (Bed Mobility Goal 1, PT) 2 weeks  -CS       Row Name 11/09/23 1159          Transfer Goal 1 (PT)    Activity/Assistive Device (Transfer Goal 1, PT) sit-to-stand/stand-to-sit;bed-to-chair/chair-to-bed  -CS     [place] Level/Cues Needed (Transfer Goal 1, PT) supervision required  -CS     Time Frame (Transfer Goal 1, PT) 2 weeks  -CS       Row Name 11/09/23 1151          Gait Training Goal 1 (PT)    Activity/Assistive Device (Gait Training Goal 1, PT) gait (walking locomotion);assistive device use  -CS     [place] Level (Gait Training Goal 1, PT) standby assist  -CS     Distance (Gait Training Goal 1, PT) 50'  -CS     Time Frame (Gait Training Goal 1, PT) 2 weeks  -CS               User Key  (r) = Recorded By, (t) = Taken By, (c) = Cosigned By      Initials Name Provider Type    CS Rosalia Thompson, PT Physical Therapist                   Clinical Impression       Row Name 11/09/23 1153          Pain    Pretreatment Pain Rating 6/10  -CS     Posttreatment Pain Rating 6/10  -CS     Pain Location lower  -CS     Pain Location - back   -CS     Pain Intervention(s) Ambulation/increased activity;Rest;Repositioned  -CS       Row Name 11/09/23 1155          Plan of Care Review    Plan of Care Reviewed With patient  -CS     Outcome Evaluation Pt is a 72 y/o F admitted to [facility] with AMS and found hypoxic with acute on chronic respiratory failure likely secondary to acute on chronic combined systolic and diastolic CHF. Pt received UIC upon arrival and agreeable to PT eval. Pt reports she lives with her spouse with a ramp to enter and uses both RW & SC for mobility at . Pt presents to PT with generalized weakness and decreased endurance. Pt stood and ambulated 40' c RW requiring CGA. Pt demo's a slow pace but no LOB noted. Pt returned to bed with SBA. PT encouraged pt to sit UIC during the day. PT recommends home with assist and HHPT vs SNF pending progress.  -CS       Row Name 11/09/23 1155          Therapy Assessment/Plan (PT)    Criteria for Skilled Interventions Met (PT) yes;meets criteria  -CS     Therapy Frequency (PT) 5 times/wk  -CS       Row Name 11/09/23 1155          Vital Signs    Pre SpO2 (%) 100  -CS     O2 Delivery Pre Treatment supplemental O2  -CS     Post SpO2 (%) 95  -CS     O2 Delivery Post Treatment room air  -CS       Row Name 11/09/23 1155          Positioning and Restraints    Pre-Treatment Position sitting in chair/recliner  -CS     Post Treatment Position bed  -CS     In Bed supine;call light within reach;encouraged to call for assist;exit alarm on;notified nsg  -CS               User Key  (r) = Recorded By, (t) = Taken By, (c) = Cosigned By      Initials Name Provider Type    CS Rosalia Thompson, PT Physical Therapist                   Outcome Measures       Row Name 11/09/23 1159 11/09/23 0800       How much help from another person do you currently need...    Turning from your back to your side while in flat bed without using bedrails? 4  -CS 4  -CG    Moving from lying on back to sitting on the side of a flat bed without  bedrails? 4  -CS 4  -CG    Moving to and from a bed to a chair (including a wheelchair)? 3  -CS 2  -CG    Standing up from a chair using your arms (e.g., wheelchair, bedside chair)? 3  -CS 4  -CG    Climbing 3-5 steps with a railing? 2  -CS 2  -CG    To walk in hospital room? 3  -CS 2  -CG    AM-PAC 6 Clicks Score (PT) 19  -CS 18  -CG    Highest level of mobility 6 --> Walked 10 steps or more  -CS 6 --> Walked 10 steps or more  -CG      Row Name 11/09/23 1159          Functional Assessment    Outcome Measure Options AM-PAC 6 Clicks Basic Mobility (PT)  -CS               User Key  (r) = Recorded By, (t) = Taken By, (c) = Cosigned By      Initials Name Provider Type    Rosalia Jacome, PT Physical Therapist    Derrick Goodson, RN Registered Nurse                                 Physical Therapy Education       Title: PT OT SLP Therapies (Done)       Topic: Physical Therapy (Done)       Point: Mobility training (Done)       Learning Progress Summary             Patient Acceptance, E,TB, VU,DU by CIRO at 11/9/2023 1159    Acceptance, E,TB, VU by GURVINDER at 11/8/2023 1742    Acceptance, E,TB, VU by DK at 11/8/2023 1733                         Point: Home exercise program (Done)       Learning Progress Summary             Patient Acceptance, E,TB, VU by DK at 11/8/2023 1742    Acceptance, E,TB, VU by DK at 11/8/2023 1733                         Point: Body mechanics (Done)       Learning Progress Summary             Patient Acceptance, E,TB, VU,DU by CIRO at 11/9/2023 1159    Acceptance, E,TB, VU by DK at 11/8/2023 1742    Acceptance, E,TB, VU by DK at 11/8/2023 1733                         Point: Precautions (Done)       Learning Progress Summary             Patient Acceptance, E,TB, VU,DU by CIRO at 11/9/2023 1159    Acceptance, E,TB, VU by GURVINDER at 11/8/2023 1742    Acceptance, E,TB, VU by DK at 11/8/2023 1733                                         User Key       Initials Effective Dates Name Provider Type Eloisa HOLLINS  10/22/23 -  Jenniffer Figueroa, RN Registered Nurse Nurse     09/22/22 -  Rosalia Thompson, PT Physical Therapist PT                  PT Recommendation and Plan     Plan of Care Reviewed With: patient  Outcome Evaluation: Pt is a 72 y/o F admitted to [facility] with AMS and found hypoxic with acute on chronic respiratory failure likely secondary to acute on chronic combined systolic and diastolic CHF. Pt received UIC upon arrival and agreeable to PT eval. Pt reports she lives with her spouse with a ramp to enter and uses both RW & SC for mobility at . Pt presents to PT with generalized weakness and decreased endurance. Pt stood and ambulated 40' c RW requiring CGA. Pt demo's a slow pace but no LOB noted. Pt returned to bed with SBA. PT encouraged pt to sit UIC during the day. PT recommends home with assist and HHPT vs SNF pending progress.     Time Calculation:         PT Charges       Row Name 11/09/23 1200             Time Calculation    Start Time 1047  -CS      Stop Time 1101  -CS      Time Calculation (min) 14 min  -CS      PT Received On 11/09/23  -CS      PT - Next Appointment 11/10/23  -      PT Goal Re-Cert Due Date 11/23/23  -CS         Time Calculation- PT    Total Timed Code Minutes- PT 10 minute(s)  -CS         Timed Charges    91556 - PT Therapeutic Activity Minutes 10  -CS         Total Minutes    Timed Charges Total Minutes 10  -CS       Total Minutes 10  -CS                User Key  (r) = Recorded By, (t) = Taken By, (c) = Cosigned By      Initials Name Provider Type    CS Rosalia Thompson, PT Physical Therapist                  Therapy Charges for Today       Code Description Service Date Service Provider Modifiers Qty    51816310179 HC PT THERAPEUTIC ACT EA 15 MIN 11/9/2023 Rosalia Thompson, PT GP 1    25599050534 HC PT EVAL MOD COMPLEXITY 3 11/9/2023 Rosalia Thompson, PT GP 1            PT G-Codes  Outcome Measure Options: AM-PAC 6 Clicks Basic Mobility (PT)  AM-PAC 6 Clicks Score (PT): 19  PT  Discharge Summary  Anticipated Discharge Disposition (PT): home with assist, home with home health, skilled nursing facility    Rosalia Thompson, PT  11/9/2023

## 2023-11-09 NOTE — CONSULTS
"Nutrition Services    Patient Name:  Aydee Solis  YOB: 1950  MRN: [id number]  Admit Date:  11/8/2023  Assessment Date:  11/09/23    Summary: Nutrition Consult for Chronic poor po intake   Dx: CKD, increase confusion, hypothyroidism, CHF  Pt and RN state appetite is good, 100%  On 3L oxygen  BMI 42, wt loss noted, on diuretics  Labs K 3.1, tbili 2.7, bun, cr    Visited pt who states appetite is good here in the hospital. Obtained food preferences.  RD to remain available as needed.     CLINICAL NUTRITION ASSESSMENT      Reason for Assessment Chronic Poor Intake      Diagnosis/Problem   CKD, increase confusion, hypothyroidism, CHF   Medical/Surgical History Past Medical History:   Diagnosis Date    Arthritis     Breast cancer     Cancer     Chronic anticoagulation 01/25/2023    Chronic combined systolic and diastolic heart failure 08/03/2021    Chronic pain disorder 02/24/2023    Class 3 severe obesity due to excess calories with body mass index (BMI) of 50.0 to 59.9 in adult     COPD (chronic obstructive pulmonary disease)     Diabetes mellitus     EDWARDS (dyspnea on exertion)     Elephantiasis     left leg    Hyperlipidemia     Hypertension     Leukemia     Lung cancer     Lymphedema     left arm    Lymphedema BLE and LUE 06/06/2023    Osteoporosis     Pacemaker 11/09/2023    PAF (paroxysmal atrial fibrillation) 01/25/2023    Pulmonary hypertension 03/27/2022    Tracheal cancer        Past Surgical History:   Procedure Laterality Date    APPENDECTOMY      HYSTERECTOMY      KNEE ARTHROSCOPY Right     LAPAROSCOPIC GASTRIC BANDING      LYMPHADENECTOMY Left     MASTECTOMY Left         Anthropometrics        Current Height  Current Weight  BMI kg/m2 Height: 167.6 cm (65.98\")  Weight: 118 kg (260 lb 3.2 oz) (11/08/23 1423)  Body mass index is 42.02 kg/m².   Adjusted BMI (if applicable)    BMI Category Obese, Class III (40 or higher)   Ideal Body Weight (IBW) 130lb   Usual Body Weight (UBW) See hx   Weight " Trend Loss   Weight History Wt Readings from Last 30 Encounters:   11/08/23 1423 118 kg (260 lb 3.2 oz)   08/29/23 1635 111 kg (245 lb)   08/29/23 1343 120 kg (264 lb)   08/03/23 0318 120 kg (264 lb 1.8 oz)   08/02/23 0320 115 kg (252 lb 13.9 oz)   07/31/23 2006 129 kg (285 lb)   06/30/23 1133 129 kg (285 lb)   06/05/23 2359 129 kg (285 lb 7.9 oz)   06/05/23 1906 125 kg (276 lb)   05/30/23 1158 120 kg (265 lb)   05/25/23 1329 120 kg (265 lb 8 oz)   01/30/23 0609 (!) 146 kg (322 lb 12.8 oz)   01/29/23 0500 (!) 145 kg (319 lb 8 oz)   01/28/23 0500 (!) 147 kg (324 lb 6.4 oz)   01/27/23 0617 (!) 144 kg (317 lb 8 oz)   01/26/23 0636 (!) 144 kg (317 lb 1.6 oz)   01/25/23 0600 (!) 140 kg (308 lb 8 oz)   01/24/23 0500 (!) 139 kg (305 lb 9.6 oz)   01/23/23 0828 (!) 146 kg (321 lb 14 oz)   01/23/23 0600 (!) 146 kg (322 lb 15.6 oz)   01/22/23 0505 (!) 145 kg (319 lb 14.2 oz)   01/21/23 2010 (!) 144 kg (318 lb 2 oz)   09/15/22 1030 (!) 142 kg (312 lb)   05/05/22 1306 (!) 142 kg (312 lb)   01/05/21 1615 (!) 161 kg (355 lb 14.4 oz)   02/21/17 1511 (!) 152 kg (334 lb)   01/31/17 0500 (!) 154 kg (340 lb)   01/30/17 0500 (!) 152 kg (335 lb)   01/29/17 0521 (!) 151 kg (333 lb 2 oz)   01/28/17 0500 (!) 150 kg (330 lb 3 oz)   01/27/17 2300 (!) 149 kg (329 lb 9 oz)   01/27/17 1756 (!) 150 kg (330 lb)      --  Labs       Pertinent Labs    Results from last 7 days   Lab Units 11/09/23  0535 11/08/23  1444   SODIUM mmol/L 137 133*   POTASSIUM mmol/L 3.1* 3.8   CHLORIDE mmol/L 93* 90*   CO2 mmol/L 30.7* 31.2*   BUN mg/dL 39* 41*   CREATININE mg/dL 1.97* 2.36*   CALCIUM mg/dL 9.3 9.5   BILIRUBIN mg/dL 2.7* 3.1*   ALK PHOS U/L 133* 134*   ALT (SGPT) U/L 13 18   AST (SGOT) U/L 28 35*   GLUCOSE mg/dL 125* 95     Results from last 7 days   Lab Units 11/09/23  0535   MAGNESIUM mg/dL 2.3   PHOSPHORUS mg/dL 3.6   HEMOGLOBIN g/dL 13.3   HEMATOCRIT % 42.8   WBC 10*3/mm3 4.43   ALBUMIN g/dL 3.6     Results from last 7 days   Lab Units 11/09/23  0535  11/08/23  1444   INR  2.87*  --    PLATELETS 10*3/mm3 262 283     COVID19   Date Value Ref Range Status   11/09/2023 Not Detected Not Detected - Ref. Range Final     Lab Results   Component Value Date    HGBA1C 8.50 (H) 08/29/2023          Medications           Scheduled Medications albuterol, 2.5 mg, Nebulization, 4x Daily  allopurinol, 100 mg, Oral, Daily  apixaban, 5 mg, Oral, BID  atorvastatin, 10 mg, Oral, Daily  budesonide-formoterol, 2 puff, Inhalation, BID - RT   And  tiotropium bromide monohydrate, 2 puff, Inhalation, Daily - RT  docusate sodium, 100 mg, Oral, BID  furosemide, 80 mg, Intravenous, BID  insulin glargine, 16 Units, Subcutaneous, Nightly  insulin lispro, 2-9 Units, Subcutaneous, 4x Daily AC & at Bedtime  metoprolol succinate XL, 25 mg, Oral, BID  midodrine, 5 mg, Oral, TID AC  potassium chloride ER, 40 mEq, Oral, Q4H  Povidone (PF), 1 Application, Both Eyes, BID  senna-docusate sodium, 2 tablet, Oral, BID  sodium chloride, 10 mL, Intravenous, Q12H  spironolactone, 50 mg, Oral, Daily       Infusions     PRN Medications   acetaminophen **OR** acetaminophen **OR** acetaminophen    albuterol    senna-docusate sodium **AND** polyethylene glycol **AND** bisacodyl **AND** bisacodyl    calcium carbonate    Calcium Replacement - Follow Nurse / BPA Driven Protocol    dextrose    dextrose    glucagon (human recombinant)    ipratropium-albuterol    Magnesium Standard Dose Replacement - Follow Nurse / BPA Driven Protocol    nitroglycerin    ondansetron **OR** ondansetron    Phosphorus Replacement - Follow Nurse / BPA Driven Protocol    Potassium Replacement - Follow Nurse / BPA Driven Protocol    sodium chloride    sodium chloride    sodium chloride     Physical Findings          General Findings alert, obese, on oxygen therapy   Oral/Mouth Cavity tooth or teeth missing   Edema  4+ (severe)   Gastrointestinal normoactive   Skin  bruising   Tubes/Drains/Lines none   NFPE No clinical signs of muscle wasting  or fat loss   --  Current Nutrition Orders & Evaluation of Intake       Oral Nutrition     Food Allergies NKFA   Current PO Diet Diet: Cardiac Diets, Diabetic Diets; Healthy Heart (2-3 Na+); Consistent Carbohydrate; Texture: Regular Texture (IDDSI 7); Fluid Consistency: Thin (IDDSI 0)   Supplement n/a   PO Evaluation     % PO Intake 100%    Factors Affecting Intake: decreased appetite   --  PES STATEMENT / NUTRITION DIAGNOSIS      Nutrition Dx Problem  Problem: Predicted Suboptimal Intake  Etiology: Factors Affecting Nutrition - decrease appetite at home    Signs/Symptoms: Report/Observation     NUTRITION INTERVENTION / PLAN OF CARE      Intervention Goal(s) Maintain nutrition status, Appropriate weight loss, and PO intake goal %: 75+         RD Intervention/Action Interview for preferences, Supplement offered/declined, Continue to monitor, and Care plan reviewed   --      Prescription/Orders:       PO Diet       Supplements       Enteral Nutrition       Parenteral Nutrition    New Prescription Ordered? Continue same per protocol   --      Monitor/Evaluation Per protocol   Discharge Plan/Needs Pending clinical course   --    RD to follow per protocol.      Electronically signed by:  Patricia Cabezas RD  11/09/23 11:46 EST

## 2023-11-09 NOTE — PROGRESS NOTES
Name: Aydee Solis ADMIT: 2023   : 1950  PCP: Bennett Duque,     MRN: [id number] LOS: 0 days   AGE/SEX: 73 y.o. female  ROOM: [facility]     Subjective   Subjective   Patient is lying in bed and does not appear to be any major distress.  Denies nausea, vomiting, abdominal pain, chest pain.  She is mildly confused.       Objective   Objective   Vital Signs  Temp:  [97.3 °F (36.3 °C)-98.3 °F (36.8 °C)] 98 °F (36.7 °C)  Heart Rate:  [59-65] 61  Resp:  [18-20] 18  BP: (102-122)/(75-90) 106/90  SpO2:  [91 %-100 %] 94 %  on  Flow (L/min):  [2] 2;   Device (Oxygen Therapy): room air  Body mass index is 42.02 kg/m².  Physical Exam  HEENT: PERRLA, extraocular movements intact, Scleras no icterus  Neck: Supple, no JVD  Cardiovascular: Regular rate and rhythm with normal S1 and S2  Respiratory: Fairly clear to auscultation bilaterally with no wheezes  GI: Soft, nontender, bowel Sulpor  Extremities: 2+ bilateral lower extreme edema, pedal pulses are difficult to palpate, chronic pigmentation changes noted  Neurologic: Globally weak, no facial asymmetry, mildly confused    Results Review     I reviewed the patient's new clinical results.  Results from last 7 days   Lab Units 23  0535 23  1444   WBC 10*3/mm3 4.43 4.68   HEMOGLOBIN g/dL 13.3 13.5   PLATELETS 10*3/mm3 262 283     Results from last 7 days   Lab Units 23  1156 23  0535 23  1444   SODIUM mmol/L 140 137 133*   POTASSIUM mmol/L 3.3* 3.1* 3.8   CHLORIDE mmol/L 92* 93* 90*   CO2 mmol/L 35.6* 30.7* 31.2*   BUN mg/dL 39* 39* 41*   CREATININE mg/dL 1.86* 1.97* 2.36*   GLUCOSE mg/dL 125* 125* 95   EGFR mL/min/1.73 28.0* 26.1* 21.0*     Results from last 7 days   Lab Units 23  0535 23  1444   ALBUMIN g/dL 3.6 4.1   BILIRUBIN mg/dL 2.7* 3.1*   ALK PHOS U/L 133* 134*   AST (SGOT) U/L 28 35*   ALT (SGPT) U/L 13 18     Results from last 7 days   Lab Units 23  1156 23  0535 23  1444   CALCIUM mg/dL  9.8 9.3 9.5   ALBUMIN g/dL  --  3.6 4.1   MAGNESIUM mg/dL  --  2.3  --    PHOSPHORUS mg/dL 3.7 3.6  --      Results from last 7 days   Lab Units 11/08/23 2014 11/08/23  1735 11/08/23  1444   LACTATE mmol/L 1.7 2.3* 2.4*     Glucose   Date/Time Value Ref Range Status   11/09/2023 1108 90 70 - 130 mg/dL Final   11/09/2023 0613 138 (H) 70 - 130 mg/dL Final       CT Head Without Contrast    Result Date: 11/8/2023  There is no evidence of acute infarction, intracranial hemorrhage or of abnormal extra-axial fluid. Age-appropriate atrophy, small vessel ischemic disease and vascular calcification is noted. The right vertebral artery is dominant. Further evaluation could be performed with a MRI examination of the brain as indicated.    Radiation dose reduction techniques were utilized, including automated exposure control and exposure modulation based on body size.   This report was finalized on 11/8/2023 5:01 PM by Dr. Bryan Muñiz M.D on Workstation: [id number]      XR Chest 1 View    Result Date: 11/8/2023  No acute findings    This report was finalized on 11/8/2023 2:55 PM by Dr. Carlos Nunez M.D on Workstation: [id number]       I have personally reviewed all medications:  Scheduled Medications  albuterol, 2.5 mg, Nebulization, 4x Daily  allopurinol, 100 mg, Oral, Daily  apixaban, 5 mg, Oral, BID  atorvastatin, 10 mg, Oral, Daily  budesonide-formoterol, 2 puff, Inhalation, BID - RT   And  tiotropium bromide monohydrate, 2 puff, Inhalation, Daily - RT  docusate sodium, 100 mg, Oral, BID  furosemide, 80 mg, Intravenous, BID  insulin glargine, 16 Units, Subcutaneous, Nightly  insulin lispro, 2-9 Units, Subcutaneous, 4x Daily AC & at Bedtime  metoprolol succinate XL, 25 mg, Oral, BID  midodrine, 5 mg, Oral, TID AC  potassium chloride ER, 40 mEq, Oral, Q4H  Povidone (PF), 1 Application, Both Eyes, BID  senna-docusate sodium, 2 tablet, Oral, BID  sodium chloride, 10 mL, Intravenous, Q12H  spironolactone, 50 mg, Oral,  Daily    Infusions   Diet  Diet: Cardiac Diets, Diabetic Diets; Healthy Heart (2-3 Na+); Consistent Carbohydrate; Texture: Regular Texture (IDDSI 7); Fluid Consistency: Thin (IDDSI 0)    I have personally reviewed:  [x]  Laboratory   [x]  Microbiology   [x]  Radiology   [x]  EKG/Telemetry  [x]  Cardiology/Vascular   []  Pathology    []  Records       Assessment/Plan     Active Hospital Problems    Diagnosis  POA    **AMS (altered mental status) [R41.82]  Yes    Pacemaker [Z95.0]  Yes    Acute on chronic combined systolic and diastolic CHF (congestive heart failure) [I50.43]  Yes    Hyponatremia [E87.1]  Yes    Lymphedema BLE and LUE [I89.0]  Yes    Chronic pain disorder [G89.4]  Yes    Chronic anticoagulation [Z79.01]  Not Applicable    COPD (chronic obstructive pulmonary disease) [J44.9]  Yes    PAULETTE (obstructive sleep apnea) [G47.33]  Yes    Stage 3b chronic kidney disease [N18.32]  Yes    Type 2 diabetes mellitus, with long-term current use of insulin [E11.9, Z79.4]  Not Applicable    Acute metabolic encephalopathy [G93.41]  Yes    PAF (paroxysmal atrial fibrillation) [I48.0]  Yes    Pulmonary hypertension [I27.20]  Yes    Reduced mobility [Z74.09]  Yes    Morbid obesity due to excess calories [E66.01]  Yes    Acute on chronic respiratory failure with hypoxia [J96.21]  Yes    Hyperlipidemia [E78.5]  Yes    Hypertension [I10]  Yes      Resolved Hospital Problems   No resolved problems to display.       73 y.o. female admitted with AMS (altered mental status).  1.Altered mental status, likely metabolic in etiology.  CT brain with no acute findings and UA with no evidence of UTI and chest x-ray with no evidence of any infiltrate.  No focal findings on exam as well.  Mental status is improving.  2.  Bilateral lower extremity lymphedema, being diuresed and ins and outs will be monitored closely.  3.  Acute systolic heart failure, as mentioned above being diuresed with IV Lasix and is on p.o. Aldactone which will be  continued.  Most recent echo was done in January 2023 which revealed ejection fraction of 42%.  4.  Acute on CKD 3B, creatinine better with diuresis and nephrology consultation will be obtained.  Avoid nephrotoxins.  5.  History of atrial fibrillation, on Eliquis and metoprolol which will be continued.  6.  Diabetes mellitus, on Lantus and corrective dose insulin which will be continued.  7.  Hypokalemia, replace and is on replacement protocol.  8.  Hyperlipidemia, on statins.  9.  COPD, continue with Symbicort and Spiriva.  Does not appear to be any major exacerbation at this point.  10.  Hypertension, on metoprolol and Aldactone and monitor blood pressure closely.  11.  History of gout, on allopurinol.  12.  Hypothyroidism, TSH was noted to be 33 and will be initiated on Synthroid 25 mcg daily.  13.  Generalized weakness, PT/OT consult has been obtained.  14.  DVT prophylaxis, on Eliquis.  15.  CODE STATUS is full code.      Buster Marshall MD  [facility]  11/09/23  15:23 EST

## 2023-11-09 NOTE — PLAN OF CARE
Problem: Adult Inpatient Plan of Care  Goal: Plan of Care Review  Outcome: Ongoing, Progressing  Flowsheets (Taken 11/9/2023 0513)  Progress: no change  Plan of Care Reviewed With: patient  Goal: Patient-Specific Goal (Individualized)  Outcome: Ongoing, Progressing  Goal: Absence of Hospital-Acquired Illness or Injury  Outcome: Ongoing, Progressing  Intervention: Identify and Manage Fall Risk  Recent Flowsheet Documentation  Taken 11/9/2023 0400 by Tammie Santiago RN  Safety Promotion/Fall Prevention: safety round/check completed  Taken 11/9/2023 0200 by Tammie Santiago RN  Safety Promotion/Fall Prevention: safety round/check completed  Taken 11/9/2023 0000 by Tammie Santiago RN  Safety Promotion/Fall Prevention: safety round/check completed  Taken 11/8/2023 2200 by Tammie Santiago RN  Safety Promotion/Fall Prevention: safety round/check completed  Taken 11/8/2023 2000 by Tammie Santiago RN  Safety Promotion/Fall Prevention: safety round/check completed  Intervention: Prevent Skin Injury  Recent Flowsheet Documentation  Taken 11/9/2023 0400 by Tammie Santiago RN  Body Position: position changed independently  Taken 11/9/2023 0200 by Tammie Santiago RN  Body Position: position changed independently  Taken 11/9/2023 0000 by Tammie Santiago RN  Body Position: position changed independently  Taken 11/8/2023 2200 by Tammie Santiago RN  Body Position: position changed independently  Taken 11/8/2023 2000 by Tammie Santiago RN  Body Position:   position changed independently   weight shifting   tilted  Skin Protection: adhesive use limited  Intervention: Prevent and Manage VTE (Venous Thromboembolism) Risk  Recent Flowsheet Documentation  Taken 11/8/2023 2000 by Tammie Santiago RN  VTE Prevention/Management:   bilateral   sequential compression devices off   patient refused intervention  Range of Motion: active ROM (range of motion)  encouraged  Intervention: Prevent Infection  Recent Flowsheet Documentation  Taken 11/9/2023 0400 by Tammie Santiago RN  Infection Prevention:   hand hygiene promoted   rest/sleep promoted  Taken 11/9/2023 0200 by Tammie Santiago RN  Infection Prevention:   rest/sleep promoted   hand hygiene promoted  Taken 11/9/2023 0000 by Tammie Santiago RN  Infection Prevention:   rest/sleep promoted   hand hygiene promoted  Taken 11/8/2023 2200 by Tammie Santiago RN  Infection Prevention:   hand hygiene promoted   rest/sleep promoted  Taken 11/8/2023 2000 by Tammie Santiago RN  Infection Prevention:   rest/sleep promoted   hand hygiene promoted  Goal: Optimal Comfort and Wellbeing  Outcome: Ongoing, Progressing  Goal: Readiness for Transition of Care  Outcome: Ongoing, Progressing     Problem: Diabetes Comorbidity  Goal: Blood Glucose Level Within Targeted Range  Outcome: Ongoing, Progressing  Intervention: Monitor and Manage Glycemia  Recent Flowsheet Documentation  Taken 11/8/2023 2000 by Tammie Santiago RN  Glycemic Management: blood glucose monitored     Problem: Obstructive Sleep Apnea Risk or Actual Comorbidity Management  Goal: Unobstructed Breathing During Sleep  Outcome: Ongoing, Progressing     Problem: Pain Chronic (Persistent) (Comorbidity Management)  Goal: Acceptable Pain Control and Functional Ability  Outcome: Ongoing, Progressing  Intervention: Manage Persistent Pain  Recent Flowsheet Documentation  Taken 11/9/2023 0400 by Tammie Santiago RN  Medication Review/Management: medications reviewed  Taken 11/9/2023 0200 by Tammie Santiago RN  Medication Review/Management: medications reviewed  Taken 11/9/2023 0000 by Tammie Santiago RN  Medication Review/Management: medications reviewed  Taken 11/8/2023 2200 by Tammie Santiago RN  Medication Review/Management: medications reviewed  Taken 11/8/2023 2000 by Tammie Santiago RN  Medication  Review/Management: medications reviewed  Intervention: Optimize Psychosocial Wellbeing  Recent Flowsheet Documentation  Taken 11/8/2023 2000 by Tammie Santiago RN  Supportive Measures: active listening utilized     Problem: Confusion Acute  Goal: Optimal Cognitive Function  Outcome: Ongoing, Progressing  Intervention: Minimize Contributing Factors  Recent Flowsheet Documentation  Taken 11/8/2023 2000 by Tammie Santiago RN  Sensory Stimulation Regulation: care clustered  Reorientation Measures: reorientation provided     Problem: Adjustment to Illness (Heart Failure)  Goal: Optimal Coping  Outcome: Ongoing, Progressing  Intervention: Support Psychosocial Response  Recent Flowsheet Documentation  Taken 11/8/2023 2000 by Tammie Santiago RN  Supportive Measures: active listening utilized     Problem: Cardiac Output Decreased (Heart Failure)  Goal: Optimal Cardiac Output  Outcome: Ongoing, Progressing  Intervention: Optimize Cardiac Output  Recent Flowsheet Documentation  Taken 11/9/2023 0000 by Tammie Santiago RN  Stabilization Measures: legs elevated  Taken 11/8/2023 2000 by Tammie Santiago RN  Stabilization Measures: legs elevated  Environmental Support: calm environment promoted     Problem: Dysrhythmia (Heart Failure)  Goal: Stable Heart Rate and Rhythm  Outcome: Ongoing, Progressing     Problem: Fluid Imbalance (Heart Failure)  Goal: Fluid Balance  Outcome: Ongoing, Progressing  Intervention: Monitor and Manage Fluid Balance  Recent Flowsheet Documentation  Taken 11/8/2023 2000 by Tammie Santiago RN  Fluid/Electrolyte Management: fluids provided     Problem: Functional Ability Impaired (Heart Failure)  Goal: Optimal Functional Ability  Outcome: Ongoing, Progressing  Intervention: Optimize Functional Ability  Recent Flowsheet Documentation  Taken 11/8/2023 2000 by Tammie Santiago RN  Self-Care Promotion: independence encouraged     Problem: Oral Intake Inadequate  (Heart Failure)  Goal: Optimal Nutrition Intake  Outcome: Ongoing, Progressing     Problem: Respiratory Compromise (Heart Failure)  Goal: Effective Oxygenation and Ventilation  Outcome: Ongoing, Progressing  Intervention: Promote Airway Secretion Clearance  Recent Flowsheet Documentation  Taken 11/8/2023 2000 by Tammie Santiago RN  Cough And Deep Breathing: done independently per patient     Problem: Sleep Disordered Breathing (Heart Failure)  Goal: Effective Breathing Pattern During Sleep  Outcome: Ongoing, Progressing   Goal Outcome Evaluation:  Plan of Care Reviewed With: patient        Progress: no change

## 2023-11-09 NOTE — PLAN OF CARE
Goal Outcome Evaluation:  Plan of Care Reviewed With: patient        Progress: no change  Outcome Evaluation: Patient is a 73 year old female admitted to hospital with AMS, found to have acute renal failure, hypothyroidism. Patient also has BLE, LUE lymphedema which significantly limits LB mobility and ADL performance. Patient lives at home with spouse and reports that she only ambulates very short distances with rwx at home,  provides assist with ADLs as needed. Patient presents today with generalized deconditioning and impaired act tolerance, requires mod A with bed mobility, able to sit at EOB unsupported with SBA, bed <> chair txfer with min A/CGA . Patient requires min A with UB dressing and grooming/hygiene, max A with toileting in bed due to urine soaked brief/bedding. Patient will benefit from skilled OT to address current functional deficits, and recommend SNF at [place].      Anticipated Discharge Disposition (OT): skilled nursing facility

## 2023-11-09 NOTE — THERAPY EVALUATION
Patient Name: Aydee Solis  : 1950    MRN: [id number]                              Today's Date: 2023       Admit Date: 2023    Visit Dx:     ICD-10-CM ICD-9-CM   1. Acute renal failure, unspecified acute renal failure type  N17.9 584.9   2. Altered mental status, unspecified altered mental status type  R41.82 780.97   3. Hypoxia  R09.02 799.02   4. Acute congestive heart failure, unspecified heart failure type  I50.9 428.0     Patient Active Problem List   Diagnosis    Pneumonia of right lower lobe due to infectious organism    Cellulitis    Hypertension    Hyperlipidemia    Breast cancer    Lymphedema    Osteoporosis    Acute on chronic respiratory failure with hypoxia    Morbid obesity due to excess calories    Acute metabolic encephalopathy    PAULETTE (obstructive sleep apnea)    Chronic respiratory failure with hypoxia    Stage 3b chronic kidney disease    Type 2 diabetes mellitus, with long-term current use of insulin    PAF (paroxysmal atrial fibrillation)    Chronic anticoagulation    COPD (chronic obstructive pulmonary disease)    Chronic systolic CHF (congestive heart failure)    Left leg cellulitis    UTI (urinary tract infection)    Lymphedema BLE and LUE    Dizziness    Opioid dependence    Metabolic acidosis    Confusion    Alkalosis, metabolic    Hypokalemia    Hyponatremia    AMS (altered mental status)    Acute on chronic combined systolic and diastolic CHF (congestive heart failure)    Chronic pain disorder    Cardiomegaly    Disorder of nervous system    Drug-induced constipation    Estrogen receptor positive status (ER+)    Family history of leukemia    Family history of malignant neoplasm of breast    FH: malignant neoplasm of trachea, bronchus and lung    Gout    Microalbuminuria    Mitral valve stenosis    Oxygen dependent    Osteopenia    Personal history of breast cancer    Postmastectomy lymphedema syndrome    Primary localized osteoarthritis of pelvic region and thigh     Prophylactic use of aromatase inhibitors    Pulmonary hypertension    Pulmonary hypertension due to lung diseases and hypoxia    Reduced mobility    Chronic combined systolic and diastolic heart failure    Pacemaker     Past Medical History:   Diagnosis Date    Arthritis     Breast cancer     Cancer     Chronic anticoagulation 01/25/2023    Chronic combined systolic and diastolic heart failure 08/03/2021    Chronic pain disorder 02/24/2023    Class 3 severe obesity due to excess calories with body mass index (BMI) of 50.0 to 59.9 in adult     COPD (chronic obstructive pulmonary disease)     Diabetes mellitus     EDWARDS (dyspnea on exertion)     Elephantiasis     left leg    Hyperlipidemia     Hypertension     Leukemia     Lung cancer     Lymphedema     left arm    Lymphedema BLE and LUE 06/06/2023    Osteoporosis     Pacemaker 11/09/2023    PAF (paroxysmal atrial fibrillation) 01/25/2023    Pulmonary hypertension 03/27/2022    Tracheal cancer      Past Surgical History:   Procedure Laterality Date    APPENDECTOMY      HYSTERECTOMY      KNEE ARTHROSCOPY Right     LAPAROSCOPIC GASTRIC BANDING      LYMPHADENECTOMY Left     MASTECTOMY Left       General Information       Row Name 11/09/23 0936          OT Time and Intention    Document Type evaluation  -     Mode of Treatment individual therapy;occupational therapy  -       Row Name 11/09/23 0936          General Information    Patient Profile Reviewed yes  -     Prior Level of Function min assist:  has some assist at times from spouse with self care, uses a walker for short distance mobility in the home  -     Existing Precautions/Restrictions fall  Lymphedema BLE, LUE  -     Barriers to Rehab medically complex;previous functional deficit  -       Row Name 11/09/23 0936          Living Environment    People in Home spouse  -       Row Name 11/09/23 0936          Cognition    Orientation Status (Cognition) oriented x 3  -       Row Name 11/09/23 0936           Safety Issues, Functional Mobility    Safety Issues Affecting Function (Mobility) insight into deficits/self-awareness;safety precautions follow-through/compliance  -     Impairments Affecting Function (Mobility) balance;endurance/activity tolerance;strength  -               User Key  (r) = Recorded By, (t) = Taken By, (c) = Cosigned By      Initials Name Provider Type     Susie Palomo OT Occupational Therapist                     Mobility/ADL's       Row Name 11/09/23 0937          Bed Mobility    Bed Mobility bed mobility (all) activities  -     All Activities, [place] (Bed Mobility) moderate assist (50% patient effort)  -     Assistive Device (Bed Mobility) bed rails;head of bed elevated;draw sheet  -[facility] Name 11/09/23 0937          Transfers    Transfers bed-chair transfer;sit-stand transfer  -[facility] Name 11/09/23 0937          Bed-Chair Transfer    Bed-Chair [place] (Transfers) minimum assist (75% patient effort)  -     Assistive Device (Bed-Chair Transfers) other (see comments)  HHA  -       Row Name 11/09/23 0937          Sit-Stand Transfer    Sit-Stand [place] (Transfers) minimum assist (75% patient effort);contact guard;verbal cues  -       Row Name 11/09/23 0937          Functional Mobility    Functional Mobility- Safety Issues balance decreased during turns;weight-shifting ability decreased;step length decreased  -[facility] Name 11/09/23 0937          Activities of Daily Living    BADL Assessment/Intervention upper body dressing;toileting;grooming  -[facility] Name 11/09/23 0937          Upper Body Dressing Assessment/Training    [place] Level (Upper Body Dressing) upper body dressing skills;moderate assist (50% patient effort)  -     Position (Upper Body Dressing) edge of bed sitting  -       Row Name 11/09/23 0937          Toileting Assessment/Training    [place] Level (Toileting) toileting skills;adjust/manage clothing;perform perineal  hygiene;change pad/brief;maximum assist (25% patient effort)  -     Position (Toileting) supine  -       Row Name 11/09/23 0937          Grooming Assessment/Training    [place] Level (Grooming) grooming skills;wash face, hands;minimum assist (75% patient effort)  -     Position (Grooming) edge of bed sitting  -               User Key  (r) = Recorded By, (t) = Taken By, (c) = Cosigned By      Initials Name Provider Type     Susie Palomo OT Occupational Therapist                   Obj/Interventions       Row Name 11/09/23 0939          Vision Assessment/Intervention    Visual Impairment/Limitations WNL  -[facility] Name 11/09/23 0939          Range of Motion Comprehensive    General Range of Motion no range of motion deficits identified  -[facility] Name 11/09/23 0939          Strength Comprehensive (MMT)    General Manual Muscle Testing (MMT) Assessment upper extremity strength deficits identified  -     Comment, General Manual Muscle Testing (MMT) Assessment 4/5 BUE  -       Row Name 11/09/23 0939          Balance    Balance Interventions sitting;standing;occupation based/functional task;static;dynamic  -               User Key  (r) = Recorded By, (t) = Taken By, (c) = Cosigned By      Initials Name Provider Type     Susie Palomo OT Occupational Therapist                   Goals/Plan       Row Name 11/09/23 0943          Bed Mobility Goal 1 (OT)    Activity/Assistive Device (Bed Mobility Goal 1, OT) bed mobility activities, all  -     [place] Level/Cues Needed (Bed Mobility Goal 1, OT) modified [place]  -     Time Frame (Bed Mobility Goal 1, OT) short term goal (STG);2 weeks  -     Progress/Outcomes (Bed Mobility Goal 1, OT) new goal  -       Row Name 11/09/23 0943          Transfer Goal 1 (OT)    Activity/Assistive Device (Transfer Goal 1, OT) transfers, all  -     [place] Level/Cues Needed (Transfer Goal 1, OT) modified independence  -     Time Frame  (Transfer Goal 1, OT) short term goal (STG);2 weeks  -     Progress/Outcome (Transfer Goal 1, OT) new goal  -       Row Name 11/09/23 0943          Dressing Goal 1 (OT)    Activity/Device (Dressing Goal 1, OT) dressing skills, all;upper body dressing;lower body dressing  -     [place]/Cues Needed (Dressing Goal 1, OT) modified independence  -     Time Frame (Dressing Goal 1, OT) short term goal (STG);2 weeks  -     Progress/Outcome (Dressing Goal 1, OT) new goal  -       Row Name 11/09/23 0943          Toileting Goal 1 (OT)    Activity/Device (Toileting Goal 1, OT) toileting skills, all  -     [place] Level/Cues Needed (Toileting Goal 1, OT) modified independence  -     Time Frame (Toileting Goal 1, OT) short term goal (STG);2 weeks  -     Progress/Outcome (Toileting Goal 1, OT) new goal  -[facility] Name 11/09/23 0943          Grooming Goal 1 (OT)    Activity/Device (Grooming Goal 1, OT) grooming skills, all  -     [place] (Grooming Goal 1, OT) modified independence  [facility]     Time Frame (Grooming Goal 1, OT) short term goal (STG);2 weeks  -     Progress/Outcome (Grooming Goal 1, OT) new [facility]  -[facility] Name 11/09/23 0943          Therapy Assessment/Plan (OT)    Planned Therapy Interventions (OT) activity tolerance training;functional balance retraining;occupation/activity based interventions;passive ROM/stretching;patient/caregiver education/training;transfer/mobility retraining;strengthening exercise  -               User Key  (r) = Recorded By, (t) = Taken By, (c) = Cosigned By      Initials Name Provider Type     Susie Palomo, ROBINSON Occupational Therapist                   Clinical Impression       Row Name 11/09/23 0939          Pain Assessment    Pretreatment Pain Rating 0/10 - no pain  -     Posttreatment Pain Rating 0/10 - no pain  -     Pain Intervention(s) Ambulation/increased activity;Repositioned  -       Row Name 11/09/23 0939          Plan of Care Review     Plan of Care Reviewed With patient  -     Progress no change  -     Outcome Evaluation Patient is a 73 year old female admitted to hospital with AMS, found to have acute renal failure, hypothyroidism. Patient also has BLE, LUE lymphedema which significantly limits LB mobility and ADL performance. Patient lives at home with spouse and reports that she only ambulates very short distances with rwx at home,  provides assist with ADLs as needed. Patient presents today with generalized deconditioning and impaired act tolerance, requires mod A with bed mobility, able to sit at EOB unsupported with SBA, bed <> chair txfer with min A/CGA . Patient requires min A with UB dressing and grooming/hygiene, max A with toileting in bed due to urine soaked brief/bedding. Patient will benefit from skilled OT to address current functional deficits, and recommend SNF at [place].  -       Row Name 11/09/23 0939          Therapy Plan Review/Discharge Plan (OT)    Anticipated Discharge Disposition (OT) skilled nursing facility  -       Row Name 11/09/23 0939          Positioning and Restraints    Pre-Treatment Position in bed  -     Post Treatment Position chair  -     In Chair sitting;call light within reach;encouraged to call for assist;exit alarm on  -               User Key  (r) = Recorded By, (t) = Taken By, (c) = Cosigned By      Initials Name Provider Type     Susie Palomo OT Occupational Therapist                   Outcome Measures    No documentation.                   Occupational Therapy Education       Title: PT OT SLP Therapies (Done)       Topic: Occupational Therapy (Done)       Point: ADL training (Done)       Description:   Instruct learner(s) on proper safety adaptation and remediation techniques during self care or transfers.   Instruct in proper use of assistive devices.                  Learning Progress Summary             Patient Acceptance, E,TB, VU by  at 11/9/2023 9515    Comment: Role of  OT, fall prevention, adaptive techniques for self care, discharge planning    Acceptance, E,TB, VU by  at 11/8/2023 1742    Acceptance, E,TB, VU by  at 11/8/2023 1733                         Point: Home exercise program (Done)       Description:   Instruct learner(s) on appropriate technique for monitoring, assisting and/or progressing therapeutic exercises/activities.                  Learning Progress Summary             Patient Acceptance, E,TB, VU by  at 11/9/2023 0953    Comment: Role of OT, fall prevention, adaptive techniques for self care, discharge planning    Acceptance, E,TB, VU by  at 11/8/2023 1742    Acceptance, E,TB, VU by  at 11/8/2023 1733                         Point: Precautions (Done)       Description:   Instruct learner(s) on prescribed precautions during self-care and functional transfers.                  Learning Progress Summary             Patient Acceptance, E,TB, VU by  at 11/9/2023 0953    Comment: Role of OT, fall prevention, adaptive techniques for self care, discharge planning    Acceptance, E,TB, VU by  at 11/8/2023 1742    Acceptance, E,TB, VU by  at 11/8/2023 1733                         Point: Body mechanics (Done)       Description:   Instruct learner(s) on proper positioning and spine alignment during self-care, functional mobility activities and/or exercises.                  Learning Progress Summary             Patient Acceptance, E,TB, VU by  at 11/9/2023 0953    Comment: Role of OT, fall prevention, adaptive techniques for self care, discharge planning    Acceptance, E,TB, VU by  at 11/8/2023 1742    Acceptance, E,TB, VU by  at 11/8/2023 1733                                         User Key       Initials Effective Dates Name Provider Type Discipline     10/22/23 -  Jenniffer Figueroa, RN Registered Nurse Nurse     08/31/23 -  Susie Palomo OT Occupational Therapist OT                  OT Recommendation and Plan  Planned Therapy Interventions (OT):  activity tolerance training, functional balance retraining, occupation/activity based interventions, passive ROM/stretching, patient/caregiver education/training, transfer/mobility retraining, strengthening exercise  Plan of Care Review  Plan of Care Reviewed With: patient  Progress: no change  Outcome Evaluation: Patient is a 73 year old female admitted to hospital with AMS, found to have acute renal failure, hypothyroidism. Patient also has BLE, LUE lymphedema which significantly limits LB mobility and ADL performance. Patient lives at home with spouse and reports that she only ambulates very short distances with rwx at home,  provides assist with ADLs as needed. Patient presents today with generalized deconditioning and impaired act tolerance, requires mod A with bed mobility, able to sit at EOB unsupported with SBA, bed <> chair txfer with min A/CGA . Patient requires min A with UB dressing and grooming/hygiene, max A with toileting in bed due to urine soaked brief/bedding. Patient will benefit from skilled OT to address current functional deficits, and recommend SNF at [place].     Time Calculation:   Evaluation Complexity (OT)  Review Occupational Profile/Medical/Therapy History Complexity: expanded/moderate complexity  Assessment, Occupational Performance/Identification of Deficit Complexity: 3-5 performance deficits  Clinical Decision Making Complexity (OT): detailed assessment/moderate complexity  Overall Complexity of Evaluation (OT): moderate complexity     Time Calculation- OT       Row Name 11/09/23 0954             Time Calculation- OT    OT Start Time 0825  -      OT Stop Time 0855  -      OT Time Calculation (min) 30 min  -      Total Timed Code Minutes- OT 15 minute(s)  -      OT Non-Billable Time (min) 15 min  -      OT Received On 11/09/23  -      OT - Next Appointment 11/10/23  -      OT Goal Re-Cert Due Date 11/23/23  -         Timed Charges    45983 - OT Self Care/Mgmt Minutes  15  -LH         Untimed Charges    OT Eval/Re-eval Minutes 15  -LH         Total Minutes    Timed Charges Total Minutes 15  -LH      Untimed Charges Total Minutes 15  -LH       Total Minutes 30  -LH                User Key  (r) = Recorded By, (t) = Taken By, (c) = Cosigned By      Initials Name Provider Type     uSsie Palomo OT Occupational Therapist                  Therapy Charges for Today       Code Description Service Date Service Provider Modifiers Qty    [id number]  OT SELF CARE/MGMT/TRAIN EA 15 MIN 11/9/2023 Susie Palomo OT GO 1    [id number]  OT EVAL MOD COMPLEXITY 3 11/9/2023 Susie Palomo OT GO 1                 Susie Palomo OT  11/9/2023

## 2023-11-09 NOTE — CONSULTS
Aydee Solis   73 y.o.  female    LOS: 0 days   Patient Care Team:  Bennett Duque DO as PCP - General (Internal Medicine)  Marco Mayes MD as PCP - Family Medicine      Subjective     Chief Complaint:    History of Present Illness:     73-year-old white female patient who been followed by my colleague Dr.Arun Chacon is brought to the hospital with the confusion visual hallucinations and metabolic encephalopathy.  Patient is better now.  She has hypertension hyperlipidemia diabetes COPD obesity sleep apnea paroxysmal atrial fibrillation tachybradycardia syndrome status post pacemaker.  She has history of normal heart catheterization several years ago.  In January 2023 she had an echocardiogram which showed ejection fraction 42%.  She has a severe pulmonary hypertension.  BNP is 5929 troponin is 31.  She been treated with IV diuretics.  Patient has chronic kidney disease.  She is on Eliquis for paroxysmal atrial fibrillation.  Chest x-ray shows cardiomegaly.      Past Medical History:   Diagnosis Date    Arthritis     Breast cancer     Cancer     Chronic anticoagulation 01/25/2023    Chronic combined systolic and diastolic heart failure 08/03/2021    Chronic pain disorder 02/24/2023    Class 3 severe obesity due to excess calories with body mass index (BMI) of 50.0 to 59.9 in adult     COPD (chronic obstructive pulmonary disease)     Diabetes mellitus     EDWARDS (dyspnea on exertion)     Elephantiasis     left leg    Hyperlipidemia     Hypertension     Leukemia     Lung cancer     Lymphedema     left arm    Lymphedema BLE and LUE 06/06/2023    Osteoporosis     Pacemaker 11/09/2023    PAF (paroxysmal atrial fibrillation) 01/25/2023    Pulmonary hypertension 03/27/2022    Tracheal cancer      Past Surgical History:   Procedure Laterality Date    APPENDECTOMY      HYSTERECTOMY      KNEE ARTHROSCOPY Right     LAPAROSCOPIC GASTRIC BANDING      LYMPHADENECTOMY Left     MASTECTOMY Left      Medications Prior to  Admission   Medication Sig Dispense Refill Last Dose    albuterol (PROVENTIL HFA;VENTOLIN HFA) 108 (90 BASE) MCG/ACT inhaler Inhale 2 puffs Every 4 (Four) Hours As Needed for wheezing or shortness of air. 1 inhaler 0 Past Week    allopurinol (ZYLOPRIM) 100 MG tablet Take 1 tablet by mouth Daily.   11/8/2023    apixaban (ELIQUIS) 5 MG tablet tablet Take 1 tablet by mouth 2 (Two) Times a Day. 60 tablet 0 11/8/2023    atorvastatin (LIPITOR) 10 MG tablet Take 1 tablet by mouth Daily.   11/8/2023    Diclofenac Sodium (VOLTAREN) 1 % gel gel Apply 4 g topically to the appropriate area as directed 4 (Four) Times a Day As Needed.   Past Week    docusate sodium (COLACE) 100 MG capsule Take 1 capsule by mouth 2 (Two) Times a Day.   11/8/2023    DULoxetine (CYMBALTA) 60 MG capsule Take 1 capsule by mouth Daily.   11/8/2023    insulin aspart (NovoLOG FlexPen) 100 UNIT/ML solution pen-injector sc pen Novolog FlexPen U-100 Insulin aspart 100 unit/mL (3 mL) subcutaneous   Sliding scale.   11/8/2023    insulin detemir (LEVEMIR) 100 UNIT/ML injection Inject 20 Units under the skin into the appropriate area as directed Every Night.   11/7/2023    metoprolol succinate XL (TOPROL-XL) 25 MG 24 hr tablet Take 1 tablet by mouth 2 (Two) Times a Day.   11/8/2023    midodrine (PROAMATINE) 5 MG tablet Take 1 tablet by mouth 3 (Three) Times a Day Before Meals. 90 tablet 0 11/8/2023    Povidone, PF, (iVIZIA Dry Eyes) 0.5 % solution Apply 0.5 % to eye(s) as directed by provider 2 (Two) Times a Day.   11/8/2023    spironolactone (ALDACTONE) 50 MG tablet Take 1 tablet by mouth Daily.   11/8/2023    Trelegy Ellipta 100-62.5-25 MCG/INH inhaler    11/8/2023       Family History   Problem Relation Age of Onset    Stroke Mother     Leukemia Father     COPD Sister     ALS Brother      Social History     Socioeconomic History    Marital status:    Tobacco Use    Smoking status: Former   Vaping Use    Vaping Use: Never used   Substance and Sexual  Activity    Alcohol use: No    Drug use: No    Sexual activity: Defer     Objective       Review of Systems:   Constitutional: Negative for diaphoresis, fatigue, fever and unexpected weight change.   HENT: Negative.    Eyes: Negative.    Respiratory: Negative for cough, shortness of breath and wheezing.    Cardiovascular: Negative for chest pain, palpitations and leg swelling.   Gastrointestinal: Negative for abdominal pain, blood in stool, constipation, diarrhea, nausea and vomiting.   Endocrine: Negative.    Genitourinary: Negative for difficulty urinating, dysuria and frequency.   Musculoskeletal: Negative.    Skin: Negative.    Allergic/Immunologic: Negative for environmental allergies and food allergies.   Neurological: Negative.    Hematological: Negative.    Psychiatric/Behavioral: Negative.        Current Facility-Administered Medications:     acetaminophen (TYLENOL) tablet 650 mg, 650 mg, Oral, Q4H PRN **OR** acetaminophen (TYLENOL) 160 MG/5ML oral solution 650 mg, 650 mg, Oral, Q4H PRN **OR** acetaminophen (TYLENOL) suppository 650 mg, 650 mg, Rectal, Q4H PRN, Samantha Manjarrez MD    albuterol (PROVENTIL) nebulizer solution 0.083% 2.5 mg/3mL, 2.5 mg, Nebulization, Q6H PRN, Samantha Manjarrez MD, 2.5 mg at 11/08/23 1956    albuterol (PROVENTIL) nebulizer solution 0.083% 2.5 mg/3mL, 2.5 mg, Nebulization, 4x Daily, Samantha Manjarrez MD, 2.5 mg at 11/09/23 0709    allopurinol (ZYLOPRIM) tablet 100 mg, 100 mg, Oral, Daily, Samantha Manjarrez MD, 100 mg at 11/09/23 0827    apixaban (ELIQUIS) tablet 5 mg, 5 mg, Oral, BID, Samantha Manjarrez MD, 5 mg at 11/09/23 0828    atorvastatin (LIPITOR) tablet 10 mg, 10 mg, Oral, Daily, Samantha Manjarrez MD, 10 mg at 11/09/23 0827    sennosides-docusate (PERICOLACE) 8.6-50 MG per tablet 2 tablet, 2 tablet, Oral, BID, 2 tablet at 11/08/23 2030 **AND** polyethylene glycol (MIRALAX) packet 17 g, 17 g, Oral, Daily PRN **AND** bisacodyl (DULCOLAX)  EC tablet 5 mg, 5 mg, Oral, Daily PRN **AND** bisacodyl (DULCOLAX) suppository 10 mg, 10 mg, Rectal, Daily PRN, Samantha Manjarrez MD    budesonide-formoterol (SYMBICORT) 160-4.5 MCG/ACT inhaler 2 puff, 2 puff, Inhalation, BID - RT **AND** tiotropium (SPIRIVA RESPIMAT) 2.5 mcg/act aerosol solution inhaler, 2 puff, Inhalation, Daily - RT, Samantha Manjarrez MD    calcium carbonate (TUMS) chewable tablet 500 mg (200 mg elemental), 2 tablet, Oral, BID PRN, Samantha Manjarrez MD    dextrose (D50W) (25 g/50 mL) IV injection 25 g, 25 g, Intravenous, Q15 Min PRN, Samantha Manjarrez MD    dextrose (GLUTOSE) oral gel 15 g, 15 g, Oral, Q15 Min PRN, Samantha Manjarrez MD    docusate sodium (COLACE) capsule 100 mg, 100 mg, Oral, BID, Samantha Manjarrez MD, 100 mg at 11/08/23 2030    furosemide (LASIX) injection 80 mg, 80 mg, Intravenous, BID, Samantha Manjarrez MD, 80 mg at 11/09/23 0827    glucagon (GLUCAGEN) injection 1 mg, 1 mg, Intramuscular, Q15 Min PRN, Samantha Manjarrez MD    insulin glargine (LANTUS, SEMGLEE) injection 16 Units, 16 Units, Subcutaneous, Nightly, Samantha Manjarrez MD, 16 Units at 11/08/23 2039    insulin lispro (HUMALOG/ADMELOG) injection 2-9 Units, 2-9 Units, Subcutaneous, 4x Daily AC & at Bedtime, Samantha Manjarrez MD    ipratropium-albuterol (DUO-NEB) nebulizer solution 3 mL, 3 mL, Nebulization, Q4H PRN, Samantha Manjarrez MD    metoprolol succinate XL (TOPROL-XL) 24 hr tablet 25 mg, 25 mg, Oral, BID, Samantha Manjarrez MD, 25 mg at 11/09/23 0828    midodrine (PROAMATINE) tablet 5 mg, 5 mg, Oral, TID AC, Samantha Manjarrez MD, 5 mg at 11/09/23 0827    nitroglycerin (NITROSTAT) SL tablet 0.4 mg, 0.4 mg, Sublingual, Q5 Min PRN, Samantha Manjarrez MD    ondansetron (ZOFRAN) tablet 4 mg, 4 mg, Oral, Q6H PRN **OR** ondansetron (ZOFRAN) injection 4 mg, 4 mg, Intravenous, Q6H PRN, Samantha Manjarrez MD    Povidone (PF) 0.5 %  solution 1 Application, 1 Application, Both Eyes, BID, Samantha Manjarrez MD    sodium chloride 0.9 % flush 10 mL, 10 mL, Intravenous, PRN, Samantha Manjarrez MD    sodium chloride 0.9 % flush 10 mL, 10 mL, Intravenous, Q12H, Samantha Manjarrez MD, 10 mL at 11/09/23 0830    sodium chloride 0.9 % flush 10 mL, 10 mL, Intravenous, PRN, Samantha Manjarrez MD    sodium chloride 0.9 % infusion 40 mL, 40 mL, Intravenous, PRN, Samantha Manjarrez MD    spironolactone (ALDACTONE) tablet 50 mg, 50 mg, Oral, Daily, Samantha Manjarrez MD, 50 mg at 11/09/23 0828      Physical Exam:   Vital Sign Min/Max for last 24 hours  Temp  Min: 97 °F (36.1 °C)  Max: 98.3 °F (36.8 °C)   BP  Min: 102/86  Max: 122/87    Pulse  Min: 59  Max: 63     Wt Readings from Last 3 Encounters:   11/08/23 118 kg (260 lb 3.2 oz)   08/29/23 111 kg (245 lb)   08/03/23 120 kg (264 lb 1.8 oz)       General Appearance:  Awake,  Alert, cooperative, in no acute distress   Head:  Normocephalic, without obvious abnormality, atraumatic   Eyes:          Conjunctivae normal, anicteric, eom intact    Neck: No adenopathy, supple, trachea midline, no thyromegaly, no   carotid bruit, no JVD, no elevated cvp   Lungs:   Clear to auscultation,respirations regular, even and                  unlabored    Heart:  Regular rhythm and normal rate, normal S1 and S2,            No murmur, no gallop, no rub, no click    Chest Wall:  No abnormalities observed   Abdomen:   Normal bowel sounds, no masses, soft nontender, nondistended                    Rectal:   Deferred   Extremities: No edema. Moves all extremities well, no cyanosis, no erythema   Pulses: Pulses palpable and equal bilaterally   Skin: No bleeding, bruising or rash   Neurologic: Speech clear and appropriate, no facial drooping     :       MONITOR:    Results Review:     Sodium Sodium   Date Value Ref Range Status   11/09/2023 137 136 - 145 mmol/L Final   11/08/2023 133 (L) 136 - 145 mmol/L  "Final      Potassium Potassium   Date Value Ref Range Status   11/09/2023 3.1 (L) 3.5 - 5.2 mmol/L Final   11/08/2023 3.8 3.5 - 5.2 mmol/L Final      Chloride Chloride   Date Value Ref Range Status   11/09/2023 93 (L) 98 - 107 mmol/L Final   11/08/2023 90 (L) 98 - 107 mmol/L Final      Bicarbonate No results found for: \"PLASMABICARB\"   BUN BUN   Date Value Ref Range Status   11/09/2023 39 (H) 8 - 23 mg/dL Final   11/08/2023 41 (H) 8 - 23 mg/dL Final      Creatinine Creatinine   Date Value Ref Range Status   11/09/2023 1.97 (H) 0.57 - 1.00 mg/dL Final   11/08/2023 2.36 (H) 0.57 - 1.00 mg/dL Final      Calcium Calcium   Date Value Ref Range Status   11/09/2023 9.3 8.6 - 10.5 mg/dL Final   11/08/2023 9.5 8.6 - 10.5 mg/dL Final      Magnesium Magnesium   Date Value Ref Range Status   11/09/2023 2.3 1.6 - 2.4 mg/dL Final        Results from last 7 days   Lab Units 11/09/23  0535   WBC 10*3/mm3 4.43   HEMOGLOBIN g/dL 13.3   HEMATOCRIT % 42.8   PLATELETS 10*3/mm3 262     Lab Results   Lab Value Date/Time    TROPONINT 31 (H) 11/09/2023 0738    TROPONINT 34 (H) 11/09/2023 0535    TROPONINT 39 (H) 08/29/2023 1415    TROPONINT 45 (H) 06/05/2023 2213    TROPONINT 46 (H) 06/05/2023 1946    TROPONINT <0.010 01/27/2017 1850     No results found for: \"CHOL\"  Lab Results   Component Value Date    HDL 22 (L) 03/02/2023    HDL 71 08/27/2019    HDL 56 12/21/2018     Lab Results   Component Value Date    LDL 53 08/27/2019    LDL 43 12/21/2018     Lab Results   Component Value Date    TRIG 51 03/02/2023    TRIG 86 08/27/2019    TRIG 60 12/21/2018     No components found for: \"CHOLHDL\"  No results found for: \"PTT\"  No components found for: \"PT/INR\"  Lab Results   Component Value Date    HGBA1C 8.50 (H) 08/29/2023      Lab Results   Component Value Date    TSH 33.500 (H) 11/09/2023        Echo EF Estimated  )No results found for: \"ECHOEFEST\"      Assessment/ Plan    Active Hospital Problems    Diagnosis  POA    **AMS (altered mental " status) [R41.82]  Yes    Pacemaker [Z95.0]  Yes    Acute on chronic combined systolic and diastolic CHF (congestive heart failure) [I50.43]  Yes    Hyponatremia [E87.1]  Yes    Lymphedema BLE and LUE [I89.0]  Yes    Chronic pain disorder [G89.4]  Yes    Chronic anticoagulation [Z79.01]  Not Applicable    COPD (chronic obstructive pulmonary disease) [J44.9]  Yes    PAULETTE (obstructive sleep apnea) [G47.33]  Yes    Stage 3b chronic kidney disease [N18.32]  Yes    Type 2 diabetes mellitus, with long-term current use of insulin [E11.9, Z79.4]  Not Applicable    Acute metabolic encephalopathy [G93.41]  Yes    PAF (paroxysmal atrial fibrillation) [I48.0]  Yes    Pulmonary hypertension [I27.20]  Yes    Reduced mobility [Z74.09]  Yes    Morbid obesity due to excess calories [E66.01]  Yes    Acute on chronic respiratory failure with hypoxia [J96.21]  Yes    Hyperlipidemia [E78.5]  Yes    Hypertension [I10]  Yes     Patient on IV diuretics awaiting nephrology consult          Mu Bueno MD  11/09/23  10:08 EST    Discussed with  Time:

## 2023-11-09 NOTE — H&P
"PCP: Bennett Duque,     Chief complaint   Chief Complaint   Patient presents with    Dizziness       HPI  Patient is a 73 y.o. female presents with history of diabetes, COPD, on midodrine, presents to the ER due to increasing confusion, decreased oral intake and hypoxia 83%.  Patient has history of combined heart failure with an EF of 42% with hypokinetic segments seen in January 2023, diastolic grade 1, mildly reduced RV function.  Patient is somewhat confused and family states that she was talking about a dog is been dead for a while and patient states that she was seeing some visual hallucinations.  As she has been on the floor she has been doing better.  But says that she has not been eating and drinking as much.  Had some light lightheadedness.  Just finished antibiotics for UTI.  She says \"a little\" to her shortness of breath.  BNP is elevated.  Refused the urinary catheterization to get UA.  Patient on elevated BNP but due to decreased p.o. intake ER did not give diuretics at that point.   Patient is adamant that she takes her diuretic at night and that she has some kind of a pure wick like device at home (unsure if that is true()    Normally is only on oxygen at night  PAST MEDICAL HISTORY  Past Medical History:   Diagnosis Date    Arthritis     Breast cancer     Cancer     Chronic anticoagulation 01/25/2023    Chronic combined systolic and diastolic heart failure 08/03/2021    Chronic pain disorder 02/24/2023    Class 3 severe obesity due to excess calories with body mass index (BMI) of 50.0 to 59.9 in adult     COPD (chronic obstructive pulmonary disease)     Diabetes mellitus     EDWARDS (dyspnea on exertion)     Elephantiasis     left leg    Hyperlipidemia     Hypertension     Leukemia     Lung cancer     Lymphedema     left arm    Osteoporosis     PAF (paroxysmal atrial fibrillation) 01/25/2023    Pulmonary hypertension 03/27/2022    Tracheal cancer        PAST SURGICAL HISTORY  Past Surgical " History:   Procedure Laterality Date    APPENDECTOMY      HYSTERECTOMY      KNEE ARTHROSCOPY Right     LAPAROSCOPIC GASTRIC BANDING      LYMPHADENECTOMY Left     MASTECTOMY Left        FAMILY HISTORY  Family History   Problem Relation Age of Onset    Stroke Mother     Leukemia Father     COPD Sister     ALS Brother        SOCIAL HISTORY  Social History     Tobacco Use    Smoking status: Former   Vaping Use    Vaping Use: Never used   Substance Use Topics    Alcohol use: No    Drug use: No       MEDICATIONS:  Medications Prior to Admission   Medication Sig Dispense Refill Last Dose    albuterol (PROVENTIL HFA;VENTOLIN HFA) 108 (90 BASE) MCG/ACT inhaler Inhale 2 puffs Every 4 (Four) Hours As Needed for wheezing or shortness of air. 1 inhaler 0 Past Week    allopurinol (ZYLOPRIM) 100 MG tablet Take 1 tablet by mouth Daily.   11/8/2023    apixaban (ELIQUIS) 5 MG tablet tablet Take 1 tablet by mouth 2 (Two) Times a Day. 60 tablet 0 11/8/2023    atorvastatin (LIPITOR) 10 MG tablet Take 1 tablet by mouth Daily.   11/8/2023    Diclofenac Sodium (VOLTAREN) 1 % gel gel Apply 4 g topically to the appropriate area as directed 4 (Four) Times a Day As Needed.   Past Week    docusate sodium (COLACE) 100 MG capsule Take 1 capsule by mouth 2 (Two) Times a Day.   11/8/2023    DULoxetine (CYMBALTA) 60 MG capsule Take 1 capsule by mouth Daily.   11/8/2023    insulin aspart (NovoLOG FlexPen) 100 UNIT/ML solution pen-injector sc pen Novolog FlexPen U-100 Insulin aspart 100 unit/mL (3 mL) subcutaneous   Sliding scale.   11/8/2023    insulin detemir (LEVEMIR) 100 UNIT/ML injection Inject 20 Units under the skin into the appropriate area as directed Every Night.   11/7/2023    metoprolol succinate XL (TOPROL-XL) 25 MG 24 hr tablet Take 1 tablet by mouth 2 (Two) Times a Day.   11/8/2023    midodrine (PROAMATINE) 5 MG tablet Take 1 tablet by mouth 3 (Three) Times a Day Before Meals. 90 tablet 0 11/8/2023    Povidone, PF, (iVIZIA Dry Eyes) 0.5  "% solution Apply 0.5 % to eye(s) as directed by provider 2 (Two) Times a Day.   11/8/2023    spironolactone (ALDACTONE) 50 MG tablet Take 1 tablet by mouth Daily.   11/8/2023    [place] Ellipta 100-62.5-25 MCG/INH inhaler    11/8/2023       Allergies:  Ciprofloxacin    Review of Systems:  Focal to obtain secondary to confusion        Vital Signs  Temp:  [97 °F (36.1 °C)-98.3 °F (36.8 °C)] 98.3 °F (36.8 °C)  Heart Rate:  [59-63] 61  Resp:  [18-20] 20  BP: (113-122)/(60-78) 119/75  Flowsheet Rows      Flowsheet Row First Filed Value   Admission Height 167.6 cm (65.98\") Documented at 11/08/2023 1423   Admission Weight 118 kg (260 lb 3.2 oz) Documented at 11/08/2023 1423           Body mass index is 42.02 kg/m².  83% on room air    Physical Exam:  General Appearance:    Alert, cooperative, in no acute distress chronically ill-appearing   Head:    Normocephalic, without obvious abnormality, atraumatic   Eyes:         conjunctivae and sclerae normal, no icterus, PERRLA Arcus senilis   ENT:    Ears grossly intact, oral mucosa moist, oxygen nasal cannula   Neck:   No adenopathy, supple, trachea midline,        Lungs:   Decreased breath sounds in the bases,respirations regular, even and  moderately abored    Heart:    Regular rhythm and normal rate,  no murmur, normal S1 and S2, maker   Abdomen:     Normal bowel sounds, no masses,  soft non-tender, non-distended,    Extremities:   Moves all extremities well, no cyanosis, edema bilateral lower extremities and left upper extremity             Pulses:   Pulses palpable and equal bilaterally   Skin:   No bleeding, rash,    +bruising right hand   Neurologic:    Psych:   Cranial nerves 2 - 12 grossly intact, sensation grossly intact,     Moves all extremities well, equal bilateral strength    Alert and Oriented x 2, Normal Affect    I washed/sanitized my hands before entering the room and immediately upon leaving the room.    LABS:  Admission on 11/08/2023   Component Date Value " Ref Range Status    Glucose 11/08/2023 95  65 - 99 mg/dL Final    BUN 11/08/2023 41 (H)  8 - 23 mg/dL Final    Creatinine 11/08/2023 2.36 (H)  0.57 - 1.00 mg/dL Final    Sodium 11/08/2023 133 (L)  136 - 145 mmol/L Final    Potassium 11/08/2023 3.8  3.5 - 5.2 mmol/L Final    Chloride 11/08/2023 90 (L)  98 - 107 mmol/L Final    CO2 11/08/2023 31.2 (H)  22.0 - 29.0 mmol/L Final    Calcium 11/08/2023 9.5  8.6 - 10.5 mg/dL Final    Total Protein 11/08/2023 7.8  6.0 - 8.5 g/dL Final    Albumin 11/08/2023 4.1  3.5 - 5.2 g/dL Final    ALT (SGPT) 11/08/2023 18  1 - 33 U/L Final    AST (SGOT) 11/08/2023 35 (H)  1 - 32 U/L Final    Alkaline Phosphatase 11/08/2023 134 (H)  39 - 117 U/L Final    Total Bilirubin 11/08/2023 3.1 (H)  0.0 - 1.2 mg/dL Final    Globulin 11/08/2023 3.7  gm/dL Final    A/G Ratio 11/08/2023 1.1  g/dL Final    BUN/Creatinine Ratio 11/08/2023 17.4  7.0 - 25.0 Final    Anion Gap 11/08/2023 11.8  5.0 - 15.0 mmol/L Final    eGFR 11/08/2023 21.0 (L)  >60.0 mL/min/1.73 Final    Lactate 11/08/2023 2.4 (C)  0.5 - 2.0 mmol/L Final    Ammonia 11/08/2023 21  11 - 51 umol/L Final    Ethanol 11/08/2023 <10  0 - 10 mg/dL Final    Ethanol % 11/08/2023 <0.010  % Final    Extra Tube 11/08/2023 Hold for add-ons.   Final    Auto resulted.    Extra Tube 11/08/2023 hold for add-on   Final    Auto resulted    Extra Tube 11/08/2023 Hold for add-ons.   Final    Auto resulted    WBC 11/08/2023 4.68  3.40 - 10.80 10*3/mm3 Final    RBC 11/08/2023 5.40 (H)  3.77 - 5.28 10*6/mm3 Final    Hemoglobin 11/08/2023 13.5  12.0 - 15.9 g/dL Final    Hematocrit 11/08/2023 44.2  34.0 - 46.6 % Final    MCV 11/08/2023 81.9  79.0 - 97.0 fL Final    MCH 11/08/2023 25.0 (L)  26.6 - 33.0 pg Final    MCHC 11/08/2023 30.5 (L)  31.5 - 35.7 g/dL Final    RDW 11/08/2023 18.5 (H)  12.3 - 15.4 % Final    RDW-SD 11/08/2023 52.1  37.0 - 54.0 fl Final    MPV 11/08/2023 9.9  6.0 - 12.0 fL Final    Platelets 11/08/2023 283  140 - 450 10*3/mm3 Final    Neutrophil  % 11/08/2023 66.8  42.7 - 76.0 % Final    Lymphocyte % 11/08/2023 20.5  19.6 - 45.3 % Final    Monocyte % 11/08/2023 10.5  5.0 - 12.0 % Final    Eosinophil % 11/08/2023 0.9  0.3 - 6.2 % Final    Basophil % 11/08/2023 1.1  0.0 - 1.5 % Final    Immature Grans % 11/08/2023 0.2  0.0 - 0.5 % Final    Neutrophils, Absolute 11/08/2023 3.13  1.70 - 7.00 10*3/mm3 Final    Lymphocytes, Absolute 11/08/2023 0.96  0.70 - 3.10 10*3/mm3 Final    Monocytes, Absolute 11/08/2023 0.49  0.10 - 0.90 10*3/mm3 Final    Eosinophils, Absolute 11/08/2023 0.04  0.00 - 0.40 10*3/mm3 Final    Basophils, Absolute 11/08/2023 0.05  0.00 - 0.20 10*3/mm3 Final    Immature Grans, Absolute 11/08/2023 0.01  0.00 - 0.05 10*3/mm3 Final    nRBC 11/08/2023 0.6 (H)  0.0 - 0.2 /100 WBC Final    proBNP 11/08/2023 5,929.0 (H)  0.0 - 1,800.0 pg/mL Final    Lactate 11/08/2023 2.3 (C)  0.5 - 2.0 mmol/L Final    Site 11/08/2023 Right Radial   Final    Tee's Test 11/08/2023 Positive   Final    pH, Arterial 11/08/2023 7.471 (H)  7.350 - 7.450 pH units Final    pCO2, Arterial 11/08/2023 39.2  35.0 - 45.0 mm Hg Final    pO2, Arterial 11/08/2023 71.6 (L)  80.0 - 100.0 mm Hg Final    HCO3, Arterial 11/08/2023 28.6 (H)  22.0 - 28.0 mmol/L Final    Base Excess, Arterial 11/08/2023 4.6 (H)  0.0 - 2.0 mmol/L Final    Serial Number: [id number]:  [id number]    O2 Saturation, Arterial 11/08/2023 95.2  92.0 - 98.5 % Final    A-a DO2 11/08/2023 0.4  mmHg Final    CO2 Content 11/08/2023 29.8 (H)  23 - 27 mmol/L Final    Barometric Pressure for Blood Gas 11/08/2023 743.8000  mmHg Final    Modality 11/08/2023 Cannula   Final    FIO2 11/08/2023 28  % Final    Flow Rate 11/08/2023 2.0000  lpm Final    Set Mech Resp Rate 11/08/2023 18   Final    Hemodilution 11/08/2023 No   Final    QT Interval 11/08/2023 522  ms Preliminary    QTC Interval 11/08/2023 522  ms Preliminary         DIAGNOSTICS:  CT Head Without Contrast    Result Date: 11/8/2023  There is no evidence of acute  infarction, intracranial hemorrhage or of abnormal extra-axial fluid. Age-appropriate atrophy, small vessel ischemic disease and vascular calcification is noted. The right vertebral artery is dominant. Further evaluation could be performed with a MRI examination of the brain as indicated.    Radiation dose reduction techniques were utilized, including automated exposure control and exposure modulation based on body size.   This report was finalized on 11/8/2023 5:01 PM by Dr. Bryan Muñiz M.D on Workstation: [id number]      XR Chest 1 View    Result Date: 11/8/2023  No acute findings    This report was finalized on 11/8/2023 2:55 PM by Dr. Carlos Nunez M.D on Workstation: [id number]            Results Review:   I reviewed the patient's new clinical results.  Discussed with ER physician  Old records reviewed / Medical Decision Making High Complexity  I personally viewed and interpreted the patient's EKG/Telemetry data- paced    Jan 2023   Left ventricular systolic function is mildly decreased. Calculated left ventricular EF = 42.8% Left ventricular ejection fraction appears to be 41 - 45%.    The following left ventricular wall segments are hypokinetic: mid anterior, apical anterior, basal anterolateral, mid anterolateral, apical lateral, basal inferolateral, mid inferolateral, apical inferior, mid inferior, apical septal, basal inferoseptal, mid inferoseptal, apex hypokinetic, mid anteroseptal, basal anterior, basal inferior and basal inferoseptal.    Left ventricular diastolic function is consistent with (grade I) impaired relaxation.    Mildly reduced right ventricular systolic function noted.    The right ventricular cavity is moderately dilated.    The right atrial cavity is moderately  dilated.    There is moderate, bileaflet mitral valve thickening present.    Mild mitral valve stenosis is present. The mitral valve peak gradient is 8 mmHg. The mitral valve mean gradient is 3 mmHg.    Estimated right  ventricular systolic pressure from tricuspid regurgitation is mildly elevated (35-45 mmHg).    ASSESSMENT AND PLAN    AMS (altered mental status)    Hypertension    Hyperlipidemia    Acute on chronic respiratory failure with hypoxia    Morbid obesity due to excess calories    Acute metabolic encephalopathy    PAULETTE (obstructive sleep apnea)    Stage 3b chronic kidney disease    Type 2 diabetes mellitus, with long-term current use of insulin    Chronic anticoagulation    COPD (chronic obstructive pulmonary disease)    Lymphedema BLE and LUE    Hyponatremia    Acute on chronic combined systolic and diastolic CHF (congestive heart failure)    Chronic pain disorder    Pulmonary hypertension    Reduced mobility     Hypoxia with acute on chronic respiratory failure likely secondary toAcute on chronic combined systolic and diastolic CHF (congestive heart failure) but could also be due to obesity hypoventilation, PAULETTE, Pulmonary hypertension and copd  -check covid  -Due to Symbicort and Spiriva to replace home Trelegy  -scheduled alb for 24 hours     Acute on chronic combined systolic and diastolic CHF (congestive heart failure) with hyponatremia  --daily weights, strict I&O's  -IV Lasix BID (on torsemide 80daily )  -low sodium diet, nutrition to see  -no Ace-I nor SGLT@ 2/2 renal fxn,  on BB  -cardiology consult        Type 2 diabetes mellitus, with long-term current use of insulin  - -Accu-Cheks  -hypoglycemia protocol  -sliding scale insulin to cover outlier blood sugars  -home medicine regimen is 20 units at night and sliding scale insulin       Chronic anticoagulation on eliquis / paf  -stable, continue     Ams due to all the above   -Secondary to metabolic encephalopathy  -CT and OT to evaluate and treat    Acute on CKD 3-4 question if this is cardiorenal  -Patient seen Dr. Guallpa in the past consult them  -Due to decreased renal function holding Cymbalta  -Wean topical diclofenac    HOME MEDS HELD: cymnbata   -Restart  when clinically appropriate      Chronic medical conditions being monitored- stable, continue medical management  - Lymphedema BLE and LUE  -   Reduced mobility  Chronic pain disorder   Hypertension    Hyperlipidemia     +DVT proph:    eliquis  + CODE STATUS: Full       I discussed the patient's findings and my recommendations with the patient and/or family.  Please reference all orders placed.    Samantha Manjarrez MD  11/08/23  19:35 EST      This document is intended for medical expert use only. Reading of this document by patients and/or patient's family without participating in medical staff guidance may result in misinterpretation and unintended morbidity. Any interpretation of such data is the responsibility of the patient and/or family member responsible for the patient in concert with their primary or specialist providers, and NOT to be left for sources of online searches such as [employer], [employer] or similar queries. Relying on these approaches to knowledge may result in misinterpretation, misguided goals of care and even death should patients or family members try recommendations outside of the realm of professional medical care in a supervised way.    Dictated utilizing Voice dictation:  Parts of this note may be an electronic transcription/translation of spoke language to printed text using Dragon dictation system.

## 2023-11-09 NOTE — CONSULTS
[facility] Consult Note      Patient Name: Aydee Solis  : 1950  MRN: [id number]  Primary Care Physician:  Bennett Duque DO  Referring Physician: No ref. provider found  Date of admission: 2023    Subjective     Reason for Consult:  MELANIE on CKD3b    HPI:   Aydee Solis is a 73 y.o. female with CKD3b (baseline SCR 1.6) followed by Dr. Zeb Guallpa of our group, admitted yesterday for further evaluation of confusion, hallucinations, shortness of breath, and weakness.  Head CT negative, and CXR without acute findings.  ABG without CO2 retention.  SCR on arrival 2.4, with value 2.0 today.  Notable labs also include TSH 34; he reports no history of thyroid disease.  Full PMH outlined below; notable is severe pulmonary hypertension, PAF on AC, DM2, COPD, obesity, and PAULETTE.  Hospital course so far notable for IV Lasix; aggressive pulmonary toilet, and cardiology consult.    She believes weight has been stable over the last 1 month; leg swelling stable  No orthopnea; no chest pain  No fever or chills  No urinary complaints other than frequency, which she blames on her diuretic  Though she reports taking torsemide 80 mg daily, this does not appear on her medication list  Bowel movements fine    Review of Systems:   14 point review of systems is otherwise negative except for mentioned above on HPI    Personal History     Past Medical History:   Diagnosis Date    Arthritis     Breast cancer     Cancer     Chronic anticoagulation 2023    Chronic combined systolic and diastolic heart failure 2021    Chronic pain disorder 2023    Class 3 severe obesity due to excess calories with body mass index (BMI) of 50.0 to 59.9 in adult     COPD (chronic obstructive pulmonary disease)     Diabetes mellitus     EDWARDS (dyspnea on exertion)     Elephantiasis     left leg    Hyperlipidemia     Hypertension     Leukemia     Lung cancer     Lymphedema     left arm    Lymphedema BLE  and ZANA 06/06/2023    Osteoporosis     Pacemaker 11/09/2023    PAF (paroxysmal atrial fibrillation) 01/25/2023    Pulmonary hypertension 03/27/2022    Tracheal cancer        Past Surgical History:   Procedure Laterality Date    APPENDECTOMY      HYSTERECTOMY      KNEE ARTHROSCOPY Right     LAPAROSCOPIC GASTRIC BANDING      LYMPHADENECTOMY Left     MASTECTOMY Left        Family History: family history includes ALS in her brother; COPD in her sister; Leukemia in her father; Stroke in her mother.    Social History:  reports that she has quit smoking. She does not have any smokeless tobacco history on file. She reports that she does not drink alcohol and does not use drugs.    Home Medications:  Prior to Admission medications    Medication Sig Start Date End Date Taking? Authorizing Provider   albuterol (PROVENTIL HFA;VENTOLIN HFA) 108 (90 BASE) MCG/ACT inhaler Inhale 2 puffs Every 4 (Four) Hours As Needed for wheezing or shortness of air. 1/31/17  Yes Bennett John MD   allopurinol (ZYLOPRIM) 100 MG tablet Take 1 tablet by mouth Daily.   Yes Brock Kenyon MD   apixaban (ELIQUIS) 5 MG tablet tablet Take 1 tablet by mouth 2 (Two) Times a Day. 1/5/21  Yes Camilo Armstrong MD   atorvastatin (LIPITOR) 10 MG tablet Take 1 tablet by mouth Daily. 1/21/22  Yes Brock Kenyon MD   Diclofenac Sodium (VOLTAREN) 1 % gel gel Apply 4 g topically to the appropriate area as directed 4 (Four) Times a Day As Needed.   Yes Brock Kenyon MD   docusate sodium (COLACE) 100 MG capsule Take 1 capsule by mouth 2 (Two) Times a Day.   Yes Brock Kenyon MD   DULoxetine (CYMBALTA) 60 MG capsule Take 1 capsule by mouth Daily. 1/21/22  Yes Brock Kenyon MD   insulin aspart (NovoLOG FlexPen) 100 UNIT/ML solution pen-injector sc pen Novolog FlexPen U-100 Insulin aspart 100 unit/mL (3 mL) subcutaneous   Sliding scale.   Yes Brock Kenyon MD   insulin detemir (LEVEMIR) 100 UNIT/ML injection Inject 20  Units under the skin into the appropriate area as directed Every Night. 2/25/16  Yes Brock Kenyon MD   metoprolol succinate XL (TOPROL-XL) 25 MG 24 hr tablet Take 1 tablet by mouth 2 (Two) Times a Day.   Yes Brock Kenyon MD   midodrine (PROAMATINE) 5 MG tablet Take 1 tablet by mouth 3 (Three) Times a Day Before Meals. 9/6/23  Yes Ab Campbell MD Povidone, PF, (iVIZIA Dry Eyes) 0.5 % solution Apply 0.5 % to eye(s) as directed by provider 2 (Two) Times a Day.   Yes Brock Kenyon MD   spironolactone (ALDACTONE) 50 MG tablet Take 1 tablet by mouth Daily.   Yes Brock Kenyon MD   Trelegy Ellipta 100-62.5-25 MCG/INH inhaler  4/18/22  Yes Brock Kenyon MD       Allergies:  Allergies   Allergen Reactions    Ciprofloxacin        Objective     Vitals:   Temp:  [97 °F (36.1 °C)-98.3 °F (36.8 °C)] 98 °F (36.7 °C)  Heart Rate:  [59-63] 61  Resp:  [18-20] 18  BP: (102-122)/(60-87) 102/86  Flow (L/min):  [2] 2    Intake/Output Summary (Last 24 hours) at 11/9/2023 1011  Last data filed at 11/9/2023 0535  Gross per 24 hour   Intake 480 ml   Output 500 ml   Net -20 ml       Physical Exam:   Constitutional: Awake, alert, NAD, chronically ill, hyperpigmented skin  HEENT: Sclera anicteric, no conjunctival injection  Neck: Supple, no carotid bruit, trachea at midline, no JVD  Resp: A few crackles right base, but no wheezing, nonlabored on 2 L/min   Cardiovascular: RRR, no rub  Gastrointestinal: Soft, BS +, nontender and nondistended  : No palpable bladder  Musculoskeletal: +1-2 woody edema, no clubbing or cyanosis  Psychiatric: Flat affect, depressed mood, oriented  Neurologic: No asterixis, moving all extremities, normal speech   Skin: Warm and dry; hyperpigmented       Scheduled Meds:     albuterol, 2.5 mg, Nebulization, 4x Daily  allopurinol, 100 mg, Oral, Daily  apixaban, 5 mg, Oral, BID  atorvastatin, 10 mg, Oral, Daily  budesonide-formoterol, 2 puff, Inhalation, BID - RT    And  tiotropium bromide monohydrate, 2 puff, Inhalation, Daily - RT  docusate sodium, 100 mg, Oral, BID  furosemide, 80 mg, Intravenous, BID  insulin glargine, 16 Units, Subcutaneous, Nightly  insulin lispro, 2-9 Units, Subcutaneous, 4x Daily AC & at Bedtime  metoprolol succinate XL, 25 mg, Oral, BID  midodrine, 5 mg, Oral, TID AC  Povidone (PF), 1 Application, Both Eyes, BID  senna-docusate sodium, 2 tablet, Oral, BID  sodium chloride, 10 mL, Intravenous, Q12H  spironolactone, 50 mg, Oral, Daily      IV Meds:        Results Reviewed:   I have personally reviewed the results from the time of this admission to 11/9/2023 10:11 EST     Lab Results   Component Value Date    GLUCOSE 125 (H) 11/09/2023    CALCIUM 9.3 11/09/2023     11/09/2023    K 3.1 (L) 11/09/2023    CO2 30.7 (H) 11/09/2023    CL 93 (L) 11/09/2023    BUN 39 (H) 11/09/2023    CREATININE 1.97 (H) 11/09/2023    EGFRIFNONA 39 (L) 01/05/2021    BCR 19.8 11/09/2023    ANIONGAP 13.3 11/09/2023      Lab Results   Component Value Date    MG 2.3 11/09/2023    PHOS 3.6 11/09/2023    ALBUMIN 3.6 11/09/2023           Assessment / Plan       AMS (altered mental status)    Hypertension    Hyperlipidemia    Acute on chronic respiratory failure with hypoxia    Morbid obesity due to excess calories    Acute metabolic encephalopathy    PAULETTE (obstructive sleep apnea)    Stage 3b chronic kidney disease    Type 2 diabetes mellitus, with long-term current use of insulin    PAF (paroxysmal atrial fibrillation)    Chronic anticoagulation    COPD (chronic obstructive pulmonary disease)    Lymphedema BLE and LUE    Hyponatremia    Acute on chronic combined systolic and diastolic CHF (congestive heart failure)    Chronic pain disorder    Pulmonary hypertension    Reduced mobility    Pacemaker      ASSESSMENT:  MELANIE on CKD3b, nonoliguric, improving:  prerenal likely due to modest hypotension and CHF.  ROS negative for obstructive symptoms, but will need exclude urinary  retention.  Volume excess by exam; low potassium and metabolic alkalosis.  Urinalysis bland with no blood, protein, or cells  Severe hypothyroidism with TSH 33.5  Acute on chronic CHF  COPD/PAULETTE/severe pulmonary hypertension  Confusion, resolving  Elevated troponin    PLAN:  1.  IV Lasix 80 mg twice daily for now  2.  Hypothyroidism treatment per primary  3.  Surveillance labs    Thank you for involving us in the care of Aydeefarrah Solis.  Please feel free to call with any questions.    Mason Myers MD  11/09/23  10:11 [facility]    [facility]  [phone]      Please note that portions of this note were completed with a voice recognition program.

## 2023-11-09 NOTE — CASE MANAGEMENT/SOCIAL WORK
Continued Stay Note  [facility]     Patient Name: Aydee Solis  MRN: [id number]  Today's Date: 11/9/2023    Admit Date: 11/8/2023    Plan: home with spouse; current with [facility]   Discharge Plan       Row Name 11/09/23 0957       Plan    Plan home with spouse; current with [facility]    Patient/Family in Agreement with Plan yes    Plan Comments Spoke with pt bedside. Confirmed facesheet correct. Explained role of CCP.  Pt reports she lives with her spouse in a 2 level home. She is IADLs has cane and walker and home O2 from [place]. Pt reports she is current with [facility] and has been to [facility] for SNF in past. Verified PCP and pharmacy correct. Pt reports she plans to return home with family assist. CCP to follow.                   Discharge Codes    No documentation.                       CARIE Carrillo

## 2023-11-09 NOTE — PLAN OF CARE
Goal Outcome Evaluation:  Plan of Care Reviewed With: patient           Outcome Evaluation: Pt is a 72 y/o F [facility] and found hypoxic with acute on chronic respiratory failure likely secondary to acute on chronic combined systolic and diastolic CHF. Pt received UIC upon arrival and agreeable to PT eval. Pt reports she lives with her spouse with a ramp to enter and uses both RW & SC for mobility at BL. Pt presents to PT with generalized weakness and decreased endurance. Pt stood and ambulated 40' c RW requiring CGA. Pt demo's a slow pace but no LOB noted. Pt returned to bed with SBA. PT encouraged pt to sit UIC during the day. PT recommends home with assist and HHPT vs SNF pending progress.      Anticipated Discharge Disposition (PT): home with assist, home with home health, skilled nursing facility

## 2023-11-09 NOTE — PLAN OF CARE
Problem: Adult Inpatient Plan of Care  Goal: Plan of Care Review  11/9/2023 0517 by Tammie Santiago RN  Outcome: Ongoing, Progressing  Flowsheets  Taken 11/9/2023 0517  Plan of Care Reviewed With: patient  Taken 11/9/2023 0513  Progress: no change  11/9/2023 0513 by Tammie Santiago RN  Outcome: Ongoing, Progressing  Flowsheets (Taken 11/9/2023 0513)  Progress: no change  Plan of Care Reviewed With: patient  Goal: Patient-Specific Goal (Individualized)  11/9/2023 0517 by Tammie Santiago RN  Outcome: Ongoing, Progressing  11/9/2023 0513 by Tammie Santiago RN  Outcome: Ongoing, Progressing  Goal: Absence of Hospital-Acquired Illness or Injury  11/9/2023 0517 by Tammie Santiago RN  Outcome: Ongoing, Progressing  11/9/2023 0513 by Tammie Santiago RN  Outcome: Ongoing, Progressing  Intervention: Identify and Manage Fall Risk  Recent Flowsheet Documentation  Taken 11/9/2023 0400 by Tammie Santiago RN  Safety Promotion/Fall Prevention: safety round/check completed  Taken 11/9/2023 0200 by Tammie Santiago RN  Safety Promotion/Fall Prevention: safety round/check completed  Taken 11/9/2023 0000 by Tammie Santiago RN  Safety Promotion/Fall Prevention: safety round/check completed  Taken 11/8/2023 2200 by Tammie Santiago RN  Safety Promotion/Fall Prevention: safety round/check completed  Taken 11/8/2023 2000 by Tammie Santiago RN  Safety Promotion/Fall Prevention: safety round/check completed  Intervention: Prevent Skin Injury  Recent Flowsheet Documentation  Taken 11/9/2023 0400 by Tammie Santiago RN  Body Position: position changed independently  Taken 11/9/2023 0200 by Tammie Santiago RN  Body Position: position changed independently  Taken 11/9/2023 0000 by Tammie Santiago RN  Body Position: position changed independently  Taken 11/8/2023 2200 by Tammie Santiago RN  Body Position: position changed independently  Taken  11/8/2023 2000 by Tammie Santiago RN  Body Position:   position changed independently   weight shifting   tilted  Skin Protection: adhesive use limited  Intervention: Prevent and Manage VTE (Venous Thromboembolism) Risk  Recent Flowsheet Documentation  Taken 11/8/2023 2000 by Tammie Santiago RN  VTE Prevention/Management:   bilateral   sequential compression devices off   patient refused intervention  Range of Motion: active ROM (range of motion) encouraged  Intervention: Prevent Infection  Recent Flowsheet Documentation  Taken 11/9/2023 0400 by Tammie Santiago RN  Infection Prevention:   hand hygiene promoted   rest/sleep promoted  Taken 11/9/2023 0200 by Tammie Santiago RN  Infection Prevention:   rest/sleep promoted   hand hygiene promoted  Taken 11/9/2023 0000 by Tammie Santiago RN  Infection Prevention:   rest/sleep promoted   hand hygiene promoted  Taken 11/8/2023 2200 by Tammie Santiago RN  Infection Prevention:   hand hygiene promoted   rest/sleep promoted  Taken 11/8/2023 2000 by Tammie Santiago RN  Infection Prevention:   rest/sleep promoted   hand hygiene promoted  Goal: Optimal Comfort and Wellbeing  11/9/2023 0517 by Tammie Santiago RN  Outcome: Ongoing, Progressing  11/9/2023 0513 by Tammie Santiago RN  Outcome: Ongoing, Progressing  Goal: Readiness for Transition of Care  11/9/2023 0517 by Tammie Santiago RN  Outcome: Ongoing, Progressing  11/9/2023 0513 by Tammie Santiago RN  Outcome: Ongoing, Progressing     Problem: Diabetes Comorbidity  Goal: Blood Glucose Level Within Targeted Range  11/9/2023 0517 by Tammie Santiago RN  Outcome: Ongoing, Progressing  11/9/2023 0513 by Tammie Santiago RN  Outcome: Ongoing, Progressing  Intervention: Monitor and Manage Glycemia  Recent Flowsheet Documentation  Taken 11/8/2023 2000 by Tammie Santiago RN  Glycemic Management: blood glucose monitored     Problem:  Obstructive Sleep Apnea Risk or Actual Comorbidity Management  Goal: Unobstructed Breathing During Sleep  11/9/2023 0517 by Tammie Santiago RN  Outcome: Ongoing, Progressing  11/9/2023 0513 by Tammie Santiago RN  Outcome: Ongoing, Progressing     Problem: Pain Chronic (Persistent) (Comorbidity Management)  Goal: Acceptable Pain Control and Functional Ability  11/9/2023 0517 by Tammie Santiago RN  Outcome: Ongoing, Progressing  11/9/2023 0513 by Tammie Santiago RN  Outcome: Ongoing, Progressing  Intervention: Manage Persistent Pain  Recent Flowsheet Documentation  Taken 11/9/2023 0400 by Tammie Santiago RN  Medication Review/Management: medications reviewed  Taken 11/9/2023 0200 by Tammie Santiago RN  Medication Review/Management: medications reviewed  Taken 11/9/2023 0000 by Tammie Santiago RN  Medication Review/Management: medications reviewed  Taken 11/8/2023 2200 by Tammie Santiago RN  Medication Review/Management: medications reviewed  Taken 11/8/2023 2000 by Tammie Santiago RN  Medication Review/Management: medications reviewed  Intervention: Optimize Psychosocial Wellbeing  Recent Flowsheet Documentation  Taken 11/8/2023 2000 by Tammie Santiago RN  Supportive Measures: active listening utilized     Problem: Confusion Acute  Goal: Optimal Cognitive Function  11/9/2023 0517 by Tammie Santiago RN  Outcome: Ongoing, Progressing  11/9/2023 0513 by Tammie Santiago RN  Outcome: Ongoing, Progressing  Intervention: Minimize Contributing Factors  Recent Flowsheet Documentation  Taken 11/8/2023 2000 by Tammie Santiago RN  Sensory Stimulation Regulation: care clustered  Reorientation Measures: reorientation provided     Problem: Adjustment to Illness (Heart Failure)  Goal: Optimal Coping  11/9/2023 0517 by Tammie Santiago RN  Outcome: Ongoing, Progressing  11/9/2023 0513 by Tammie Santiago RN  Outcome: Ongoing,  Progressing  Intervention: Support Psychosocial Response  Recent Flowsheet Documentation  Taken 11/8/2023 2000 by Tammie Santiago RN  Supportive Measures: active listening utilized     Problem: Cardiac Output Decreased (Heart Failure)  Goal: Optimal Cardiac Output  11/9/2023 0517 by Tammie Santiago RN  Outcome: Ongoing, Progressing  11/9/2023 0513 by Tammie Santiago RN  Outcome: Ongoing, Progressing  Intervention: Optimize Cardiac Output  Recent Flowsheet Documentation  Taken 11/9/2023 0000 by Tammie Santiago RN  Stabilization Measures: legs elevated  Taken 11/8/2023 2000 by Tammie Santiago RN  Stabilization Measures: legs elevated  Environmental Support: calm environment promoted     Problem: Dysrhythmia (Heart Failure)  Goal: Stable Heart Rate and Rhythm  11/9/2023 0517 by Tammie Santiago RN  Outcome: Ongoing, Progressing  11/9/2023 0513 by Tammie Santiago RN  Outcome: Ongoing, Progressing     Problem: Fluid Imbalance (Heart Failure)  Goal: Fluid Balance  11/9/2023 0517 by Tammie Santiago RN  Outcome: Ongoing, Progressing  11/9/2023 0513 by Tammie Santiago RN  Outcome: Ongoing, Progressing  Intervention: Monitor and Manage Fluid Balance  Recent Flowsheet Documentation  Taken 11/8/2023 2000 by Tammie Santiago RN  Fluid/Electrolyte Management: fluids provided     Problem: Functional Ability Impaired (Heart Failure)  Goal: Optimal Functional Ability  11/9/2023 0517 by Tammie Santiago RN  Outcome: Ongoing, Progressing  11/9/2023 0513 by Tammie Santiago RN  Outcome: Ongoing, Progressing  Intervention: Optimize Functional Ability  Recent Flowsheet Documentation  Taken 11/8/2023 2000 by Tammie Santiago RN  Self-Care Promotion: independence encouraged     Problem: Oral Intake Inadequate (Heart Failure)  Goal: Optimal Nutrition Intake  11/9/2023 0517 by Tammie Santiago RN  Outcome: Ongoing, Progressing  11/9/2023 0513 by  Tammie Santiago, TSERING  Outcome: Ongoing, Progressing     Problem: Respiratory Compromise (Heart Failure)  Goal: Effective Oxygenation and Ventilation  11/9/2023 0517 by Tammie Santiago RN  Outcome: Ongoing, Progressing  11/9/2023 0513 by aTmmie Santiago RN  Outcome: Ongoing, Progressing  Intervention: Promote Airway Secretion Clearance  Recent Flowsheet Documentation  Taken 11/8/2023 2000 by Tammie Santiago RN  Cough And Deep Breathing: done independently per patient     Problem: Sleep Disordered Breathing (Heart Failure)  Goal: Effective Breathing Pattern During Sleep  11/9/2023 0517 by Tammie Santiago RN  Outcome: Ongoing, Progressing  11/9/2023 0513 by Tammie Santiago RN  Outcome: Ongoing, Progressing   Goal Outcome Evaluation:  Plan of Care Reviewed With: patient        Progress: no change

## 2023-11-10 LAB
ALBUMIN SERPL-MCNC: 3.6 G/DL (ref 3.5–5.2)
ALBUMIN/GLOB SERPL: 1 G/DL
ALP SERPL-CCNC: 144 U/L (ref 39–117)
ALT SERPL W P-5'-P-CCNC: 11 U/L (ref 1–33)
ANION GAP SERPL CALCULATED.3IONS-SCNC: 9.6 MMOL/L (ref 5–15)
AST SERPL-CCNC: 25 U/L (ref 1–32)
BASOPHILS # BLD AUTO: 0.05 10*3/MM3 (ref 0–0.2)
BASOPHILS NFR BLD AUTO: 1 % (ref 0–1.5)
BILIRUB SERPL-MCNC: 2.4 MG/DL (ref 0–1.2)
BUN SERPL-MCNC: 37 MG/DL (ref 8–23)
BUN/CREAT SERPL: 19.5 (ref 7–25)
CA-I BLD-MCNC: 4.5 MG/DL (ref 4.6–5.4)
CA-I SERPL ISE-MCNC: 1.13 MMOL/L (ref 1.15–1.35)
CALCIUM SPEC-SCNC: 9.1 MG/DL (ref 8.6–10.5)
CHLORIDE SERPL-SCNC: 96 MMOL/L (ref 98–107)
CO2 SERPL-SCNC: 32.4 MMOL/L (ref 22–29)
CREAT SERPL-MCNC: 1.9 MG/DL (ref 0.57–1)
DEPRECATED RDW RBC AUTO: 53 FL (ref 37–54)
EGFRCR SERPLBLD CKD-EPI 2021: 27.3 ML/MIN/1.73
EOSINOPHIL # BLD AUTO: 0.1 10*3/MM3 (ref 0–0.4)
EOSINOPHIL NFR BLD AUTO: 1.9 % (ref 0.3–6.2)
ERYTHROCYTE [DISTWIDTH] IN BLOOD BY AUTOMATED COUNT: 18 % (ref 12.3–15.4)
GLOBULIN UR ELPH-MCNC: 3.5 GM/DL
GLUCOSE BLDC GLUCOMTR-MCNC: 118 MG/DL (ref 70–130)
GLUCOSE BLDC GLUCOMTR-MCNC: 128 MG/DL (ref 70–130)
GLUCOSE BLDC GLUCOMTR-MCNC: 156 MG/DL (ref 70–130)
GLUCOSE BLDC GLUCOMTR-MCNC: 217 MG/DL (ref 70–130)
GLUCOSE SERPL-MCNC: 131 MG/DL (ref 65–99)
HCT VFR BLD AUTO: 43.1 % (ref 34–46.6)
HGB BLD-MCNC: 13 G/DL (ref 12–15.9)
IMM GRANULOCYTES # BLD AUTO: 0.02 10*3/MM3 (ref 0–0.05)
IMM GRANULOCYTES NFR BLD AUTO: 0.4 % (ref 0–0.5)
LYMPHOCYTES # BLD AUTO: 1.39 10*3/MM3 (ref 0.7–3.1)
LYMPHOCYTES NFR BLD AUTO: 26.8 % (ref 19.6–45.3)
MCH RBC QN AUTO: 25.3 PG (ref 26.6–33)
MCHC RBC AUTO-ENTMCNC: 30.2 G/DL (ref 31.5–35.7)
MCV RBC AUTO: 84 FL (ref 79–97)
MONOCYTES # BLD AUTO: 0.61 10*3/MM3 (ref 0.1–0.9)
MONOCYTES NFR BLD AUTO: 11.8 % (ref 5–12)
NEUTROPHILS NFR BLD AUTO: 3.01 10*3/MM3 (ref 1.7–7)
NEUTROPHILS NFR BLD AUTO: 58.1 % (ref 42.7–76)
NRBC BLD AUTO-RTO: 0.6 /100 WBC (ref 0–0.2)
PHOSPHATE SERPL-MCNC: 3.4 MG/DL (ref 2.5–4.5)
PLATELET # BLD AUTO: 256 10*3/MM3 (ref 140–450)
PMV BLD AUTO: 10.6 FL (ref 6–12)
POTASSIUM SERPL-SCNC: 4.4 MMOL/L (ref 3.5–5.2)
POTASSIUM SERPL-SCNC: 5.1 MMOL/L (ref 3.5–5.2)
PROT SERPL-MCNC: 7.1 G/DL (ref 6–8.5)
QT INTERVAL: 522 MS
QTC INTERVAL: 522 MS
RBC # BLD AUTO: 5.13 10*6/MM3 (ref 3.77–5.28)
SODIUM SERPL-SCNC: 138 MMOL/L (ref 136–145)
WBC NRBC COR # BLD: 5.18 10*3/MM3 (ref 3.4–10.8)

## 2023-11-10 PROCEDURE — 25010000002 FUROSEMIDE PER 20 MG: Performed by: INTERNAL MEDICINE

## 2023-11-10 PROCEDURE — 94664 DEMO&/EVAL PT USE INHALER: CPT

## 2023-11-10 PROCEDURE — 63710000001 INSULIN LISPRO (HUMAN) PER 5 UNITS: Performed by: INTERNAL MEDICINE

## 2023-11-10 PROCEDURE — 85025 COMPLETE CBC W/AUTO DIFF WBC: CPT | Performed by: INTERNAL MEDICINE

## 2023-11-10 PROCEDURE — 94761 N-INVAS EAR/PLS OXIMETRY MLT: CPT

## 2023-11-10 PROCEDURE — 80053 COMPREHEN METABOLIC PANEL: CPT | Performed by: INTERNAL MEDICINE

## 2023-11-10 PROCEDURE — 94799 UNLISTED PULMONARY SVC/PX: CPT

## 2023-11-10 PROCEDURE — 84132 ASSAY OF SERUM POTASSIUM: CPT | Performed by: INTERNAL MEDICINE

## 2023-11-10 PROCEDURE — 82948 REAGENT STRIP/BLOOD GLUCOSE: CPT

## 2023-11-10 PROCEDURE — 63710000001 INSULIN GLARGINE PER 5 UNITS: Performed by: INTERNAL MEDICINE

## 2023-11-10 PROCEDURE — 82330 ASSAY OF CALCIUM: CPT | Performed by: INTERNAL MEDICINE

## 2023-11-10 PROCEDURE — 94760 N-INVAS EAR/PLS OXIMETRY 1: CPT

## 2023-11-10 PROCEDURE — 84100 ASSAY OF PHOSPHORUS: CPT | Performed by: INTERNAL MEDICINE

## 2023-11-10 PROCEDURE — 97530 THERAPEUTIC ACTIVITIES: CPT

## 2023-11-10 RX ORDER — SPIRONOLACTONE 25 MG/1
25 TABLET ORAL DAILY
Status: DISCONTINUED | OUTPATIENT
Start: 2023-11-11 | End: 2023-11-11 | Stop reason: HOSPADM

## 2023-11-10 RX ADMIN — DOCUSATE SODIUM 100 MG: 100 CAPSULE, LIQUID FILLED ORAL at 20:42

## 2023-11-10 RX ADMIN — ACETAMINOPHEN 650 MG: 325 TABLET, FILM COATED ORAL at 00:05

## 2023-11-10 RX ADMIN — Medication 10 ML: at 08:39

## 2023-11-10 RX ADMIN — INSULIN GLARGINE 16 UNITS: 100 INJECTION, SOLUTION SUBCUTANEOUS at 20:42

## 2023-11-10 RX ADMIN — FUROSEMIDE 80 MG: 10 INJECTION, SOLUTION INTRAMUSCULAR; INTRAVENOUS at 17:27

## 2023-11-10 RX ADMIN — BUDESONIDE AND FORMOTEROL FUMARATE DIHYDRATE 2 PUFF: 160; 4.5 AEROSOL RESPIRATORY (INHALATION) at 19:35

## 2023-11-10 RX ADMIN — APIXABAN 5 MG: 5 TABLET, FILM COATED ORAL at 08:38

## 2023-11-10 RX ADMIN — MIDODRINE HYDROCHLORIDE 5 MG: 5 TABLET ORAL at 17:26

## 2023-11-10 RX ADMIN — SPIRONOLACTONE 50 MG: 50 TABLET, FILM COATED ORAL at 08:37

## 2023-11-10 RX ADMIN — MIDODRINE HYDROCHLORIDE 5 MG: 5 TABLET ORAL at 11:34

## 2023-11-10 RX ADMIN — Medication 10 ML: at 20:42

## 2023-11-10 RX ADMIN — METOPROLOL SUCCINATE 25 MG: 25 TABLET, EXTENDED RELEASE ORAL at 20:42

## 2023-11-10 RX ADMIN — MIDODRINE HYDROCHLORIDE 5 MG: 5 TABLET ORAL at 08:23

## 2023-11-10 RX ADMIN — METOPROLOL SUCCINATE 25 MG: 25 TABLET, EXTENDED RELEASE ORAL at 08:38

## 2023-11-10 RX ADMIN — APIXABAN 5 MG: 5 TABLET, FILM COATED ORAL at 20:42

## 2023-11-10 RX ADMIN — ALLOPURINOL 100 MG: 100 TABLET ORAL at 08:38

## 2023-11-10 RX ADMIN — INSULIN LISPRO 4 UNITS: 100 INJECTION, SOLUTION INTRAVENOUS; SUBCUTANEOUS at 17:26

## 2023-11-10 RX ADMIN — TIOTROPIUM BROMIDE INHALATION SPRAY 2 PUFF: 3.12 SPRAY, METERED RESPIRATORY (INHALATION) at 06:25

## 2023-11-10 RX ADMIN — INSULIN LISPRO 2 UNITS: 100 INJECTION, SOLUTION INTRAVENOUS; SUBCUTANEOUS at 12:02

## 2023-11-10 RX ADMIN — SODIUM ZIRCONIUM CYCLOSILICATE 10 G: 10 POWDER, FOR SUSPENSION ORAL at 20:42

## 2023-11-10 RX ADMIN — LEVOTHYROXINE SODIUM 25 MCG: 0.03 TABLET ORAL at 05:41

## 2023-11-10 RX ADMIN — FUROSEMIDE 80 MG: 10 INJECTION, SOLUTION INTRAMUSCULAR; INTRAVENOUS at 08:37

## 2023-11-10 RX ADMIN — ATORVASTATIN CALCIUM 10 MG: 20 TABLET, FILM COATED ORAL at 08:38

## 2023-11-10 RX ADMIN — DOCUSATE SODIUM 100 MG: 100 CAPSULE, LIQUID FILLED ORAL at 08:38

## 2023-11-10 RX ADMIN — BUDESONIDE AND FORMOTEROL FUMARATE DIHYDRATE 2 PUFF: 160; 4.5 AEROSOL RESPIRATORY (INHALATION) at 06:26

## 2023-11-10 NOTE — PLAN OF CARE
Goal Outcome Evaluation:  Plan of Care Reviewed With: patient        Progress: no change  Outcome Evaluation: Patients vitals stable. Patient reports pain of headache and PRN tylenol given. Patient denies any other complainst and is asleep most of shift. Will continue to monitor.

## 2023-11-10 NOTE — PROGRESS NOTES
[facility] Progress Note      Patient Name: Aydee Solis  : 1950  MRN: [id number]  Primary Care Physician:  Bennett Duque DO  Date of admission: 2023    Subjective     Interval History:   Confusion has resolved. Patient is asking when she gets to go home.   Denies shortness of breath, chest pain, orthopnea.    UOP already 1.7 L today  Legs are less taut    Review of Systems:   As noted above    Objective     Vitals:   Temp:  [97.5 °F (36.4 °C)-97.9 °F (36.6 °C)] 97.5 °F (36.4 °C)  Heart Rate:  [59-72] 62  Resp:  [18] 18  BP: ()/() 117/76  Flow (L/min):  [2] 2    Intake/Output Summary (Last 24 hours) at 11/10/2023 1733  Last data filed at 11/10/2023 1354  Gross per 24 hour   Intake 240 ml   Output 3300 ml   Net -3060 ml       Physical Exam:    Constitutional: Awake, alert, NAD, chronically ill, hyperpigmented skin  HEENT: Sclera anicteric, no conjunctival injection  Neck: Supple, no carotid bruit, trachea at midline, no JVD  Resp: lungs clear anteriorly, no wheezing, nonlabored on 2 L/min   Cardiovascular: RRR, no rub  Gastrointestinal: Soft, BS +, nontender and nondistended  : No palpable bladder  Musculoskeletal: +1-2 woody edema, no clubbing or cyanosis  Psychiatric: normal mood and mentation, oriented x4  Neurologic: No asterixis, moving all extremities, normal speech   Skin: Warm and dry; hyperpigmented       Scheduled Meds:     allopurinol, 100 mg, Oral, Daily  apixaban, 5 mg, Oral, BID  atorvastatin, 10 mg, Oral, Daily  budesonide-formoterol, 2 puff, Inhalation, BID - RT   And  tiotropium bromide monohydrate, 2 puff, Inhalation, Daily - RT  docusate sodium, 100 mg, Oral, BID  furosemide, 80 mg, Intravenous, BID  insulin glargine, 16 Units, Subcutaneous, Nightly  insulin lispro, 2-9 Units, Subcutaneous, 4x Daily AC & at Bedtime  levothyroxine, 25 mcg, Oral, Q AM  metoprolol succinate XL, 25 mg, Oral, BID  midodrine, 5 mg, Oral, TID AC  Povidone  (PF), 1 Application, Both Eyes, BID  senna-docusate sodium, 2 tablet, Oral, BID  sodium chloride, 10 mL, Intravenous, Q12H  spironolactone, 50 mg, Oral, Daily      IV Meds:        Results Reviewed:   I have personally reviewed the results from the time of this admission to 11/10/2023 17:33 EST     Results from last 7 days   Lab Units 11/10/23  0545 11/10/23  0023 11/09/23  1156 11/09/23 0535 11/08/23  1444   SODIUM mmol/L 138  --  140 137 133*   POTASSIUM mmol/L 5.1 4.4 3.3* 3.1* 3.8   CHLORIDE mmol/L 96*  --  92* 93* 90*   CO2 mmol/L 32.4*  --  35.6* 30.7* 31.2*   BUN mg/dL 37*  --  39* 39* 41*   CREATININE mg/dL 1.90*  --  1.86* 1.97* 2.36*   CALCIUM mg/dL 9.1  --  9.8 9.3 9.5   BILIRUBIN mg/dL 2.4*  --   --  2.7* 3.1*   ALK PHOS U/L 144*  --   --  133* 134*   ALT (SGPT) U/L 11  --   --  13 18   AST (SGOT) U/L 25  --   --  28 35*   GLUCOSE mg/dL 131*  --  125* 125* 95       Estimated Creatinine Clearance: 33.4 mL/min (A) (by C-G formula based on SCr of 1.9 mg/dL (H)).    Results from last 7 days   Lab Units 11/10/23  0545 11/09/23  1156 11/09/23  0535   MAGNESIUM mg/dL  --   --  2.3   PHOSPHORUS mg/dL 3.4 3.7 3.6             Results from last 7 days   Lab Units 11/10/23  0545 11/09/23  0535 11/08/23  1444   WBC 10*3/mm3 5.18 4.43 4.68   HEMOGLOBIN g/dL 13.0 13.3 13.5   PLATELETS 10*3/mm3 256 262 283       Results from last 7 days   Lab Units 11/09/23  0535   INR  2.87*       Assessment / Plan     ASSESSMENT:  MELANIE on CKD3b, nonoliguric, creatinine unchanged:  prerenal likely due to modest hypotension and CHF.  ROS negative for obstructive symptoms, but will need exclude urinary retention.  Volume excess by exam; high/normal potassium and improving metabolic alkalosis.  Urinalysis bland with no blood, protein, or cells  Severe hypothyroidism with TSH 33.5  Acute on chronic CHF  COPD/PAULETTE/severe pulmonary hypertension - on continuous O2 3L  Confusion, resolved  Elevated troponin - improving        PLAN:  Change  tomorrow to oral loop diuretic:  furosemide 60 mg twice daily  Reduce spironolactone to 25 mg daily, with hold orders tomorrow for any hyperkalemia   Hopefully home soon      Thank you for involving us in the care of Aydee Solis.  Please feel free to call with any questions.    Mason Myers MD  11/10/23  17:33 [facility]    [facility]  [phone]    Parts of this note may be an electronic transcription/translation of spoken language to printed text using the Dragon dictation system.

## 2023-11-10 NOTE — PROGRESS NOTES
Name: Aydee Solis ADMIT: 2023   : 1950  PCP: Bennett Duque DO    MRN: [id number] LOS: 0 days   AGE/SEX: 73 y.o. female  ROOM: [facility]     Subjective   Subjective   Patient is lying in bed and does not appear to be any major distress.  Denies nausea, vomiting, abdominal pain, chest pain.  Confusion is much better and back to baseline mental status.       Objective   Objective   Vital Signs  Temp:  [97.5 °F (36.4 °C)-97.9 °F (36.6 °C)] 97.5 °F (36.4 °C)  Heart Rate:  [59-72] 61  Resp:  [18] 18  BP: ()/() 107/71  SpO2:  [84 %-100 %] 84 %  on  Flow (L/min):  [2] 2;   Device (Oxygen Therapy): nasal cannula  Body mass index is 42.02 kg/m².  Physical Exam  HEENT: PERRLA, extraocular movements intact, Scleras no icterus  Neck: Supple, no JVD  Cardiovascular: Regular rate and rhythm with normal S1 and S2  Respiratory: Fairly clear to auscultation bilaterally with no wheezes  GI: Soft, nontender, bowel Sulpor  Extremities: 2+ bilateral lower extreme edema, pedal pulses are difficult to palpate, chronic pigmentation changes noted  Neurologic: Globally weak, no facial asymmetry, oriented x3.    Results Review     I reviewed the patient's new clinical results.  Results from last 7 days   Lab Units 11/10/23  0545 23  0535 23  1444   WBC 10*3/mm3 5.18 4.43 4.68   HEMOGLOBIN g/dL 13.0 13.3 13.5   PLATELETS 10*3/mm3 256 262 283     Results from last 7 days   Lab Units 11/10/23  0545 11/10/23  0023 23  1156 23  0535 23  1444   SODIUM mmol/L 138  --  140 137 133*   POTASSIUM mmol/L 5.1 4.4 3.3* 3.1* 3.8   CHLORIDE mmol/L 96*  --  92* 93* 90*   CO2 mmol/L 32.4*  --  35.6* 30.7* 31.2*   BUN mg/dL 37*  --  39* 39* 41*   CREATININE mg/dL 1.90*  --  1.86* 1.97* 2.36*   GLUCOSE mg/dL 131*  --  125* 125* 95   EGFR mL/min/1.73 27.3*  --  28.0* 26.1* 21.0*     Results from last 7 days   Lab Units 11/10/23  0545 23  0535 23  1444   ALBUMIN g/dL 3.6 3.6 4.1    BILIRUBIN mg/dL 2.4* 2.7* 3.1*   ALK PHOS U/L 144* 133* 134*   AST (SGOT) U/L 25 28 35*   ALT (SGPT) U/L 11 13 18     Results from last 7 days   Lab Units 11/10/23  0545 11/09/23  1156 11/09/23  0535 11/08/23  1444   CALCIUM mg/dL 9.1 9.8 9.3 9.5   ALBUMIN g/dL 3.6  --  3.6 4.1   MAGNESIUM mg/dL  --   --  2.3  --    PHOSPHORUS mg/dL 3.4 3.7 3.6  --      Results from last 7 days   Lab Units 11/08/23  2014 11/08/23  1735 11/08/23  1444   LACTATE mmol/L 1.7 2.3* 2.4*     Glucose   Date/Time Value Ref Range Status   11/10/2023 1127 156 (H) 70 - 130 mg/dL Final   11/10/2023 0604 118 70 - 130 mg/dL Final   11/09/2023 2026 190 (H) 70 - 130 mg/dL Final   11/09/2023 1544 143 (H) 70 - 130 mg/dL Final   11/09/2023 1108 90 70 - 130 mg/dL Final   11/09/2023 0613 138 (H) 70 - 130 mg/dL Final       CT Head Without Contrast    Result Date: 11/8/2023  There is no evidence of acute infarction, intracranial hemorrhage or of abnormal extra-axial fluid. Age-appropriate atrophy, small vessel ischemic disease and vascular calcification is noted. The right vertebral artery is dominant. Further evaluation could be performed with a MRI examination of the brain as indicated.    Radiation dose reduction techniques were utilized, including automated exposure control and exposure modulation based on body size.   This report was finalized on 11/8/2023 5:01 PM by Dr. Bryan Muñiz M.D on Workstation: [id number]      XR Chest 1 View    Result Date: 11/8/2023  No acute findings    This report was finalized on 11/8/2023 2:55 PM by Dr. Carlos Nunez M.D on Workstation: [id number]       I have personally reviewed all medications:  Scheduled Medications  allopurinol, 100 mg, Oral, Daily  apixaban, 5 mg, Oral, BID  atorvastatin, 10 mg, Oral, Daily  budesonide-formoterol, 2 puff, Inhalation, BID - RT   And  tiotropium bromide monohydrate, 2 puff, Inhalation, Daily - RT  docusate sodium, 100 mg, Oral, BID  furosemide, 80 mg, Intravenous, BID  insulin  glargine, 16 Units, Subcutaneous, Nightly  insulin lispro, 2-9 Units, Subcutaneous, 4x Daily AC & at Bedtime  levothyroxine, 25 mcg, Oral, Q AM  metoprolol succinate XL, 25 mg, Oral, BID  midodrine, 5 mg, Oral, TID AC  Povidone (PF), 1 Application, Both Eyes, BID  senna-docusate sodium, 2 tablet, Oral, BID  sodium chloride, 10 mL, Intravenous, Q12H  spironolactone, 50 mg, Oral, Daily    Infusions   Diet  Diet: Cardiac Diets, Diabetic Diets; Healthy Heart (2-3 Na+); Consistent Carbohydrate; Texture: Regular Texture (IDDSI 7); Fluid Consistency: Thin (IDDSI 0)    I have personally reviewed:  [x]  Laboratory   [x]  Microbiology   [x]  Radiology   [x]  EKG/Telemetry  [x]  Cardiology/Vascular   []  Pathology    []  Records       Assessment/Plan     Active Hospital Problems    Diagnosis  POA    **AMS (altered mental status) [R41.82]  Yes    Pacemaker [Z95.0]  Yes    Acute on chronic combined systolic and diastolic CHF (congestive heart failure) [I50.43]  Yes    Hyponatremia [E87.1]  Yes    Lymphedema BLE and LUE [I89.0]  Yes    Chronic pain disorder [G89.4]  Yes    Chronic anticoagulation [Z79.01]  Not Applicable    COPD (chronic obstructive pulmonary disease) [J44.9]  Yes    PAULETTE (obstructive sleep apnea) [G47.33]  Yes    Stage 3b chronic kidney disease [N18.32]  Yes    Type 2 diabetes mellitus, with long-term current use of insulin [E11.9, Z79.4]  Not Applicable    Acute metabolic encephalopathy [G93.41]  Yes    PAF (paroxysmal atrial fibrillation) [I48.0]  Yes    Pulmonary hypertension [I27.20]  Yes    Reduced mobility [Z74.09]  Yes    Morbid obesity due to excess calories [E66.01]  Yes    Acute on chronic respiratory failure with hypoxia [J96.21]  Yes    Hyperlipidemia [E78.5]  Yes    Hypertension [I10]  Yes      Resolved Hospital Problems   No resolved problems to display.       73 y.o. female admitted with AMS (altered mental status).  1.Altered mental status, likely metabolic in etiology.  CT brain with no acute  findings and UA with no evidence of UTI and chest x-ray with no evidence of any infiltrate.  No focal findings on exam as well.  Mental status is improving and back to baseline.    2.  Bilateral lower extremity lymphedema, being diuresed and ins and outs will be monitored closely.    3.  Acute systolic heart failure, as mentioned above being diuresed with IV Lasix and is on p.o. Aldactone which will be continued.  Most recent echo was done in January 2023 which revealed ejection fraction of 42%.    4.  Acute on CKD 3B, creatinine better with diuresis and nephrology consultation will be obtained.  Avoid nephrotoxins.    5.  History of atrial fibrillation, on Eliquis and metoprolol which will be continued.    6.  Diabetes mellitus, on Lantus and corrective dose insulin which will be continued.    7.  Hypokalemia, replace and is on replacement protocol.    8.  Hyperlipidemia, on statins.    9.  COPD, continue with Symbicort and Spiriva.  Does not appear to be any major exacerbation at this point.    10.  Hypertension, on metoprolol and Aldactone and monitor blood pressure closely.    11.  History of gout, on allopurinol.    12.  Hypothyroidism, TSH was noted to be 33 and will be initiated on Synthroid 25 mcg daily.    13.  Generalized weakness, PT/OT consult has been obtained.    14.  DVT prophylaxis, on Eliquis.    15.  CODE STATUS is full code.    Estimated discharge date, next 1 to 2 days.    Copied text on this note has been reviewed by me on 11/10/2023      Buster Marshall MD  [facility]  11/10/23  13:13 EST

## 2023-11-10 NOTE — PROGRESS NOTES
Aydee Solis   73 y.o.  female    LOS: 0 days   Patient Care Team:  Bennett Duque DO as PCP - General (Internal Medicine)  Marco Mayes MD as PCP - Family Medicine      Subjective   Lying in bed, no acute events overnight  Interval History:     Patient Complaints:     Review of Systems:   No cp or SOA    Medication Review:   Current Facility-Administered Medications:     acetaminophen (TYLENOL) tablet 650 mg, 650 mg, Oral, Q4H PRN, 650 mg at 11/10/23 0005 **OR** acetaminophen (TYLENOL) 160 MG/5ML oral solution 650 mg, 650 mg, Oral, Q4H PRN **OR** acetaminophen (TYLENOL) suppository 650 mg, 650 mg, Rectal, Q4H PRN, Samantha Manjarrez MD    albuterol (PROVENTIL) nebulizer solution 0.083% 2.5 mg/3mL, 2.5 mg, Nebulization, Q6H PRN, Samantha Manjarrez MD, 2.5 mg at 11/08/23 1956    allopurinol (ZYLOPRIM) tablet 100 mg, 100 mg, Oral, Daily, Samantha Manjarrez MD, 100 mg at 11/09/23 0827    apixaban (ELIQUIS) tablet 5 mg, 5 mg, Oral, BID, Samantha Manjarrez MD, 5 mg at 11/09/23 2030    atorvastatin (LIPITOR) tablet 10 mg, 10 mg, Oral, Daily, Samantha Manjarrez MD, 10 mg at 11/09/23 0827    sennosides-docusate (PERICOLACE) 8.6-50 MG per tablet 2 tablet, 2 tablet, Oral, BID, 2 tablet at 11/08/23 2030 **AND** polyethylene glycol (MIRALAX) packet 17 g, 17 g, Oral, Daily PRN **AND** bisacodyl (DULCOLAX) EC tablet 5 mg, 5 mg, Oral, Daily PRN **AND** bisacodyl (DULCOLAX) suppository 10 mg, 10 mg, Rectal, Daily PRN, Samantha Manjarrez MD    budesonide-formoterol (SYMBICORT) 160-4.5 MCG/ACT inhaler 2 puff, 2 puff, Inhalation, BID - RT, 2 puff at 11/10/23 0626 **AND** tiotropium (SPIRIVA RESPIMAT) 2.5 mcg/act aerosol solution inhaler, 2 puff, Inhalation, Daily - RT, Samantha Manjarrez MD, 2 puff at 11/10/23 0625    calcium carbonate (TUMS) chewable tablet 500 mg (200 mg elemental), 2 tablet, Oral, BID PRN, Samantha Manjarrez MD    Calcium Replacement - Follow Nurse / BPA Driven  Protocol, , Does not apply, PRN, Buster Marshall MD    dextrose (D50W) (25 g/50 mL) IV injection 25 g, 25 g, Intravenous, Q15 Min PRN, Samantha Manjarrez MD    dextrose (GLUTOSE) oral gel 15 g, 15 g, Oral, Q15 Min PRN, Samantha Manjarrez MD    docusate sodium (COLACE) capsule 100 mg, 100 mg, Oral, BID, Samantha Manjarrez MD, 100 mg at 11/09/23 2030    furosemide (LASIX) injection 80 mg, 80 mg, Intravenous, BID, Samantha Manjarrez MD, 80 mg at 11/09/23 1726    glucagon (GLUCAGEN) injection 1 mg, 1 mg, Intramuscular, Q15 Min PRN, Samantha Manjarrez MD    insulin glargine (LANTUS, SEMGLEE) injection 16 Units, 16 Units, Subcutaneous, Nightly, Samantha Manjarrez MD, 16 Units at 11/09/23 2030    insulin lispro (HUMALOG/ADMELOG) injection 2-9 Units, 2-9 Units, Subcutaneous, 4x Daily AC & at Bedtime, Samantha Manjarrez MD, 2 Units at 11/09/23 2030    ipratropium-albuterol (DUO-NEB) nebulizer solution 3 mL, 3 mL, Nebulization, Q4H PRN, Samantha Manjarrez MD    levothyroxine (SYNTHROID, LEVOTHROID) tablet 25 mcg, 25 mcg, Oral, Q AM, Buster Marshall MD, 25 mcg at 11/10/23 0541    Magnesium Standard Dose Replacement - Follow Nurse / BPA Driven Protocol, , Does not apply, PRN, Buster Marshall MD    metoprolol succinate XL (TOPROL-XL) 24 hr tablet 25 mg, 25 mg, Oral, BID, Samantha Manjarrez MD, 25 mg at 11/09/23 2030    midodrine (PROAMATINE) tablet 5 mg, 5 mg, Oral, TID AC, Samantha Manjarrez MD, 5 mg at 11/09/23 1726    nitroglycerin (NITROSTAT) SL tablet 0.4 mg, 0.4 mg, Sublingual, Q5 Min PRN, Samantha Manjarrez MD    ondansetron (ZOFRAN) tablet 4 mg, 4 mg, Oral, Q6H PRN **OR** ondansetron (ZOFRAN) injection 4 mg, 4 mg, Intravenous, Q6H PRN, Samantha Manjarrez MD    Phosphorus Replacement - Follow Nurse / BPA Driven Protocol, , Does not apply, PRN, Buster Marshall MD    Potassium Replacement - Follow Nurse / BPA Driven Protocol, , Does not  apply, PRN, Buster Marshall MD    Povidone (PF) 0.5 % solution 1 Application, 1 Application, Both Eyes, BID, Samantha Manjarrez MD    sodium chloride 0.9 % flush 10 mL, 10 mL, Intravenous, PRN, Samantha Manjarrez MD    sodium chloride 0.9 % flush 10 mL, 10 mL, Intravenous, Q12H, Samantha Manjarrez MD, 10 mL at 11/09/23 2030    sodium chloride 0.9 % flush 10 mL, 10 mL, Intravenous, PRN, Samantha Manjarrez MD    sodium chloride 0.9 % infusion 40 mL, 40 mL, Intravenous, PRN, Samantha Manjarrez MD    spironolactone (ALDACTONE) tablet 50 mg, 50 mg, Oral, Daily, Samantha Manjarrez MD, 50 mg at 11/09/23 0828      Objective   Vital Sign Min/Max for last 24 hours  Temp  Min: 97.7 °F (36.5 °C)  Max: 98 °F (36.7 °C)   BP  Min: 98/56  Max: 106/90    Pulse  Min: 60  Max: 65     Wt Readings from Last 3 Encounters:   11/08/23 118 kg (260 lb 3.2 oz)   08/29/23 111 kg (245 lb)   08/03/23 120 kg (264 lb 1.8 oz)        Intake/Output Summary (Last 24 hours) at 11/10/2023 0707  Last data filed at 11/10/2023 0427  Gross per 24 hour   Intake --   Output 2600 ml   Net -2600 ml     Physical Exam:      General Appearance:    Chronically ill appearing female, in no acute distress   Head:    Normocephalic, atraumatic   Eyes:            Conjunctivae normal, anicteric, no xanthelasma   Neck:   supple, trachea midline, no thyromegaly, no carotid bruit, + JVD,    Lungs:     Fine crackles in bases,respirations regular, even and                  unlabored    Heart:    Regular rhythm and normal rate, normal S1 and S2,            No murmur, no gallop, no rub, no click   Chest Wall:    No abnormalities observed   Abdomen:     Normal bowel sounds, no masses, no organomegaly, soft        nontender, nondistended, no guarding, no rebound                tenderness   Rectal:     Deferred   Extremities:   1-2+ edema. Moves all extremities well, no cyanosis, no erythema   Pulses:   Pulses palpable and equal bilaterally  "  Skin:   No bleeding, bruising or rash   Neurologic:   awake alert and oriented x3, speech clear and approp, no facial drooping     :    Monitor:  SR     Results Review:         Sodium Sodium   Date Value Ref Range Status   11/10/2023 138 136 - 145 mmol/L Final   11/09/2023 140 136 - 145 mmol/L Final   11/09/2023 137 136 - 145 mmol/L Final   11/08/2023 133 (L) 136 - 145 mmol/L Final      Potassium Potassium   Date Value Ref Range Status   11/10/2023 5.1 3.5 - 5.2 mmol/L Final   11/10/2023 4.4 3.5 - 5.2 mmol/L Final   11/09/2023 3.3 (L) 3.5 - 5.2 mmol/L Final   11/09/2023 3.1 (L) 3.5 - 5.2 mmol/L Final   11/08/2023 3.8 3.5 - 5.2 mmol/L Final      Chloride Chloride   Date Value Ref Range Status   11/10/2023 96 (L) 98 - 107 mmol/L Final   11/09/2023 92 (L) 98 - 107 mmol/L Final   11/09/2023 93 (L) 98 - 107 mmol/L Final   11/08/2023 90 (L) 98 - 107 mmol/L Final      Bicarbonate No results found for: \"PLASMABICARB\"   BUN BUN   Date Value Ref Range Status   11/10/2023 37 (H) 8 - 23 mg/dL Final   11/09/2023 39 (H) 8 - 23 mg/dL Final   11/09/2023 39 (H) 8 - 23 mg/dL Final   11/08/2023 41 (H) 8 - 23 mg/dL Final      Creatinine Creatinine   Date Value Ref Range Status   11/10/2023 1.90 (H) 0.57 - 1.00 mg/dL Final   11/09/2023 1.86 (H) 0.57 - 1.00 mg/dL Final   11/09/2023 1.97 (H) 0.57 - 1.00 mg/dL Final   11/08/2023 2.36 (H) 0.57 - 1.00 mg/dL Final      Calcium Calcium   Date Value Ref Range Status   11/10/2023 9.1 8.6 - 10.5 mg/dL Final   11/09/2023 9.8 8.6 - 10.5 mg/dL Final   11/09/2023 9.3 8.6 - 10.5 mg/dL Final   11/08/2023 9.5 8.6 - 10.5 mg/dL Final      Magnesium Magnesium   Date Value Ref Range Status   11/09/2023 2.3 1.6 - 2.4 mg/dL Final        Results from last 7 days   Lab Units 11/10/23  0545   WBC 10*3/mm3 5.18   HEMOGLOBIN g/dL 13.0   HEMATOCRIT % 43.1   PLATELETS 10*3/mm3 256     Lab Results   Lab Value Date/Time    TROPONINT 31 (H) 11/09/2023 0738    TROPONINT 34 (H) 11/09/2023 0535    TROPONINT 39 (H) " "08/29/2023 1415    TROPONINT 45 (H) 06/05/2023 2213    TROPONINT 46 (H) 06/05/2023 1946    TROPONINT <0.010 01/27/2017 1850            Echo EF Estimated  No results found for: \"ECHOEFEST\"      Assessment/ Plan  Assessment & Plan   Active Hospital Problems    Diagnosis  POA    **AMS (altered mental status) [R41.82]  Yes    Pacemaker [Z95.0]  Yes    Acute on chronic combined systolic and diastolic CHF (congestive heart failure) [I50.43]  Yes    Hyponatremia [E87.1]  Yes    Lymphedema BLE and LUE [I89.0]  Yes    Chronic pain disorder [G89.4]  Yes    Chronic anticoagulation [Z79.01]  Not Applicable    COPD (chronic obstructive pulmonary disease) [J44.9]  Yes    PAULETTE (obstructive sleep apnea) [G47.33]  Yes    Stage 3b chronic kidney disease [N18.32]  Yes    Type 2 diabetes mellitus, with long-term current use of insulin [E11.9, Z79.4]  Not Applicable    Acute metabolic encephalopathy [G93.41]  Yes    PAF (paroxysmal atrial fibrillation) [I48.0]  Yes    Pulmonary hypertension [I27.20]  Yes    Reduced mobility [Z74.09]  Yes    Morbid obesity due to excess calories [E66.01]  Yes    Acute on chronic respiratory failure with hypoxia [J96.21]  Yes    Hyperlipidemia [E78.5]  Yes    Hypertension [I10]  Yes     AMS- resolved  Acute on chronic combined HFrEF 42%- Echo 1/2023  PAF- Eliquis  Hx tachy-akash syndrome s/p PPM  HTN  CKD  HLD  COPD  PAULETTE  Severe pulm HTN    Plan:  Appears volume overloaded on exam- on IV diuretics per renal  On Metoprolol and Eliquis for PAF-  Bp soft- optimize GDM for CHF as vitals allow.        Faith Justice, APRN  11/10/23  07:07 EST    Patient seen and examined  IV diuretics per renal        "

## 2023-11-10 NOTE — THERAPY TREATMENT NOTE
Patient Name: Aydee Solis  : 1950    MRN: [id number]                              Today's Date: 11/10/2023       Admit Date: 2023    Visit Dx:     ICD-10-CM ICD-9-CM   1. Acute renal failure, unspecified acute renal failure type  N17.9 584.9   2. Altered mental status, unspecified altered mental status type  R41.82 780.97   3. Hypoxia  R09.02 799.02   4. Acute congestive heart failure, unspecified heart failure type  I50.9 428.0     Patient Active Problem List   Diagnosis    Pneumonia of right lower lobe due to infectious organism    Cellulitis    Hypertension    Hyperlipidemia    Breast cancer    Lymphedema    Osteoporosis    Acute on chronic respiratory failure with hypoxia    Morbid obesity due to excess calories    Acute metabolic encephalopathy    PAULETTE (obstructive sleep apnea)    Chronic respiratory failure with hypoxia    Stage 3b chronic kidney disease    Type 2 diabetes mellitus, with long-term current use of insulin    PAF (paroxysmal atrial fibrillation)    Chronic anticoagulation    COPD (chronic obstructive pulmonary disease)    Chronic systolic CHF (congestive heart failure)    Left leg cellulitis    UTI (urinary tract infection)    Lymphedema BLE and LUE    Dizziness    Opioid dependence    Metabolic acidosis    Confusion    Alkalosis, metabolic    Hypokalemia    Hyponatremia    AMS (altered mental status)    Acute on chronic combined systolic and diastolic CHF (congestive heart failure)    Chronic pain disorder    Cardiomegaly    Disorder of nervous system    Drug-induced constipation    Estrogen receptor positive status (ER+)    Family history of leukemia    Family history of malignant neoplasm of breast    FH: malignant neoplasm of trachea, bronchus and lung    Gout    Microalbuminuria    Mitral valve stenosis    Oxygen dependent    Osteopenia    Personal history of breast cancer    Postmastectomy lymphedema syndrome    Primary localized osteoarthritis of pelvic region and thigh     Prophylactic use of aromatase inhibitors    Pulmonary hypertension    Pulmonary hypertension due to lung diseases and hypoxia    Reduced mobility    Chronic combined systolic and diastolic heart failure    Pacemaker     Past Medical History:   Diagnosis Date    Arthritis     Breast cancer     Cancer     Chronic anticoagulation 01/25/2023    Chronic combined systolic and diastolic heart failure 08/03/2021    Chronic pain disorder 02/24/2023    Class 3 severe obesity due to excess calories with body mass index (BMI) of 50.0 to 59.9 in adult     COPD (chronic obstructive pulmonary disease)     Diabetes mellitus     EDWARDS (dyspnea on exertion)     Elephantiasis     left leg    Hyperlipidemia     Hypertension     Leukemia     Lung cancer     Lymphedema     left arm    Lymphedema BLE and LUE 06/06/2023    Osteoporosis     Pacemaker 11/09/2023    PAF (paroxysmal atrial fibrillation) 01/25/2023    Pulmonary hypertension 03/27/2022    Tracheal cancer      Past Surgical History:   Procedure Laterality Date    APPENDECTOMY      HYSTERECTOMY      KNEE ARTHROSCOPY Right     LAPAROSCOPIC GASTRIC BANDING      LYMPHADENECTOMY Left     MASTECTOMY Left       General Information       Row Name 11/10/23 1340          Physical Therapy Time and Intention    Document Type therapy note (daily note)  -CS     Mode of Treatment individual therapy;physical therapy  -CS       Row Name 11/10/23 1340          General Information    Patient Profile Reviewed yes  -CS     Existing Precautions/Restrictions fall  -CS       Row Name 11/10/23 1340          Cognition    Orientation Status (Cognition) oriented x 3  -CS       Row Name 11/10/23 1340          Safety Issues, Functional Mobility    Impairments Affecting Function (Mobility) endurance/activity tolerance;strength  -CS               User Key  (r) = Recorded By, (t) = Taken By, (c) = Cosigned By      Initials Name Provider Type    CS Rosalia Thompson, PT Physical Therapist                   Mobility        Row Name 11/10/23 1340          Bed Mobility    Bed Mobility supine-sit;sit-supine  -CS     Supine-Sit [place] (Bed Mobility) standby assist  -CS     Sit-Supine [place] (Bed Mobility) standby assist  -CS       Row Name 11/10/23 1340          Sit-Stand Transfer    Sit-Stand [place] (Transfers) standby assist  -CS     Assistive Device (Sit-Stand Transfers) walker, front-wheeled  -CS       Row Name 11/10/23 1340          Gait/Stairs (Locomotion)    [place] Level (Gait) standby assist;contact guard  -CS     Assistive Device (Gait) walker, front-wheeled  -CS     Distance in Feet (Gait) 45'  -CS     Deviations/Abnormal Patterns (Gait) rachid decreased;gait speed decreased;stride length decreased;base of support, wide  -CS               User Key  (r) = Recorded By, (t) = Taken By, (c) = Cosigned By      Initials Name Provider Type    Rosalia Jacome, PT Physical Therapist                   Obj/Interventions       Row Name 11/10/23 [facility]          Motor Skills    Therapeutic Exercise other (see comments)  10 reps B LE AP, LAQ, & seated marches  -       Row Name 11/10/23 1341          Balance    Balance Assessment sitting static balance;sitting dynamic balance;standing static balance;standing dynamic balance  -CS     Static Sitting Balance supervision  -     Dynamic Sitting Balance standby assist  -CS     Position, Sitting Balance unsupported;sitting edge of bed  -CS     Static Standing Balance standby assist  -CS     Dynamic Standing Balance standby assist;contact guard  -CS     Position/Device Used, Standing Balance supported;walker, front-wheeled  -CS               User Key  (r) = Recorded By, (t) = Taken By, (c) = Cosigned By      Initials Name Provider Type    Rosalia Jacome, PT Physical Therapist                   Goals/Plan    No documentation.                  Clinical Impression       Row Name 11/10/23 1341          Plan of Care Review    Plan of Care Reviewed With patient;spouse   -CS     Progress improving  -CS     Outcome Evaluation Pt received in bed upon arrival and agreeable to PT. Pt completed bed mobility, STS transfer, and ambulated 45' c RW requiring SBA/CGA. Pt demo's a slow pace but no LOB. Pt returned to sitting EOB and completed B LE ther-ex 10x at end of session. Pt improving as demonstrated with increased activity tolerance. Pt plans home at D/C and f/u with HHPT.  -CS       Row Name 11/10/23 1341          Therapy Assessment/Plan (PT)    Criteria for Skilled Interventions Met (PT) yes;meets criteria  -CS     Therapy Frequency (PT) 5 times/wk  -CS       Row Name 11/10/23 1341          Vital Signs    Pre SpO2 (%) 97  -CS     O2 Delivery Pre Treatment room air  -CS     Post SpO2 (%) 91  -CS     O2 Delivery Post Treatment room air  -CS       Row Name 11/10/23 1341          Positioning and Restraints    Pre-Treatment Position in bed  -CS     Post Treatment Position bed  -CS     In Bed fowlers;call light within reach;encouraged to call for assist;exit alarm on;with family/caregiver;with nsg  -CS               User Key  (r) = Recorded By, (t) = Taken By, (c) = Cosigned By      Initials Name Provider Type    CS Rosalia Thompson, PT Physical Therapist                   Outcome Measures       Row Name 11/10/23 1343 11/10/23 0832       How much help from another person do you currently need...    Turning from your back to your side while in flat bed without using bedrails? 3  -CS 3  -AH    Moving from lying on back to sitting on the side of a flat bed without bedrails? 3  -CS 3  -AH    Moving to and from a bed to a chair (including a wheelchair)? 4  -CS 3  -AH    Standing up from a chair using your arms (e.g., wheelchair, bedside chair)? 4  -CS 3  -AH    Climbing 3-5 steps with a railing? 3  -CS 2  -AH    To walk in hospital room? 3  -CS 3  -AH    AM-PAC 6 Clicks Score (PT) 20  -CS 17  -AH    Highest level of mobility 6 --> Walked 10 steps or more  -CS 5 --> Static standing  -      Nahum  Name 11/10/23 1343          Functional Assessment    Outcome Measure Options AM-PAC 6 Clicks Basic Mobility (PT)  -CS               User Key  (r) = Recorded By, (t) = Taken By, (c) = Cosigned By      Initials Name Provider Type    Liana Gan, RN Registered Nurse    Rosalia Jacome, PT Physical Therapist                                 Physical Therapy Education       Title: PT OT SLP Therapies (Done)       Topic: Physical Therapy (Done)       Point: Mobility training (Done)       Learning Progress Summary             Patient Acceptance, E,TB, VU,DU by  at 11/10/2023 1343    Acceptance, E,TB, VU,DU by CS at 11/9/2023 1159    Acceptance, E,TB, VU by  at 11/8/2023 1742    Acceptance, E,TB, VU by DK at 11/8/2023 1733                         Point: Home exercise program (Done)       Learning Progress Summary             Patient Acceptance, E,TB, VU,DU by  at 11/10/2023 1343    Acceptance, E,TB, VU by GURVINDER at 11/8/2023 1742    Acceptance, E,TB, VU by DK at 11/8/2023 1733                         Point: Body mechanics (Done)       Learning Progress Summary             Patient Acceptance, E,TB, VU,DU by  at 11/10/2023 1343    Acceptance, E,TB, VU,DU by  at 11/9/2023 1159    Acceptance, E,TB, VU by  at 11/8/2023 1742    Acceptance, E,TB, VU by  at 11/8/2023 1733                         Point: Precautions (Done)       Learning Progress Summary             Patient Acceptance, E,TB, VU,DU by  at 11/10/2023 1343    Acceptance, E,TB, VU,DU by  at 11/9/2023 1159    Acceptance, E,TB, VU by  at 11/8/2023 1742    Acceptance, E,TB, VU by  at 11/8/2023 1733                                         User Key       Initials Effective Dates Name Provider Type Discipline    GURVINDER 10/22/23 -  Jenniffer Figueroa, RN Registered Nurse Nurse    CIRO 09/22/22 -  Rosalia Thompson, PT Physical Therapist PT                  PT Recommendation and Plan     Plan of Care Reviewed With: patient, spouse  Progress: improving  Outcome  Evaluation: Pt received in bed upon arrival and agreeable to PT. Pt completed bed mobility, STS transfer, and ambulated 45' c RW requiring SBA/CGA. Pt demo's a slow pace but no LOB. Pt returned to sitting EOB and completed B LE ther-ex 10x at end of session. Pt improving as demonstrated with increased activity tolerance. Pt plans home at D/C and f/u with HHPT.     Time Calculation:         PT Charges       Row Name 11/10/23 1343             Time Calculation    Start Time 1314  -CS      Stop Time 1328  -CS      Time Calculation (min) 14 min  -CS      PT Received On 11/10/23  -CS      PT - Next Appointment 11/13/23  -CS         Time Calculation- PT    Total Timed Code Minutes- PT 12 minute(s)  -CS         Timed Charges    73087 - PT Therapeutic Activity Minutes 12  -CS         Total Minutes    Timed Charges Total Minutes 12  -CS       Total Minutes 12  -CS                User Key  (r) = Recorded By, (t) = Taken By, (c) = Cosigned By      Initials Name Provider Type    CS Rosalia Thompson, PT Physical Therapist                  Therapy Charges for Today       Code Description Service Date Service Provider Modifiers Qty    41244019804  PT THERAPEUTIC ACT EA 15 MIN 11/9/2023 Rosalia Thompson, PT GP 1    [id number]  PT EVAL MOD COMPLEXITY 3 11/9/2023 Rosalia Thompson, PT GP 1    [id number]  PT THERAPEUTIC ACT EA 15 MIN 11/10/2023 Rosalia Thompson, PT GP 1            PT G-Codes  Outcome Measure Options: AM-PAC 6 Clicks Basic Mobility (PT)  AM-PAC 6 Clicks Score (PT): 20  PT Discharge Summary  Anticipated Discharge Disposition (PT): home with assist, home with home health    Rosalia Thompson PT  11/10/2023

## 2023-11-10 NOTE — PLAN OF CARE
Goal Outcome Evaluation:  Plan of Care Reviewed With: patient, spouse        Progress: improving  Outcome Evaluation: Pt received in bed upon arrival and agreeable to PT. Pt completed bed mobility, STS transfer, and ambulated 45' c RW requiring SBA/CGA. Pt demo's a slow pace but no LOB. Pt returned to sitting EOB and completed B LE ther-ex 10x at end of session. Pt improving as demonstrated with increased activity tolerance. Pt plans home at D/C and f/u with HHPT.      Anticipated Discharge Disposition (PT): home with assist, home with home health

## 2023-11-10 NOTE — PLAN OF CARE
Problem: Adult Inpatient Plan of Care  Goal: Plan of Care Review  Outcome: Ongoing, Progressing  Flowsheets (Taken 11/10/2023 1544)  Outcome Evaluation: Patient is alert and oriented times 4 from post mid morning till now. She is not having pain, able to get oob to stand for orthostatics,remains on 2 liters and desaturates off oxygen dropping to low 80's. Her skin appears to be jaundiced and bilirubin is elevated. At the present time patient has voided 1700 ml per pure wick.  Goal: Absence of Hospital-Acquired Illness or Injury  Outcome: Ongoing, Progressing  Intervention: Identify and Manage Fall Risk  Recent Flowsheet Documentation  Taken 11/10/2023 1444 by Liana Bravo RN  Safety Promotion/Fall Prevention:   nonskid shoes/slippers when out of bed   safety round/check completed  Taken 11/10/2023 1354 by Liana Bravo RN  Safety Promotion/Fall Prevention:   nonskid shoes/slippers when out of bed   safety round/check completed  Taken 11/10/2023 1333 by Liana Bravo RN  Safety Promotion/Fall Prevention:   nonskid shoes/slippers when out of bed   safety round/check completed  Taken 11/10/2023 1202 by Liana Bravo RN  Safety Promotion/Fall Prevention:   nonskid shoes/slippers when out of bed   safety round/check completed  Taken 11/10/2023 1000 by Liana Bravo RN  Safety Promotion/Fall Prevention:   nonskid shoes/slippers when out of bed   safety round/check completed  Taken 11/10/2023 0832 by Liana Bravo RN  Safety Promotion/Fall Prevention:   nonskid shoes/slippers when out of bed   safety round/check completed  Intervention: Prevent Skin Injury  Recent Flowsheet Documentation  Taken 11/10/2023 1444 by Liana Bravo RN  Body Position:   turned   right  Taken 11/10/2023 1354 by Liana Bravo RN  Body Position:   turned   right  Taken 11/10/2023 1333 by Liana Bravo RN  Body Position:   turned   right  Taken 11/10/2023 1202 by Liana Bravo RN  Body Position:   turned   right  Taken 11/10/2023 0832 by Liana Bravo  RN  Body Position: sitting up in bed  Intervention: Prevent and Manage VTE (Venous Thromboembolism) Risk  Recent Flowsheet Documentation  Taken 11/10/2023 1444 by Liana Bravo RN  Activity Management: activity encouraged  Taken 11/10/2023 1354 by Liana Bravo RN  Activity Management: activity encouraged  Taken 11/10/2023 1333 by Liana Bravo RN  Activity Management: activity encouraged  Taken 11/10/2023 1202 by Liana Bravo RN  Activity Management: activity encouraged  Taken 11/10/2023 1000 by Liana Bravo RN  Activity Management: activity encouraged  Taken 11/10/2023 0832 by Liana Bravo RN  Activity Management: activity encouraged  Range of Motion:   active ROM (range of motion) encouraged   ROM (range of motion) performed  Intervention: Prevent Infection  Recent Flowsheet Documentation  Taken 11/10/2023 1444 by Liana Bravo RN  Infection Prevention:   single patient room provided   environmental surveillance performed   hand hygiene promoted  Taken 11/10/2023 1354 by Liana Bravo RN  Infection Prevention:   single patient room provided   environmental surveillance performed   hand hygiene promoted  Taken 11/10/2023 1333 by Liana Bravo RN  Infection Prevention:   single patient room provided   environmental surveillance performed   hand hygiene promoted  Taken 11/10/2023 1202 by Liana Bravo RN  Infection Prevention:   single patient room provided   environmental surveillance performed   hand hygiene promoted  Taken 11/10/2023 1000 by Liana Bravo RN  Infection Prevention:   single patient room provided   environmental surveillance performed   hand hygiene promoted  Taken 11/10/2023 0832 by Liana Bravo RN  Infection Prevention:   single patient room provided   environmental surveillance performed   hand hygiene promoted  Goal: Optimal Comfort and Wellbeing  Outcome: Ongoing, Progressing  Intervention: Provide Person-Centered Care  Recent Flowsheet Documentation  Taken 11/10/2023 1444 by Liana Bravo RN  Trust  Relationship/Rapport:   care explained   choices provided  Taken 11/10/2023 0832 by Liana Bravo, RN  Trust Relationship/Rapport:   care explained   choices provided   reassurance provided   thoughts/feelings acknowledged  Goal: Readiness for Transition of Care  Outcome: Ongoing, Progressing   Goal Outcome Evaluation:              Outcome Evaluation: Patient is alert and oriented times 4 from post mid morning till now. She is not having pain, able to get oob to stand for orthostatics,remains on 2 liters and desaturates off oxygen dropping to low 80's. Her skin appears to be jaundiced and bilirubin is elevated. At the present time patient has voided 1700 ml per pure wick.

## 2023-11-11 ENCOUNTER — READMISSION MANAGEMENT (OUTPATIENT)
Dept: CALL CENTER | Facility: HOSPITAL | Age: 73
End: 2023-11-11
Payer: MEDICARE

## 2023-11-11 VITALS
BODY MASS INDEX: 40.75 KG/M2 | HEIGHT: 66 IN | WEIGHT: 253.53 LBS | HEART RATE: 63 BPM | RESPIRATION RATE: 14 BRPM | DIASTOLIC BLOOD PRESSURE: 77 MMHG | TEMPERATURE: 97.2 F | SYSTOLIC BLOOD PRESSURE: 107 MMHG | OXYGEN SATURATION: 97 %

## 2023-11-11 LAB
ALBUMIN SERPL-MCNC: 3.8 G/DL (ref 3.5–5.2)
ALBUMIN/GLOB SERPL: 1.1 G/DL
ALP SERPL-CCNC: 170 U/L (ref 39–117)
ALT SERPL W P-5'-P-CCNC: 15 U/L (ref 1–33)
ANION GAP SERPL CALCULATED.3IONS-SCNC: 6 MMOL/L (ref 5–15)
AST SERPL-CCNC: 25 U/L (ref 1–32)
BASOPHILS # BLD AUTO: 0.06 10*3/MM3 (ref 0–0.2)
BASOPHILS NFR BLD AUTO: 0.8 % (ref 0–1.5)
BILIRUB SERPL-MCNC: 2.5 MG/DL (ref 0–1.2)
BUN SERPL-MCNC: 39 MG/DL (ref 8–23)
BUN/CREAT SERPL: 21.7 (ref 7–25)
CA-I BLD-MCNC: 4.6 MG/DL (ref 4.6–5.4)
CA-I SERPL ISE-MCNC: 1.15 MMOL/L (ref 1.15–1.35)
CALCIUM SPEC-SCNC: 9.7 MG/DL (ref 8.6–10.5)
CHLORIDE SERPL-SCNC: 94 MMOL/L (ref 98–107)
CO2 SERPL-SCNC: 34 MMOL/L (ref 22–29)
CREAT SERPL-MCNC: 1.8 MG/DL (ref 0.57–1)
DEPRECATED RDW RBC AUTO: 50.8 FL (ref 37–54)
EGFRCR SERPLBLD CKD-EPI 2021: 29.1 ML/MIN/1.73
EOSINOPHIL # BLD AUTO: 0.11 10*3/MM3 (ref 0–0.4)
EOSINOPHIL NFR BLD AUTO: 1.5 % (ref 0.3–6.2)
ERYTHROCYTE [DISTWIDTH] IN BLOOD BY AUTOMATED COUNT: 17.5 % (ref 12.3–15.4)
GLOBULIN UR ELPH-MCNC: 3.5 GM/DL
GLUCOSE BLDC GLUCOMTR-MCNC: 157 MG/DL (ref 70–130)
GLUCOSE SERPL-MCNC: 167 MG/DL (ref 65–99)
HCT VFR BLD AUTO: 43.4 % (ref 34–46.6)
HGB BLD-MCNC: 13 G/DL (ref 12–15.9)
IMM GRANULOCYTES # BLD AUTO: 0.02 10*3/MM3 (ref 0–0.05)
IMM GRANULOCYTES NFR BLD AUTO: 0.3 % (ref 0–0.5)
LYMPHOCYTES # BLD AUTO: 1.28 10*3/MM3 (ref 0.7–3.1)
LYMPHOCYTES NFR BLD AUTO: 18 % (ref 19.6–45.3)
MAGNESIUM SERPL-MCNC: 1.7 MG/DL (ref 1.6–2.4)
MCH RBC QN AUTO: 24.9 PG (ref 26.6–33)
MCHC RBC AUTO-ENTMCNC: 30 G/DL (ref 31.5–35.7)
MCV RBC AUTO: 83.1 FL (ref 79–97)
MONOCYTES # BLD AUTO: 0.89 10*3/MM3 (ref 0.1–0.9)
MONOCYTES NFR BLD AUTO: 12.5 % (ref 5–12)
NEUTROPHILS NFR BLD AUTO: 4.75 10*3/MM3 (ref 1.7–7)
NEUTROPHILS NFR BLD AUTO: 66.9 % (ref 42.7–76)
NRBC BLD AUTO-RTO: 0.6 /100 WBC (ref 0–0.2)
PHOSPHATE SERPL-MCNC: 2.8 MG/DL (ref 2.5–4.5)
PLATELET # BLD AUTO: 252 10*3/MM3 (ref 140–450)
PMV BLD AUTO: 10 FL (ref 6–12)
POTASSIUM SERPL-SCNC: 5.1 MMOL/L (ref 3.5–5.2)
PROT SERPL-MCNC: 7.3 G/DL (ref 6–8.5)
RBC # BLD AUTO: 5.22 10*6/MM3 (ref 3.77–5.28)
SODIUM SERPL-SCNC: 134 MMOL/L (ref 136–145)
WBC NRBC COR # BLD: 7.11 10*3/MM3 (ref 3.4–10.8)

## 2023-11-11 PROCEDURE — 94761 N-INVAS EAR/PLS OXIMETRY MLT: CPT

## 2023-11-11 PROCEDURE — 85025 COMPLETE CBC W/AUTO DIFF WBC: CPT | Performed by: INTERNAL MEDICINE

## 2023-11-11 PROCEDURE — 82330 ASSAY OF CALCIUM: CPT | Performed by: INTERNAL MEDICINE

## 2023-11-11 PROCEDURE — 94799 UNLISTED PULMONARY SVC/PX: CPT

## 2023-11-11 PROCEDURE — 83735 ASSAY OF MAGNESIUM: CPT | Performed by: INTERNAL MEDICINE

## 2023-11-11 PROCEDURE — 94760 N-INVAS EAR/PLS OXIMETRY 1: CPT

## 2023-11-11 PROCEDURE — 94664 DEMO&/EVAL PT USE INHALER: CPT

## 2023-11-11 PROCEDURE — 82948 REAGENT STRIP/BLOOD GLUCOSE: CPT

## 2023-11-11 PROCEDURE — 63710000001 INSULIN LISPRO (HUMAN) PER 5 UNITS: Performed by: INTERNAL MEDICINE

## 2023-11-11 PROCEDURE — 84100 ASSAY OF PHOSPHORUS: CPT | Performed by: INTERNAL MEDICINE

## 2023-11-11 PROCEDURE — 80053 COMPREHEN METABOLIC PANEL: CPT | Performed by: INTERNAL MEDICINE

## 2023-11-11 RX ORDER — LEVOTHYROXINE SODIUM 0.03 MG/1
25 TABLET ORAL
Qty: 30 TABLET | Refills: 0 | Status: SHIPPED | OUTPATIENT
Start: 2023-11-12 | End: 2023-12-12

## 2023-11-11 RX ORDER — FUROSEMIDE 20 MG/1
60 TABLET ORAL 2 TIMES DAILY
Qty: 180 TABLET | Refills: 0 | Status: SHIPPED | OUTPATIENT
Start: 2023-11-11 | End: 2023-12-11

## 2023-11-11 RX ORDER — SPIRONOLACTONE 25 MG/1
25 TABLET ORAL DAILY
Qty: 30 TABLET | Refills: 0 | Status: SHIPPED | OUTPATIENT
Start: 2023-11-12 | End: 2023-12-12

## 2023-11-11 RX ADMIN — FUROSEMIDE 60 MG: 40 TABLET ORAL at 08:00

## 2023-11-11 RX ADMIN — METOPROLOL SUCCINATE 25 MG: 25 TABLET, EXTENDED RELEASE ORAL at 08:00

## 2023-11-11 RX ADMIN — ALLOPURINOL 100 MG: 100 TABLET ORAL at 08:00

## 2023-11-11 RX ADMIN — SPIRONOLACTONE 25 MG: 25 TABLET, FILM COATED ORAL at 08:00

## 2023-11-11 RX ADMIN — APIXABAN 5 MG: 5 TABLET, FILM COATED ORAL at 08:00

## 2023-11-11 RX ADMIN — LEVOTHYROXINE SODIUM 25 MCG: 0.03 TABLET ORAL at 05:43

## 2023-11-11 RX ADMIN — MIDODRINE HYDROCHLORIDE 5 MG: 5 TABLET ORAL at 08:00

## 2023-11-11 RX ADMIN — ATORVASTATIN CALCIUM 10 MG: 20 TABLET, FILM COATED ORAL at 08:00

## 2023-11-11 RX ADMIN — DOCUSATE SODIUM 50MG AND SENNOSIDES 8.6MG 2 TABLET: 8.6; 5 TABLET, FILM COATED ORAL at 08:00

## 2023-11-11 RX ADMIN — BUDESONIDE AND FORMOTEROL FUMARATE DIHYDRATE 2 PUFF: 160; 4.5 AEROSOL RESPIRATORY (INHALATION) at 08:13

## 2023-11-11 RX ADMIN — ACETAMINOPHEN 650 MG: 325 TABLET, FILM COATED ORAL at 10:15

## 2023-11-11 RX ADMIN — INSULIN LISPRO 2 UNITS: 100 INJECTION, SOLUTION INTRAVENOUS; SUBCUTANEOUS at 07:59

## 2023-11-11 RX ADMIN — TIOTROPIUM BROMIDE INHALATION SPRAY 2 PUFF: 3.12 SPRAY, METERED RESPIRATORY (INHALATION) at 08:14

## 2023-11-11 RX ADMIN — DOCUSATE SODIUM 100 MG: 100 CAPSULE, LIQUID FILLED ORAL at 08:00

## 2023-11-11 NOTE — OUTREACH NOTE
Prep Survey      Flowsheet Row Responses   [facility] facility patient discharged from? [facility]   Is LACE score < 7 ? No   Eligibility Readm Mgmt   Discharge diagnosis AMS (altered mental status)   Does the patient have one of the following disease processes/diagnoses(primary or secondary)? CHF   Does the patient have Home health ordered? Yes   What is the Home health agency?  [facility] [place] [facility]   Is there a DME ordered? No   Medication alerts for this patient see AVS   General alerts for this patient COPD   Prep survey completed? Yes            Heavenly GRAYSON - Registered Nurse

## 2023-11-11 NOTE — PLAN OF CARE
Problem: Adult Inpatient Plan of Care  Goal: Plan of Care Review  Outcome: Adequate for Care Transition  Goal: Patient-Specific Goal (Individualized)  Outcome: Adequate for Care Transition  Goal: Absence of Hospital-Acquired Illness or Injury  Outcome: Adequate for Care Transition  Intervention: Identify and Manage Fall Risk  Recent Flowsheet Documentation  Taken 11/11/2023 1000 by Juan Gilbert RN  Safety Promotion/Fall Prevention:   activity supervised   clutter free environment maintained   fall prevention program maintained   lighting adjusted   nonskid shoes/slippers when out of bed   room organization consistent   safety round/check completed  Taken 11/11/2023 0800 by Juan Gilbert RN  Safety Promotion/Fall Prevention:   activity supervised   clutter free environment maintained   fall prevention program maintained   lighting adjusted   nonskid shoes/slippers when out of bed   room organization consistent   safety round/check completed  Intervention: Prevent Infection  Recent Flowsheet Documentation  Taken 11/11/2023 1000 by Juan Gilbert RN  Infection Prevention:   hand hygiene promoted   rest/sleep promoted  Taken 11/11/2023 0800 by Juan Gilbert RN  Infection Prevention:   rest/sleep promoted   hand hygiene promoted  Goal: Optimal Comfort and Wellbeing  Outcome: Adequate for Care Transition  Intervention: Provide Person-Centered Care  Recent Flowsheet Documentation  Taken 11/11/2023 0905 by Juan Gilbert RN  Trust Relationship/Rapport:   thoughts/feelings acknowledged   care explained   reassurance provided   questions encouraged   questions answered  Goal: Readiness for Transition of Care  Outcome: Adequate for Care Transition     Problem: Diabetes Comorbidity  Goal: Blood Glucose Level Within Targeted Range  Outcome: Adequate for Care Transition     Problem: Obstructive Sleep Apnea Risk or Actual Comorbidity Management  Goal: Unobstructed Breathing During Sleep  Outcome: Adequate for Care  Transition     Problem: Pain Chronic (Persistent) (Comorbidity Management)  Goal: Acceptable Pain Control and Functional Ability  Outcome: Adequate for Care Transition  Intervention: Manage Persistent Pain  Recent Flowsheet Documentation  Taken 11/11/2023 1000 by Juan Gilbert RN  Medication Review/Management: medications reviewed  Taken 11/11/2023 0800 by Juan Gilbert RN  Medication Review/Management: medications reviewed  Intervention: Optimize Psychosocial Wellbeing  Recent Flowsheet Documentation  Taken 11/11/2023 0905 by Juan Gilbert RN  Supportive Measures:   active listening utilized   verbalization of feelings encouraged     Problem: Confusion Acute  Goal: Optimal Cognitive Function  Outcome: Adequate for Care Transition     Problem: Adjustment to Illness (Heart Failure)  Goal: Optimal Coping  Outcome: Adequate for Care Transition  Intervention: Support Psychosocial Response  Recent Flowsheet Documentation  Taken 11/11/2023 0905 by Juan Gilbert RN  Supportive Measures:   active listening utilized   verbalization of feelings encouraged     Problem: Cardiac Output Decreased (Heart Failure)  Goal: Optimal Cardiac Output  Outcome: Adequate for Care Transition  Intervention: Optimize Cardiac Output  Recent Flowsheet Documentation  Taken 11/11/2023 0905 by Juan Gilbert RN  Environmental Support:   calm environment promoted   rest periods encouraged     Problem: Dysrhythmia (Heart Failure)  Goal: Stable Heart Rate and Rhythm  Outcome: Adequate for Care Transition     Problem: Fluid Imbalance (Heart Failure)  Goal: Fluid Balance  Outcome: Adequate for Care Transition     Problem: Functional Ability Impaired (Heart Failure)  Goal: Optimal Functional Ability  Outcome: Adequate for Care Transition     Problem: Oral Intake Inadequate (Heart Failure)  Goal: Optimal Nutrition Intake  Outcome: Adequate for Care Transition     Problem: Respiratory Compromise (Heart Failure)  Goal: Effective Oxygenation and  Ventilation  Outcome: Adequate for Care Transition  Intervention: Promote Airway Secretion Clearance  Recent Flowsheet Documentation  Taken 11/11/2023 0905 by Juan Gilbert RN  Cough And Deep Breathing: done independently per patient     Problem: Sleep Disordered Breathing (Heart Failure)  Goal: Effective Breathing Pattern During Sleep  Outcome: Adequate for Care Transition     Problem: Skin Injury Risk Increased  Goal: Skin Health and Integrity  Outcome: Adequate for Care Transition     Problem: Fall Injury Risk  Goal: Absence of Fall and Fall-Related Injury  Outcome: Adequate for Care Transition  Intervention: Identify and Manage Contributors  Recent Flowsheet Documentation  Taken 11/11/2023 1000 by Juan Gilbert RN  Medication Review/Management: medications reviewed  Taken 11/11/2023 0800 by Juan Gilbert RN  Medication Review/Management: medications reviewed  Intervention: Promote Injury-Free Environment  Recent Flowsheet Documentation  Taken 11/11/2023 1000 by Juan Gilbert RN  Safety Promotion/Fall Prevention:   activity supervised   clutter free environment maintained   fall prevention program maintained   lighting adjusted   nonskid shoes/slippers when out of bed   room organization consistent   safety round/check completed  Taken 11/11/2023 0800 by Juan Gilbert RN  Safety Promotion/Fall Prevention:   activity supervised   clutter free environment maintained   fall prevention program maintained   lighting adjusted   nonskid shoes/slippers when out of bed   room organization consistent   safety round/check completed   Goal Outcome Evaluation:   Patient and  received discharge teaching. Patient and  claim to understand teaching and deny having any further questions at this time. Call light in reach.

## 2023-11-11 NOTE — CASE MANAGEMENT/SOCIAL WORK
Case Management Discharge Note      Final Note: home w/HH    Provided Post Acute Provider List?: Refused  Refused Provider List Comment: declined    Selected Continued Care - Discharged on 11/11/2023 Admission date: 11/8/2023 - Discharge disposition: Home or Self Care      Destination    No services have been selected for the patient.                Durable Medical Equipment    No services have been selected for the patient.                Dialysis/Infusion    No services have been selected for the patient.                Home Medical Care Coordination complete.      Service Provider Selected Services Address Phone Fax Patient Preferred    [facility] [address], [address], [place] [address] [phone] --              Therapy    No services have been selected for the patient.                Community Resources    No services have been selected for the patient.                Community & DME    No services have been selected for the patient.                    Selected Continued Care - Prior Encounters Includes continued care and service providers with selected services from prior encounters from 8/10/2023 to 11/11/2023      Discharged on 9/6/2023 Admission date: 8/29/2023 - Discharge disposition: Home-Health Care [facility]      Home Medical Care       Service Provider Selected Services Address Phone Fax Patient Preferred    [facility] [address], [address], [place] [address] [phone] --                               Final Discharge Disposition Code: 06 - home with home health care

## 2023-11-11 NOTE — DISCHARGE SUMMARY
Patient Name: Aydee Solis  : 1950  MRN: [id number]    Date of Admission: 2023  Date of Discharge:  2023  Primary Care Physician: Bennett Duque DO      Chief Complaint:   Dizziness      Discharge Diagnoses     Active Hospital Problems    Diagnosis  POA    **AMS (altered mental status) [R41.82]  Yes    Pacemaker [Z95.0]  Yes    Acute on chronic combined systolic and diastolic CHF (congestive heart failure) [I50.43]  Yes    Hyponatremia [E87.1]  Yes    Lymphedema BLE and LUE [I89.0]  Yes    Chronic pain disorder [G89.4]  Yes    Chronic anticoagulation [Z79.01]  Not Applicable    COPD (chronic obstructive pulmonary disease) [J44.9]  Yes    PAULETTE (obstructive sleep apnea) [G47.33]  Yes    Stage 3b chronic kidney disease [N18.32]  Yes    Type 2 diabetes mellitus, with long-term current use of insulin [E11.9, Z79.4]  Not Applicable    Acute metabolic encephalopathy [G93.41]  Yes    PAF (paroxysmal atrial fibrillation) [I48.0]  Yes    Pulmonary hypertension [I27.20]  Yes    Reduced mobility [Z74.09]  Yes    Morbid obesity due to excess calories [E66.01]  Yes    Acute on chronic respiratory failure with hypoxia [J96.21]  Yes    Hyperlipidemia [E78.5]  Yes    Hypertension [I10]  Yes      Resolved Hospital Problems   No resolved problems to display.        Hospital Course   HPI  Patient is a 73 y.o. female presents with history of diabetes, COPD, on midodrine, presents to the ER due to increasing confusion, decreased oral intake and hypoxia 83%.  Patient has history of combined heart failure with an EF of 42% with hypokinetic segments seen in 2023, diastolic grade 1, mildly reduced RV function.  Patient is somewhat confused and family states that she was talking about a dog is been dead for a while and patient states that she was seeing some visual hallucinations.  As she has been on the floor she has been doing better.  But says that she has not been eating and drinking as much.  Had  "some light lightheadedness.  Just finished antibiotics for UTI.  She says \"a little\" to her shortness of breath.  BNP is elevated.  Refused the urinary catheterization to get UA.  Patient on elevated BNP but due to decreased p.o. intake ER did not give diuretics at that point.   Patient is adamant that she takes her diuretic at night and that she has some kind of a pure wick like device at home (unsure if that is true)      1.Altered mental status, likely metabolic in etiology.  CT brain with no acute findings and UA with no evidence of UTI and chest x-ray with no evidence of any infiltrate.  No focal findings on exam as well.  Mental status is improving and back to baseline.     2.  Bilateral lower extremity lymphedema, was diuresed aggressively during this hospital stay and is currently on p.o. diuretics.  Lower extremity edema has been improving.     3.  Acute systolic heart failure, was diuresed with IV Lasix earlier during this hospital stay and has been transitioned to p.o. Lasix as well as Aldactone. Most recent echo was done in January 2023 which revealed ejection fraction of 42%.  Was requiring 2 to 3 L of oxygen and she is off now.  Her lower extremity edema has also been improving.  She denies any PND orthopnea and overall feels much better from respiratory standpoint as well.     4.  Acute on CKD 3B, creatinine better with diuresis and nephrology consultation will be obtained.  Avoid nephrotoxins.     5.  History of atrial fibrillation, on Eliquis and metoprolol which will be continued.     6.  Diabetes mellitus, continue with home dose Lantus and patient was advised to keep a log of blood sugars as an outpatient basis.     7.  Hypokalemia, r replace per replacement protocol.     8.  Hyperlipidemia, on statins.     9.  COPD, continue with Symbicort and Spiriva.  Does not appear to be any major exacerbation at this point.     10.  Hypertension, on metoprolol and Aldactone and monitor blood pressure " closely.     11.  History of gout, on allopurinol.     12.  Hypothyroidism, TSH was noted to be 33 and has been initiated on Synthroid 25 mcg p.o. daily and will need repeat TSH in 4 to 6 weeks.       Copied text on this note has been reviewed by me on 11/11/2023.      Day of Discharge         Physical Exam:  Temp:  [97.2 °F (36.2 °C)-97.7 °F (36.5 °C)] 97.2 °F (36.2 °C)  Heart Rate:  [60-67] 63  Resp:  [14-20] 14  BP: (107-132)/(71-81) 107/77  Body mass index is 40.95 kg/m².  Physical Exam  HEENT: PERRLA, extraocular movements intact, Scleras no icterus  Neck: Supple, no JVD  Cardiovascular: Regular rate and rhythm with normal S1 and S2  Respiratory: Fairly clear to auscultation bilaterally with no wheezes  GI: Soft, nontender, bowel Sulpor  Extremities: 1+ bilateral lower extreme edema, pedal pulses are difficult to palpate, chronic pigmentation changes noted  Neurologic: Globally weak, no facial asymmetry, oriented x3.      Consultants     Consult Orders (all) (From admission, onward)       Start     Ordered    11/08/23 1928  Inpatient Cardiology Consult  Once        Specialty:  Cardiology  Provider:  Mu Bueno MD    11/08/23 1930 11/08/23 1925  Inpatient Nephrology Consult  Once        Specialty:  Nephrology  Provider:  Kyle Guallpa MD    11/08/23 1930 11/08/23 1924  Inpatient Nutrition Consult  Once        Provider:  (Not yet assigned)    11/08/23 1930 11/08/23 1918  Inpatient Heart Failure Navigator Consult  Once        Provider:  (Not yet assigned)    11/08/23 1930 11/08/23 1917  Inpatient Consult to Case Management   Once        Provider:  (Not yet assigned)    11/08/23 1930 11/08/23 1520  [facility] (on-call MD unless specified) Details  Once,   Status:  Canceled        Specialty:  Hospitalist  Provider:  (Not yet assigned)    11/08/23 1519                  Procedures     * Surgery not found *    Imaging Results (All)       Procedure Component Value Units Date/Time     CT Head Without Contrast [[id number]] Collected: 11/08/23 1624     Updated: 11/08/23 1704    Narrative:      CT HEAD WITHOUT CONTRAST     HISTORY:  Confusion, dizziness.     COMPARISON: CT head 07/31/2023.     FINDINGS: The brain and ventricles are symmetrical. There is no evidence  of hemorrhage, hydrocephalus or of abnormal extra-axial fluid. No focal  area of decreased attenuation to suggest acute infarction is identified.  Moderate to severe vascular calcification involving the carotid siphons  are noted bilaterally. There is moderate small vessel ischemic disease.       Impression:      There is no evidence of acute infarction, intracranial  hemorrhage or of abnormal extra-axial fluid. Age-appropriate atrophy,  small vessel ischemic disease and vascular calcification is noted. The  right vertebral artery is dominant. Further evaluation could be  performed with a MRI examination of the brain as indicated.           Radiation dose reduction techniques were utilized, including automated  exposure control and exposure modulation based on body size.        This report was finalized on 11/8/2023 5:01 PM by Dr. Bryan Muñiz M.D  on Workstation: [id number]       XR Chest 1 View [[id number]] Collected: 11/08/23 1454     Updated: 11/08/23 1458    Narrative:      AP CHEST     HISTORY: Dizziness, altered mental status     COMPARISON: 8/30/2023     FINDINGS: Stable cardiomegaly. Mild atelectasis in the lung bases.  Stable ICD. No pneumothorax. Atherosclerotic calcification aorta. Left  axillary surgical clips.       Impression:      No acute findings           This report was finalized on 11/8/2023 2:55 PM by Dr. Carlos Nunez M.D on Workstation: [id number]             Results for orders placed during the hospital encounter of 07/31/23    Duplex Venous Lower Extremity - Right CAR    Interpretation Summary    Chronic right lower extremity superficial thrombophlebitis noted in the small saphenous.    All other right sided  veins appeared normal.    Results for orders placed during the hospital encounter of 01/21/23    Adult Transthoracic Echo Complete W/ Cont if Necessary Per Protocol    Interpretation Summary    Left ventricular systolic function is mildly decreased. Calculated left ventricular EF = 42.8% Left ventricular ejection fraction appears to be 41 - 45%.    The following left ventricular wall segments are hypokinetic: mid anterior, apical anterior, basal anterolateral, mid anterolateral, apical lateral, basal inferolateral, mid inferolateral, apical inferior, mid inferior, apical septal, basal inferoseptal, mid inferoseptal, apex hypokinetic, mid anteroseptal, basal anterior, basal inferior and basal inferoseptal.    Left ventricular diastolic function is consistent with (grade I) impaired relaxation.    Mildly reduced right ventricular systolic function noted.    The right ventricular cavity is moderately dilated.    The right atrial cavity is moderately  dilated.    There is moderate, bileaflet mitral valve thickening present.    Mild mitral valve stenosis is present. The mitral valve peak gradient is 8 mmHg. The mitral valve mean gradient is 3 mmHg.    Estimated right ventricular systolic pressure from tricuspid regurgitation is mildly elevated (35-45 mmHg).    Pertinent Labs     Results from last 7 days   Lab Units 11/11/23  0608 11/10/23  0545 11/09/23  0535 11/08/23  1444   WBC 10*3/mm3 7.11 5.18 4.43 4.68   HEMOGLOBIN g/dL 13.0 13.0 13.3 13.5   PLATELETS 10*3/mm3 252 256 262 283     Results from last 7 days   Lab Units 11/11/23  0608 11/10/23  0545 11/10/23  0023 11/09/23  1156 11/09/23  0535   SODIUM mmol/L 134* 138  --  140 137   POTASSIUM mmol/L 5.1 5.1 4.4 3.3* 3.1*   CHLORIDE mmol/L 94* 96*  --  92* 93*   CO2 mmol/L 34.0* 32.4*  --  35.6* 30.7*   BUN mg/dL 39* 37*  --  39* 39*   CREATININE mg/dL 1.80* 1.90*  --  1.86* 1.97*   GLUCOSE mg/dL 167* 131*  --  125* 125*   EGFR mL/min/1.73 29.1* 27.3*  --  28.0* 26.1*  "    Results from last 7 days   Lab Units 11/11/23  0608 11/10/23  0545 11/09/23  0535 11/08/23  1444   ALBUMIN g/dL 3.8 3.6 3.6 4.1   BILIRUBIN mg/dL 2.5* 2.4* 2.7* 3.1*   ALK PHOS U/L 170* 144* 133* 134*   AST (SGOT) U/L 25 25 28 35*   ALT (SGPT) U/L 15 11 13 18     Results from last 7 days   Lab Units 11/11/23  0608 11/10/23  0545 11/09/23  1156 11/09/23  0535 11/08/23  1444   CALCIUM mg/dL 9.7 9.1 9.8 9.3 9.5   ALBUMIN g/dL 3.8 3.6  --  3.6 4.1   MAGNESIUM mg/dL 1.7  --   --  2.3  --    PHOSPHORUS mg/dL 2.8 3.4 3.7 3.6  --      Results from last 7 days   Lab Units 11/08/23  1444   AMMONIA umol/L 21     Results from last 7 days   Lab Units 11/09/23  0738 11/09/23  0535 11/08/23  1444   HSTROP T ng/L 31* 34*  --    PROBNP pg/mL  --   --  5,929.0*           Invalid input(s): \"LDLCALC\"  Results from last 7 days   Lab Units 11/08/23  1946 11/08/23  1454 11/08/23  1444   BLOODCX   --  No growth at 2 days No growth at 2 days   URINECX  No growth  --   --      Results from last 7 days   Lab Units 11/09/23  0633   COVID19  Not Detected       Test Results Pending at Discharge     Pending Labs       Order Current Status    Blood Culture - Blood, Arm, Left Preliminary result    Blood Culture - Blood, Arm, Left Preliminary result            Discharge Details        Discharge Medications        New Medications        Instructions Start Date   albuterol sulfate  (90 Base) MCG/ACT inhaler  Commonly known as: PROVENTIL HFA;VENTOLIN HFA;PROAIR HFA   2 puffs, Inhalation, Every 4 Hours PRN      furosemide 20 MG tablet  Commonly known as: LASIX   60 mg, Oral, 2 Times Daily      levothyroxine 25 MCG tablet  Commonly known as: SYNTHROID, LEVOTHROID   25 mcg, Oral, Every Early Morning   Start Date: November 12, 2023            Changes to Medications        Instructions Start Date   spironolactone 25 MG tablet  Commonly known as: ALDACTONE  What changed:   medication strength  how much to take   25 mg, Oral, Daily   Start Date: " November 12, 2023            Continue These Medications        Instructions Start Date   acetaminophen 650 MG 8 hr tablet  Commonly known as: TYLENOL   1,300 mg, Oral, Every 8 Hours PRN      allopurinol 100 MG tablet  Commonly known as: ZYLOPRIM   300 mg, Oral, Daily      apixaban 5 MG tablet tablet  Commonly known as: ELIQUIS   5 mg, Oral, 2 Times Daily      atorvastatin 10 MG tablet  Commonly known as: LIPITOR   10 mg, Oral, Daily      docusate sodium 100 MG capsule  Commonly known as: COLACE   100 mg, Oral, 2 Times Daily      DULoxetine 60 MG capsule  Commonly known as: CYMBALTA   60 mg, Oral, Daily      HYDROcodone-acetaminophen  MG per tablet  Commonly known as: NORCO   1 tablet, Oral, Every 4 Hours PRN      insulin detemir 100 UNIT/ML injection  Commonly known as: LEVEMIR   20 Units, Subcutaneous, Nightly      iVIZIA Dry Eyes 0.5 % solution  Generic drug: Povidone (PF)   0.5 %, Ophthalmic, 2 Times Daily      metoprolol succinate XL 25 MG 24 hr tablet  Commonly known as: TOPROL-XL   25 mg, Oral, 2 Times Daily      midodrine 5 MG tablet  Commonly known as: PROAMATINE   5 mg, Oral, 3 Times Daily Before Meals      NovoLOG FlexPen 100 UNIT/ML solution pen-injector sc pen  Generic drug: insulin aspart   Novolog FlexPen U-100 Insulin aspart 100 unit/mL (3 mL) subcutaneous   Sliding scale.      pantoprazole 40 MG EC tablet  Commonly known as: PROTONIX   40 mg, Oral, Daily      Trelegy Ellipta 100-62.5-25 MCG/INH inhaler  Generic drug: Fluticasone-Umeclidin-Vilant   No dose, route, or frequency recorded.             Stop These Medications      Diclofenac Sodium 1 % gel gel  Commonly known as: VOLTAREN     torsemide 20 MG tablet  Commonly known as: DEMADEX              Allergies   Allergen Reactions    Ciprofloxacin        Discharge Disposition:  Home or Self Care      Discharge Diet:  Diet Order   Procedures    Diet: Cardiac Diets, Diabetic Diets, Renal Diets; Healthy Heart (2-3 Na+); Consistent Carbohydrate; Low  Potassium; Texture: Regular Texture (IDDSI 7); Fluid Consistency: Thin (IDDSI 0)       Discharge Activity:   Activity Instructions       Activity as Tolerated              CODE STATUS:    Code Status and Medical Interventions:   Ordered at: 11/08/23 1930     Code Status (Patient has no pulse and is not breathing):    CPR (Attempt to Resuscitate)     Medical Interventions (Patient has pulse or is breathing):    Full Support       No future appointments.  Additional Instructions for the Follow-ups that You Need to Schedule       Discharge Follow-up with PCP   As directed       Currently Documented PCP:    Bennett Duque DO    PCP Phone Number:    [phone]     Follow Up Details: 1 week        Discharge Follow-up with Specified Provider: ; 2 Weeks   As directed      To:    Follow Up: 2 Weeks               Contact information for follow-up providers       Bennett Duque DO .    Specialty: Internal Medicine  Why: 1 week  Contact information:  [address]  [address]  [facility] [id number] [phone]               Marco Mayes MD .    Specialty: Family Medicine  Why: 1 week  Contact information:  [address]  [facility] [id number] [phone]                       Contact information for after-discharge care       Home Medical Care       [facility]    Service: [facility] Services  Contact information:  [address]  [facility] [address] [phone]                                   Additional Instructions for the Follow-ups that You Need to Schedule       Discharge Follow-up with PCP   As directed       Currently Documented PCP:    Bennett Duque DO    PCP Phone Number:    [phone]     Follow Up Details: 1 week        Discharge Follow-up with Specified Provider: ; 2 Weeks   As directed      To:    Follow Up: 2 Weeks            Time Spent on Discharge:  Greater than 30 minutes      Buster Leon  MD Rol[place]  [facility]  11/11/23  11:20 EST

## 2023-11-11 NOTE — PROGRESS NOTES
[facility] Progress Note      Patient Name: Aydee Solis  : 1950  MRN: [id number]  Primary Care Physician:  Bennett Duque DO  Date of admission: 2023    Subjective     Interval History:   Eager to go home  UOP yesterday 3.6 L  Denies shortness of breath, chest pain, orthopnea.    Leg swelling steadily better    Review of Systems:   As noted above    Objective     Vitals:   Temp:  [97.2 °F (36.2 °C)-97.7 °F (36.5 °C)] 97.2 °F (36.2 °C)  Heart Rate:  [60-67] 63  Resp:  [14-20] 14  BP: (107-132)/(71-81) 107/77  Flow (L/min):  [2] 2    Intake/Output Summary (Last 24 hours) at 2023 1207  Last data filed at 11/10/2023 2221  Gross per 24 hour   Intake 480 ml   Output 3150 ml   Net -2670 ml       Physical Exam:    Constitutional: Awake, alert, NAD, chronically ill, hyperpigmented skin  HEENT: Sclera anicteric, no conjunctival injection  Neck: Supple, no carotid bruit, trachea at midline, no JVD  Resp: lungs clear anteriorly, no wheezing, nonlabored on 2 L/min   Cardiovascular: RRR, no rub  Gastrointestinal: Soft, BS +, nontender and nondistended  : No palpable bladder  Musculoskeletal: +1 woody edema, no clubbing or cyanosis  Psychiatric: normal mood and mentation, oriented x4  Neurologic: No asterixis, moving all extremities, normal speech   Skin: Warm and dry; hyperpigmented       Scheduled Meds:     allopurinol, 100 mg, Oral, Daily  apixaban, 5 mg, Oral, BID  atorvastatin, 10 mg, Oral, Daily  budesonide-formoterol, 2 puff, Inhalation, BID - RT   And  tiotropium bromide monohydrate, 2 puff, Inhalation, Daily - RT  docusate sodium, 100 mg, Oral, BID  furosemide, 60 mg, Oral, BID  insulin glargine, 16 Units, Subcutaneous, Nightly  insulin lispro, 2-9 Units, Subcutaneous, 4x Daily AC & at Bedtime  levothyroxine, 25 mcg, Oral, Q AM  metoprolol succinate XL, 25 mg, Oral, BID  midodrine, 5 mg, Oral, TID AC  Povidone (PF), 1 Application, Both Eyes, BID  senna-docusate  sodium, 2 tablet, Oral, BID  sodium chloride, 10 mL, Intravenous, Q12H  spironolactone, 25 mg, Oral, Daily      IV Meds:        Results Reviewed:   I have personally reviewed the results from the time of this admission to 11/11/2023 12:07 EST     Results from last 7 days   Lab Units 11/11/23  0608 11/10/23  0545 11/10/23  0023 11/09/23  1156 11/09/23  0535   SODIUM mmol/L 134* 138  --  140 137   POTASSIUM mmol/L 5.1 5.1 4.4 3.3* 3.1*   CHLORIDE mmol/L 94* 96*  --  92* 93*   CO2 mmol/L 34.0* 32.4*  --  35.6* 30.7*   BUN mg/dL 39* 37*  --  39* 39*   CREATININE mg/dL 1.80* 1.90*  --  1.86* 1.97*   CALCIUM mg/dL 9.7 9.1  --  9.8 9.3   BILIRUBIN mg/dL 2.5* 2.4*  --   --  2.7*   ALK PHOS U/L 170* 144*  --   --  133*   ALT (SGPT) U/L 15 11  --   --  13   AST (SGOT) U/L 25 25  --   --  28   GLUCOSE mg/dL 167* 131*  --  125* 125*       Estimated Creatinine Clearance: 34.8 mL/min (A) (by C-G formula based on SCr of 1.8 mg/dL (H)).    Results from last 7 days   Lab Units 11/11/23  0608 11/10/23  0545 11/09/23  1156 11/09/23  0535   MAGNESIUM mg/dL 1.7  --   --  2.3   PHOSPHORUS mg/dL 2.8 3.4 3.7 3.6             Results from last 7 days   Lab Units 11/11/23  0608 11/10/23  0545 11/09/23  0535 11/08/23  1444   WBC 10*3/mm3 7.11 5.18 4.43 4.68   HEMOGLOBIN g/dL 13.0 13.0 13.3 13.5   PLATELETS 10*3/mm3 252 256 262 283       Results from last 7 days   Lab Units 11/09/23  0535   INR  2.87*       Assessment / Plan     ASSESSMENT:  MELANIE on CKD3b, nonoliguric, creatinine unchanged:  prerenal due to modest hypotension and CHF.  ROS negative for obstructive symptoms.  Volume excess by exam but improving; high/normal potassium. Urinalysis bland with no blood, protein, or cells  Severe hypothyroidism with TSH 33.5  Acute on chronic CHF  COPD/PAULETTE/severe pulmonary hypertension - on continuous O2 3L  Confusion, resolved  Elevated troponin - improving        PLAN:  Continue furosemide 60 mg twice daily  Spironolactone 25 mg daily  Home anytime  from renal view      Thank you for involving us in the care of Aydee Solis.  Please feel free to call with any questions.    Mason Myers MD  11/11/23  12:07 [facility]    [facility]  [phone]    Parts of this note may be an electronic transcription/translation of spoken language to printed text using the Dragon dictation system.

## 2023-11-11 NOTE — PLAN OF CARE
Goal Outcome Evaluation:  Plan of Care Reviewed With: patient        Progress: no change  Outcome Evaluation: Patients vitals stable. Patient denies pain. Patien had wash up at sink this shift and no complaints or changes. Will continue to monitor.

## 2023-11-13 ENCOUNTER — READMISSION MANAGEMENT (OUTPATIENT)
Dept: CALL CENTER | Facility: HOSPITAL | Age: 73
End: 2023-11-13
Payer: MEDICARE

## 2023-11-13 LAB
BACTERIA SPEC AEROBE CULT: NORMAL
BACTERIA SPEC AEROBE CULT: NORMAL

## 2023-11-13 NOTE — OUTREACH NOTE
CHF Week 1 Survey      Flowsheet Row Responses   [facility] patient discharged from? [place]   Does the patient have one of the following disease processes/diagnoses(primary or secondary)? CHF   CHF Week 1 attempt successful? Yes   Call start time 1003   Call end time 1009   General alerts for this patient .   Discharge diagnosis AMS (altered mental status)   Meds reviewed with patient/caregiver? Yes   Is the patient having any side effects they believe may be caused by any medication additions or changes? No   Does the patient have all medications ordered at discharge? Yes   Prescription comments .   Is the patient taking all medications as directed (includes completed medication regime)? Yes   Comments regarding appointments Pt referred to [facility], unable to transfer r/t pt ending call.Message sent to [facility] w/pt's information to schedule appt.   Does the patient have a primary care provider?  Yes   Does the patient have an appointment with their PCP within 7 days of discharge? Yes   Comments regarding PCP Pt reports that she has a f/u appt with Cardiology today at 11am   Has the patient kept scheduled appointments due by today? N/A   What is the Home health agency?  [facility] [place]   Has home health visited the patient within 72 hours of discharge? No   Comments Pt reports that her confusion is improved as has her SOA. Pt denies SOA w/exertion. She has not weighed at home, she reports.   Did the patient receive a copy of their discharge instructions? Yes   What is the patient's perception of their health status since discharge? Improving   Nursing interventions Nurse provided patient education    CHF Week 1 call completed? Yes   Is the patient interested in additional calls from an ambulatory ? No   Wrap up additional comments Pt ended call abruptly as she was preparing for her appt   Call end time 1009            Lorena FERRARO - Registered Nurse

## 2023-11-13 NOTE — CASE MANAGEMENT/SOCIAL WORK
Case Management Discharge Note      [facility]    Provided Post Acute Provider List?: Refused  Refused Provider List Comment: declined    Selected Continued Care - Discharged on 11/11/2023 Admission date: 11/8/2023 - Discharge disposition: Home or Self Care      Destination    No services have been selected for the patient.                Durable Medical Equipment    No services have been selected for the patient.                Dialysis/Infusion    No services have been selected for the patient.                Home Medical Care Coordination complete.      Service Provider Selected Services Address Phone Fax Patient Preferred    [facility] ([place],[facility] [address] [facility] ([place], [facility] [address] [phone] --              Therapy    No services have been selected for the patient.                Community Resources    No services have been selected for the patient.                Community & DME    No services have been selected for the patient.                    Selected Continued Care - Prior Encounters Includes continued care and service providers with selected services from prior encounters from 8/10/2023 to 11/11/2023      Discharged on 9/6/2023 Admission date: 8/29/2023 - Discharge disposition: Home-Health Care [facility]      Home Medical Care       Service Provider Selected Services Address Phone Fax Patient Preferred    [facility] ([place],[facility] [address] [facility] ([place], [facility] [address] [phone] --                          Transportation Services  Private: Car    Final Discharge Disposition Code: 06 - home with home health care

## 2024-08-24 NOTE — DISCHARGE PLACEMENT REQUEST
"Aydee Solis (73 y.o. Female)       Date of Birth   1950    Social Security Number       Address   [address] [id number]    Home Phone   [phone]    MRN   [id number]       Jainism   Spiritism    Marital Status                               Admission Date   7/31/23    Admission Type   Emergency    Admitting Provider   Connor Avery MD    Attending Provider   Robi Vital MD    Department, Room/Bed   [facility] OBSERVATION, 124/1       Discharge Date       Discharge Disposition       Discharge Destination                                 Attending Provider: Robi Vital MD    Allergies: Ciprofloxacin    Isolation: None   Infection: None   Code Status: CPR    Ht: 167.6 cm (66\")   Wt: 120 kg (264 lb 1.8 oz)    Admission Cmt: None   Principal Problem: Acute renal insufficiency [N28.9]                   Active Insurance as of 7/31/2023       Primary Coverage       Payor Plan Insurance Group Employer/Plan Group    MEDICARE MEDICARE A & B        Payor Plan Address Payor Plan Phone Number Payor Plan Fax Number Effective Dates    [address] [phone]  7/1/2013 - None Entered    [facility] [id number]         Subscriber Name Subscriber Birth Date Member ID       AYDEE SOLIS W 1950 [id number]               Secondary Coverage       Payor Plan Insurance Group Employer/Plan Group    [facility] SUPP [id number]       Payor Plan Address Payor Plan Phone Number Payor Plan Fax Number Effective Dates    [address]   12/1/2016 - None Entered    [facility] [id number]         Subscriber Name Subscriber Birth Date Member ID       AYDEE SOLIS W 1950 [id number]                     Emergency Contacts        (Rel.) Home Phone Work Phone Mobile Phone    Kyle Solis (Spouse) [phone] -- [phone]                "
Abormal VS: Temp > 100F or < 96.8F; SBP < 90 mmHG; HR > 120bpm; Resp > 24/min